# Patient Record
Sex: FEMALE | Race: WHITE | NOT HISPANIC OR LATINO | Employment: OTHER | URBAN - METROPOLITAN AREA
[De-identification: names, ages, dates, MRNs, and addresses within clinical notes are randomized per-mention and may not be internally consistent; named-entity substitution may affect disease eponyms.]

---

## 2017-01-04 RX ORDER — NITROGLYCERIN 0.4 MG/1
0.4 TABLET SUBLINGUAL PRN
Qty: 25 TAB | Refills: 2 | Status: SHIPPED | OUTPATIENT
Start: 2017-01-04 | End: 2017-09-12 | Stop reason: SDUPTHER

## 2017-01-04 NOTE — TELEPHONE ENCOUNTER
Last seen: 7/19/16 by Dr. Bahena  Next appt: 1/9/17 with Dr. Bahena    Was the patient seen in the last year in this department? Yes   Does patient have an active prescription for medications requested? No   Received Request Via: Pharmacy

## 2017-01-09 ENCOUNTER — OFFICE VISIT (OUTPATIENT)
Dept: INTERNAL MEDICINE | Facility: MEDICAL CENTER | Age: 67
End: 2017-01-09
Payer: MEDICARE

## 2017-01-09 VITALS
WEIGHT: 117.6 LBS | HEIGHT: 60 IN | HEART RATE: 78 BPM | BODY MASS INDEX: 23.09 KG/M2 | RESPIRATION RATE: 18 BRPM | DIASTOLIC BLOOD PRESSURE: 78 MMHG | TEMPERATURE: 98.4 F | SYSTOLIC BLOOD PRESSURE: 125 MMHG | OXYGEN SATURATION: 95 %

## 2017-01-09 DIAGNOSIS — Z00.00 PREVENTATIVE HEALTH CARE: ICD-10-CM

## 2017-01-09 DIAGNOSIS — M13.0 POLYARTHRITIS: ICD-10-CM

## 2017-01-09 DIAGNOSIS — Z79.891 OPIATE ANALGESIC USE AGREEMENT EXISTS: ICD-10-CM

## 2017-01-09 DIAGNOSIS — M54.41 CHRONIC MIDLINE LOW BACK PAIN WITH RIGHT-SIDED SCIATICA: ICD-10-CM

## 2017-01-09 DIAGNOSIS — G89.29 CHRONIC MIDLINE LOW BACK PAIN WITH RIGHT-SIDED SCIATICA: ICD-10-CM

## 2017-01-09 DIAGNOSIS — J44.9 CHRONIC OBSTRUCTIVE PULMONARY DISEASE, UNSPECIFIED COPD TYPE (HCC): ICD-10-CM

## 2017-01-09 DIAGNOSIS — Z79.4 TYPE 2 DIABETES MELLITUS WITHOUT COMPLICATION, WITH LONG-TERM CURRENT USE OF INSULIN (HCC): ICD-10-CM

## 2017-01-09 DIAGNOSIS — I63.9 CEREBROVASCULAR ACCIDENT (CVA), UNSPECIFIED MECHANISM (HCC): ICD-10-CM

## 2017-01-09 DIAGNOSIS — I50.9 CHRONIC CONGESTIVE HEART FAILURE, UNSPECIFIED CONGESTIVE HEART FAILURE TYPE: ICD-10-CM

## 2017-01-09 DIAGNOSIS — E11.9 TYPE 2 DIABETES MELLITUS WITHOUT COMPLICATION, WITH LONG-TERM CURRENT USE OF INSULIN (HCC): ICD-10-CM

## 2017-01-09 PROCEDURE — 90715 TDAP VACCINE 7 YRS/> IM: CPT | Performed by: INTERNAL MEDICINE

## 2017-01-09 PROCEDURE — 90472 IMMUNIZATION ADMIN EACH ADD: CPT | Performed by: INTERNAL MEDICINE

## 2017-01-09 PROCEDURE — G0009 ADMIN PNEUMOCOCCAL VACCINE: HCPCS | Performed by: INTERNAL MEDICINE

## 2017-01-09 PROCEDURE — 99214 OFFICE O/P EST MOD 30 MIN: CPT | Mod: 25,GC | Performed by: INTERNAL MEDICINE

## 2017-01-09 PROCEDURE — 90670 PCV13 VACCINE IM: CPT | Performed by: INTERNAL MEDICINE

## 2017-01-09 RX ORDER — HYDROCODONE BITARTRATE AND ACETAMINOPHEN 7.5; 325 MG/1; MG/1
1 TABLET ORAL EVERY 8 HOURS PRN
Qty: 90 TAB | Refills: 3 | Status: SHIPPED | OUTPATIENT
Start: 2017-01-30 | End: 2017-03-03 | Stop reason: SDUPTHER

## 2017-01-09 RX ORDER — BUTALBITAL, ACETAMINOPHEN AND CAFFEINE 50; 325; 40 MG/1; MG/1; MG/1
1 TABLET ORAL
Qty: 30 TAB | Refills: 3 | Status: SHIPPED | OUTPATIENT
Start: 2017-01-09 | End: 2017-04-07 | Stop reason: SDUPTHER

## 2017-01-09 RX ORDER — BUTALBITAL, ACETAMINOPHEN AND CAFFEINE 50; 325; 40 MG/1; MG/1; MG/1
1 TABLET ORAL
Refills: 0 | COMMUNITY
Start: 2016-12-30 | End: 2017-01-09 | Stop reason: SDUPTHER

## 2017-01-09 RX ORDER — BUTALBITAL, ACETAMINOPHEN AND CAFFEINE 50; 325; 40 MG/1; MG/1; MG/1
1 TABLET ORAL
Qty: 30 TAB | Refills: 3 | Status: SHIPPED | OUTPATIENT
Start: 2017-01-09 | End: 2017-01-09 | Stop reason: SDUPTHER

## 2017-01-09 RX ORDER — HYDROCODONE BITARTRATE AND ACETAMINOPHEN 7.5; 325 MG/1; MG/1
1 TABLET ORAL EVERY 8 HOURS PRN
Qty: 90 TAB | Refills: 3 | Status: SHIPPED | OUTPATIENT
Start: 2017-01-30 | End: 2017-01-09 | Stop reason: SDUPTHER

## 2017-01-09 NOTE — MR AVS SNAPSHOT
"        Leena Bella   2017 1:15 PM   Office Visit   MRN: [id number]    Department:  [facility]   Dept Phone:  [phone]    Description:  Female : 1950   Provider:  Adriano Bahena M.D.           Reason for Visit     Follow-Up           Allergies as of 2017     Allergen Noted Reactions    Cozaar [Losartan] 2016       Darvocet [Propoxyphene N-Apap] 2016       Lexapro 2016       Lisinopril 2016       Cephalexin 2014       Rash - \"looks like I have AIDS\"    Morphine 10/12/2008       Heart stopped?    Other Environmental 10/20/2011   Rash    tape    Penicillin G Potassium 10/12/2008   Rash    Has tolerated Unasyn in May 2014 and received amoxicillin on 12/29/15 without reaction    Sulfa Drugs 10/18/2011   Rash      You were diagnosed with     Chronic obstructive pulmonary disease, unspecified COPD type ([facility])   [4781489]       Cerebrovascular accident (CVA), unspecified mechanism ([facility])   [[id number]]       Type 2 diabetes mellitus without complication, with long-term current use of insulin ([facility])   [[id number]]       Chronic congestive heart failure, unspecified congestive heart failure type ([facility])   [[id number]]       Opiate analgesic use agreement exists   [8720971]       Polyarthritis   [115620]       Chronic midline low back pain with right-sided sciatica   [[id number]]       Preventative health care   [[id number]]         Vital Signs     Blood Pressure Pulse Temperature Respirations Height Weight    125/78 mmHg 78 36.9 °C (98.4 °F) 18 1.524 m (5') 53.343 kg (117 lb 9.6 oz)    Body Mass Index Oxygen Saturation Last Menstrual Period Breastfeeding? Smoking Status       22.97 kg/m2 95% 06/15/1996 No Former Smoker       Basic Information     Date Of Birth Sex Race Ethnicity Preferred Language    1950 Female White Non- English      Your appointments     2017  1:00 PM   Follow Up Visit with Maria A Bajwa M.D.   [facility] (--)    [address] , [address]  [facility] [id number] [phone]           You will be receiving a confirmation call a few days before your appointment from our automated call confirmation system.            Feb 14, 2017  2:40 PM   BOTOX with Melissa P Bloch, M.D.   [facility] (--)    [address], [address]  [facility] [id number] [phone]              Problem List              ICD-10-CM Priority Class Noted - Resolved    CAD (coronary artery disease) I25.10   10/18/2011 - Present    Ischemic cardiomyopathy I25.5   10/18/2011 - Present    Type 2 diabetes mellitus ([facility]) E11.9   10/18/2011 - Present    CHF (congestive heart failure) ([facility]) I50.9   10/18/2011 - Present    Migraines G43.909   12/23/2013 - Present    Polyarthralgia M25.50   11/3/2015 - Present    Chronic fatigue R53.82   11/3/2015 - Present    Dyspnea R06.00   11/3/2015 - Present    Skin rash R21   11/3/2015 - Present    Osteoporosis M81.0   11/3/2015 - Present    Vitamin D deficiency E55.9   11/11/2015 - Present    CVA (cerebral vascular accident) ([facility]) I63.9   6/4/2016 - Present    Allergic rhinitis J30.9   6/4/2016 - Present    Automatic implantable cardioverter-defibrillator in situ Z95.810   6/4/2016 - Present    Osteoarthritis M19.90   6/4/2016 - Present    Labial abscess N76.4   6/4/2016 - Present    Nicotine dependence F17.200   6/4/2016 - Present    Diabetic neuropathy ([facility]) E11.40   6/4/2016 - Present    GERD (gastroesophageal reflux disease) K21.9   6/4/2016 - Present    RLS (restless legs syndrome) G25.81   6/4/2016 - Present    PEYTON (obstructive sleep apnea) G47.33   6/4/2016 - Present    COPD (chronic obstructive pulmonary disease) ([facility]) J44.9   6/4/2016 - Present    Anxiety disorder F41.9   6/4/2016 - Present    Dyslipidemia E78.5   6/4/2016 - Present    Abscess L02.91   6/4/2016 - Present    Hx of coronary artery bypass graft Z95.1   6/4/2016 - Present    HTN (hypertension) I10   6/4/2016 - Present    Opiate analgesic use agreement  exists Z02.89   1/9/2017 - Present      Health Maintenance        Date Due Completion Dates    DIABETES MONOFILAMTENT / LE EXAM 1950 ---    RETINAL SCREENING 4/6/1967 ---    PAP SMEAR 4/6/1970 ---    COLONOSCOPY 4/6/1999 ---    URINE ACR / MICROALBUMIN 12/14/2013 12/14/2012, 10/17/2011, 7/11/2011, 4/12/2010, 9/22/2008    A1C SCREENING 3/10/2016 9/10/2015, 8/19/2014, 5/28/2014, 5/28/2014, 12/14/2012, 3/9/2012, 3/7/2012, 10/17/2011, 7/11/2011, 7/26/2010, 4/12/2010, 8/7/2009, 9/22/2008, 2/13/2007, 5/29/2006    MAMMOGRAM 2/3/2017 2/3/2016, 11/5/2012    FASTING LIPID PROFILE 2/11/2017 2/11/2016, 9/10/2015, 3/10/2015, 8/19/2014, 5/28/2014, 5/28/2014, 12/14/2012, 12/14/2012, 7/16/2012, 3/7/2012, 7/11/2011, 7/26/2010, 4/12/2010, 8/7/2009, 9/22/2008, 2/13/2007, 5/29/2006    IMM PNEUMOCOCCAL 65+ (ADULT) LOW/MEDIUM RISK SERIES (2 of 2 - PCV13) 12/5/2017 12/5/2016    SERUM CREATININE 12/15/2017 12/15/2016, 11/9/2016, 4/1/2016, 11/6/2015, 9/10/2015, 3/10/2015, 12/17/2014, 8/19/2014, 8/1/2014, 6/2/2014, 6/1/2014, 5/31/2014, 5/30/2014, 5/29/2014, 5/28/2014, 5/27/2014, 10/10/2013, 10/9/2013, 10/8/2013, 12/14/2012, 10/30/2012, 7/16/2012, 3/9/2012, 3/8/2012, 3/7/2012, 3/6/2012, 10/20/2011, 10/17/2011, 7/11/2011, 7/26/2010, 4/12/2010, 8/7/2009, 9/22/2008, 2/13/2007, 2/12/2007, 7/17/2006, 5/30/2006, 5/29/2006, 5/28/2006    BONE DENSITY 11/30/2020 11/30/2015    IMM DTaP/Tdap/Td Vaccine (2 - Td) 3/3/2025 3/3/2015            Current Immunizations     13-VALENT PCV PREVNAR  Incomplete    Influenza TIV (IM) 10/9/2013  6:30 PM, 11/11/2011    Influenza Vaccine Quad Inj (Preserved) 12/5/2016    Pneumococcal Vaccine (UF)Historical Data 11/11/2011    Pneumococcal polysaccharide vaccine (PPSV-23) 12/5/2016    SHINGLES VACCINE 10/11/2015    Tdap Vaccine  Incomplete, 3/3/2015      Below and/or attached are the medications your provider expects you to take. Review all of your home medications and newly ordered medications with your provider  and/or pharmacist. Follow medication instructions as directed by your provider and/or pharmacist. Please keep your medication list with you and share with your provider. Update the information when medications are discontinued, doses are changed, or new medications (including over-the-counter products) are added; and carry medication information at all times in the event of emergency situations     Allergies:  COZAAR - (reactions not documented)     DARVOCET - (reactions not documented)     LEXAPRO - (reactions not documented)     LISINOPRIL - (reactions not documented)     CEPHALEXIN - (reactions not documented)     MORPHINE - (reactions not documented)     OTHER ENVIRONMENTAL - Rash     PENICILLIN G POTASSIUM - Rash     SULFA DRUGS - Rash               Medications  Valid as of: January 09, 2017 -  2:41 PM    Generic Name Brand Name Tablet Size Instructions for use    Amiodarone HCl (Tab) CORDARONE 200 MG Take 200 mg by mouth every day.        Aspirin (Tab) aspirin 81 MG Take 325 mg by mouth every day.        BuPROPion HCl (TABLET SR 12 HR) WELLBUTRIN- MG Take 1 Tab by mouth 2 times a day.        Butalbital-APAP-Caffeine (Tab) FIORICET -40 MG Take 1 Tab by mouth every day.        Calcium Carbonate-Vitamin D (Tab) OSCAL 500 +D 500-200 MG-UNIT Take 1 Tab by mouth 2 times a day, with meals.        Calcium Carbonate-Vitamin D (Tab) OSCAL 500 +D 500-200 MG-UNIT Take 1 Tab by mouth 2 times a day, with meals.        Carvedilol (Tab) COREG 12.5 MG TAKE 1 TAB BY MOUTH 2 TIMES A DAY.        Cetirizine HCl (Tab) ZYRTEC 10 MG Take 1 Tab by mouth 1 time daily as needed for Allergies.        Clopidogrel Bisulfate (Tab) PLAVIX 75 MG TAKE 1 TAB BY MOUTH EVERY DAY.        Ferrous Sulfate (Tab) Iron 325 (65 FE) MG every day.         Fluticasone Propionate (Suspension) FLONASE 50 MCG/ACT         Furosemide (Tab) LASIX 20 MG TAKE 1 TAB BY MOUTH EVERY DAY.        Gabapentin (Tab) NEURONTIN 600 MG TAKE 1 TABLET BY MOUTH  TWICE A DAY        Hydrocodone-Acetaminophen (Tab) NORCO 7.5-325 MG Take 1 Tab by mouth every 8 hours as needed for Severe Pain.        Hydroxychloroquine Sulfate (Tab) PLAQUENIL 200 MG Take 1 Tab by mouth every day.        Insulin Glargine (Solution Pen-injector) LANTUS 100 UNIT/ML INJECT 20 UNITS SUBCUTANEOUSLY DAILY AT BEDTIME AS DIRECTED        Insulin Pen Needle (Misc) Insulin Pen Needle 31G X 8 MM Use to inject insulin daily.        Ipratropium-Albuterol (Aerosol) COMBIVENT  MCG/ACT Inhale 2 Puffs by mouth 4 times a day.        MetFORMIN HCl (Tab) GLUCOPHAGE 1000 MG Take 1 Tab by mouth 2 times a day.        Nitroglycerin (SL Tab) NITROSTAT 0.4 MG Place 1 Tab under tongue as needed for Chest Pain. Max dose of 3 in 24 hrs until need for ED.        Pantoprazole Sodium (Tablet Delayed Response) PROTONIX 40 MG TAKE 1 TAB BY MOUTH EVERY DAY.        Potassium Chloride (Cap CR) MICRO-K 10 MEQ TAKE ONE CAPSULE BY MOUTH TWICE DAILY        ROPINIRole HCl (Tab) REQUIP 0.5 MG Take 1 Tab by mouth every bedtime.        Rosuvastatin Calcium (Tab) CRESTOR 40 MG Take 1 Tab by mouth every day.        Topiramate (Tab) TOPAMAX 100 MG TAKE 1 TAB BY MOUTH EVERY 12 HOURS.        .                 Medicines prescribed today were sent to:     [id number] - [place] - [address]    [address] [address]    Phone: [phone] Fax: [phone]    Open 24 Hours?: No      Medication refill instructions:       If your prescription bottle indicates you have medication refills left, it is not necessary to call your provider’s office. Please contact your pharmacy and they will refill your medication.    If your prescription bottle indicates you do not have any refills left, you may request refills at any time through one of the following ways: The online [employer] system (except Urgent Care), by calling your provider’s office, or by asking your pharmacy to contact your provider’s office with a refill request.  Medication refills are processed only during regular business hours and may not be available until the next business day. Your provider may request additional information or to have a follow-up visit with you prior to refilling your medication.   *Please Note: Medication refills are assigned a new Rx number when refilled electronically. Your pharmacy may indicate that no refills were authorized even though a new prescription for the same medication is available at the pharmacy. Please request the medicine by name with the pharmacy before contacting your provider for a refill.        Your To Do List     Future Labs/Procedures Complete By Expires    CBC WITH DIFFERENTIAL  As directed 1/9/2018    COMP METABOLIC PANEL  As directed 1/9/2018    HEMOGLOBIN A1C  As directed 1/9/2018      Instructions    Insulin Resistance  Insulin is a hormone made by the pancreas that controls blood sugar (glucose) levels. It is needed for your body to function normally. Insulin resistance occurs when your body does not respond well to the insulin made by your pancreas. Insulin resistance results in high blood glucose levels and can lead to problems, including:  · Prediabetes.  · Type 2 diabetes.  · Heart disease.  · High blood pressure (hypertension).  · Stroke.  · Polycystic ovary syndrome.  · Fatty liver.  CAUSES   The exact causes is unknown.  RISK FACTORS  · Being overweight or obese, especially if your weight is centered in the waist area.  · Physical inactivity.  SIGNS AND SYMPTOMS   There are usually no symptoms.  DIAGNOSIS   There is no test to diagnose insulin resistance. However, your health care provider may suspect that you have insulin resistance if you have:  · High blood glucose levels.  · Abnormal cholesterol levels.  · A waist circumference of more than 35 inches for women and more than 40 inches for men.  · The risk factors for insulin resistance.  To make a diagnosis, your health care provider will perform a physical  exam. During the exam, he or she will ask you questions about your health and medical history.  TREATMENT   The most important treatment is lifestyle changes. Your health care provider can help you make the changes that are appropriate. These may include:  · Eating less.  · Being physically active.  · Stopping the use of any tobacco products.  · Taking medicine to help improve your insulin sensitivity.  HOME CARE INSTRUCTIONS   · Eat a healthy diet.  ¨ Choose healthy portion sizes.  ¨ Eat more vegetables.  ¨ Drink more water.  ¨ Cut back on high-fat toppings.  ¨ Eat foods that are low in fat, such as fruits, vegetables, and lean meats.  · Be physically active to reach and maintain a healthy weight.  ¨ Exercise 4 days a week for 20-40 minutes at a time, or as directed by your health care provider.  ¨ Take the stairs instead of the elevator.  ¨ Walk around when you talk on the phone.  ¨ Walk your dog if you have one.  · Take medicines only as directed by your health care provider.  · Do not use any tobacco products, including cigarettes, chewing tobacco, or electronic cigarettes. If you need help quitting, ask your health care provider.  · Keep all follow-up visits as directed by your health care provider. This is important.  SEEK MEDICAL CARE IF:   · You have trouble losing weight or maintaining your goal weight.  · You gain weight.  · You have trouble following your prescribed meal plan.  · You cannot exercise or do your normal exercises.     This information is not intended to replace advice given to you by your health care provider. Make sure you discuss any questions you have with your health care provider.     Document Released: 02/06/2007 Document Revised: 01/08/2016 Document Reviewed: 05/29/2014  [employer] Interactive Patient Education ©2016 [employer]            MyChart Status: Patient Declined

## 2017-01-09 NOTE — PROGRESS NOTES
Established Patient    Leena presents today with the following:    CC: Follow-up for insulin-dependent diabetes mellitus, hypertension, migraines, or back pain    HPI: Patient is a very pleasant 66-year-old female with a past medical history of ischemic cardiomyopathy resulting in congestive heart failure well compensated, hypertension, vitamin D deficiency, osteoporosis, COPD, heard, and recent CVA in 12/12/16. She came here to follow-up for management for the diseases listed above. Currently she denies any new onset motor weakness, sensory paresthesias, bowel or bladder incontinence, headaches, vertigo. Patient was discharged on aspirin 325 and Plavix dual antiplatelet therapy. Currently she denies any active bleeding, melena, or hematochezia.    Patient Active Problem List    Diagnosis Date Noted   • Opiate analgesic use agreement exists 01/09/2017   • CVA (cerebral vascular accident) ([facility]) 06/04/2016   • Allergic rhinitis 06/04/2016   • Automatic implantable cardioverter-defibrillator in situ 06/04/2016   • Osteoarthritis 06/04/2016   • Labial abscess 06/04/2016   • Nicotine dependence 06/04/2016   • Diabetic neuropathy ([facility]) 06/04/2016   • GERD (gastroesophageal reflux disease) 06/04/2016   • RLS (restless legs syndrome) 06/04/2016   • PEYTON (obstructive sleep apnea) 06/04/2016   • COPD (chronic obstructive pulmonary disease) ([facility]) 06/04/2016   • Anxiety disorder 06/04/2016   • Dyslipidemia 06/04/2016   • Abscess 06/04/2016   • Hx of coronary artery bypass graft 06/04/2016   • HTN (hypertension) 06/04/2016   • Vitamin D deficiency 11/11/2015   • Polyarthralgia 11/03/2015   • Chronic fatigue 11/03/2015   • Dyspnea 11/03/2015   • Skin rash 11/03/2015   • Osteoporosis 11/03/2015   • Migraines 12/23/2013   • CAD (coronary artery disease) 10/18/2011   • Ischemic cardiomyopathy 10/18/2011   • Type 2 diabetes mellitus ([facility]) 10/18/2011   • CHF (congestive heart failure) ([facility]) 10/18/2011       Current Outpatient  Prescriptions   Medication Sig Dispense Refill   • calcium-vitamin D (OSCAL 500 +D) 500-200 MG-UNIT Tab Take 1 Tab by mouth 2 times a day, with meals. 100 Tab 3   • calcium-vitamin D (OSCAL 500 +D) 500-200 MG-UNIT Tab Take 1 Tab by mouth 2 times a day, with meals. 100 Tab 3   • nitroglycerin (NITROSTAT) 0.4 MG SL Tab Place 1 Tab under tongue as needed for Chest Pain. Max dose of 3 in 24 hrs until need for ED. 25 Tab 2   • hydrocodone-acetaminophen (NORCO) 7.5-325 MG per tablet Take 1 Tab by mouth every 8 hours as needed for Severe Pain. 90 Tab 0   • Insulin Pen Needle 31G X 8 MM Misc Use to inject insulin daily. 100 Each 0   • rosuvastatin (CRESTOR) 40 MG tablet Take 1 Tab by mouth every day. 30 Tab 3   • buPROPion SR (WELLBUTRIN-SR) 150 MG TABLET SR 12 HR sustained-release tablet Take 1 Tab by mouth 2 times a day. 60 Tab 11   • metformin (GLUCOPHAGE) 1000 MG tablet Take 1 Tab by mouth 2 times a day. 60 Tab 11   • ropinirole (REQUIP) 0.5 MG Tab Take 1 Tab by mouth every bedtime. 30 Tab 3   • furosemide (LASIX) 20 MG Tab TAKE 1 TAB BY MOUTH EVERY DAY. 60 Tab 0   • cetirizine (ZYRTEC) 10 MG Tab Take 1 Tab by mouth 1 time daily as needed for Allergies. 90 Tab 0   • hydroxychloroquine (PLAQUENIL) 200 MG Tab Take 1 Tab by mouth every day. 30 Tab 3   • potassium chloride (MICRO-K) 10 MEQ capsule TAKE ONE CAPSULE BY MOUTH TWICE DAILY 180 Cap 3   • pantoprazole (PROTONIX) 40 MG Tablet Delayed Response TAKE 1 TAB BY MOUTH EVERY DAY. 30 Tab 5   • clopidogrel (PLAVIX) 75 MG Tab TAKE 1 TAB BY MOUTH EVERY DAY. 30 Tab 5   • carvedilol (COREG) 12.5 MG Tab TAKE 1 TAB BY MOUTH 2 TIMES A DAY. 60 Tab 5   • topiramate (TOPAMAX) 100 MG Tab TAKE 1 TAB BY MOUTH EVERY 12 HOURS. 60 Tab 5   • gabapentin (NEURONTIN) 600 MG tablet TAKE 1 TABLET BY MOUTH TWICE A DAY 60 Tab 5   • insulin glargine (LANTUS SOLOSTAR) 100 UNIT/ML Solution Pen-injector injection INJECT 20 UNITS SUBCUTANEOUSLY DAILY AT BEDTIME AS DIRECTED 15 PEN 0   • fluticasone  (FLONASE) 50 MCG/ACT nasal spray      • albuterol-ipratropium (COMBIVENT)  MCG/ACT AERO Inhale 2 Puffs by mouth 4 times a day. 1 Inhaler 0   • amiodarone (CORDARONE) 200 MG TABS Take 200 mg by mouth every day.     • IRON 325 (65 FE) MG PO TABS every day.      • ASPIRIN 81 MG PO TABS Take 325 mg by mouth every day.     • acetaminophen/caffeine/butalbital 325-40-50 mg (FIORICET) -40 MG Tab Take 1 Tab by mouth every day.  0     No current facility-administered medications for this visit.       ROS: As per HPI. Additional pertinent symptoms as noted below.    Constitutional: denies unintentional weight loss, fatigue, however endorses occasional lightheadedness when getting up out of bed quickly  Cardiovascular: reports exertional angina, denies palpitations and orthopnea  Respiratory: denies cough or phlegm production. Reports shortness of breath with prolonged activity  Neurological: patient has residual left-sided mild motor deficit. Denies any numbness or tingling  All others negative    /78 mmHg  Pulse 78  Temp(Src) 36.9 °C (98.4 °F)  Resp 18  Ht 1.524 m (5')  Wt 53.343 kg (117 lb 9.6 oz)  BMI 22.97 kg/m2  SpO2 95%  LMP 06/15/1996  Breastfeeding? No    Physical Exam   Constitutional:  oriented to person, place, and time. No distress.   Eyes: Pupils are equal, round, and reactive to light. No scleral icterus.  Neck: Neck supple. No thyromegaly present.   Cardiovascular: Normal rate, regular rhythm and normal heart sounds.  Exam reveals no gallop and no friction rub.  No murmur heard.  Pulmonary/Chest: Breath sounds normal. Chest wall is not dull to percussion.   Musculoskeletal:   no edema.   Lymphadenopathy: no cervical adenopathy  Neurological: Hand  strength is decreased on the left. No motor deficit present in the lower extremities. Reflexes are 2/2. Babinski sign shows downflexion  Skin: No cyanosis. Nails show no clubbing.        Assessment and Plan    1. Chronic obstructive  pulmonary disease, unspecified COPD type ([facility])  -Reports exertional dyspnea without cough or production  -No PFTs on file  -States that she uses 2 L of oxygen at night and sometimes throughout the day  -Currently on Combivent  -Symptoms are well-controlled  -CTM      2. Cerebrovascular accident (CVA), unspecified mechanism ([facility])  -Ischemic CVA in December 2016 resulting in left swati-paralysis, dysarthria, and mild cognitive deficit.  -Patient underwent stroke rehabilitation  -Patient underwent physical therapy  -Currently uses a walker. Denies any falls  -Significant improvement in her symptoms as per the patient  -She was discharged with aspirin 325 and Plavix 75  -Currently smokes 1-3 cigarettes in a month  -Currently on Crestor 20      3. Type 2 diabetes mellitus without complication, with long-term current use of insulin ([facility])  -Patient was advised to get routine labs however failed to do so  -Last glycohemoglobin was 8.2 in 2015  -She is currently on Lantus 15 units daily at bedtime and metformin 2 g a day  -Reports fasting glucose of 95-10 5 in the morning, and postprandial glucose ranging from 125-165  -Compliant with daily glucose   Measurements    4. Chronic congestive heart failure, unspecified congestive heart failure type ([facility])  -Patient endorses angina with exertion, denies rest angina. -No pedal edema present upon examination, no bibasal crackles present, no JVD noted.  -Continue Coreg, aspirin, Plavix, furosemide 10, and KCl      5. Opiate analgesic use agreement exists  -For her lower back pain  Overall impression that this patient is benefiting from opioid therapy and that the benefits outweigh the risks of continued use: yes     - Controlled Substance Use Agreement current or updated today   - Urine drug screen current or ordered today   - Additional lab: CMP to assess liver and renal function scheduled for the futur   - Rx refill prescriptions are done for 3 months, No early refills. See  orders   - Referral to pain management: N\A    6. Preventative health care  Patient is up-to-date on her flu shot, is scheduled to get a mammogram, has gotten a colonoscopy within the last 10 years, has gotten Pneumovax.  -Prevnar and Tdap given in the clinic today      Followup: Return in about 6 months (around 7/9/2017).      Signed by: Adriano Bahena M.D.

## 2017-01-09 NOTE — PATIENT INSTRUCTIONS
Insulin Resistance  Insulin is a hormone made by the pancreas that controls blood sugar (glucose) levels. It is needed for your body to function normally. Insulin resistance occurs when your body does not respond well to the insulin made by your pancreas. Insulin resistance results in high blood glucose levels and can lead to problems, including:  · Prediabetes.  · Type 2 diabetes.  · Heart disease.  · High blood pressure (hypertension).  · Stroke.  · Polycystic ovary syndrome.  · Fatty liver.  CAUSES   The exact causes is unknown.  RISK FACTORS  · Being overweight or obese, especially if your weight is centered in the waist area.  · Physical inactivity.  SIGNS AND SYMPTOMS   There are usually no symptoms.  DIAGNOSIS   There is no test to diagnose insulin resistance. However, your health care provider may suspect that you have insulin resistance if you have:  · High blood glucose levels.  · Abnormal cholesterol levels.  · A waist circumference of more than 35 inches for women and more than 40 inches for men.  · The risk factors for insulin resistance.  To make a diagnosis, your health care provider will perform a physical exam. During the exam, he or she will ask you questions about your health and medical history.  TREATMENT   The most important treatment is lifestyle changes. Your health care provider can help you make the changes that are appropriate. These may include:  · Eating less.  · Being physically active.  · Stopping the use of any tobacco products.  · Taking medicine to help improve your insulin sensitivity.  HOME CARE INSTRUCTIONS   · Eat a healthy diet.  ¨ Choose healthy portion sizes.  ¨ Eat more vegetables.  ¨ Drink more water.  ¨ Cut back on high-fat toppings.  ¨ Eat foods that are low in fat, such as fruits, vegetables, and lean meats.  · Be physically active to reach and maintain a healthy weight.  ¨ Exercise 4 days a week for 20-40 minutes at a time, or as directed by your health care  provider.  ¨ Take the stairs instead of the elevator.  ¨ Walk around when you talk on the phone.  ¨ Walk your dog if you have one.  · Take medicines only as directed by your health care provider.  · Do not use any tobacco products, including cigarettes, chewing tobacco, or electronic cigarettes. If you need help quitting, ask your health care provider.  · Keep all follow-up visits as directed by your health care provider. This is important.  SEEK MEDICAL CARE IF:   · You have trouble losing weight or maintaining your goal weight.  · You gain weight.  · You have trouble following your prescribed meal plan.  · You cannot exercise or do your normal exercises.     This information is not intended to replace advice given to you by your health care provider. Make sure you discuss any questions you have with your health care provider.     Document Released: 02/06/2007 Document Revised: 01/08/2016 Document Reviewed: 05/29/2014  [employer] Interactive Patient Education ©2016 [employer]

## 2017-01-24 NOTE — TELEPHONE ENCOUNTER
Last seen: 01/09/17 by Dr. Bahena  Next appt: None     Was the patient seen in the last year in this department? Yes   Does patient have an active prescription for medications requested? No   Received Request Via: Pharmacy

## 2017-02-01 RX ORDER — FUROSEMIDE 20 MG/1
20 TABLET ORAL
Qty: 30 TAB | Refills: 3 | Status: SHIPPED | OUTPATIENT
Start: 2017-02-01 | End: 2017-03-28 | Stop reason: SDUPTHER

## 2017-02-09 ENCOUNTER — APPOINTMENT (OUTPATIENT)
Dept: RHEUMATOLOGY | Facility: PHYSICIAN GROUP | Age: 67
End: 2017-02-09
Payer: MEDICARE

## 2017-02-14 ENCOUNTER — OFFICE VISIT (OUTPATIENT)
Dept: NEUROLOGY | Facility: MEDICAL CENTER | Age: 67
End: 2017-02-14
Payer: MEDICARE

## 2017-02-14 VITALS
HEART RATE: 82 BPM | BODY MASS INDEX: 24.58 KG/M2 | RESPIRATION RATE: 16 BRPM | OXYGEN SATURATION: 93 % | HEIGHT: 60 IN | TEMPERATURE: 97.4 F | SYSTOLIC BLOOD PRESSURE: 144 MMHG | DIASTOLIC BLOOD PRESSURE: 72 MMHG | WEIGHT: 125.2 LBS

## 2017-02-14 DIAGNOSIS — G43.119 INTRACTABLE MIGRAINE WITH AURA WITHOUT STATUS MIGRAINOSUS: ICD-10-CM

## 2017-02-14 DIAGNOSIS — I63.032 CEREBROVASCULAR ACCIDENT (CVA) DUE TO THROMBOSIS OF LEFT CAROTID ARTERY (HCC): ICD-10-CM

## 2017-02-14 PROCEDURE — G8420 CALC BMI NORM PARAMETERS: HCPCS | Performed by: PSYCHIATRY & NEUROLOGY

## 2017-02-14 PROCEDURE — G8482 FLU IMMUNIZE ORDER/ADMIN: HCPCS | Performed by: PSYCHIATRY & NEUROLOGY

## 2017-02-14 PROCEDURE — G8432 DEP SCR NOT DOC, RNG: HCPCS | Performed by: PSYCHIATRY & NEUROLOGY

## 2017-02-14 PROCEDURE — 4040F PNEUMOC VAC/ADMIN/RCVD: CPT | Performed by: PSYCHIATRY & NEUROLOGY

## 2017-02-14 PROCEDURE — 0518F FALL PLAN OF CARE DOCD: CPT | Mod: 8P | Performed by: PSYCHIATRY & NEUROLOGY

## 2017-02-14 PROCEDURE — 1100F PTFALLS ASSESS-DOCD GE2>/YR: CPT | Performed by: PSYCHIATRY & NEUROLOGY

## 2017-02-14 PROCEDURE — 3017F COLORECTAL CA SCREEN DOC REV: CPT | Mod: 8P | Performed by: PSYCHIATRY & NEUROLOGY

## 2017-02-14 PROCEDURE — 1036F TOBACCO NON-USER: CPT | Performed by: PSYCHIATRY & NEUROLOGY

## 2017-02-14 PROCEDURE — G8598 ASA/ANTIPLAT THER USED: HCPCS | Performed by: PSYCHIATRY & NEUROLOGY

## 2017-02-14 PROCEDURE — 99214 OFFICE O/P EST MOD 30 MIN: CPT | Performed by: PSYCHIATRY & NEUROLOGY

## 2017-02-14 PROCEDURE — 3288F FALL RISK ASSESSMENT DOCD: CPT | Mod: 8P | Performed by: PSYCHIATRY & NEUROLOGY

## 2017-02-14 PROCEDURE — 3014F SCREEN MAMMO DOC REV: CPT | Performed by: PSYCHIATRY & NEUROLOGY

## 2017-02-14 NOTE — MR AVS SNAPSHOT
"        Leena Bella   2017 2:40 PM   Office Visit   MRN: [id number]    Department:  [facility]   Dept Phone:  [phone]    Description:  Female : 1950   Provider:  Melissa P Bloch, M.D.           Reason for Visit     Follow-Up Botox      Allergies as of 2017     Allergen Noted Reactions    Cozaar [Losartan] 2016       Darvocet [Propoxyphene N-Apap] 2016       Lexapro 2016       Lisinopril 2016       Cephalexin 2014       Rash - \"looks like I have AIDS\"    Morphine 10/12/2008       Heart stopped?    Other Environmental 10/20/2011   Rash    tape    Penicillin G Potassium 10/12/2008   Rash    Has tolerated Unasyn in May 2014 and received amoxicillin on 12/29/15 without reaction    Sulfa Drugs 10/18/2011   Rash      You were diagnosed with     Intractable migraine with aura without status migrainosus   [987077]       Cerebrovascular accident (CVA) due to thrombosis of left carotid artery (CMS-[facility])   [5997171]         Vital Signs     Blood Pressure Pulse Temperature Respirations Height Weight    144/72 mmHg 82 36.3 °C (97.4 °F) 16 1.524 m (5') 56.79 kg (125 lb 3.2 oz)    Body Mass Index Oxygen Saturation Last Menstrual Period Smoking Status          24.45 kg/m2 93% 06/15/1996 Former Smoker        Basic Information     Date Of Birth Sex Race Ethnicity Preferred Language    1950 Female White Non- English      Your appointments     Aug 14, 2017  2:40 PM   Follow Up Visit with Melissa P Bloch, M.D.   [facility] (--)    [address] [place] [address], [address]  [facility] [address] [phone]           You will be receiving a confirmation call a few days before your appointment from our automated call confirmation system.              Problem List              ICD-10-CM Priority Class Noted - Resolved    CAD (coronary artery disease) I25.10   10/18/2011 - Present    Ischemic cardiomyopathy I25.5   10/18/2011 - Present    Type 2 diabetes " mellitus (CMS-HCC) E11.9   10/18/2011 - Present    CHF (congestive heart failure) (CMS-HCC) I50.9   10/18/2011 - Present    Migraines G43.909   12/23/2013 - Present    Polyarthralgia M25.50   11/3/2015 - Present    Chronic fatigue R53.82   11/3/2015 - Present    Dyspnea R06.00   11/3/2015 - Present    Skin rash R21   11/3/2015 - Present    Osteoporosis M81.0   11/3/2015 - Present    Vitamin D deficiency E55.9   11/11/2015 - Present    CVA (cerebral vascular accident) (CMS-HCC) I63.9   6/4/2016 - Present    Allergic rhinitis J30.9   6/4/2016 - Present    Automatic implantable cardioverter-defibrillator in situ Z95.810   6/4/2016 - Present    Osteoarthritis M19.90   6/4/2016 - Present    Labial abscess N76.4   6/4/2016 - Present    Nicotine dependence F17.200   6/4/2016 - Present    Diabetic neuropathy (CMS-HCC) E11.40   6/4/2016 - Present    GERD (gastroesophageal reflux disease) K21.9   6/4/2016 - Present    RLS (restless legs syndrome) G25.81   6/4/2016 - Present    PEYTON (obstructive sleep apnea) G47.33   6/4/2016 - Present    COPD (chronic obstructive pulmonary disease) (CMS-HCC) J44.9   6/4/2016 - Present    Anxiety disorder F41.9   6/4/2016 - Present    Dyslipidemia E78.5   6/4/2016 - Present    Abscess L02.91   6/4/2016 - Present    Hx of coronary artery bypass graft Z95.1   6/4/2016 - Present    HTN (hypertension) I10   6/4/2016 - Present    Opiate analgesic use agreement exists Z02.89   1/9/2017 - Present      Health Maintenance        Date Due Completion Dates    DIABETES MONOFILAMENT / LE EXAM 1950 ---    RETINAL SCREENING 4/6/1967 ---    PAP SMEAR 4/6/1970 ---    COLONOSCOPY 4/6/1999 ---    URINE ACR / MICROALBUMIN 12/14/2013 12/14/2012, 10/17/2011, 7/11/2011, 4/12/2010, 9/22/2008    A1C SCREENING 3/10/2016 9/10/2015, 8/19/2014, 5/28/2014, 5/28/2014, 12/14/2012, 3/9/2012, 3/7/2012, 10/17/2011, 7/11/2011, 7/26/2010, 4/12/2010, 8/7/2009, 9/22/2008, 2/13/2007, 5/29/2006    MAMMOGRAM 2/3/2017 2/3/2016,  11/5/2012    FASTING LIPID PROFILE 2/11/2017 2/11/2016, 9/10/2015, 3/10/2015, 8/19/2014, 5/28/2014, 5/28/2014, 12/14/2012, 12/14/2012, 7/16/2012, 3/7/2012, 7/11/2011, 7/26/2010, 4/12/2010, 8/7/2009, 9/22/2008, 2/13/2007, 5/29/2006    SERUM CREATININE 12/15/2017 12/15/2016, 11/9/2016, 4/1/2016, 11/6/2015, 9/10/2015, 3/10/2015, 12/17/2014, 8/19/2014, 8/1/2014, 6/2/2014, 6/1/2014, 5/31/2014, 5/30/2014, 5/29/2014, 5/28/2014, 5/27/2014, 10/10/2013, 10/9/2013, 10/8/2013, 12/14/2012, 10/30/2012, 7/16/2012, 3/9/2012, 3/8/2012, 3/7/2012, 3/6/2012, 10/20/2011, 10/17/2011, 7/11/2011, 7/26/2010, 4/12/2010, 8/7/2009, 9/22/2008, 2/13/2007, 2/12/2007, 7/17/2006, 5/30/2006, 5/29/2006, 5/28/2006    BONE DENSITY 11/30/2020 11/30/2015    IMM DTaP/Tdap/Td Vaccine (2 - Td) 1/9/2027 1/9/2017, 3/3/2015            Current Immunizations     13-VALENT PCV PREVNAR 1/9/2017    Influenza TIV (IM) 10/9/2013  6:30 PM, 11/11/2011    Influenza Vaccine Quad Inj (Preserved) 12/5/2016    Pneumococcal Vaccine (UF)Historical Data 11/11/2011    Pneumococcal polysaccharide vaccine (PPSV-23) 12/5/2016    SHINGLES VACCINE 10/11/2015    Tdap Vaccine 1/9/2017, 3/3/2015      Below and/or attached are the medications your provider expects you to take. Review all of your home medications and newly ordered medications with your provider and/or pharmacist. Follow medication instructions as directed by your provider and/or pharmacist. Please keep your medication list with you and share with your provider. Update the information when medications are discontinued, doses are changed, or new medications (including over-the-counter products) are added; and carry medication information at all times in the event of emergency situations     Allergies:  COZAAR - (reactions not documented)     DARVOCET - (reactions not documented)     LEXAPRO - (reactions not documented)     LISINOPRIL - (reactions not documented)     CEPHALEXIN - (reactions not documented)     MORPHINE -  (reactions not documented)     OTHER ENVIRONMENTAL - Rash     PENICILLIN G POTASSIUM - Rash     SULFA DRUGS - Rash               Medications  Valid as of: February 14, 2017 -  3:20 PM    Generic Name Brand Name Tablet Size Instructions for use    Amiodarone HCl (Tab) CORDARONE 200 MG Take 200 mg by mouth every day.        Aspirin (Tab) aspirin 81 MG Take 325 mg by mouth every day.        BuPROPion HCl (TABLET SR 12 HR) WELLBUTRIN- MG Take 1 Tab by mouth 2 times a day.        Butalbital-APAP-Caffeine (Tab) FIORICET -40 MG Take 1 Tab by mouth every day.        Calcium Carbonate-Vitamin D (Tab) OSCAL 500 +D 500-200 MG-UNIT Take 1 Tab by mouth 2 times a day, with meals.        Calcium Carbonate-Vitamin D (Tab) OSCAL 500 +D 500-200 MG-UNIT Take 1 Tab by mouth 2 times a day, with meals.        Carvedilol (Tab) COREG 12.5 MG TAKE 1 TAB BY MOUTH 2 TIMES A DAY.        Cetirizine HCl (Tab) ZYRTEC 10 MG Take 1 Tab by mouth 1 time daily as needed for Allergies.        Clopidogrel Bisulfate (Tab) PLAVIX 75 MG TAKE 1 TAB BY MOUTH EVERY DAY.        Ferrous Sulfate (Tab) Iron 325 (65 FE) MG every day.         Fluticasone Propionate (Suspension) FLONASE 50 MCG/ACT         Furosemide (Tab) LASIX 20 MG Take 1 Tab by mouth every day.        Gabapentin (Tab) NEURONTIN 600 MG TAKE 1 TABLET BY MOUTH TWICE A DAY        Hydrocodone-Acetaminophen (Tab) NORCO 7.5-325 MG Take 1 Tab by mouth every 8 hours as needed for Severe Pain.        Hydroxychloroquine Sulfate (Tab) PLAQUENIL 200 MG Take 1 Tab by mouth every day.        Insulin Glargine (Solution Pen-injector) LANTUS 100 UNIT/ML INJECT 20 UNITS SUBCUTANEOUSLY DAILY AT BEDTIME AS DIRECTED        Insulin Pen Needle (Misc) Insulin Pen Needle 31G X 8 MM Use to inject insulin daily.        Ipratropium-Albuterol (Aerosol) COMBIVENT  MCG/ACT Inhale 2 Puffs by mouth 4 times a day.        MetFORMIN HCl (Tab) GLUCOPHAGE 1000 MG Take 1 Tab by mouth 2 times a day.         Nitroglycerin (SL Tab) NITROSTAT 0.4 MG Place 1 Tab under tongue as needed for Chest Pain. Max dose of 3 in 24 hrs until need for ED.        Pantoprazole Sodium (Tablet Delayed Response) PROTONIX 40 MG TAKE 1 TAB BY MOUTH EVERY DAY.        Potassium Chloride (Cap CR) MICRO-K 10 MEQ TAKE ONE CAPSULE BY MOUTH TWICE DAILY        ROPINIRole HCl (Tab) REQUIP 0.5 MG Take 1 Tab by mouth every bedtime.        Rosuvastatin Calcium (Tab) CRESTOR 40 MG Take 1 Tab by mouth every day.        Topiramate (Tab) TOPAMAX 100 MG TAKE 1 TAB BY MOUTH EVERY 12 HOURS.        .                 Medicines prescribed today were sent to:     [facility] #[id number] - [place], [place] - [address] [facility]    [address] [place] [address]    Phone: [phone] Fax: [phone]    Open 24 Hours?: No      Medication refill instructions:       If your prescription bottle indicates you have medication refills left, it is not necessary to call your provider’s office. Please contact your pharmacy and they will refill your medication.    If your prescription bottle indicates you do not have any refills left, you may request refills at any time through one of the following ways: The online [employer] system (except Urgent Care), by calling your provider’s office, or by asking your pharmacy to contact your provider’s office with a refill request. Medication refills are processed only during regular business hours and may not be available until the next business day. Your provider may request additional information or to have a follow-up visit with you prior to refilling your medication.   *Please Note: Medication refills are assigned a new Rx number when refilled electronically. Your pharmacy may indicate that no refills were authorized even though a new prescription for the same medication is available at the pharmacy. Please request the medicine by name with the pharmacy before contacting your provider for a refill.           [facility] [employer] Status: Patient Declined

## 2017-02-16 RX ORDER — CETIRIZINE HYDROCHLORIDE 10 MG/1
TABLET ORAL
Qty: 90 TAB | Refills: 0 | Status: SHIPPED | OUTPATIENT
Start: 2017-02-16 | End: 2017-03-28 | Stop reason: SDUPTHER

## 2017-02-27 NOTE — TELEPHONE ENCOUNTER
Last seen: 1/9/17 by Dr. Bahena  Next appt: 3/3/17 with Dr. Suggs    Was the patient seen in the last year in this department? Yes   Does patient have an active prescription for medications requested? No   Received Request Via: Pharmacy

## 2017-02-28 ENCOUNTER — HOSPITAL ENCOUNTER (OUTPATIENT)
Dept: LAB | Facility: MEDICAL CENTER | Age: 67
End: 2017-02-28
Attending: INTERNAL MEDICINE
Payer: MEDICARE

## 2017-02-28 DIAGNOSIS — Z79.4 TYPE 2 DIABETES MELLITUS WITHOUT COMPLICATION, WITH LONG-TERM CURRENT USE OF INSULIN (HCC): ICD-10-CM

## 2017-02-28 DIAGNOSIS — E11.9 TYPE 2 DIABETES MELLITUS WITHOUT COMPLICATION, WITH LONG-TERM CURRENT USE OF INSULIN (HCC): ICD-10-CM

## 2017-02-28 LAB
ALBUMIN SERPL BCP-MCNC: 4.1 G/DL (ref 3.2–4.9)
ALBUMIN/GLOB SERPL: 1.2 G/DL
ALP SERPL-CCNC: 81 U/L (ref 30–99)
ALT SERPL-CCNC: 8 U/L (ref 2–50)
ANION GAP SERPL CALC-SCNC: 7 MMOL/L (ref 0–11.9)
AST SERPL-CCNC: 10 U/L (ref 12–45)
BASOPHILS # BLD AUTO: 0.06 K/UL (ref 0–0.12)
BASOPHILS NFR BLD AUTO: 0.6 % (ref 0–1.8)
BILIRUB SERPL-MCNC: 0.2 MG/DL (ref 0.1–1.5)
BUN SERPL-MCNC: 21 MG/DL (ref 8–22)
CALCIUM SERPL-MCNC: 10.3 MG/DL (ref 8.5–10.5)
CHLORIDE SERPL-SCNC: 109 MMOL/L (ref 96–112)
CO2 SERPL-SCNC: 23 MMOL/L (ref 20–33)
CREAT SERPL-MCNC: 0.96 MG/DL (ref 0.5–1.4)
EOSINOPHIL # BLD: 0.27 K/UL (ref 0–0.51)
EOSINOPHIL NFR BLD AUTO: 2.5 % (ref 0–6.9)
ERYTHROCYTE [DISTWIDTH] IN BLOOD BY AUTOMATED COUNT: 46 FL (ref 35.9–50)
EST. AVERAGE GLUCOSE BLD GHB EST-MCNC: 203 MG/DL
GLOBULIN SER CALC-MCNC: 3.3 G/DL (ref 1.9–3.5)
GLUCOSE SERPL-MCNC: 346 MG/DL (ref 65–99)
HBA1C MFR BLD: 8.7 % (ref 0–5.6)
HCT VFR BLD AUTO: 43.6 % (ref 37–47)
HGB BLD-MCNC: 13.6 G/DL (ref 12–16)
IMM GRANULOCYTES # BLD AUTO: 0.03 K/UL (ref 0–0.11)
IMM GRANULOCYTES NFR BLD AUTO: 0.3 % (ref 0–0.9)
LYMPHOCYTES # BLD: 1.94 K/UL (ref 1–4.8)
LYMPHOCYTES NFR BLD AUTO: 18.1 % (ref 22–41)
MCH RBC QN AUTO: 29.4 PG (ref 27–33)
MCHC RBC AUTO-ENTMCNC: 31.2 G/DL (ref 33.6–35)
MCV RBC AUTO: 94.2 FL (ref 81.4–97.8)
MONOCYTES # BLD: 0.5 K/UL (ref 0–0.85)
MONOCYTES NFR BLD AUTO: 4.7 % (ref 0–13.4)
NEUTROPHILS # BLD: 7.9 K/UL (ref 2–7.15)
NEUTROPHILS NFR BLD AUTO: 73.8 % (ref 44–72)
NRBC # BLD AUTO: 0 K/UL
NRBC BLD-RTO: 0 /100 WBC
PLATELET # BLD AUTO: 178 K/UL (ref 164–446)
PMV BLD AUTO: 10.9 FL (ref 9–12.9)
POTASSIUM SERPL-SCNC: 4.4 MMOL/L (ref 3.6–5.5)
PROT SERPL-MCNC: 7.4 G/DL (ref 6–8.2)
RBC # BLD AUTO: 4.63 M/UL (ref 4.2–5.4)
SODIUM SERPL-SCNC: 139 MMOL/L (ref 135–145)
WBC # BLD AUTO: 10.7 K/UL (ref 4.8–10.8)

## 2017-02-28 PROCEDURE — 36415 COLL VENOUS BLD VENIPUNCTURE: CPT

## 2017-02-28 PROCEDURE — 83036 HEMOGLOBIN GLYCOSYLATED A1C: CPT | Mod: GA

## 2017-02-28 PROCEDURE — 85025 COMPLETE CBC W/AUTO DIFF WBC: CPT

## 2017-02-28 PROCEDURE — 80053 COMPREHEN METABOLIC PANEL: CPT

## 2017-02-28 RX ORDER — POTASSIUM CHLORIDE 750 MG/1
CAPSULE, EXTENDED RELEASE ORAL
Qty: 60 CAP | Refills: 1 | Status: SHIPPED | OUTPATIENT
Start: 2017-02-28 | End: 2017-03-28 | Stop reason: SDUPTHER

## 2017-03-03 ENCOUNTER — APPOINTMENT (OUTPATIENT)
Dept: INTERNAL MEDICINE | Facility: MEDICAL CENTER | Age: 67
End: 2017-03-03
Payer: MEDICARE

## 2017-03-03 ENCOUNTER — OFFICE VISIT (OUTPATIENT)
Dept: INTERNAL MEDICINE | Facility: MEDICAL CENTER | Age: 67
End: 2017-03-03
Payer: MEDICARE

## 2017-03-03 VITALS
DIASTOLIC BLOOD PRESSURE: 89 MMHG | BODY MASS INDEX: 23.95 KG/M2 | TEMPERATURE: 98.4 F | HEART RATE: 60 BPM | WEIGHT: 122 LBS | HEIGHT: 60 IN | OXYGEN SATURATION: 97 % | SYSTOLIC BLOOD PRESSURE: 112 MMHG

## 2017-03-03 DIAGNOSIS — Z79.891 CHRONIC PRESCRIPTION OPIATE USE: ICD-10-CM

## 2017-03-03 DIAGNOSIS — M54.41 CHRONIC MIDLINE LOW BACK PAIN WITH RIGHT-SIDED SCIATICA: ICD-10-CM

## 2017-03-03 DIAGNOSIS — G25.81 RLS (RESTLESS LEGS SYNDROME): ICD-10-CM

## 2017-03-03 DIAGNOSIS — Z79.891 OPIATE ANALGESIC USE AGREEMENT EXISTS: ICD-10-CM

## 2017-03-03 DIAGNOSIS — G89.29 CHRONIC MIDLINE LOW BACK PAIN WITH RIGHT-SIDED SCIATICA: ICD-10-CM

## 2017-03-03 PROBLEM — M54.9 CHRONIC BACK PAIN: Status: ACTIVE | Noted: 2017-03-03

## 2017-03-03 PROCEDURE — 99214 OFFICE O/P EST MOD 30 MIN: CPT | Performed by: INTERNAL MEDICINE

## 2017-03-03 RX ORDER — HYDROCODONE BITARTRATE AND ACETAMINOPHEN 7.5; 325 MG/1; MG/1
1 TABLET ORAL EVERY 8 HOURS PRN
Qty: 90 TAB | Refills: 0 | Status: SHIPPED | OUTPATIENT
Start: 2017-03-03 | End: 2017-03-28 | Stop reason: SDUPTHER

## 2017-03-03 RX ORDER — ROPINIROLE 0.5 MG/1
1 TABLET, FILM COATED ORAL
Qty: 30 TAB | Refills: 3 | COMMUNITY
Start: 2017-03-03 | End: 2017-03-28 | Stop reason: SDUPTHER

## 2017-03-03 RX ORDER — DOCUSATE SODIUM 100 MG/1
100 CAPSULE, LIQUID FILLED ORAL 2 TIMES DAILY
Qty: 60 CAP | Status: SHIPPED | OUTPATIENT
Start: 2017-03-03 | End: 2017-04-26 | Stop reason: SDUPTHER

## 2017-03-03 NOTE — PROGRESS NOTES
Established Patient    Leena Bella is a 66 y.o. female who presents today with the following:    CC: Follow-up visit for refill of pain medications.    HPI:  1. Chronic midline low back pain with right-sided sciatica/Chronic prescription opiate use/Opiate analgesic use agreement exists  Leena presents today for short-term follow-up for refill of her hydrocodone. She is a primary care patient of Dr. Adriano Bahena, and was last seen by Dr. Bahena on January 10, 2017. At that time a prescription was given for quantity of 90 of her hydrocodone 7.5/325. She reports that she is getting significant pain relief with use of this medication. She does however endorse that she is having some side effects of this in particular constipation. She reports that she only has a bowel movement about every 3 or 4 days. She reports that in the past she has tried over-the-counter medications such as stool softeners but has never been very consistent about using them.    2. RLS (restless legs syndrome)  Incidentally she mentioned today that she has found that lately the current dose of ropinirole 0.5 mg that she is taking is not as effective as it used to be for control of restless leg syndrome. She finds that the symptoms are quite intolerable especially at night. She is interested in possibly increasing this medication.      ROS:   Denies any new chest pain or shortness of breath.  No changes to urinary function. Positive constipation as per history of present illness    Past Medical History   Diagnosis Date   • Coronary bypass    • Automatic implantable cardiac defibrillator in situ    • Diabetes    • Arthritis    • Hypertension    • Myocardial infarct (CMS-[facility])    • Congestive heart failure (CMS-[facility])    • Arrhythmia    • Pacemaker    • Angina    • Indigestion    • Hiatus hernia syndrome    • ASTHMA    • Bronchitis    • Breath shortness    • CATARACT    • Infectious disease      6 weeks ago   • Cold    • Heart burn    • Other  acute pain      feet   • On home oxygen therapy    • Diabetes (CMS-HCC)    • CVA (cerebral vascular accident) (CMS-HCC) 6/4/2016   • Allergic rhinitis 6/4/2016   • Automatic implantable cardioverter-defibrillator in situ 6/4/2016   • Ventricular tachycardia (CMS-HCC) 6/4/2016   • Cardiomyopathy, ischemic 6/4/2016   • Osteoarthritis 6/4/2016   • Labial abscess 6/4/2016   • Nicotine dependence 6/4/2016   • Diabetic neuropathy (CMS-HCC) 6/4/2016   • GERD (gastroesophageal reflux disease) 6/4/2016   • RLS (restless legs syndrome) 6/4/2016   • PEYTON (obstructive sleep apnea) 6/4/2016   • COPD (chronic obstructive pulmonary disease) (CMS-HCC) 6/4/2016   • Anxiety disorder 6/4/2016   • Dyslipidemia 6/4/2016   • Abscess 6/4/2016   • Hx of coronary artery bypass graft 6/4/2016   • HTN (hypertension) 6/4/2016       Social History   Substance Use Topics   • Smoking status: Former Smoker -- 0.25 packs/day for 25 years     Types: Cigarettes     Quit date: 01/01/2016   • Smokeless tobacco: Never Used   • Alcohol Use: No       Current Outpatient Prescriptions   Medication Sig Dispense Refill   • ropinirole (REQUIP) 0.5 MG Tab Take 2 Tabs by mouth every bedtime. 30 Tab 3   • hydrocodone-acetaminophen (NORCO) 7.5-325 MG per tablet Take 1 Tab by mouth every 8 hours as needed for Severe Pain. 90 Tab 0   • docusate sodium (COLACE) 100 MG Cap Take 1 Cap by mouth 2 times a day. 60 Cap prn   • KLOR-CON SPRINKLE 10 MEQ capsule TAKE 1 CAP BY MOUTH 2 TIMES A DAY. 60 Cap 1   • topiramate (TOPAMAX) 100 MG Tab TAKE 1 TAB BY MOUTH EVERY 12 HOURS. 30 Tab 1   • carvedilol (COREG) 12.5 MG Tab TAKE 1 TAB BY MOUTH 2 TIMES A DAY. 60 Tab 5   • clopidogrel (PLAVIX) 75 MG Tab TAKE 1 TAB BY MOUTH EVERY DAY. 30 Tab 5   • pantoprazole (PROTONIX) 40 MG Tablet Delayed Response TAKE 1 TAB BY MOUTH EVERY DAY. 30 Tab 0   • gabapentin (NEURONTIN) 600 MG tablet TAKE 1 TABLET BY MOUTH TWICE A DAY 60 Tab 3   • cetirizine (ZYRTEC) 10 MG Tab TAKE 1 TAB BY MOUTH 1 TIME  DAILY AS NEEDED FOR ALLERGIES. 90 Tab 0   • furosemide (LASIX) 20 MG Tab Take 1 Tab by mouth every day. 30 Tab 3   • calcium-vitamin D (OSCAL 500 +D) 500-200 MG-UNIT Tab Take 1 Tab by mouth 2 times a day, with meals. 100 Tab 3   • calcium-vitamin D (OSCAL 500 +D) 500-200 MG-UNIT Tab Take 1 Tab by mouth 2 times a day, with meals. 100 Tab 3   • acetaminophen/caffeine/butalbital 325-40-50 mg (FIORICET) -40 MG Tab Take 1 Tab by mouth every day. 30 Tab 3   • nitroglycerin (NITROSTAT) 0.4 MG SL Tab Place 1 Tab under tongue as needed for Chest Pain. Max dose of 3 in 24 hrs until need for ED. 25 Tab 2   • Insulin Pen Needle 31G X 8 MM Misc Use to inject insulin daily. 100 Each 0   • rosuvastatin (CRESTOR) 40 MG tablet Take 1 Tab by mouth every day. 30 Tab 3   • buPROPion SR (WELLBUTRIN-SR) 150 MG TABLET SR 12 HR sustained-release tablet Take 1 Tab by mouth 2 times a day. 60 Tab 11   • metformin (GLUCOPHAGE) 1000 MG tablet Take 1 Tab by mouth 2 times a day. 60 Tab 11   • hydroxychloroquine (PLAQUENIL) 200 MG Tab Take 1 Tab by mouth every day. 30 Tab 3   • potassium chloride (MICRO-K) 10 MEQ capsule TAKE ONE CAPSULE BY MOUTH TWICE DAILY 180 Cap 3   • insulin glargine (LANTUS SOLOSTAR) 100 UNIT/ML Solution Pen-injector injection INJECT 20 UNITS SUBCUTANEOUSLY DAILY AT BEDTIME AS DIRECTED 15 PEN 0   • fluticasone (FLONASE) 50 MCG/ACT nasal spray      • albuterol-ipratropium (COMBIVENT)  MCG/ACT AERO Inhale 2 Puffs by mouth 4 times a day. 1 Inhaler 0   • amiodarone (CORDARONE) 200 MG TABS Take 200 mg by mouth every day.     • IRON 325 (65 FE) MG PO TABS every day.      • ASPIRIN 81 MG PO TABS Take 325 mg by mouth every day.       No current facility-administered medications for this visit.       Pulse 60  Temp(Src) 36.9 °C (98.4 °F)  Ht 1.524 m (5')  Wt 55.339 kg (122 lb)  BMI 23.83 kg/m2  SpO2 97%  LMP 06/15/1996    Physical Exam  General: Well developed, well nourished female, in no distress.  Eyes:  Conjuntiva without any obvious injection or erythema.   Neuro: Speech is somewhat delayed, and she is having some word finding difficulty, however this is consistent with her history of stroke in the past and she had similar issues when I saw her in December.    Assessment and Plan    1. Chronic midline low back pain with right-sided sciatica/Chronic prescription opiate use/Opiate analgesic use agreement exists  I refilled her medication for hydrocodone quantity of 90. I have reviewed her prescription monitoring report, which shows that her last refill of hydrocodone was January 26, 2017.  I called her pharmacist to verify this, and was informed that she does not have any additional prescriptions on hold prior to be filled. Per the note by Dr. Adriano Bahena dated January 10, 2017, 3 refills were provided. It appears however these refills were noted on the original prescription rather than written as separate prescriptions.    Moving forward, Leena should be seen once per month here in the office for her medications. Given her history of stroke, and gait instability she has an increased risk of falls, and therefore should be assessed regularly for continued appropriateness of use of narcotic pain relief.    She seems to be having significant constipation as a side effect of chronic opiate use. My recommendation today is for her to be consistent about using a stool softener as a first step. I recommended docusate 100 mg twice daily in addition to significantly increasing her fiber intake as well as significantly increasing her hydration status. When she follows up with her primary care physician, this status can be addressed.    2. RLS (restless legs syndrome)  I think it would be reasonable given the increased symptoms of restless leg syndrome, for her to increase her ropinirole dose to 1 mg at night. If she finds this is helpful, she should request a refill for this new dose. Additionally there is some concern for  worsening constipation associated with ropinirole however I'm hopeful that with a more aggressive bowel regimen with twice daily use of docusate that this could be mitigated.     I spent approximately 35 minutes with this patient of which the majority was spend in the counseling and/or coordinating care of the above listed conditions.      Return in about 4 weeks (around 3/31/2017) with PCP Dr. Bahena.      Signed by: Dhaval Suggs M.D.    This note was created using voice recognition software.  While every attempt is made to ensure accuracy of transcription, occasionally errors occur.

## 2017-03-03 NOTE — PATIENT INSTRUCTIONS
Try OTC stool softener twice per day and increase your fluid and fiber intake to help with constipation.    Try increasing your ropinirole to two pills at night for the restless leg syndrome.

## 2017-03-24 ENCOUNTER — APPOINTMENT (OUTPATIENT)
Dept: RADIOLOGY | Facility: MEDICAL CENTER | Age: 67
End: 2017-03-24
Attending: EMERGENCY MEDICINE
Payer: MEDICARE

## 2017-03-24 ENCOUNTER — HOSPITAL ENCOUNTER (EMERGENCY)
Facility: MEDICAL CENTER | Age: 67
End: 2017-03-24
Attending: EMERGENCY MEDICINE
Payer: MEDICARE

## 2017-03-24 VITALS
BODY MASS INDEX: 22.08 KG/M2 | WEIGHT: 120 LBS | HEART RATE: 69 BPM | OXYGEN SATURATION: 96 % | HEIGHT: 62 IN | RESPIRATION RATE: 16 BRPM | TEMPERATURE: 97.7 F

## 2017-03-24 DIAGNOSIS — S09.90XA CHI (CLOSED HEAD INJURY), INITIAL ENCOUNTER: ICD-10-CM
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
## 2017-03-24 DIAGNOSIS — S16.1XXA NECK STRAIN, INITIAL ENCOUNTER: ICD-10-CM

## 2017-03-24 DIAGNOSIS — T07.XXXA MULTIPLE CONTUSIONS: ICD-10-CM

## 2017-03-24 DIAGNOSIS — J01.00 ACUTE NON-RECURRENT MAXILLARY SINUSITIS: ICD-10-CM

## 2017-03-24 PROCEDURE — 70450 CT HEAD/BRAIN W/O DYE: CPT

## 2017-03-24 PROCEDURE — 304561 HCHG STAT O2

## 2017-03-24 PROCEDURE — 72125 CT NECK SPINE W/O DYE: CPT

## 2017-03-24 PROCEDURE — A9270 NON-COVERED ITEM OR SERVICE: HCPCS | Performed by: EMERGENCY MEDICINE

## 2017-03-24 PROCEDURE — 71010 DX-CHEST-LIMITED (1 VIEW): CPT

## 2017-03-24 PROCEDURE — 73501 X-RAY EXAM HIP UNI 1 VIEW: CPT | Mod: RT

## 2017-03-24 PROCEDURE — 99284 EMERGENCY DEPT VISIT MOD MDM: CPT

## 2017-03-24 PROCEDURE — 700102 HCHG RX REV CODE 250 W/ 637 OVERRIDE(OP): Performed by: EMERGENCY MEDICINE

## 2017-03-24 RX ORDER — DOXYCYCLINE 100 MG/1
100 CAPSULE ORAL 2 TIMES DAILY
Qty: 20 CAP | Refills: 0 | Status: SHIPPED | OUTPATIENT
Start: 2017-03-24 | End: 2017-04-03

## 2017-03-24 RX ORDER — OXYCODONE HYDROCHLORIDE AND ACETAMINOPHEN 5; 325 MG/1; MG/1
1 TABLET ORAL ONCE
Status: COMPLETED | OUTPATIENT
Start: 2017-03-24 | End: 2017-03-24

## 2017-03-24 RX ADMIN — OXYCODONE HYDROCHLORIDE AND ACETAMINOPHEN 1 TABLET: 5; 325 TABLET ORAL at 18:29

## 2017-03-24 ASSESSMENT — LIFESTYLE VARIABLES: DO YOU DRINK ALCOHOL: NO

## 2017-03-24 ASSESSMENT — PAIN SCALES - GENERAL: PAINLEVEL_OUTOF10: 8

## 2017-03-24 NOTE — ED AVS SNAPSHOT
Home Care Instructions                                                                                                                Leena Bella   MRN: [id number]    Department:  [facility], Emergency Dept   Date of Visit:  3/24/2017            [facility], Emergency Dept    [address] [facility] [address]    Phone:  [phone]      You were seen by     Gaston Vivar M.D.      Your Diagnosis Was     CHI (closed head injury), initial encounter     S09.90XA       These are the medications you received during your hospitalization from 03/24/2017 1747 to 03/24/2017 2022     Date/Time Order Dose Route Action    03/24/2017 1829 oxycodone-acetaminophen (PERCOCET) 5-325 MG per tablet 1 Tab 1 Tab Oral Given      Follow-up Information     1. Follow up with Adriano Bahena M.D.. Schedule an appointment as soon as possible for a visit in 1 week.    Specialty:  Internal Medicine    Why:  As needed    Contact information    [address]  [address]  [facility] [address] [phone]        Medication Information     Review all of your home medications and newly ordered medications with your primary doctor and/or pharmacist as soon as possible. Follow medication instructions as directed by your doctor and/or pharmacist.     Please keep your complete medication list with you and share with your physician. Update the information when medications are discontinued, doses are changed, or new medications (including over-the-counter products) are added; and carry medication information at all times in the event of emergency situations.               Medication List      START taking these medications        Instructions    Morning Afternoon Evening Bedtime    doxycycline 100 MG capsule   Commonly known as:  MONODOX        Take 1 Cap by mouth 2 times a day for 10 days.   Dose:  100 mg                          ASK your doctor about these medications        Instructions    Morning Afternoon  Evening Bedtime    acetaminophen/caffeine/butalbital 325-40-50 mg -40 MG Tabs   Commonly known as:  FIORICET        Take 1 Tab by mouth every day.   Dose:  1 Tab                        albuterol-ipratropium  MCG/ACT Aero   Commonly known as:  COMBIVENT        Inhale 2 Puffs by mouth 4 times a day.   Dose:  2 Puff                        amiodarone 200 MG Tabs   Commonly known as:  CORDARONE        Take 200 mg by mouth every day.   Dose:  200 mg                        aspirin 81 MG tablet        Take 325 mg by mouth every day.   Dose:  325 mg                        buPROPion  MG Tb12 sustained-release tablet   Commonly known as:  WELLBUTRIN-SR        Take 1 Tab by mouth 2 times a day.   Dose:  150 mg                        * calcium-vitamin D 500-200 MG-UNIT Tabs   Commonly known as:  OSCAL 500 +D        Take 1 Tab by mouth 2 times a day, with meals.   Dose:  1 Tab                        * calcium-vitamin D 500-200 MG-UNIT Tabs   Commonly known as:  OSCAL 500 +D        Take 1 Tab by mouth 2 times a day, with meals.   Dose:  1 Tab                        carvedilol 12.5 MG Tabs   Commonly known as:  COREG        TAKE 1 TAB BY MOUTH 2 TIMES A DAY.                        cetirizine 10 MG Tabs   Commonly known as:  ZYRTEC        TAKE 1 TAB BY MOUTH 1 TIME DAILY AS NEEDED FOR ALLERGIES.                        clopidogrel 75 MG Tabs   Commonly known as:  PLAVIX        TAKE 1 TAB BY MOUTH EVERY DAY.                        docusate sodium 100 MG Caps   Commonly known as:  COLACE        Take 1 Cap by mouth 2 times a day.   Dose:  100 mg                        fluticasone 50 MCG/ACT nasal spray   Commonly known as:  FLONASE                             furosemide 20 MG Tabs   Commonly known as:  LASIX        Take 1 Tab by mouth every day.   Dose:  20 mg                        gabapentin 600 MG tablet   Commonly known as:  NEURONTIN        TAKE 1 TABLET BY MOUTH TWICE A DAY                         hydrocodone-acetaminophen 7.5-325 MG per tablet   Commonly known as:  NORCO        Take 1 Tab by mouth every 8 hours as needed for Severe Pain.   Dose:  1 Tab                        hydroxychloroquine 200 MG Tabs   Commonly known as:  PLAQUENIL        TAKE 1 TABLET BY MOUTH EVERY DAY                        insulin glargine 100 UNIT/ML Sopn injection   Commonly known as:  LANTUS SOLOSTAR        INJECT 20 UNITS SUBCUTANEOUSLY DAILY AT BEDTIME AS DIRECTED                        Insulin Pen Needle 31G X 8 MM Misc        Use to inject insulin daily.                        Iron 325 (65 FE) MG Tabs        every day.                        metformin 1000 MG tablet   Commonly known as:  GLUCOPHAGE        Take 1 Tab by mouth 2 times a day.   Dose:  1000 mg                        nitroglycerin 0.4 MG Subl   Commonly known as:  NITROSTAT        Place 1 Tab under tongue as needed for Chest Pain. Max dose of 3 in 24 hrs until need for ED.   Dose:  0.4 mg                        pantoprazole 40 MG Tbec   Commonly known as:  PROTONIX        TAKE 1 TAB BY MOUTH EVERY DAY.                        * potassium chloride 10 MEQ capsule   Commonly known as:  MICRO-K        TAKE ONE CAPSULE BY MOUTH TWICE DAILY                        * KLOR-CON SPRINKLE 10 MEQ capsule   Generic drug:  potassium chloride        TAKE 1 CAP BY MOUTH 2 TIMES A DAY.                        ropinirole 0.5 MG Tabs   Commonly known as:  REQUIP        Take 2 Tabs by mouth every bedtime.   Dose:  1 mg                        rosuvastatin 40 MG tablet   Commonly known as:  CRESTOR        Take 1 Tab by mouth every day.   Dose:  40 mg                        topiramate 100 MG Tabs   Commonly known as:  TOPAMAX        TAKE 1 TAB BY MOUTH EVERY 12 HOURS.                        * Notice:  This list has 4 medication(s) that are the same as other medications prescribed for you. Read the directions carefully, and ask your doctor or other care provider to review them with you.           Where to Get Your Medications      You can get these medications from any pharmacy     Bring a paper prescription for each of these medications    - doxycycline 100 MG capsule            Procedures and tests performed during your visit     CT-CSPINE WITHOUT PLUS RECONS    CT-HEAD W/O    DX-CHEST-LIMITED (1 VIEW)    DX-HIP-UNILATERAL-WITH PELVIS-1 VIEW RIGHT        Discharge Instructions       Head Injuries, Adult    Return for worsening headache, repeated vomiting, confusion, seizure, unequal pupils or difficulty arousing from sleep.  Avoid activities that may cause a repeat concussion for 1-2 weeks. Take antibiotics for left maxillary sinusitis.  Take your pain medicines as needed for headache, neck strain or bruises.    You have had a head injury which does not appear serious at this time. A concussion is a state of changed mental ability, usually from a blow to the head. You should take clear liquids for the rest of the day and then resume your regular diet. You should not take sedatives or alcoholic beverages for 48 hours after discharge. After injuries such as yours, most problems occur within the first 24 hours.    THESE MINOR SYMPTOMS MAY BE seen AFTER DISCHARGE:  Ø Memory difficulties  Ø Dizziness  Ø Headaches Ø Double vision  Ø Hearing difficulties   Ø Depression Ø Tiredness  Ø Weakness  Ø Difficulty with concentration 11.1      If you experience any of these problems, you should not be alarmed. A bruise on the brain (concussion) requires a few days for recovery. This is the same as a bruise elsewhere on the body. Many patients with head injuries frequently experience such symptoms. Usually, these problems disappear without medical care. If symptoms last for more than one day, notify your caregiver. See your caregiver sooner if symptoms are becoming worse rather than better.    22.2 HOME CARE INSTRUCTIONS  Ø During the next 24 hours you must stay with someone who can watch you for the above warning  signs.  Ø This person should wake you up every 30 minutes for 3 hours or as directed to check on your condition, noting any of the above signs or symptoms. Problems which are getting worse mean you should call or return immediately to the facility where you were just seen, or to the nearest emergency department. In case of emergency or unconsciousness, dial 911.    Although it is unlikely that serious side effects will occur, you should be aware of signs and symptoms which may necessitate your return to this location. Side effects may occur up to 7 - 10 days following the injury.  It is important for you to carefully monitor your condition and contact your caregiver or seek immediate medical attention if there is a change in your condition.    33.3 seek immediate medical attention if:  Ø There is confusion or drowsiness.   Ø You can not awaken the injured person.  Ø There is nausea (feeling sick to your stomach) or continued, forceful vomiting.  Ø You notice dizziness or unsteadiness which is getting worse, or inability to walk.  Ø You have convulsions or unconsciousness.  Ø You experience severe, persistent headaches not relieved by Tylenol®. (Do not take aspirin as this impairs clotting abilities). Take other pain medications only as directed.  Ø You can not use arms or legs normally.  Ø There are changes in pupil sizes. (This is the black center in the colored part of the eye)  Ø There is clear or bloody discharge from the nose or ears.    AGREEMENT BETWEEN PATIENT AND HEALTHCARE TEAM:  Your signature on this document represents an understanding between you and the healthcare team that took care of you today.  That means that you:  Ø Understand these discharge instructions.   Ø Will monitor your condition.  Ø Will seek immediate medical attention as instructed.    Document Released: 12/18/2006  Document Re-Released: 06/30/2008  ExitCare® Patient Information ©2009 [employer]              Patient Information      Patient Information    Following emergency treatment: all patient requiring follow-up care must return either to a private physician or a clinic if your condition worsens before you are able to obtain further medical attention, please return to the emergency room.     Billing Information    At [facility], we work to make the billing process streamlined for our patients.  Our Representatives are here to answer any questions you may have regarding your hospital bill.  If you have insurance coverage and have supplied your insurance information to us, we will submit a claim to your insurer on your behalf.  Should you have any questions regarding your bill, we can be reached online or by phone as follows:  Online: You are able pay your bills online or live chat with our representatives about any billing questions you may have. We are here to help Monday - Friday from 8:00am to 7:30pm and 9:00am - 12:00pm on Saturdays.  Please visit [email or web address]  for more information.   Phone:  [phone] or [phone]    Please note that your emergency physician, surgeon, pathologist, radiologist, anesthesiologist, and other specialists are not employed by [facility] and will therefore bill separately for their services.  Please contact them directly for any questions concerning their bills at the numbers below:     Emergency Physician Services:  [phone]  [facility]:  [phone]  [facility]:  [phone]  [facility]:  [phone]    1. Your final bill may vary from the amount quoted upon discharge if all procedures are not complete at that time, or if your doctor has additional procedures of which we are not aware. You will receive an additional bill if you return to the Emergency Department at [facility] for suture removal regardless of the facility of which the sutures were placed.     2. Please arrange for settlement of  this account at the emergency registration.    3. All self-pay accounts are due in full at the time of treatment.  If you are unable to meet this obligation then payment is expected within 4-5 days.     4. If you have had radiology studies (CT, X-ray, Ultrasound, MRI), you have received a preliminary result during your emergency department visit. Please contact the radiology department [phone] to receive a copy of your final result. Please discuss the Final result with your primary physician or with the follow up physician provided.     Crisis Hotline:  [place] Crisis Hotline:  9-217-YEBBSJZ or 1-484.424.6426  [place] Crisis Hotline:    1-279.777.7642 or 455-168-5872         ED Discharge Follow Up Questions    1. In order to provide you with very good care, we would like to follow up with a phone call in the next few days.  May we have your permission to contact you?     YES /  NO    2. What is the best phone number to call you? (       )_____-__________    3. What is the best time to call you?      Morning  /  Afternoon  /  Evening                   Patient Signature:  ____________________________________________________________    Date:  ____________________________________________________________      Your appointments     Mar 28, 2017  9:00 AM   Established Patient with Emily Parr M.D.   [facility] - Internal Medicine (--)    [address] [place] [address]  [address]  [facility] [address] [phone]           You will be receiving a confirmation call a few days before your appointment from our automated call confirmation system.            Aug 14, 2017  2:40 PM   Follow Up Visit with Melissa P Bloch, M.D.   [facility] (--)    [address], [address]  [facility] [id number] [phone]           You will be receiving a confirmation call a few days before your appointment from our automated call confirmation system.

## 2017-03-24 NOTE — ED AVS SNAPSHOT
3/24/2017          Leena Bella  [address] [place] [address]    Dear Leena:    [facility] wants to ensure your discharge home is safe and you or your loved ones have had all your questions answered regarding your care after you leave the hospital.    You may receive a telephone call within two days of your discharge.  This call is to make certain you understand your discharge instructions as well as ensure we provided you with the best care possible during your stay with us.     The call will only last approximately 3-5 minutes and will be done by a nurse.    Once again, we want to ensure your discharge home is safe and that you have a clear understanding of any next steps in your care.  If you have any questions or concerns, please do not hesitate to contact us, we are here for you.  Thank you for choosing [facility] for your healthcare needs.    Sincerely,    Steven Carbajal    [facility]

## 2017-03-24 NOTE — ED AVS SNAPSHOT
Seeq Access Code: Activation code not generated  Current Seeq Status: Patient Declined    Your email address is not on file at [facility] [facility].  Email Addresses are required for you to sign up for Seeq, please contact [phone] to verify your personal information and to provide your email address prior to attempting to register for Seeq.    Leena Bella  [address] [place] [address]    Seeq  A secure, online tool to manage your health information     [employer]’s Seeq® is a secure, online tool that connects you to your personalized health information from the privacy of your home -- day or night - making it very easy for you to manage your healthcare. Once the activation process is completed, you can even access your medical information using the Seeq jaki, which is available for free in the Apple Jaki store or Google Play store.     To learn more about Seeq, visit [email or web address]    There are two levels of access available (as shown below):   My Chart Features  [facility] Primary Care Doctor [facility]  Specialists [facility]  Urgent  Care Non-[facility] Primary Care Doctor   Email your healthcare team securely and privately 24/7 X X X    Manage appointments: schedule your next appointment; view details of past/upcoming appointments X      Request prescription refills. X      View recent personal medical records, including lab and immunizations X X X X   View health record, including health history, allergies, medications X X X X   Read reports about your outpatient visits, procedures, consult and ER notes X X X X   See your discharge summary, which is a recap of your hospital and/or ER visit that includes your diagnosis, lab results, and care plan X X  X     How to register for [employer]:  Once your e-mail address has been verified, follow the following steps to sign up for [employer].     1. Go to  [email or web address]  2. Click on the Sign Up Now box, which takes you to the New  Member Sign Up page. You will need to provide the following information:  a. Enter your [employer] Access Code exactly as it appears at the top of this page. (You will not need to use this code after you’ve completed the sign-up process. If you do not sign up before the expiration date, you must request a new code.)   b. Enter your date of birth.   c. Enter your home email address.   d. Click Submit, and follow the next screen’s instructions.  3. Create a [employer] ID. This will be your [employer] login ID and cannot be changed, so think of one that is secure and easy to remember.  4. Create a [employer] password. You can change your password at any time.  5. Enter your Password Reset Question and Answer. This can be used at a later time if you forget your password.   6. Enter your e-mail address. This allows you to receive e-mail notifications when new information is available in [employer].  7. Click Sign Up. You can now view your health information.    For assistance activating your [employer] account, call [phone]
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
## 2017-03-25 NOTE — ED NOTES
Patient arrives to ED via. EMS reports GLF just PTA. Patient states that she hit her right side including her head, but had no LOC. Patient states that she was at the pharmacy to  her medications, patient states that her cart got away from her and she fell.

## 2017-03-25 NOTE — DISCHARGE INSTRUCTIONS
Head Injuries, Adult    Return for worsening headache, repeated vomiting, confusion, seizure, unequal pupils or difficulty arousing from sleep.  Avoid activities that may cause a repeat concussion for 1-2 weeks. Take antibiotics for left maxillary sinusitis.  Take your pain medicines as needed for headache, neck strain or bruises.    You have had a head injury which does not appear serious at this time. A concussion is a state of changed mental ability, usually from a blow to the head. You should take clear liquids for the rest of the day and then resume your regular diet. You should not take sedatives or alcoholic beverages for 48 hours after discharge. After injuries such as yours, most problems occur within the first 24 hours.    THESE MINOR SYMPTOMS MAY BE seen AFTER DISCHARGE:  Ø Memory difficulties  Ø Dizziness  Ø Headaches Ø Double vision  Ø Hearing difficulties   Ø Depression Ø Tiredness  Ø Weakness  Ø Difficulty with concentration 11.1      If you experience any of these problems, you should not be alarmed. A bruise on the brain (concussion) requires a few days for recovery. This is the same as a bruise elsewhere on the body. Many patients with head injuries frequently experience such symptoms. Usually, these problems disappear without medical care. If symptoms last for more than one day, notify your caregiver. See your caregiver sooner if symptoms are becoming worse rather than better.    22.2 HOME CARE INSTRUCTIONS  Ø During the next 24 hours you must stay with someone who can watch you for the above warning signs.  Ø This person should wake you up every 30 minutes for 3 hours or as directed to check on your condition, noting any of the above signs or symptoms. Problems which are getting worse mean you should call or return immediately to the facility where you were just seen, or to the nearest emergency department. In case of emergency or unconsciousness, dial 911.    Although it is unlikely that serious  side effects will occur, you should be aware of signs and symptoms which may necessitate your return to this location. Side effects may occur up to 7 - 10 days following the injury.  It is important for you to carefully monitor your condition and contact your caregiver or seek immediate medical attention if there is a change in your condition.    33.3 seek immediate medical attention if:  Ø There is confusion or drowsiness.   Ø You can not awaken the injured person.  Ø There is nausea (feeling sick to your stomach) or continued, forceful vomiting.  Ø You notice dizziness or unsteadiness which is getting worse, or inability to walk.  Ø You have convulsions or unconsciousness.  Ø You experience severe, persistent headaches not relieved by Tylenol®. (Do not take aspirin as this impairs clotting abilities). Take other pain medications only as directed.  Ø You can not use arms or legs normally.  Ø There are changes in pupil sizes. (This is the black center in the colored part of the eye)  Ø There is clear or bloody discharge from the nose or ears.    AGREEMENT BETWEEN PATIENT AND HEALTHCARE TEAM:  Your signature on this document represents an understanding between you and the healthcare team that took care of you today.  That means that you:  Ø Understand these discharge instructions.   Ø Will monitor your condition.  Ø Will seek immediate medical attention as instructed.    Document Released: 12/18/2006  Document Re-Released: 06/30/2008  [employer]® Patient Information ©2009 [employer].

## 2017-03-25 NOTE — ED NOTES
All lines and monitors d/c'd. Discharge paperwork and medications reviewed. Pt states she understands and had no questions. Pt encouraged to follow-up with PCP and come back to ER with worsening symptoms. Instructed not to drive after taking pain medication. Pt verbalizes understanding. Pt ambulated out of ED with steady gait.

## 2017-03-27 NOTE — TELEPHONE ENCOUNTER
Last seen: 03/03/17 by Dr. Suggs  Next appt: 03/28/17 with Dr. Parr    Was the patient seen in the last year in this department? Yes   Does patient have an active prescription for medications requested? No   Received Request Via: Pharmacy

## 2017-03-28 ENCOUNTER — OFFICE VISIT (OUTPATIENT)
Dept: INTERNAL MEDICINE | Facility: MEDICAL CENTER | Age: 67
End: 2017-03-28
Payer: MEDICARE

## 2017-03-28 VITALS
BODY MASS INDEX: 24.11 KG/M2 | SYSTOLIC BLOOD PRESSURE: 132 MMHG | RESPIRATION RATE: 18 BRPM | TEMPERATURE: 97.6 F | HEIGHT: 60 IN | HEART RATE: 84 BPM | WEIGHT: 122.8 LBS | DIASTOLIC BLOOD PRESSURE: 82 MMHG | OXYGEN SATURATION: 93 %

## 2017-03-28 DIAGNOSIS — Z79.891 OPIATE ANALGESIC USE AGREEMENT EXISTS: ICD-10-CM

## 2017-03-28 DIAGNOSIS — I63.032 CEREBROVASCULAR ACCIDENT (CVA) DUE TO THROMBOSIS OF LEFT CAROTID ARTERY (HCC): ICD-10-CM

## 2017-03-28 DIAGNOSIS — G25.81 RLS (RESTLESS LEGS SYNDROME): ICD-10-CM

## 2017-03-28 DIAGNOSIS — G89.29 CHRONIC MIDLINE LOW BACK PAIN WITH RIGHT-SIDED SCIATICA: ICD-10-CM

## 2017-03-28 DIAGNOSIS — I10 ESSENTIAL HYPERTENSION: ICD-10-CM

## 2017-03-28 DIAGNOSIS — M54.41 CHRONIC MIDLINE LOW BACK PAIN WITH RIGHT-SIDED SCIATICA: ICD-10-CM

## 2017-03-28 PROCEDURE — 99213 OFFICE O/P EST LOW 20 MIN: CPT | Mod: GE | Performed by: INTERNAL MEDICINE

## 2017-03-28 RX ORDER — ROPINIROLE 0.5 MG/1
1 TABLET, FILM COATED ORAL
Qty: 30 TAB | Refills: 3 | Status: SHIPPED | OUTPATIENT
Start: 2017-03-28 | End: 2017-04-07

## 2017-03-28 RX ORDER — FUROSEMIDE 20 MG/1
20 TABLET ORAL
Qty: 30 TAB | Refills: 3 | Status: SHIPPED | OUTPATIENT
Start: 2017-03-28 | End: 2017-04-07

## 2017-03-28 RX ORDER — TOPIRAMATE 100 MG/1
100 TABLET, FILM COATED ORAL EVERY 12 HOURS
Qty: 30 TAB | Refills: 1 | Status: SHIPPED | OUTPATIENT
Start: 2017-03-28 | End: 2017-04-26 | Stop reason: SDUPTHER

## 2017-03-28 RX ORDER — HYDROCODONE BITARTRATE AND ACETAMINOPHEN 7.5; 325 MG/1; MG/1
1 TABLET ORAL EVERY 8 HOURS PRN
Qty: 90 TAB | Refills: 0 | Status: SHIPPED | OUTPATIENT
Start: 2017-03-28 | End: 2017-04-28 | Stop reason: SDUPTHER

## 2017-03-28 RX ORDER — AMIODARONE HYDROCHLORIDE 200 MG/1
200 TABLET ORAL DAILY
Qty: 30 TAB | Refills: 0 | Status: SHIPPED | OUTPATIENT
Start: 2017-03-28 | End: 2017-04-07

## 2017-03-28 RX ORDER — TOPIRAMATE 100 MG/1
100 TABLET, FILM COATED ORAL EVERY 12 HOURS
Qty: 30 TAB | Refills: 1 | Status: SHIPPED | OUTPATIENT
Start: 2017-03-28 | End: 2017-04-07

## 2017-03-28 RX ORDER — POTASSIUM CHLORIDE 750 MG/1
10 CAPSULE, EXTENDED RELEASE ORAL 2 TIMES DAILY
Qty: 60 CAP | Refills: 1 | Status: SHIPPED | OUTPATIENT
Start: 2017-03-28 | End: 2017-04-07

## 2017-03-28 RX ORDER — CETIRIZINE HYDROCHLORIDE 10 MG/1
TABLET ORAL
Qty: 90 TAB | Refills: 0 | Status: SHIPPED | OUTPATIENT
Start: 2017-03-28 | End: 2017-05-26 | Stop reason: SDUPTHER

## 2017-03-28 RX ORDER — POTASSIUM CHLORIDE 750 MG/1
10 CAPSULE, EXTENDED RELEASE ORAL 2 TIMES DAILY
Qty: 60 CAP | Refills: 1 | Status: SHIPPED | OUTPATIENT
Start: 2017-03-28 | End: 2017-04-07 | Stop reason: SDUPTHER

## 2017-03-28 RX ORDER — AMIODARONE HYDROCHLORIDE 200 MG/1
200 TABLET ORAL DAILY
Qty: 30 TAB | Refills: 0 | Status: SHIPPED | OUTPATIENT
Start: 2017-03-28 | End: 2017-05-26 | Stop reason: SDUPTHER

## 2017-03-28 RX ORDER — PANTOPRAZOLE SODIUM 40 MG/1
40 TABLET, DELAYED RELEASE ORAL
Qty: 30 TAB | Refills: 0 | Status: SHIPPED | OUTPATIENT
Start: 2017-03-28 | End: 2017-04-28 | Stop reason: SDUPTHER

## 2017-03-28 RX ORDER — ROPINIROLE 0.5 MG/1
1 TABLET, FILM COATED ORAL
Qty: 30 TAB | Refills: 3 | Status: SHIPPED | OUTPATIENT
Start: 2017-03-28 | End: 2017-04-07 | Stop reason: SDUPTHER

## 2017-03-28 RX ORDER — FUROSEMIDE 20 MG/1
20 TABLET ORAL
Qty: 30 TAB | Refills: 3 | Status: SHIPPED | OUTPATIENT
Start: 2017-03-28 | End: 2017-04-26 | Stop reason: SDUPTHER

## 2017-03-28 RX ORDER — PANTOPRAZOLE SODIUM 40 MG/1
40 TABLET, DELAYED RELEASE ORAL
Qty: 30 TAB | Refills: 0 | Status: SHIPPED | OUTPATIENT
Start: 2017-03-28 | End: 2017-04-07

## 2017-03-28 ASSESSMENT — ENCOUNTER SYMPTOMS
CONSTIPATION: 0
NAUSEA: 0
TINGLING: 0
SEIZURES: 0
SPUTUM PRODUCTION: 0
COUGH: 0
DEPRESSION: 0
SHORTNESS OF BREATH: 0
ABDOMINAL PAIN: 0
BACK PAIN: 1
VOMITING: 0
HEADACHES: 0
NECK PAIN: 1
DIARRHEA: 0
BLURRED VISION: 0

## 2017-03-28 ASSESSMENT — PAIN SCALES - GENERAL: PAINLEVEL: 10=SEVERE PAIN
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
## 2017-03-28 NOTE — MR AVS SNAPSHOT
"        Leena Conrade   3/28/2017 9:00 AM   Office Visit   MRN: [id number]    Department:  [facility] - Internal Med   Dept Phone:  [phone]    Description:  Female : 1950   Provider:  Emily Parr M.D.           Reason for Visit     Chronic Opiate Therapy refills     Depression htn      Allergies as of 3/28/2017     Allergen Noted Reactions    Cozaar [Losartan] 2016       Darvocet [Propoxyphene N-Apap] 2016       Lexapro 2016       Lisinopril 2016       Cephalexin 2014       Rash - \"looks like I have AIDS\"    Morphine 10/12/2008       Heart stopped?    Other Environmental 10/20/2011   Rash    tape    Penicillin G Potassium 10/12/2008   Rash    Has tolerated Unasyn in May 2014 and received amoxicillin on 12/29/15 without reaction    Sulfa Drugs 10/18/2011   Rash      You were diagnosed with     RLS (restless legs syndrome)   [577518]       Opiate analgesic use agreement exists   [2991646]       Essential hypertension   [1690271]       Cerebrovascular accident (CVA) due to thrombosis of left carotid artery (CMS-HCC)   [4374293]       Chronic midline low back pain with right-sided sciatica   [2800909]         Vital Signs     Blood Pressure Pulse Temperature Respirations Height Weight    132/82 mmHg 84 36.4 °C (97.6 °F) 18 1.524 m (5') 55.702 kg (122 lb 12.8 oz)    Body Mass Index Oxygen Saturation Last Menstrual Period Smoking Status          23.98 kg/m2 93% 06/15/1996 Former Smoker        Basic Information     Date Of Birth Sex Race Ethnicity Preferred Language    1950 Female White Non- English      Your appointments     2017 10:30 AM   Established Patient with Adriano Bahena M.D.   [facility] / [facility] - Internal Medicine (--)    [address]  [address]  [facility] [address] [phone]           You will be receiving a confirmation call a few days before your appointment from our automated call confirmation system.            " 2017  2:40 PM   Follow Up Visit with Melissa P Bloch, M.D.   [facility] (--)    [address], [address]  [facility] [address] [phone]           You will be receiving a confirmation call a few days before your appointment from our automated call confirmation system.              Problem List              ICD-10-CM Priority Class Noted - Resolved    CAD (coronary artery disease) I25.10   10/18/2011 - Present    Ischemic cardiomyopathy I25.5   10/18/2011 - Present    Type 2 diabetes mellitus (CMS-HCC) E11.9   10/18/2011 - Present    CHF (congestive heart failure) (CMS-HCC) I50.9   10/18/2011 - Present    Migraines G43.909   12/23/2013 - Present    Polyarthralgia M25.50   11/3/2015 - Present    Chronic fatigue R53.82   11/3/2015 - Present    Dyspnea R06.00   11/3/2015 - Present    Skin rash R21   11/3/2015 - Present    Osteoporosis M81.0   11/3/2015 - Present    Vitamin D deficiency E55.9   11/11/2015 - Present    CVA (cerebral vascular accident) (CMS-HCC) I63.9   6/4/2016 - Present    Allergic rhinitis J30.9   6/4/2016 - Present    Automatic implantable cardioverter-defibrillator in situ Z95.810   6/4/2016 - Present    Osteoarthritis M19.90   6/4/2016 - Present    Labial abscess N76.4   6/4/2016 - Present    Nicotine dependence F17.200   6/4/2016 - Present    Diabetic neuropathy (CMS-HCC) E11.40   6/4/2016 - Present    GERD (gastroesophageal reflux disease) K21.9   6/4/2016 - Present    RLS (restless legs syndrome) G25.81   6/4/2016 - Present    PEYTON (obstructive sleep apnea) G47.33   6/4/2016 - Present    COPD (chronic obstructive pulmonary disease) (CMS-HCC) J44.9   6/4/2016 - Present    Anxiety disorder F41.9   6/4/2016 - Present    Dyslipidemia E78.5   6/4/2016 - Present    Abscess L02.91   6/4/2016 - Present    Hx of coronary artery bypass graft Z95.1   6/4/2016 - Present    HTN (hypertension) I10   6/4/2016 - Present    Opiate analgesic use agreement exists Z02.89   1/9/2017 - Present     Chronic back pain M54.9, G89.29   3/3/2017 - Present      Health Maintenance        Date Due Completion Dates    DIABETES MONOFILAMENT / LE EXAM 1950 ---    RETINAL SCREENING 4/6/1967 ---    PAP SMEAR 4/6/1970 ---    URINE ACR / MICROALBUMIN 12/14/2013 12/14/2012, 10/17/2011, 7/11/2011, 4/12/2010, 9/22/2008    COLONOSCOPY 8/11/2014 8/11/2004    MAMMOGRAM 2/3/2017 2/3/2016, 11/5/2012    FASTING LIPID PROFILE 2/11/2017 2/11/2016, 9/10/2015, 3/10/2015, 8/19/2014, 5/28/2014, 5/28/2014, 12/14/2012, 12/14/2012, 7/16/2012, 3/7/2012, 7/11/2011, 7/26/2010, 4/12/2010, 8/7/2009, 9/22/2008, 2/13/2007, 5/29/2006    A1C SCREENING 8/28/2017 2/28/2017, 9/10/2015, 8/19/2014, 5/28/2014, 5/28/2014, 12/14/2012, 3/9/2012, 3/7/2012, 10/17/2011, 7/11/2011, 7/26/2010, 4/12/2010, 8/7/2009, 9/22/2008, 2/13/2007, 5/29/2006    SERUM CREATININE 2/28/2018 2/28/2017, 12/15/2016, 11/9/2016, 4/1/2016, 11/6/2015, 9/10/2015, 3/10/2015, 12/17/2014, 8/19/2014, 8/1/2014, 6/2/2014, 6/1/2014, 5/31/2014, 5/30/2014, 5/29/2014, 5/28/2014, 5/27/2014, 10/10/2013, 10/9/2013, 10/8/2013, 12/14/2012, 10/30/2012, 7/16/2012, 3/9/2012, 3/8/2012, 3/7/2012, 3/6/2012, 10/20/2011, 10/17/2011, 7/11/2011, 7/26/2010, 4/12/2010, 8/7/2009, 9/22/2008, 2/13/2007, 2/12/2007, 7/17/2006, 5/30/2006, 5/29/2006, 5/28/2006    BONE DENSITY 11/30/2020 11/30/2015    IMM DTaP/Tdap/Td Vaccine (2 - Td) 1/9/2027 1/9/2017, 3/3/2015            Current Immunizations     13-VALENT PCV PREVNAR 1/9/2017    Influenza TIV (IM) 10/9/2013  6:30 PM, 11/11/2011    Influenza Vaccine Quad Inj (Preserved) 12/5/2016    Pneumococcal Vaccine (UF)Historical Data 11/11/2011    Pneumococcal polysaccharide vaccine (PPSV-23) 12/5/2016    SHINGLES VACCINE 10/11/2015    Tdap Vaccine 1/9/2017, 3/3/2015      Below and/or attached are the medications your provider expects you to take. Review all of your home medications and newly ordered medications with your provider and/or pharmacist. Follow medication  instructions as directed by your provider and/or pharmacist. Please keep your medication list with you and share with your provider. Update the information when medications are discontinued, doses are changed, or new medications (including over-the-counter products) are added; and carry medication information at all times in the event of emergency situations     Allergies:  COZAAR - (reactions not documented)     DARVOCET - (reactions not documented)     LEXAPRO - (reactions not documented)     LISINOPRIL - (reactions not documented)     CEPHALEXIN - (reactions not documented)     MORPHINE - (reactions not documented)     OTHER ENVIRONMENTAL - Rash     PENICILLIN G POTASSIUM - Rash     SULFA DRUGS - Rash               Medications  Valid as of: March 28, 2017 - 10:14 AM    Generic Name Brand Name Tablet Size Instructions for use    Amiodarone HCl (Tab) CORDARONE 200 MG Take 1 Tab by mouth every day.        Amiodarone HCl (Tab) CORDARONE 200 MG Take 1 Tab by mouth every day.        Aspirin (Tab) aspirin 81 MG Take 325 mg by mouth every day.        BuPROPion HCl (TABLET SR 12 HR) WELLBUTRIN- MG Take 1 Tab by mouth 2 times a day.        Butalbital-APAP-Caffeine (Tab) FIORICET -40 MG Take 1 Tab by mouth every day.        Calcium Carb-Cholecalciferol (Tab) OS-ADAIR CALCIUM + D3 500-200 MG-UNIT Take 1 Tab by mouth 2 times a day, with meals.        Calcium Carbonate-Vitamin D (Tab) OSCAL 500 +D 500-200 MG-UNIT Take 1 Tab by mouth 2 times a day, with meals.        Calcium Carbonate-Vitamin D (Tab) OSCAL 500 +D 500-200 MG-UNIT Take 1 Tab by mouth 2 times a day, with meals.        Carvedilol (Tab) COREG 12.5 MG TAKE 1 TAB BY MOUTH 2 TIMES A DAY.        Cetirizine HCl (Tab) ZYRTEC 10 MG TAKE 1 TAB BY MOUTH 1 TIME DAILY AS NEEDED FOR ALLERGIES.        Clopidogrel Bisulfate (Tab) PLAVIX 75 MG TAKE 1 TAB BY MOUTH EVERY DAY.        Docusate Sodium (Cap) COLACE 100 MG Take 1 Cap by mouth 2 times a day.        Doxycycline  Monohydrate (Cap) MONODOX 100 MG Take 1 Cap by mouth 2 times a day for 10 days.        Ferrous Sulfate (Tab) Iron 325 (65 FE) MG every day.         Fluticasone Propionate (Suspension) FLONASE 50 MCG/ACT         Furosemide (Tab) LASIX 20 MG Take 1 Tab by mouth every day.        Furosemide (Tab) LASIX 20 MG Take 1 Tab by mouth every day.        Gabapentin (Tab) NEURONTIN 600 MG TAKE 1 TABLET BY MOUTH TWICE A DAY        Hydrocodone-Acetaminophen (Tab) NORCO 7.5-325 MG Take 1 Tab by mouth every 8 hours as needed for Severe Pain.        Hydroxychloroquine Sulfate (Tab) PLAQUENIL 200 MG TAKE 1 TABLET BY MOUTH EVERY DAY        Insulin Glargine (Solution Pen-injector) LANTUS 100 UNIT/ML INJECT 20 UNITS SUBCUTANEOUSLY DAILY AT BEDTIME AS DIRECTED        Insulin Pen Needle (Misc) Insulin Pen Needle 31G X 8 MM Use to inject insulin daily.        Ipratropium-Albuterol (Aerosol) COMBIVENT  MCG/ACT Inhale 2 Puffs by mouth 4 times a day.        MetFORMIN HCl (Tab) GLUCOPHAGE 1000 MG Take 1 Tab by mouth 2 times a day.        Nitroglycerin (SL Tab) NITROSTAT 0.4 MG Place 1 Tab under tongue as needed for Chest Pain. Max dose of 3 in 24 hrs until need for ED.        Pantoprazole Sodium (Tablet Delayed Response) PROTONIX 40 MG Take 1 Tab by mouth every day. TAKE 1 TAB BY MOUTH EVERY DAY.        Pantoprazole Sodium (Tablet Delayed Response) PROTONIX 40 MG Take 1 Tab by mouth every day. TAKE 1 TAB BY MOUTH EVERY DAY.        Potassium Chloride (Cap CR) MICRO-K 10 MEQ TAKE ONE CAPSULE BY MOUTH TWICE DAILY        Potassium Chloride (Cap CR) MICRO-K 10 MEQ Take 1 Cap by mouth 2 times a day.        Potassium Chloride (Cap CR) MICRO-K 10 MEQ Take 1 Cap by mouth 2 times a day.        ROPINIRole HCl (Tab) REQUIP 0.5 MG Take 2 Tabs by mouth every bedtime.        Rosuvastatin Calcium (Tab) CRESTOR 40 MG Take 1 Tab by mouth every day.        Topiramate (Tab) TOPAMAX 100 MG Take 1 Tab by mouth every 12 hours.        Topiramate (Tab) TOPAMAX  100 MG Take 1 Tab by mouth every 12 hours.        .                 Medicines prescribed today were sent to:     [facility] - [place], [place] - [address]    [address] [address]    Phone: [phone] Fax: [phone]    Open 24 Hours?: No      Medication refill instructions:       If your prescription bottle indicates you have medication refills left, it is not necessary to call your provider’s office. Please contact your pharmacy and they will refill your medication.    If your prescription bottle indicates you do not have any refills left, you may request refills at any time through one of the following ways: The online [employer] system (except Urgent Care), by calling your provider’s office, or by asking your pharmacy to contact your provider’s office with a refill request. Medication refills are processed only during regular business hours and may not be available until the next business day. Your provider may request additional information or to have a follow-up visit with you prior to refilling your medication.   *Please Note: Medication refills are assigned a new Rx number when refilled electronically. Your pharmacy may indicate that no refills were authorized even though a new prescription for the same medication is available at the pharmacy. Please request the medicine by name with the pharmacy before contacting your provider for a refill.        Your To Do List     Future Labs/Procedures Complete By Expires    MAGNESIUM  As directed 3/28/2018         [employer] Status: Patient Declined

## 2017-03-28 NOTE — PROGRESS NOTES
Established Patient    Leena presents today with the following:    CC: For pain med refill. Dr. Frias is PCP    HPI:   Patient is a very pleasant 66-year-old female with a past medical history of ischemic cardiomyopathy resulting in congestive heart failure well compensated, hypertension, vitamin D deficiency, osteoporosis, COPD, heard, and recent CVA came to the clinic for pain med refill.   Pt had mechanical fall few day ago and she went to the ED. CT head without contrast came back normal. CT spine did not show any fracture from C1-T2. Patient report of neck pain on this visit, deny any alarm symptoms..  Patient is on pain medication for many years. Patient report of chronic back pain. Deny any alarm symptoms.  Patient also report of bilateral leg pain going on for many months and especially happened during  night. Patient was diagnosed with restless leg syndrome. Recent chem panel came back normal.     Patient Active Problem List    Diagnosis Date Noted   • Chronic back pain 03/03/2017   • Opiate analgesic use agreement exists 01/09/2017   • CVA (cerebral vascular accident) (CMS-HCC) 06/04/2016   • Allergic rhinitis 06/04/2016   • Automatic implantable cardioverter-defibrillator in situ 06/04/2016   • Osteoarthritis 06/04/2016   • Labial abscess 06/04/2016   • Nicotine dependence 06/04/2016   • Diabetic neuropathy (CMS-HCC) 06/04/2016   • GERD (gastroesophageal reflux disease) 06/04/2016   • RLS (restless legs syndrome) 06/04/2016   • PEYTON (obstructive sleep apnea) 06/04/2016   • COPD (chronic obstructive pulmonary disease) (CMS-HCC) 06/04/2016   • Anxiety disorder 06/04/2016   • Dyslipidemia 06/04/2016   • Abscess 06/04/2016   • Hx of coronary artery bypass graft 06/04/2016   • HTN (hypertension) 06/04/2016   • Vitamin D deficiency 11/11/2015   • Polyarthralgia 11/03/2015   • Chronic fatigue 11/03/2015   • Dyspnea 11/03/2015   • Skin rash 11/03/2015   • Osteoporosis 11/03/2015   • Migraines 12/23/2013   •  CAD (coronary artery disease) 10/18/2011   • Ischemic cardiomyopathy 10/18/2011   • Type 2 diabetes mellitus (CMS-HCC) 10/18/2011   • CHF (congestive heart failure) (CMS-HCC) 10/18/2011       Current Outpatient Prescriptions   Medication Sig Dispense Refill   • ropinirole (REQUIP) 0.5 MG Tab Take 2 Tabs by mouth every bedtime. 30 Tab 3   • cetirizine (ZYRTEC) 10 MG Tab TAKE 1 TAB BY MOUTH 1 TIME DAILY AS NEEDED FOR ALLERGIES. 90 Tab 0   • furosemide (LASIX) 20 MG Tab Take 1 Tab by mouth every day. 30 Tab 3   • pantoprazole (PROTONIX) 40 MG Tablet Delayed Response Take 1 Tab by mouth every day. TAKE 1 TAB BY MOUTH EVERY DAY. 30 Tab 0   • amiodarone (CORDARONE) 200 MG Tab Take 1 Tab by mouth every day. 30 Tab 0   • potassium chloride (KLOR-CON SPRINKLE) 10 MEQ capsule Take 1 Cap by mouth 2 times a day. 60 Cap 1   • topiramate (TOPAMAX) 100 MG Tab Take 1 Tab by mouth every 12 hours. 30 Tab 1   • hydrocodone-acetaminophen (NORCO) 7.5-325 MG per tablet Take 1 Tab by mouth every 8 hours as needed for Severe Pain. 90 Tab 0   • amiodarone (CORDARONE) 200 MG Tab Take 1 Tab by mouth every day. 30 Tab 0   • furosemide (LASIX) 20 MG Tab Take 1 Tab by mouth every day. 30 Tab 3   • pantoprazole (PROTONIX) 40 MG Tablet Delayed Response Take 1 Tab by mouth every day. TAKE 1 TAB BY MOUTH EVERY DAY. 30 Tab 0   • topiramate (TOPAMAX) 100 MG Tab Take 1 Tab by mouth every 12 hours. 30 Tab 1   • potassium chloride (KLOR-CON SPRINKLE) 10 MEQ capsule Take 1 Cap by mouth 2 times a day. 60 Cap 1   • OS-ADAIR CALCIUM + D3 500-200 MG-UNIT Tab Take 1 Tab by mouth 2 times a day, with meals.  3   • doxycycline (MONODOX) 100 MG capsule Take 1 Cap by mouth 2 times a day for 10 days. 20 Cap 0   • hydroxychloroquine (PLAQUENIL) 200 MG Tab TAKE 1 TABLET BY MOUTH EVERY DAY 30 Tab 5   • docusate sodium (COLACE) 100 MG Cap Take 1 Cap by mouth 2 times a day. 60 Cap prn   • carvedilol (COREG) 12.5 MG Tab TAKE 1 TAB BY MOUTH 2 TIMES A DAY. 60 Tab 5   •  clopidogrel (PLAVIX) 75 MG Tab TAKE 1 TAB BY MOUTH EVERY DAY. 30 Tab 5   • gabapentin (NEURONTIN) 600 MG tablet TAKE 1 TABLET BY MOUTH TWICE A DAY 60 Tab 3   • calcium-vitamin D (OSCAL 500 +D) 500-200 MG-UNIT Tab Take 1 Tab by mouth 2 times a day, with meals. 100 Tab 3   • calcium-vitamin D (OSCAL 500 +D) 500-200 MG-UNIT Tab Take 1 Tab by mouth 2 times a day, with meals. 100 Tab 3   • acetaminophen/caffeine/butalbital 325-40-50 mg (FIORICET) -40 MG Tab Take 1 Tab by mouth every day. 30 Tab 3   • nitroglycerin (NITROSTAT) 0.4 MG SL Tab Place 1 Tab under tongue as needed for Chest Pain. Max dose of 3 in 24 hrs until need for ED. 25 Tab 2   • Insulin Pen Needle 31G X 8 MM Misc Use to inject insulin daily. 100 Each 0   • rosuvastatin (CRESTOR) 40 MG tablet Take 1 Tab by mouth every day. 30 Tab 3   • buPROPion SR (WELLBUTRIN-SR) 150 MG TABLET SR 12 HR sustained-release tablet Take 1 Tab by mouth 2 times a day. 60 Tab 11   • metformin (GLUCOPHAGE) 1000 MG tablet Take 1 Tab by mouth 2 times a day. 60 Tab 11   • potassium chloride (MICRO-K) 10 MEQ capsule TAKE ONE CAPSULE BY MOUTH TWICE DAILY 180 Cap 3   • insulin glargine (LANTUS SOLOSTAR) 100 UNIT/ML Solution Pen-injector injection INJECT 20 UNITS SUBCUTANEOUSLY DAILY AT BEDTIME AS DIRECTED 15 PEN 0   • fluticasone (FLONASE) 50 MCG/ACT nasal spray      • albuterol-ipratropium (COMBIVENT)  MCG/ACT AERO Inhale 2 Puffs by mouth 4 times a day. 1 Inhaler 0   • IRON 325 (65 FE) MG PO TABS every day.      • ASPIRIN 81 MG PO TABS Take 325 mg by mouth every day.       No current facility-administered medications for this visit.       ROS: As per HPI. Additional pertinent symptoms as noted below.    Review of Systems   Eyes: Negative for blurred vision.   Respiratory: Negative for cough, sputum production and shortness of breath.    Cardiovascular: Positive for leg swelling. Negative for chest pain.   Gastrointestinal: Negative for nausea, vomiting, abdominal pain,  diarrhea and constipation.   Genitourinary: Negative for dysuria.   Musculoskeletal: Positive for back pain and neck pain.   Neurological: Negative for tingling, seizures and headaches.   Psychiatric/Behavioral: Negative for depression.       /82 mmHg  Pulse 84  Temp(Src) 36.4 °C (97.6 °F)  Resp 18  Ht 1.524 m (5')  Wt 55.702 kg (122 lb 12.8 oz)  BMI 23.98 kg/m2  SpO2 93%  LMP 06/15/1996    Physical Exam   Constitutional:  oriented to person, place, and time.   Eyes: . No scleral icterus.  Neck: Neck supple.  Cardiovascular: Normal rate, regular rhythm and normal heart sounds.   No murmur heard.  Pulmonary/Chest: Breath sounds normal. Chest wall is not dull to percussion.    Musculoskeletal:   no edema.    Neurological: Hand  strength is decreased on the left. No motor deficit present in the lower extremities. Reflexes are 2/2.   Skin: No cyanosis.    Assessment and Plan    1. RLS (restless legs syndrome)  -Likely leg pain is due to the restless leg syndrome  -We will check magnesium level, it was normal then consider to increase ropinirole dose in next appointment   -Follow up in 2 weeks      2. Essential hypertension  -Stable ,denied any alarm symptoms  -Continue current medication    3. Cerebrovascular accident (CVA) due to thrombosis of left carotid artery (CMS-HCC)  -Having right-sided neck weakness due to CVA. Patient using cane at home   -Cont aspirin , pt is not on statin , will talk in next appointment     4. Chronic midline low back pain with right-sided sciatica   -on Norco 7.5/325 tid      Chronic pain recheck:   Last dose of controlled substance: 3/27/2017  Chronic pain treated with oxy/aceta  Current alcohol or substance use:No    Consequences of Chronic Opiate therapy:  (5 A's)  Analgesia:  IMPROVED/  Activity:  IMPROVED  Adverse Events:  None  Aberrant Behaviors: No  Affect/Mood: Normal    Nonnarcotic treatments that are being used: None    Urine drug screen done: 1/17, which was  reviewed today and consistent with prescribed medications.    I have reviewed the medical records, the Prescription Monitoring Program and I have determined that controlled substance treatment is medically indicated.          Signed by: Emily Parr M.D.

## 2017-03-29 DIAGNOSIS — M19.90 INFLAMMATORY ARTHROPATHY: ICD-10-CM

## 2017-03-29 RX ORDER — HYDROXYCHLOROQUINE SULFATE 200 MG/1
200 TABLET, FILM COATED ORAL
Qty: 30 TAB | Refills: 2 | Status: SHIPPED | OUTPATIENT
Start: 2017-03-29 | End: 2017-04-27 | Stop reason: SDUPTHER

## 2017-03-29 RX ORDER — TOPIRAMATE 100 MG/1
TABLET, FILM COATED ORAL
Qty: 30 TAB | Refills: 0 | Status: SHIPPED | OUTPATIENT
Start: 2017-03-29 | End: 2017-04-07

## 2017-03-29 RX ORDER — PANTOPRAZOLE SODIUM 40 MG/1
TABLET, DELAYED RELEASE ORAL
Qty: 30 TAB | Refills: 0 | Status: SHIPPED | OUTPATIENT
Start: 2017-03-29 | End: 2017-04-07

## 2017-03-30 NOTE — TELEPHONE ENCOUNTER
I wrote an Rx 3/2017 for this medication with 5 refills.  Why is the pharmacy asking for a refill?

## 2017-04-03 ENCOUNTER — TELEPHONE (OUTPATIENT)
Dept: INTERNAL MEDICINE | Facility: MEDICAL CENTER | Age: 67
End: 2017-04-03

## 2017-04-04 NOTE — TELEPHONE ENCOUNTER
"----- Message from Finesse Flaquito, Med Ass't sent at 3/30/2017  4:05 PM PDT -----  Regarding: Son is conernced about Rx abuse  Contact: [phone]  Son, Narendra Portillo, is concerned that his mother is taking too much of her medication (I think it's the Norco, but not certain as he said something that started with a \"B\").  States that when she gets them, she takes a whole bunch at once and it makes her \"drunk\".  He's had to help her out of the tub, she falls down a lot leading to injuries and his fiance has had to help her with her undergarments, etc.     States \"it has to stop now!\"    Would like to discuss with you or he \"will take further action against [employer]\" as this is the second time he has brought this up.  Would also like for us to not tell his mother about this discussion because it causes fights and problems at home.  I let him know that while he is authorized on her account to discuss her treatment and billing, he is not a court ordered appointee and she is still her own guardian.    Please call on Monday when you are back.  I am happy to sit in on discussion.  "

## 2017-04-05 NOTE — TELEPHONE ENCOUNTER
Spoke to Mr. Portillo and asked if he was available to speak to Dr. Chavarria, he said he will call back on another line.

## 2017-04-05 NOTE — TELEPHONE ENCOUNTER
Let Dr. Chavarria know that Mr. Portillo would be coming in and Dr. Chavarria would really like for Leena to come into office.

## 2017-04-05 NOTE — TELEPHONE ENCOUNTER
Called PabloBandar explained to him that  would like for the pt to schedule an appt to be seen and would like for him to come in with his mother to the appt  refused says he is walking in at 5pm today to speak to .

## 2017-04-06 NOTE — TELEPHONE ENCOUNTER
I was here until 6pm last night and mr. Portillo did not show. Regardless, I have listened to 3 separate complaints in 3 days (2 from healthcare workers and her son) that patient may be overmedicated.  1 worker interacted with patient this week in grocery store. Patient did not recognize worker although they've known each other for years, patients pupils dilated and very lethargic appearing.  Another worker roomed patient and noted lethargy. She had to help patient remove her sweater.     Patient needs appt with resident ASAP to eval overmedication and SE from meds. May need UDS as well.

## 2017-04-06 NOTE — TELEPHONE ENCOUNTER
I spoke to patient, inform her that the dr would like her to be seen today but she doesn't feel well. Appt made for tomorrow at 8:30am with Dr. Bucio.   Inform patient that its important for to be seen and if she's not feeling well then the doctor can address that also.

## 2017-04-07 ENCOUNTER — OFFICE VISIT (OUTPATIENT)
Dept: INTERNAL MEDICINE | Facility: MEDICAL CENTER | Age: 67
End: 2017-04-07
Payer: MEDICARE

## 2017-04-07 VITALS
BODY MASS INDEX: 24.94 KG/M2 | SYSTOLIC BLOOD PRESSURE: 128 MMHG | OXYGEN SATURATION: 95 % | WEIGHT: 127 LBS | DIASTOLIC BLOOD PRESSURE: 82 MMHG | RESPIRATION RATE: 19 BRPM | TEMPERATURE: 97.5 F | HEART RATE: 66 BPM | HEIGHT: 60 IN

## 2017-04-07 DIAGNOSIS — Z81.8 FAMILY HISTORY OF STRESS: ICD-10-CM

## 2017-04-07 DIAGNOSIS — I25.810 CORONARY ARTERY DISEASE INVOLVING CORONARY BYPASS GRAFT OF NATIVE HEART WITHOUT ANGINA PECTORIS: ICD-10-CM

## 2017-04-07 DIAGNOSIS — E11.49 OTHER DIABETIC NEUROLOGICAL COMPLICATION ASSOCIATED WITH TYPE 2 DIABETES MELLITUS (HCC): ICD-10-CM

## 2017-04-07 DIAGNOSIS — G89.29 CHRONIC BILATERAL LOW BACK PAIN WITH BILATERAL SCIATICA: ICD-10-CM

## 2017-04-07 DIAGNOSIS — Z79.891 CHRONIC USE OF OPIATE DRUGS THERAPEUTIC PURPOSES: ICD-10-CM

## 2017-04-07 DIAGNOSIS — Z79.4 TYPE 2 DIABETES MELLITUS WITHOUT COMPLICATION, WITH LONG-TERM CURRENT USE OF INSULIN (HCC): ICD-10-CM

## 2017-04-07 DIAGNOSIS — Z95.1 HX OF CORONARY ARTERY BYPASS GRAFT: ICD-10-CM

## 2017-04-07 DIAGNOSIS — E11.9 TYPE 2 DIABETES MELLITUS WITHOUT COMPLICATION, WITH LONG-TERM CURRENT USE OF INSULIN (HCC): ICD-10-CM

## 2017-04-07 DIAGNOSIS — M54.42 CHRONIC BILATERAL LOW BACK PAIN WITH BILATERAL SCIATICA: ICD-10-CM

## 2017-04-07 DIAGNOSIS — M25.50 POLYARTHRALGIA: ICD-10-CM

## 2017-04-07 DIAGNOSIS — G43.119 INTRACTABLE MIGRAINE WITH AURA WITHOUT STATUS MIGRAINOSUS: ICD-10-CM

## 2017-04-07 DIAGNOSIS — I25.5 ISCHEMIC CARDIOMYOPATHY: ICD-10-CM

## 2017-04-07 DIAGNOSIS — M54.41 CHRONIC BILATERAL LOW BACK PAIN WITH BILATERAL SCIATICA: ICD-10-CM

## 2017-04-07 DIAGNOSIS — G25.81 RLS (RESTLESS LEGS SYNDROME): ICD-10-CM

## 2017-04-07 DIAGNOSIS — J44.9 CHRONIC OBSTRUCTIVE PULMONARY DISEASE, UNSPECIFIED COPD TYPE (HCC): ICD-10-CM

## 2017-04-07 PROCEDURE — 3017F COLORECTAL CA SCREEN DOC REV: CPT | Mod: 8P,GC | Performed by: INTERNAL MEDICINE

## 2017-04-07 PROCEDURE — 3288F FALL RISK ASSESSMENT DOCD: CPT | Mod: 8P,GC | Performed by: INTERNAL MEDICINE

## 2017-04-07 PROCEDURE — 1100F PTFALLS ASSESS-DOCD GE2>/YR: CPT | Mod: GC | Performed by: INTERNAL MEDICINE

## 2017-04-07 PROCEDURE — 1036F TOBACCO NON-USER: CPT | Mod: GC | Performed by: INTERNAL MEDICINE

## 2017-04-07 PROCEDURE — 3014F SCREEN MAMMO DOC REV: CPT | Mod: GC | Performed by: INTERNAL MEDICINE

## 2017-04-07 PROCEDURE — 0518F FALL PLAN OF CARE DOCD: CPT | Mod: 8P,GC | Performed by: INTERNAL MEDICINE

## 2017-04-07 PROCEDURE — 99000 SPECIMEN HANDLING OFFICE-LAB: CPT | Performed by: INTERNAL MEDICINE

## 2017-04-07 PROCEDURE — G8420 CALC BMI NORM PARAMETERS: HCPCS | Mod: GC | Performed by: INTERNAL MEDICINE

## 2017-04-07 PROCEDURE — 4040F PNEUMOC VAC/ADMIN/RCVD: CPT | Mod: GC | Performed by: INTERNAL MEDICINE

## 2017-04-07 PROCEDURE — 3045F PR MOST RECENT HEMOGLOBIN A1C LEVEL 7.0-9.0%: CPT | Mod: GC | Performed by: INTERNAL MEDICINE

## 2017-04-07 PROCEDURE — 99214 OFFICE O/P EST MOD 30 MIN: CPT | Mod: 25,GC | Performed by: INTERNAL MEDICINE

## 2017-04-07 PROCEDURE — G8432 DEP SCR NOT DOC, RNG: HCPCS | Mod: GC | Performed by: INTERNAL MEDICINE

## 2017-04-07 PROCEDURE — G8598 ASA/ANTIPLAT THER USED: HCPCS | Mod: GC | Performed by: INTERNAL MEDICINE

## 2017-04-07 RX ORDER — POTASSIUM CHLORIDE 750 MG/1
10 CAPSULE, EXTENDED RELEASE ORAL DAILY
Qty: 30 CAP | Refills: 0 | Status: ON HOLD
Start: 2017-04-07 | End: 2017-04-12

## 2017-04-07 RX ORDER — GABAPENTIN 600 MG/1
600 TABLET ORAL 2 TIMES DAILY
Qty: 60 TAB | Refills: 3
Start: 2017-04-07 | End: 2017-04-28 | Stop reason: SDUPTHER

## 2017-04-07 RX ORDER — ROPINIROLE 0.5 MG/1
1 TABLET, FILM COATED ORAL
Qty: 60 TAB | Refills: 3 | Status: SHIPPED | OUTPATIENT
Start: 2017-04-07 | End: 2017-04-26 | Stop reason: SDUPTHER

## 2017-04-07 RX ORDER — BUTALBITAL, ACETAMINOPHEN AND CAFFEINE 50; 325; 40 MG/1; MG/1; MG/1
1 TABLET ORAL
Qty: 1 TAB | Refills: 0
Start: 2017-04-07 | End: 2017-04-28

## 2017-04-07 ASSESSMENT — PAIN SCALES - GENERAL: PAINLEVEL: 7=MODERATE-SEVERE PAIN

## 2017-04-07 NOTE — MR AVS SNAPSHOT
"        Leena Bella   2017 8:30 AM   Office Visit   MRN: [id number]    Department:  [facility]   Dept Phone:  [phone]    Description:  Female : 1950   Provider:  Rafael Bucio M.D.           Reason for Visit     Medication Problem drug screen    Diarrhea uri      Allergies as of 2017     Allergen Noted Reactions    Cozaar [Losartan] 2016       Darvocet [Propoxyphene N-Apap] 2016       Lexapro 2016       Lisinopril 2016       Cephalexin 2014       Rash - \"looks like I have AIDS\"    Morphine 10/12/2008       Heart stopped?    Other Environmental 10/20/2011   Rash    tape    Penicillin G Potassium 10/12/2008   Rash    Has tolerated Unasyn in May 2014 and received amoxicillin on 12/29/15 without reaction    Sulfa Drugs 10/18/2011   Rash      You were diagnosed with     Hx of coronary artery bypass graft   [186473]       Coronary artery disease involving coronary bypass graft of native heart without angina pectoris   [0318940]       Ischemic cardiomyopathy   [954685]       Intractable migraine with aura without status migrainosus   [897449]       Type 2 diabetes mellitus without complication, with long-term current use of insulin (CMS-HCC)   [5545024]       RLS (restless legs syndrome)   [878614]       Chronic obstructive pulmonary disease, unspecified COPD type (CMS-HCC)   [9263458]       Other diabetic neurological complication associated with type 2 diabetes mellitus (CMS-HCC)   [1943008]       Polyarthralgia   [041050]       Chronic use of opiate drugs therapeutic purposes   [3479443]       Family history of stress   [469581]   Son concern about mom overmedicated but will not come in to discuss case.  Patient report's son is drug abuser and non-reliable.      Vital Signs     Blood Pressure Pulse Temperature Respirations Height Weight    128/82 mmHg 66 36.4 °C (97.5 °F) 19 1.524 m (5') 57.607 kg (127 lb)    Body Mass Index Oxygen Saturation Last " Menstrual Period Breastfeeding? Smoking Status       24.80 kg/m2 95% 06/15/1996 No Former Smoker       Basic Information     Date Of Birth Sex Race Ethnicity Preferred Language    1950 Female White Non- English      Your appointments     Apr 28, 2017 10:30 AM   Established Patient with Adriano Bahena M.D.   [facility] Med - Internal Medicine (--)    [address]  [address]  [facility] [id number] [phone]           You will be receiving a confirmation call a few days before your appointment from our automated call confirmation system.            Aug 14, 2017  2:40 PM   Follow Up Visit with Melissa P Bloch, M.D.   [facility] Neurology (--)    [address] [facility], [address]  [facility] [id number] [phone]           You will be receiving a confirmation call a few days before your appointment from our automated call confirmation system.              Problem List              ICD-10-CM Priority Class Noted - Resolved    CAD (coronary artery disease) I25.10   10/18/2011 - Present    Ischemic cardiomyopathy I25.5   10/18/2011 - Present    Type 2 diabetes mellitus (CMS-HCC) E11.9   10/18/2011 - Present    CHF (congestive heart failure) (CMS-HCC) I50.9   10/18/2011 - Present    Migraines G43.909   12/23/2013 - Present    Polyarthralgia M25.50   11/3/2015 - Present    Chronic fatigue R53.82   11/3/2015 - Present    Dyspnea R06.00   11/3/2015 - Present    Skin rash R21   11/3/2015 - Present    Osteoporosis M81.0   11/3/2015 - Present    Vitamin D deficiency E55.9   11/11/2015 - Present    CVA (cerebral vascular accident) (CMS-HCC) I63.9   6/4/2016 - Present    Allergic rhinitis J30.9   6/4/2016 - Present    Automatic implantable cardioverter-defibrillator in situ Z95.810   6/4/2016 - Present    Osteoarthritis M19.90   6/4/2016 - Present    Labial abscess N76.4   6/4/2016 - Present    Nicotine dependence F17.200   6/4/2016 - Present    Diabetic neuropathy (CMS-HCC) E11.40   6/4/2016 -  Present    GERD (gastroesophageal reflux disease) K21.9   6/4/2016 - Present    RLS (restless legs syndrome) G25.81   6/4/2016 - Present    PEYTON (obstructive sleep apnea) G47.33   6/4/2016 - Present    COPD (chronic obstructive pulmonary disease) (CMS-[facility]) J44.9   6/4/2016 - Present    Anxiety disorder F41.9   6/4/2016 - Present    Dyslipidemia E78.5   6/4/2016 - Present    Abscess L02.91   6/4/2016 - Present    Hx of coronary artery bypass graft Z95.1   6/4/2016 - Present    HTN (hypertension) I10   6/4/2016 - Present    Opiate analgesic use agreement exists Z02.89   1/9/2017 - Present    Chronic back pain M54.9, G89.29   3/3/2017 - Present    Family history of stress Z81.8   4/7/2017 - Present      Health Maintenance        Date Due Completion Dates    DIABETES MONOFILAMENT / LE EXAM 1950 ---    RETINAL SCREENING 4/6/1967 ---    PAP SMEAR 4/6/1970 ---    URINE ACR / MICROALBUMIN 12/14/2013 12/14/2012, 10/17/2011, 7/11/2011, 4/12/2010, 9/22/2008    COLONOSCOPY 8/11/2014 8/11/2004    MAMMOGRAM 2/3/2017 2/3/2016, 11/5/2012    FASTING LIPID PROFILE 2/11/2017 2/11/2016, 9/10/2015, 3/10/2015, 8/19/2014, 5/28/2014, 5/28/2014, 12/14/2012, 12/14/2012, 7/16/2012, 3/7/2012, 7/11/2011, 7/26/2010, 4/12/2010, 8/7/2009, 9/22/2008, 2/13/2007, 5/29/2006    A1C SCREENING 8/28/2017 2/28/2017, 9/10/2015, 8/19/2014, 5/28/2014, 5/28/2014, 12/14/2012, 3/9/2012, 3/7/2012, 10/17/2011, 7/11/2011, 7/26/2010, 4/12/2010, 8/7/2009, 9/22/2008, 2/13/2007, 5/29/2006    SERUM CREATININE 2/28/2018 2/28/2017, 12/15/2016, 11/9/2016, 4/1/2016, 11/6/2015, 9/10/2015, 3/10/2015, 12/17/2014, 8/19/2014, 8/1/2014, 6/2/2014, 6/1/2014, 5/31/2014, 5/30/2014, 5/29/2014, 5/28/2014, 5/27/2014, 10/10/2013, 10/9/2013, 10/8/2013, 12/14/2012, 10/30/2012, 7/16/2012, 3/9/2012, 3/8/2012, 3/7/2012, 3/6/2012, 10/20/2011, 10/17/2011, 7/11/2011, 7/26/2010, 4/12/2010, 8/7/2009, 9/22/2008, 2/13/2007, 2/12/2007, 7/17/2006, 5/30/2006, 5/29/2006, 5/28/2006    BONE DENSITY  11/30/2020 11/30/2015    IMM DTaP/Tdap/Td Vaccine (2 - Td) 1/9/2027 1/9/2017, 3/3/2015            Current Immunizations     13-VALENT PCV PREVNAR 1/9/2017    Influenza TIV (IM) 10/9/2013  6:30 PM, 11/11/2011    Influenza Vaccine Quad Inj (Preserved) 12/5/2016    Pneumococcal Vaccine (UF)Historical Data 11/11/2011    Pneumococcal polysaccharide vaccine (PPSV-23) 12/5/2016    SHINGLES VACCINE 10/11/2015    Tdap Vaccine 1/9/2017, 3/3/2015      Below and/or attached are the medications your provider expects you to take. Review all of your home medications and newly ordered medications with your provider and/or pharmacist. Follow medication instructions as directed by your provider and/or pharmacist. Please keep your medication list with you and share with your provider. Update the information when medications are discontinued, doses are changed, or new medications (including over-the-counter products) are added; and carry medication information at all times in the event of emergency situations     Allergies:  COZAAR - (reactions not documented)     DARVOCET - (reactions not documented)     LEXAPRO - (reactions not documented)     LISINOPRIL - (reactions not documented)     CEPHALEXIN - (reactions not documented)     MORPHINE - (reactions not documented)     OTHER ENVIRONMENTAL - Rash     PENICILLIN G POTASSIUM - Rash     SULFA DRUGS - Rash               Medications  Valid as of: April 07, 2017 -  9:42 AM    Generic Name Brand Name Tablet Size Instructions for use    Amiodarone HCl (Tab) CORDARONE 200 MG Take 1 Tab by mouth every day.        Aspirin (Tab) aspirin 81 MG Take 325 mg by mouth every day.        BuPROPion HCl (TABLET SR 12 HR) WELLBUTRIN- MG Take 1 Tab by mouth 2 times a day.        Butalbital-APAP-Caffeine (Tab) FIORICET -40 MG Take 1 Tab by mouth 1 time daily as needed for Headache.        Calcium Carb-Cholecalciferol (Tab) OS-ADAIR CALCIUM + D3 500-200 MG-UNIT Take 1 Tab by mouth 2 times a day,  with meals.        Calcium Carbonate-Vitamin D (Tab) OSCAL 500 +D 500-200 MG-UNIT Take 1 Tab by mouth 2 times a day, with meals.        Carvedilol (Tab) COREG 12.5 MG TAKE 1 TAB BY MOUTH 2 TIMES A DAY.        Cetirizine HCl (Tab) ZYRTEC 10 MG TAKE 1 TAB BY MOUTH 1 TIME DAILY AS NEEDED FOR ALLERGIES.        Clopidogrel Bisulfate (Tab) PLAVIX 75 MG TAKE 1 TAB BY MOUTH EVERY DAY.        Docusate Sodium (Cap) COLACE 100 MG Take 1 Cap by mouth 2 times a day.        Ferrous Sulfate (Tab) Iron 325 (65 FE) MG every day.         Fluticasone Propionate (Suspension) FLONASE 50 MCG/ACT         Furosemide (Tab) LASIX 20 MG Take 1 Tab by mouth every day.        Gabapentin (Tab) NEURONTIN 600 MG Take 1 Tab by mouth 2 times a day. TAKE 1 TABLET BY MOUTH TWICE A DAY        Hydrocodone-Acetaminophen (Tab) NORCO 7.5-325 MG Take 1 Tab by mouth every 8 hours as needed for Severe Pain.        Hydroxychloroquine Sulfate (Tab) PLAQUENIL 200 MG Take 1 Tab by mouth every day.        Insulin Glargine (Solution Pen-injector) LANTUS 100 UNIT/ML INJECT 20 UNITS SUBCUTANEOUSLY DAILY AT BEDTIME AS DIRECTED        Insulin Pen Needle (Misc) Insulin Pen Needle 31G X 8 MM Use to inject insulin daily.        Ipratropium-Albuterol (Aerosol) COMBIVENT  MCG/ACT Inhale 2 Puffs by mouth 4 times a day.        MetFORMIN HCl (Tab) GLUCOPHAGE 1000 MG Take 1 Tab by mouth 2 times a day.        Nitroglycerin (SL Tab) NITROSTAT 0.4 MG Place 1 Tab under tongue as needed for Chest Pain. Max dose of 3 in 24 hrs until need for ED.        Pantoprazole Sodium (Tablet Delayed Response) PROTONIX 40 MG Take 1 Tab by mouth every day. TAKE 1 TAB BY MOUTH EVERY DAY.        Potassium Chloride (Cap CR) MICRO-K 10 MEQ TAKE ONE CAPSULE BY MOUTH TWICE DAILY        Potassium Chloride (Cap CR) MICRO-K 10 MEQ Take 1 Cap by mouth every day.        ROPINIRole HCl (Tab) REQUIP 0.5 MG Take 2 Tabs by mouth every bedtime.        Rosuvastatin Calcium (Tab) CRESTOR 40 MG Take 1 Tab by  mouth every day.        Topiramate (Tab) TOPAMAX 100 MG Take 1 Tab by mouth every 12 hours.        .                 Medicines prescribed today were sent to:     [facility] - [place], [place] - [address] [facility]    [address] [address]    Phone: [phone] Fax: [phone]    Open 24 Hours?: No      Medication refill instructions:       If your prescription bottle indicates you have medication refills left, it is not necessary to call your provider’s office. Please contact your pharmacy and they will refill your medication.    If your prescription bottle indicates you do not have any refills left, you may request refills at any time through one of the following ways: The online [employer] system (except Urgent Care), by calling your provider’s office, or by asking your pharmacy to contact your provider’s office with a refill request. Medication refills are processed only during regular business hours and may not be available until the next business day. Your provider may request additional information or to have a follow-up visit with you prior to refilling your medication.   *Please Note: Medication refills are assigned a new Rx number when refilled electronically. Your pharmacy may indicate that no refills were authorized even though a new prescription for the same medication is available at the pharmacy. Please request the medicine by name with the pharmacy before contacting your provider for a refill.        Your To Do List     Future Labs/Procedures Complete By Expires    [facility]  As directed 4/7/2018    Comments:    Current Meds (name, sig, last dose):   Current outpatient prescriptions:   •  acetaminophen/caffeine/butalbital 325-40-50 mg, 1 Tab, Oral, QDAY PRN  •  gabapentin, 600 mg, Oral, BID  •  potassium chloride, 10 mEq, Oral, DAILY  •  ropinirole, 1 mg, Oral, QHS  •  hydroxychloroquine, 200 mg, Oral, QDAY  •  OS-ADAIR CALCIUM + D3, 1 Tab, Oral, BID  WITH MEALS  •  cetirizine, TAKE 1 TAB BY MOUTH 1 TIME DAILY AS NEEDED FOR ALLERGIES.  •  furosemide, 20 mg, Oral, QDAY  •  amiodarone, 200 mg, Oral, DAILY  •  hydrocodone-acetaminophen, 1 Tab, Oral, Q8HRS PRN  •  pantoprazole, 40 mg, Oral, QDAY  •  topiramate, 100 mg, Oral, Q12HRS  •  docusate sodium, 100 mg, Oral, BID, 3/24/2017 at Unknown time  •  carvedilol, TAKE 1 TAB BY MOUTH 2 TIMES A DAY., 3/24/2017 at Unknown time  •  clopidogrel, TAKE 1 TAB BY MOUTH EVERY DAY., 3/24/2017 at Unknown time  •  calcium-vitamin D, 1 Tab, Oral, BID WITH MEALS, 3/24/2017 at Unknown time  •  nitroglycerin, 0.4 mg, Sublingual, PRN, 3/24/2017 at Unknown time  •  Insulin Pen Needle, Use to inject insulin daily., 3/24/2017 at Unknown time  •  rosuvastatin, 40 mg, Oral, QDAY, 3/24/2017 at Unknown time  •  buPROPion SR, 150 mg, Oral, BID, 3/24/2017 at Unknown time  •  metformin, 1,000 mg, Oral, BID, 3/24/2017 at Unknown time  •  potassium chloride, TAKE ONE CAPSULE BY MOUTH TWICE DAILY, 3/24/2017 at Unknown time  •  insulin glargine, INJECT 20 UNITS SUBCUTANEOUSLY DAILY AT BEDTIME AS DIRECTED, 3/24/2017 at Unknown time  •  fluticasone, , 3/24/2017 at Unknown time  •  albuterol-ipratropium, 2 Puff, Inhalation, 4XDAY, 3/24/2017 at Unknown time  •  Iron, every day. , 3/24/2017 at Unknown time  •  aspirin, 325 mg, Oral, DAILY, 3/24/2017 at Unknown time            Instructions    F/u with Dr. Josef Ivy Status: Patient Declined

## 2017-04-07 NOTE — PROGRESS NOTES
"Established Patient    Leena presents today with the following:    CC: medication review    HPI:   Patient is a 67 y.o woman here for medication review.  Son was concern patient is being over medicated. He reports she has fallen a few times.  Patient has a history of CVA and uses a walker.  I have reviewed all her medications.  She is very appropriate. Very understanding.  She is recovering from a cold it appears.  She is taking lots of medications but appears to need it.   She is on gabapentin, topamax, norco which can cause drowsiness.  She is A&Ox3  She has osteoporosis. Moderate osteoarthritis of the bilateral hip joint.  Moderate degenerative change of the cervical spine, most at C5-6 and C6-C7.  She feels safe at home, she lives with her son.  She reports her son is a drug abuser.    After patient left the building, 30 minutes later the son wants to speak to Dr. Rivas and I. We had a 10 minute conversation, he is concern about the use of fiorcet is making her \"loopy\". We reported to him that his concern is valid but we need him and his mom to be present at the same time to make medical decisions. We can not address issues that the patient is not concern with. He is not DPOA. She is competent to make her own decisions.       Patient Active Problem List    Diagnosis Date Noted   • Chronic back pain 03/03/2017   • Opiate analgesic use agreement exists 01/09/2017   • CVA (cerebral vascular accident) (CMS-HCC) 06/04/2016   • Allergic rhinitis 06/04/2016   • Automatic implantable cardioverter-defibrillator in situ 06/04/2016   • Osteoarthritis 06/04/2016   • Labial abscess 06/04/2016   • Nicotine dependence 06/04/2016   • Diabetic neuropathy (CMS-HCC) 06/04/2016   • GERD (gastroesophageal reflux disease) 06/04/2016   • RLS (restless legs syndrome) 06/04/2016   • PEYTON (obstructive sleep apnea) 06/04/2016   • COPD (chronic obstructive pulmonary disease) (CMS-HCC) 06/04/2016   • Anxiety disorder 06/04/2016   • " Dyslipidemia 06/04/2016   • Abscess 06/04/2016   • Hx of coronary artery bypass graft 06/04/2016   • HTN (hypertension) 06/04/2016   • Vitamin D deficiency 11/11/2015   • Polyarthralgia 11/03/2015   • Chronic fatigue 11/03/2015   • Dyspnea 11/03/2015   • Skin rash 11/03/2015   • Osteoporosis 11/03/2015   • Migraines 12/23/2013   • CAD (coronary artery disease) 10/18/2011   • Ischemic cardiomyopathy 10/18/2011   • Type 2 diabetes mellitus (CMS-HCC) 10/18/2011   • CHF (congestive heart failure) (CMS-HCC) 10/18/2011       Current Outpatient Prescriptions   Medication Sig Dispense Refill   • acetaminophen/caffeine/butalbital 325-40-50 mg (FIORICET) -40 MG Tab Take 1 Tab by mouth 1 time daily as needed for Headache. 1 Tab 0   • gabapentin (NEURONTIN) 600 MG tablet Take 1 Tab by mouth 2 times a day. TAKE 1 TABLET BY MOUTH TWICE A DAY 60 Tab 3   • potassium chloride (KLOR-CON SPRINKLE) 10 MEQ capsule Take 1 Cap by mouth every day. 30 Cap 00   • ropinirole (REQUIP) 0.5 MG Tab Take 2 Tabs by mouth every bedtime. 60 Tab 3   • hydroxychloroquine (PLAQUENIL) 200 MG Tab Take 1 Tab by mouth every day. 30 Tab 2   • OS-ADAIR CALCIUM + D3 500-200 MG-UNIT Tab Take 1 Tab by mouth 2 times a day, with meals.  3   • cetirizine (ZYRTEC) 10 MG Tab TAKE 1 TAB BY MOUTH 1 TIME DAILY AS NEEDED FOR ALLERGIES. 90 Tab 0   • furosemide (LASIX) 20 MG Tab Take 1 Tab by mouth every day. 30 Tab 3   • amiodarone (CORDARONE) 200 MG Tab Take 1 Tab by mouth every day. 30 Tab 0   • hydrocodone-acetaminophen (NORCO) 7.5-325 MG per tablet Take 1 Tab by mouth every 8 hours as needed for Severe Pain. 90 Tab 0   • pantoprazole (PROTONIX) 40 MG Tablet Delayed Response Take 1 Tab by mouth every day. TAKE 1 TAB BY MOUTH EVERY DAY. 30 Tab 0   • topiramate (TOPAMAX) 100 MG Tab Take 1 Tab by mouth every 12 hours. 30 Tab 1   • docusate sodium (COLACE) 100 MG Cap Take 1 Cap by mouth 2 times a day. 60 Cap prn   • carvedilol (COREG) 12.5 MG Tab TAKE 1 TAB BY MOUTH 2  TIMES A DAY. 60 Tab 5   • clopidogrel (PLAVIX) 75 MG Tab TAKE 1 TAB BY MOUTH EVERY DAY. 30 Tab 5   • calcium-vitamin D (OSCAL 500 +D) 500-200 MG-UNIT Tab Take 1 Tab by mouth 2 times a day, with meals. 100 Tab 3   • nitroglycerin (NITROSTAT) 0.4 MG SL Tab Place 1 Tab under tongue as needed for Chest Pain. Max dose of 3 in 24 hrs until need for ED. 25 Tab 2   • Insulin Pen Needle 31G X 8 MM Misc Use to inject insulin daily. 100 Each 0   • rosuvastatin (CRESTOR) 40 MG tablet Take 1 Tab by mouth every day. 30 Tab 3   • buPROPion SR (WELLBUTRIN-SR) 150 MG TABLET SR 12 HR sustained-release tablet Take 1 Tab by mouth 2 times a day. 60 Tab 11   • metformin (GLUCOPHAGE) 1000 MG tablet Take 1 Tab by mouth 2 times a day. 60 Tab 11   • potassium chloride (MICRO-K) 10 MEQ capsule TAKE ONE CAPSULE BY MOUTH TWICE DAILY 180 Cap 3   • insulin glargine (LANTUS SOLOSTAR) 100 UNIT/ML Solution Pen-injector injection INJECT 20 UNITS SUBCUTANEOUSLY DAILY AT BEDTIME AS DIRECTED 15 PEN 0   • fluticasone (FLONASE) 50 MCG/ACT nasal spray      • albuterol-ipratropium (COMBIVENT)  MCG/ACT AERO Inhale 2 Puffs by mouth 4 times a day. 1 Inhaler 0   • IRON 325 (65 FE) MG PO TABS every day.      • ASPIRIN 81 MG PO TABS Take 325 mg by mouth every day.       No current facility-administered medications for this visit.       Social History     Social History   • Marital Status: Single     Spouse Name: N/A   • Number of Children: N/A   • Years of Education: N/A     Occupational History   • Not on file.     Social History Main Topics   • Smoking status: Former Smoker -- 0.25 packs/day for 25 years     Types: Cigarettes     Quit date: 01/01/2016   • Smokeless tobacco: Never Used   • Alcohol Use: No   • Drug Use: No   • Sexual Activity: Not on file     Other Topics Concern   • Not on file     Social History Narrative       Family History   Problem Relation Age of Onset   • Arthritis Mother    • Heart Disease Father    • Arthritis Sister         ROS  Constitutional: fevers Denies weight changes  Eyes: Denies changes in vision, no eye pain  Ears/Nose/Throat/Mouth: Denies nasal congestion or sore throat   Cardiovascular: Denies chest pain or palpitations   Respiratory: cough Denies shortness of breath , Denies   Gastrointestinal/Hepatic: Denies abdominal pain, nausea, vomiting, diarrhea, constipation or GI bleeding   Genitourinary: Denies bladder dysfunction, dysuria or frequency  Musculoskeletal/Rheum: joint pain   Neurological: Denies headache, confusion, memory loss or focal weakness/parasthesias  Psychiatric: denies mood disorder   Endocrine: hx of diabetes dysfunction  Heme/Oncology/Lymph Nodes: Denies enlarged lymph nodes, denies brusing or known bleeding disorder  Allergic/Immunologic: hx of allergies        /82 mmHg  Pulse 66  Temp(Src) 36.4 °C (97.5 °F)  Resp 19  Ht 1.524 m (5')  Wt 57.607 kg (127 lb)  BMI 24.80 kg/m2  SpO2 95%  LMP 06/15/1996  Breastfeeding? No        Physical Exam  General: no acute distress, sitting on table  Eyes:EOMI, Scleral clear, PERRLA   Mouth:moist mucus membranes  Neck: NC/AT  CV: regular rate and rhythm, no m/r/g, no peripheral edema   Pulm: ctab, good inspiratory effort,no wheezing/crackles  Abd: non-distended, non-tender to palpation, +BS, soft, No hepatosplenomegaly, No hernias present   Extremity: no cyanosis, pulses +2  Neuro:  no focal deficits  MSK: 4-5/5 muscular strength bilaterally    Skin: no rashes, lesions or ulcers   Pysch: A&Ox3    Assessment and Plan    1. Hx of coronary artery bypass graft-coreg, plavix, lasix, asa, crestor   2. Coronary artery disease involving coronary bypass graft of native heart without angina pectoris-coreg, plavix, lasix, asa, crestor   3. Ischemic cardiomyopathy-coreg, plavix, lasix, asa, crestor   4. Un-intractable migraine with aura without status migrainosus-topamax, firocet   5. Type 2 diabetes mellitus without complication, with long-term current use of  insulin ([facility])-metformin, lantus   6. RLS (restless legs syndrome)- refill her requip   7. Chronic obstructive pulmonary disease, unspecified COPD type (CMS-HCC)  8. Other diabetic neurological complication associated with type 2 diabetes mellitus (CMS-HCC)  9. Polyarthralgia- norco 7.5/325mg  10. Afib- amiodarone    Main purpose of this visit is medication review. Possible polypharmacy.   I went over all her medications. She has zero concerns with her medications. She is very competent. A&Ox3. Good grooming, full facial expressions, normal speech pattern and content, normal thought patterns, normal perception, good insight, normal reasoning. She has no concern for her safety at home with her son.   Son on the other hand is very concern about her medications interactions especially fiorcet. We have discussed with him that both him and his mom need to be present to make his concerns valid.   We are hearing two different stories and need both of them together to get one real story.  -bring in all medications next visit  -request son to be at the next visit to discuss any issues  -drug screen today              Signed by: Rafael Bucio M.D.  Follow up: Return if symptoms worsen or fail to improve.

## 2017-04-11 ENCOUNTER — APPOINTMENT (OUTPATIENT)
Dept: RADIOLOGY | Facility: MEDICAL CENTER | Age: 67
End: 2017-04-11
Attending: EMERGENCY MEDICINE
Payer: MEDICARE

## 2017-04-11 ENCOUNTER — HOSPITAL ENCOUNTER (OUTPATIENT)
Facility: MEDICAL CENTER | Age: 67
End: 2017-04-12
Attending: EMERGENCY MEDICINE
Payer: MEDICARE

## 2017-04-11 DIAGNOSIS — R55 SYNCOPE, UNSPECIFIED SYNCOPE TYPE: ICD-10-CM

## 2017-04-11 LAB
ALBUMIN SERPL BCP-MCNC: 3.6 G/DL (ref 3.2–4.9)
ALBUMIN/GLOB SERPL: 1.3 G/DL
ALP SERPL-CCNC: 89 U/L (ref 30–99)
ALT SERPL-CCNC: 6 U/L (ref 2–50)
ANION GAP SERPL CALC-SCNC: 4 MMOL/L (ref 0–11.9)
AST SERPL-CCNC: 7 U/L (ref 12–45)
BASOPHILS # BLD AUTO: 0.9 % (ref 0–1.8)
BASOPHILS # BLD: 0.07 K/UL (ref 0–0.12)
BILIRUB SERPL-MCNC: 0.2 MG/DL (ref 0.1–1.5)
BNP SERPL-MCNC: 196 PG/ML (ref 0–100)
BUN SERPL-MCNC: 26 MG/DL (ref 8–22)
CALCIUM SERPL-MCNC: 9.9 MG/DL (ref 8.5–10.5)
CHLORIDE SERPL-SCNC: 107 MMOL/L (ref 96–112)
CO2 SERPL-SCNC: 26 MMOL/L (ref 20–33)
CREAT SERPL-MCNC: 1.19 MG/DL (ref 0.5–1.4)
EOSINOPHIL # BLD AUTO: 0.43 K/UL (ref 0–0.51)
EOSINOPHIL NFR BLD: 5.3 % (ref 0–6.9)
ERYTHROCYTE [DISTWIDTH] IN BLOOD BY AUTOMATED COUNT: 44.5 FL (ref 35.9–50)
GFR SERPL CREATININE-BSD FRML MDRD: 45 ML/MIN/1.73 M 2
GLOBULIN SER CALC-MCNC: 2.7 G/DL (ref 1.9–3.5)
GLUCOSE SERPL-MCNC: 218 MG/DL (ref 65–99)
HCT VFR BLD AUTO: 37.8 % (ref 37–47)
HGB BLD-MCNC: 12.4 G/DL (ref 12–16)
IMM GRANULOCYTES # BLD AUTO: 0.01 K/UL (ref 0–0.11)
IMM GRANULOCYTES NFR BLD AUTO: 0.1 % (ref 0–0.9)
INR PPP: 1.02 (ref 0.87–1.13)
LACTATE BLD-SCNC: 1.1 MMOL/L (ref 0.5–2)
LYMPHOCYTES # BLD AUTO: 2.53 K/UL (ref 1–4.8)
LYMPHOCYTES NFR BLD: 31 % (ref 22–41)
MCH RBC QN AUTO: 30 PG (ref 27–33)
MCHC RBC AUTO-ENTMCNC: 32.8 G/DL (ref 33.6–35)
MCV RBC AUTO: 91.5 FL (ref 81.4–97.8)
MONOCYTES # BLD AUTO: 0.6 K/UL (ref 0–0.85)
MONOCYTES NFR BLD AUTO: 7.4 % (ref 0–13.4)
NEUTROPHILS # BLD AUTO: 4.51 K/UL (ref 2–7.15)
NEUTROPHILS NFR BLD: 55.3 % (ref 44–72)
NRBC # BLD AUTO: 0.08 K/UL
NRBC BLD AUTO-RTO: 1 /100 WBC
PLATELET # BLD AUTO: 180 K/UL (ref 164–446)
PMV BLD AUTO: 10.6 FL (ref 9–12.9)
POTASSIUM SERPL-SCNC: 3.8 MMOL/L (ref 3.6–5.5)
PROT SERPL-MCNC: 6.3 G/DL (ref 6–8.2)
PROTHROMBIN TIME: 13.7 SEC (ref 12–14.6)
RBC # BLD AUTO: 4.13 M/UL (ref 4.2–5.4)
SODIUM SERPL-SCNC: 137 MMOL/L (ref 135–145)
TROPONIN I SERPL-MCNC: <0.01 NG/ML (ref 0–0.04)
WBC # BLD AUTO: 8.2 K/UL (ref 4.8–10.8)

## 2017-04-11 PROCEDURE — 71010 DX-CHEST-PORTABLE (1 VIEW): CPT

## 2017-04-11 PROCEDURE — 70450 CT HEAD/BRAIN W/O DYE: CPT

## 2017-04-11 PROCEDURE — 700105 HCHG RX REV CODE 258: Performed by: EMERGENCY MEDICINE

## 2017-04-11 PROCEDURE — 96361 HYDRATE IV INFUSION ADD-ON: CPT

## 2017-04-11 PROCEDURE — 94760 N-INVAS EAR/PLS OXIMETRY 1: CPT

## 2017-04-11 PROCEDURE — 83605 ASSAY OF LACTIC ACID: CPT

## 2017-04-11 PROCEDURE — 81002 URINALYSIS NONAUTO W/O SCOPE: CPT

## 2017-04-11 PROCEDURE — 80053 COMPREHEN METABOLIC PANEL: CPT

## 2017-04-11 PROCEDURE — 36415 COLL VENOUS BLD VENIPUNCTURE: CPT

## 2017-04-11 PROCEDURE — 85610 PROTHROMBIN TIME: CPT

## 2017-04-11 PROCEDURE — 84484 ASSAY OF TROPONIN QUANT: CPT

## 2017-04-11 PROCEDURE — 96360 HYDRATION IV INFUSION INIT: CPT

## 2017-04-11 PROCEDURE — 93005 ELECTROCARDIOGRAM TRACING: CPT

## 2017-04-11 PROCEDURE — 99285 EMERGENCY DEPT VISIT HI MDM: CPT

## 2017-04-11 PROCEDURE — 83880 ASSAY OF NATRIURETIC PEPTIDE: CPT

## 2017-04-11 PROCEDURE — 85025 COMPLETE CBC W/AUTO DIFF WBC: CPT

## 2017-04-11 RX ORDER — SODIUM CHLORIDE 9 MG/ML
1000 INJECTION, SOLUTION INTRAVENOUS CONTINUOUS
Status: ACTIVE | OUTPATIENT
Start: 2017-04-11 | End: 2017-04-11

## 2017-04-11 RX ADMIN — SODIUM CHLORIDE 1000 ML: 9 INJECTION, SOLUTION INTRAVENOUS at 21:48

## 2017-04-11 ASSESSMENT — LIFESTYLE VARIABLES: DO YOU DRINK ALCOHOL: NO

## 2017-04-11 ASSESSMENT — PAIN SCALES - GENERAL: PAINLEVEL_OUTOF10: 0

## 2017-04-11 NOTE — IP AVS SNAPSHOT
" Home Care Instructions                                                                                                                  Name:Leena Bella  Medical Record Number:[id number]  CSN: [id number]    YOB: 1950   Age: 67 y.o.  Sex: female  HT:1.575 m (5' 2\") WT: 55.7 kg (122 lb 12.7 oz)          Admit Date: 4/11/2017     Discharge Date:   Today's Date: 4/12/2017  Attending Doctor:  No att. providers found                  Allergies:  Cozaar; Darvocet; Lexapro; Lisinopril; Cephalexin; Morphine; Other environmental; Penicillin g potassium; and Sulfa drugs            Discharge Instructions       Discharge Instructions    Discharged to home by car with friend. Discharged via wheelchair, hospital escort: Yes.  Special equipment needed: Not Applicable    Be sure to schedule a follow-up appointment with your primary care doctor or any specialists as instructed.     Discharge Plan:   Diet Plan: Discussed (Regular )  Activity Level: Discussed (As tolerated)  Smoking Cessation Offered: Patient Refused  Confirmed Follow up Appointment: Patient to Call and Schedule Appointment  Confirmed Symptoms Management: Discussed  Medication Reconciliation Updated: Yes  Influenza Vaccine Indication: Not indicated: Previously immunized this influenza season and > 8 years of age    I understand that a diet low in cholesterol, fat, and sodium is recommended for good health. Unless I have been given specific instructions below for another diet, I accept this instruction as my diet prescription.   Other diet: Regular    Special Instructions: None    · Is patient discharged on Warfarin / Coumadin?   No     · Is patient Post Blood Transfusion?  No    Depression / Suicide Risk    As you are discharged from this [facility] facility, it is important to learn how to keep safe from harming yourself.    Recognize the warning signs:  · Abrupt changes in personality, positive or negative- including increase in energy   · Giving " away possessions  · Change in eating patterns- significant weight changes-  positive or negative  · Change in sleeping patterns- unable to sleep or sleeping all the time   · Unwillingness or inability to communicate  · Depression  · Unusual sadness, discouragement and loneliness  · Talk of wanting to die  · Neglect of personal appearance   · Rebelliousness- reckless behavior  · Withdrawal from people/activities they love  · Confusion- inability to concentrate     If you or a loved one observes any of these behaviors or has concerns about self-harm, here's what you can do:  · Talk about it- your feelings and reasons for harming yourself  · Remove any means that you might use to hurt yourself (examples: pills, rope, extension cords, firearm)  · Get professional help from the community (Mental Health, Substance Abuse, psychological counseling)  · Do not be alone:Call your Safe Contact- someone whom you trust who will be there for you.  · Call your local CRISIS HOTLINE 976-5905 or 674-992-6089  · Call your local Children's Mobile Crisis Response Team [place] (254) 772-8759 or www.[employer]  · Call the toll free National Suicide Prevention Hotlines   · National Suicide Prevention Lifeline 512-042-IVBZ (1598)  · National Hope Line Network 800-SUICIDE (226-0881)    Okay to be discharged home.     Please inform patient to follow up Dr. Mercado at [facility] ([address], [place] [address]). [phone]   Patient also has an upcoming appointment with PCP, please inform her to keep appointment.    Your appointments     Apr 28, 2017 10:30 AM   Established Patient with Adriano Bahena M.D.   [facility] (--)    [address]  [address]  [facility] [address] [phone]           You will be receiving a confirmation call a few days before your appointment from our automated call confirmation system.            Aug 14, 2017  2:40 PM   Follow Up Visit  with Melissa P Bloch, M.D.   [facility] (--)    [address], [address]  [facility] [address] [phone]           You will be receiving a confirmation call a few days before your appointment from our automated call confirmation system.              Follow-up Information     1. Follow up with Dhaval Mercado M.D..    Specialty:  Neurology    Contact information    [address]  [address]  [facility] [address] [phone]           Discharge Medication Instructions:    Below are the medications your physician expects you to take upon discharge:    Review all your home medications and newly ordered medications with your doctor and/or pharmacist. Follow medication instructions as directed by your doctor and/or pharmacist.    Please keep your medication list with you and share with your physician.               Medication List      CHANGE how you take these medications        Instructions    Morning Afternoon Evening Bedtime    potassium chloride 10 MEQ capsule   What changed:  Another medication with the same name was removed. Continue taking this medication, and follow the directions you see here.   Commonly known as:  MICRO-K        TAKE ONE CAPSULE BY MOUTH TWICE DAILY                          CONTINUE taking these medications        Instructions    Morning Afternoon Evening Bedtime    acetaminophen/caffeine/butalbital 325-40-50 mg -40 MG Tabs   Commonly known as:  FIORICET        Take 1 Tab by mouth 1 time daily as needed for Headache.   Dose:  1 Tab                        albuterol-ipratropium  MCG/ACT Aero   Commonly known as:  COMBIVENT        Inhale 2 Puffs by mouth 4 times a day.   Dose:  2 Puff                        amiodarone 200 MG Tabs   Last time this was given:  200 mg on 4/12/2017 10:16 AM   Commonly known as:  CORDARONE        Take 1 Tab by mouth every day.   Dose:  200 mg                        aspirin 81 MG tablet   Last time this was given:  325 mg on  4/12/2017 10:16 AM        Take 325 mg by mouth every day.   Dose:  325 mg                        buPROPion  MG Tb12 sustained-release tablet   Last time this was given:  150 mg on 4/12/2017 10:16 AM   Commonly known as:  WELLBUTRIN-SR        Take 1 Tab by mouth 2 times a day.   Dose:  150 mg                        calcium-vitamin D 500-200 MG-UNIT Tabs   Commonly known as:  OSCAL 500 +D        Take 1 Tab by mouth 2 times a day, with meals.   Dose:  1 Tab                        carvedilol 12.5 MG Tabs   Last time this was given:  12.5 mg on 4/12/2017 10:16 AM   Commonly known as:  COREG        TAKE 1 TAB BY MOUTH 2 TIMES A DAY.                        cetirizine 10 MG Tabs   Commonly known as:  ZYRTEC        TAKE 1 TAB BY MOUTH 1 TIME DAILY AS NEEDED FOR ALLERGIES.                        clopidogrel 75 MG Tabs   Last time this was given:  75 mg on 4/12/2017 10:16 AM   Commonly known as:  PLAVIX        TAKE 1 TAB BY MOUTH EVERY DAY.                        docusate sodium 100 MG Caps   Commonly known as:  COLACE        Take 1 Cap by mouth 2 times a day.   Dose:  100 mg                        fluticasone 50 MCG/ACT nasal spray   Last time this was given:  50 mcg on 4/12/2017 10:17 AM   Commonly known as:  FLONASE                             furosemide 20 MG Tabs   Commonly known as:  LASIX        Take 1 Tab by mouth every day.   Dose:  20 mg                        gabapentin 600 MG tablet   Commonly known as:  NEURONTIN        Take 1 Tab by mouth 2 times a day. TAKE 1 TABLET BY MOUTH TWICE A DAY   Dose:  600 mg                        hydrocodone-acetaminophen 7.5-325 MG per tablet   Commonly known as:  NORCO        Take 1 Tab by mouth every 8 hours as needed for Severe Pain.   Dose:  1 Tab                        hydroxychloroquine 200 MG Tabs   Commonly known as:  PLAQUENIL        Take 1 Tab by mouth every day.   Dose:  200 mg                        insulin glargine 100 UNIT/ML Sopn injection   Last time this was  given:  15 Units on 4/12/2017  5:02 AM   Commonly known as:  LANTUS SOLOSTAR        INJECT 20 UNITS SUBCUTANEOUSLY DAILY AT BEDTIME AS DIRECTED                        Insulin Pen Needle 31G X 8 MM Misc        Use to inject insulin daily.                        Iron 325 (65 FE) MG Tabs   Last time this was given:  325 mg on 4/12/2017 10:17 AM        every day.                        metformin 1000 MG tablet   Commonly known as:  GLUCOPHAGE        Take 1 Tab by mouth 2 times a day.   Dose:  1000 mg                        nitroglycerin 0.4 MG Subl   Commonly known as:  NITROSTAT        Place 1 Tab under tongue as needed for Chest Pain. Max dose of 3 in 24 hrs until need for ED.   Dose:  0.4 mg                        OS-ADAIR CALCIUM + D3 500-200 MG-UNIT Tabs   Generic drug:  Calcium Carb-Cholecalciferol        Take 1 Tab by mouth 2 times a day, with meals.   Dose:  1 Tab                        pantoprazole 40 MG Tbec   Commonly known as:  PROTONIX        Take 1 Tab by mouth every day. TAKE 1 TAB BY MOUTH EVERY DAY.   Dose:  40 mg                        ropinirole 0.5 MG Tabs   Last time this was given:  1 mg on 4/12/2017  5:02 AM   Commonly known as:  REQUIP        Take 2 Tabs by mouth every bedtime.   Dose:  1 mg                        rosuvastatin 40 MG tablet   Commonly known as:  CRESTOR        Take 1 Tab by mouth every day.   Dose:  40 mg                        topiramate 100 MG Tabs   Last time this was given:  100 mg on 4/12/2017 10:17 AM   Commonly known as:  TOPAMAX        Take 1 Tab by mouth every 12 hours.   Dose:  100 mg                                Instructions           Diet / Nutrition:    Follow any diet instructions given to you by your doctor or the dietician, including how much salt (sodium) you are allowed each day.    If you are overweight, talk to your doctor about a weight reduction plan.    Activity:    Remain physically active following your doctor's instructions about exercise and  activity.    Rest often.     Any time you become even a little tired or short of breath, SIT DOWN and rest.    Worsening Symptoms:    Report any of the following signs and symptoms to the doctor's office immediately:    *Pain of jaw, arm, or neck  *Chest pain not relieved by medication                               *Dizziness or loss of consciousness  *Difficulty breathing even when at rest   *More tired than usual                                       *Bleeding drainage or swelling of surgical site  *Swelling of feet, ankles, legs or stomach                 *Fever (>100ºF)  *Pink or blood tinged sputum  *Weight gain (3lbs/day or 5lbs /week)           *Shock from internal defibrillator (if applicable)  *Palpitations or irregular heartbeats                *Cool and/or numb extremities    Stroke Awareness    Common Risk Factors for Stroke include:    Age  Atrial Fibrillation  Carotid Artery Stenosis  Diabetes Mellitus  Excessive alcohol consumption  High blood pressure  Overweight   Physical inactivity  Smoking    Warning signs and symptoms of a stroke include:    *Sudden numbness or weakness of the face, arm or leg (especially on one side of the body).  *Sudden confusion, trouble speaking or understanding.  *Sudden trouble seeing in one or both eyes.  *Sudden trouble walking, dizziness, loss of balance or coordination.Sudden severe headache with no known cause.    It is very important to get treatment quickly when a stroke occurs. If you experience any of the above warning signs, call 911 immediately.                   Disclaimer         Quit Smoking / Tobacco Use:    I understand the use of any tobacco products increases my chance of suffering from future heart disease or stroke and could cause other illnesses which may shorten my life. Quitting the use of tobacco products is the single most important thing I can do to improve my health. For further information on smoking / tobacco cessation call a Toll Free Quit  Line at 1-436.187.8010 (*[employer]) or 1-610.534.5245 (American Lung Association) or you can access the web based program at www.lungusa.org.    [place] Tobacco Users Help Line:  (656) 217-6108       Toll Free: 1-583.255.8398  Quit Tobacco Program [facility] (063)460-0403    Crisis Hotline:    [place] Crisis Hotline:  4-623-BTHLHCS or 1-860.561.8457    [place] Crisis Hotline:    1-913.684.8316 or 235-018-4218    Discharge Survey:   Thank you for choosing [facility]. We hope we did everything we could to make your hospital stay a pleasant one. You may be receiving a phone survey and we would appreciate your time and participation in answering the questions. Your input is very valuable to us in our efforts to improve our service to our patients and their families.        My signature on this form indicates that:    1. I have reviewed and understand the above information.  2. My questions regarding this information have been answered to my satisfaction.  3. I have formulated a plan with my discharge nurse to obtain my prescribed medications for home.                  Disclaimer         __________________________________                     __________       ________                       Patient Signature                                                 Date                    Time

## 2017-04-12 ENCOUNTER — RESOLUTE PROFESSIONAL BILLING HOSPITAL PROF FEE (OUTPATIENT)
Dept: HOSPITALIST | Facility: MEDICAL CENTER | Age: 67
End: 2017-04-12
Payer: MEDICARE

## 2017-04-12 ENCOUNTER — TELEPHONE (OUTPATIENT)
Dept: CARDIOLOGY | Facility: MEDICAL CENTER | Age: 67
End: 2017-04-12

## 2017-04-12 VITALS
RESPIRATION RATE: 16 BRPM | WEIGHT: 122.8 LBS | DIASTOLIC BLOOD PRESSURE: 89 MMHG | HEART RATE: 61 BPM | TEMPERATURE: 97.7 F | SYSTOLIC BLOOD PRESSURE: 117 MMHG | HEIGHT: 62 IN | BODY MASS INDEX: 22.6 KG/M2 | OXYGEN SATURATION: 98 %

## 2017-04-12 PROBLEM — N17.9 AKI (ACUTE KIDNEY INJURY) (HCC): Status: ACTIVE | Noted: 2017-04-12

## 2017-04-12 PROBLEM — R55 SYNCOPE, NEAR: Status: ACTIVE | Noted: 2017-04-12

## 2017-04-12 PROBLEM — F17.200 CURRENT SMOKER: Status: ACTIVE | Noted: 2017-04-12

## 2017-04-12 PROBLEM — I50.9 ACUTE EXACERBATION OF CONGESTIVE HEART FAILURE (HCC): Status: ACTIVE | Noted: 2017-04-12

## 2017-04-12 LAB
ALBUMIN SERPL BCP-MCNC: 3.4 G/DL (ref 3.2–4.9)
ALBUMIN/GLOB SERPL: 1.3 G/DL
ALP SERPL-CCNC: 89 U/L (ref 30–99)
ALT SERPL-CCNC: 6 U/L (ref 2–50)
ANION GAP SERPL CALC-SCNC: 8 MMOL/L (ref 0–11.9)
APPEARANCE UR: ABNORMAL
APPEARANCE UR: ABNORMAL
AST SERPL-CCNC: 8 U/L (ref 12–45)
BACTERIA #/AREA URNS HPF: ABNORMAL /HPF
BILIRUB SERPL-MCNC: 0.2 MG/DL (ref 0.1–1.5)
BILIRUB UR QL STRIP.AUTO: NEGATIVE
BUN SERPL-MCNC: 22 MG/DL (ref 8–22)
CALCIUM SERPL-MCNC: 9.5 MG/DL (ref 8.5–10.5)
CHLORIDE SERPL-SCNC: 111 MMOL/L (ref 96–112)
CO2 SERPL-SCNC: 23 MMOL/L (ref 20–33)
COLOR UR AUTO: YELLOW
COLOR UR: COLORLESS
CREAT SERPL-MCNC: 1.11 MG/DL (ref 0.5–1.4)
EKG IMPRESSION: NORMAL
EPI CELLS #/AREA URNS HPF: ABNORMAL /HPF
EST. AVERAGE GLUCOSE BLD GHB EST-MCNC: 275 MG/DL
GFR SERPL CREATININE-BSD FRML MDRD: 49 ML/MIN/1.73 M 2
GLOBULIN SER CALC-MCNC: 2.6 G/DL (ref 1.9–3.5)
GLUCOSE BLD-MCNC: 148 MG/DL (ref 65–99)
GLUCOSE BLD-MCNC: 162 MG/DL (ref 65–99)
GLUCOSE BLD-MCNC: 298 MG/DL (ref 65–99)
GLUCOSE SERPL-MCNC: 162 MG/DL (ref 65–99)
GLUCOSE UR QL STRIP.AUTO: 100 MG/DL
GLUCOSE UR STRIP.AUTO-MCNC: NEGATIVE MG/DL
HBA1C MFR BLD: 11.2 % (ref 0–5.6)
HYALINE CASTS #/AREA URNS LPF: ABNORMAL /LPF
KETONES UR QL STRIP.AUTO: NEGATIVE MG/DL
KETONES UR STRIP.AUTO-MCNC: NEGATIVE MG/DL
LEUKOCYTE ESTERASE UR QL STRIP.AUTO: ABNORMAL
LEUKOCYTE ESTERASE UR QL STRIP.AUTO: ABNORMAL
LV EJECT FRACT  99904: 60
LV EJECT FRACT MOD 2C 99903: 60.83
LV EJECT FRACT MOD 4C 99902: 60.05
LV EJECT FRACT MOD BP 99901: 61.4
MAGNESIUM SERPL-MCNC: 1.9 MG/DL (ref 1.5–2.5)
MICRO URNS: ABNORMAL
NITRITE UR QL STRIP.AUTO: NEGATIVE
NITRITE UR QL STRIP.AUTO: NEGATIVE
PH UR STRIP.AUTO: 7 [PH]
PH UR STRIP.AUTO: 7 [PH]
POTASSIUM SERPL-SCNC: 3.6 MMOL/L (ref 3.6–5.5)
PROT SERPL-MCNC: 6 G/DL (ref 6–8.2)
PROT UR QL STRIP: NEGATIVE MG/DL
PROT UR QL STRIP: NEGATIVE MG/DL
RBC # URNS HPF: ABNORMAL /HPF
RBC UR QL AUTO: NEGATIVE
RBC UR QL AUTO: NEGATIVE
SODIUM SERPL-SCNC: 142 MMOL/L (ref 135–145)
SP GR UR STRIP.AUTO: 1.01
SP GR UR: 1.01
TSH SERPL DL<=0.005 MIU/L-ACNC: 1.36 UIU/ML (ref 0.3–3.7)
WBC #/AREA URNS HPF: ABNORMAL /HPF

## 2017-04-12 PROCEDURE — G8980 MOBILITY D/C STATUS: HCPCS | Mod: CI

## 2017-04-12 PROCEDURE — 82962 GLUCOSE BLOOD TEST: CPT

## 2017-04-12 PROCEDURE — 81001 URINALYSIS AUTO W/SCOPE: CPT

## 2017-04-12 PROCEDURE — 80053 COMPREHEN METABOLIC PANEL: CPT

## 2017-04-12 PROCEDURE — 700102 HCHG RX REV CODE 250 W/ 637 OVERRIDE(OP): Performed by: STUDENT IN AN ORGANIZED HEALTH CARE EDUCATION/TRAINING PROGRAM

## 2017-04-12 PROCEDURE — 700101 HCHG RX REV CODE 250: Performed by: STUDENT IN AN ORGANIZED HEALTH CARE EDUCATION/TRAINING PROGRAM

## 2017-04-12 PROCEDURE — G0378 HOSPITAL OBSERVATION PER HR: HCPCS

## 2017-04-12 PROCEDURE — A9270 NON-COVERED ITEM OR SERVICE: HCPCS | Performed by: INTERNAL MEDICINE

## 2017-04-12 PROCEDURE — 93306 TTE W/DOPPLER COMPLETE: CPT

## 2017-04-12 PROCEDURE — 97161 PT EVAL LOW COMPLEX 20 MIN: CPT

## 2017-04-12 PROCEDURE — 99236 HOSP IP/OBS SAME DATE HI 85: CPT | Mod: GC | Performed by: INTERNAL MEDICINE

## 2017-04-12 PROCEDURE — G8979 MOBILITY GOAL STATUS: HCPCS | Mod: CI

## 2017-04-12 PROCEDURE — 93306 TTE W/DOPPLER COMPLETE: CPT | Mod: 26 | Performed by: INTERNAL MEDICINE

## 2017-04-12 PROCEDURE — 84443 ASSAY THYROID STIM HORMONE: CPT

## 2017-04-12 PROCEDURE — G8978 MOBILITY CURRENT STATUS: HCPCS | Mod: CI

## 2017-04-12 PROCEDURE — 96376 TX/PRO/DX INJ SAME DRUG ADON: CPT | Mod: XU

## 2017-04-12 PROCEDURE — 96372 THER/PROPH/DIAG INJ SC/IM: CPT

## 2017-04-12 PROCEDURE — 700102 HCHG RX REV CODE 250 W/ 637 OVERRIDE(OP): Performed by: INTERNAL MEDICINE

## 2017-04-12 PROCEDURE — 302152 K-PAD 12X17: Performed by: INTERNAL MEDICINE

## 2017-04-12 PROCEDURE — 36415 COLL VENOUS BLD VENIPUNCTURE: CPT

## 2017-04-12 PROCEDURE — 96374 THER/PROPH/DIAG INJ IV PUSH: CPT

## 2017-04-12 PROCEDURE — A9270 NON-COVERED ITEM OR SERVICE: HCPCS | Performed by: STUDENT IN AN ORGANIZED HEALTH CARE EDUCATION/TRAINING PROGRAM

## 2017-04-12 PROCEDURE — 700111 HCHG RX REV CODE 636 W/ 250 OVERRIDE (IP): Performed by: STUDENT IN AN ORGANIZED HEALTH CARE EDUCATION/TRAINING PROGRAM

## 2017-04-12 PROCEDURE — 83735 ASSAY OF MAGNESIUM: CPT

## 2017-04-12 PROCEDURE — 83036 HEMOGLOBIN GLYCOSYLATED A1C: CPT

## 2017-04-12 RX ORDER — NICOTINE 21 MG/24HR
14 PATCH, TRANSDERMAL 24 HOURS TRANSDERMAL
Status: DISCONTINUED | OUTPATIENT
Start: 2017-04-12 | End: 2017-04-12 | Stop reason: HOSPADM

## 2017-04-12 RX ORDER — BISACODYL 10 MG
10 SUPPOSITORY, RECTAL RECTAL
Status: DISCONTINUED | OUTPATIENT
Start: 2017-04-12 | End: 2017-04-12 | Stop reason: HOSPADM

## 2017-04-12 RX ORDER — INSULIN GLARGINE 100 [IU]/ML
15 INJECTION, SOLUTION SUBCUTANEOUS EVERY EVENING
Status: DISCONTINUED | OUTPATIENT
Start: 2017-04-12 | End: 2017-04-12 | Stop reason: HOSPADM

## 2017-04-12 RX ORDER — HYDROCODONE BITARTRATE AND ACETAMINOPHEN 5; 325 MG/1; MG/1
1 TABLET ORAL EVERY 6 HOURS PRN
Status: DISCONTINUED | OUTPATIENT
Start: 2017-04-12 | End: 2017-04-12 | Stop reason: HOSPADM

## 2017-04-12 RX ORDER — IPRATROPIUM BROMIDE AND ALBUTEROL SULFATE 2.5; .5 MG/3ML; MG/3ML
3 SOLUTION RESPIRATORY (INHALATION)
Status: DISCONTINUED | OUTPATIENT
Start: 2017-04-12 | End: 2017-04-12 | Stop reason: HOSPADM

## 2017-04-12 RX ORDER — ROSUVASTATIN CALCIUM 20 MG/1
40 TABLET, COATED ORAL
Status: DISCONTINUED | OUTPATIENT
Start: 2017-04-12 | End: 2017-04-12 | Stop reason: HOSPADM

## 2017-04-12 RX ORDER — POTASSIUM CHLORIDE 1.5 G/1.58G
40 POWDER, FOR SOLUTION ORAL ONCE
Status: DISCONTINUED | OUTPATIENT
Start: 2017-04-12 | End: 2017-04-12

## 2017-04-12 RX ORDER — DEXTROSE MONOHYDRATE 25 G/50ML
25 INJECTION, SOLUTION INTRAVENOUS
Status: DISCONTINUED | OUTPATIENT
Start: 2017-04-12 | End: 2017-04-12 | Stop reason: HOSPADM

## 2017-04-12 RX ORDER — AMIODARONE HYDROCHLORIDE 200 MG/1
200 TABLET ORAL DAILY
Status: DISCONTINUED | OUTPATIENT
Start: 2017-04-12 | End: 2017-04-12 | Stop reason: HOSPADM

## 2017-04-12 RX ORDER — OMEPRAZOLE 20 MG/1
20 CAPSULE, DELAYED RELEASE ORAL DAILY
Status: DISCONTINUED | OUTPATIENT
Start: 2017-04-12 | End: 2017-04-12 | Stop reason: HOSPADM

## 2017-04-12 RX ORDER — FUROSEMIDE 10 MG/ML
20 INJECTION INTRAMUSCULAR; INTRAVENOUS 2 TIMES DAILY
Status: DISCONTINUED | OUTPATIENT
Start: 2017-04-12 | End: 2017-04-12 | Stop reason: HOSPADM

## 2017-04-12 RX ORDER — FERROUS SULFATE 325(65) MG
325 TABLET ORAL
Status: DISCONTINUED | OUTPATIENT
Start: 2017-04-12 | End: 2017-04-12 | Stop reason: HOSPADM

## 2017-04-12 RX ORDER — PANTOPRAZOLE SODIUM 40 MG/1
40 TABLET, DELAYED RELEASE ORAL
Status: DISCONTINUED | OUTPATIENT
Start: 2017-04-12 | End: 2017-04-12

## 2017-04-12 RX ORDER — POTASSIUM CHLORIDE 20 MEQ/1
40 TABLET, EXTENDED RELEASE ORAL ONCE
Status: COMPLETED | OUTPATIENT
Start: 2017-04-12 | End: 2017-04-12

## 2017-04-12 RX ORDER — FLUTICASONE PROPIONATE 50 MCG
1 SPRAY, SUSPENSION (ML) NASAL DAILY
Status: DISCONTINUED | OUTPATIENT
Start: 2017-04-12 | End: 2017-04-12 | Stop reason: HOSPADM

## 2017-04-12 RX ORDER — HYDROXYCHLOROQUINE SULFATE 200 MG/1
200 TABLET, FILM COATED ORAL
Status: DISCONTINUED | OUTPATIENT
Start: 2017-04-12 | End: 2017-04-12 | Stop reason: HOSPADM

## 2017-04-12 RX ORDER — NITROGLYCERIN 0.4 MG/1
0.4 TABLET SUBLINGUAL PRN
Status: DISCONTINUED | OUTPATIENT
Start: 2017-04-12 | End: 2017-04-12 | Stop reason: HOSPADM

## 2017-04-12 RX ORDER — BUPROPION HYDROCHLORIDE 150 MG/1
150 TABLET, EXTENDED RELEASE ORAL 2 TIMES DAILY
Status: DISCONTINUED | OUTPATIENT
Start: 2017-04-12 | End: 2017-04-12 | Stop reason: HOSPADM

## 2017-04-12 RX ORDER — ONDANSETRON 2 MG/ML
4 INJECTION INTRAMUSCULAR; INTRAVENOUS EVERY 4 HOURS PRN
Status: DISCONTINUED | OUTPATIENT
Start: 2017-04-12 | End: 2017-04-12 | Stop reason: HOSPADM

## 2017-04-12 RX ORDER — ROPINIROLE 1 MG/1
1 TABLET, FILM COATED ORAL
Status: DISCONTINUED | OUTPATIENT
Start: 2017-04-12 | End: 2017-04-12 | Stop reason: HOSPADM

## 2017-04-12 RX ORDER — POLYETHYLENE GLYCOL 3350 17 G/17G
1 POWDER, FOR SOLUTION ORAL
Status: DISCONTINUED | OUTPATIENT
Start: 2017-04-12 | End: 2017-04-12 | Stop reason: HOSPADM

## 2017-04-12 RX ORDER — GABAPENTIN 300 MG/1
600 CAPSULE ORAL 2 TIMES DAILY
Status: DISCONTINUED | OUTPATIENT
Start: 2017-04-12 | End: 2017-04-12 | Stop reason: HOSPADM

## 2017-04-12 RX ORDER — CARVEDILOL 6.25 MG/1
12.5 TABLET ORAL 2 TIMES DAILY
Status: DISCONTINUED | OUTPATIENT
Start: 2017-04-12 | End: 2017-04-12 | Stop reason: HOSPADM

## 2017-04-12 RX ORDER — AMOXICILLIN 250 MG
2 CAPSULE ORAL 2 TIMES DAILY
Status: DISCONTINUED | OUTPATIENT
Start: 2017-04-12 | End: 2017-04-12 | Stop reason: HOSPADM

## 2017-04-12 RX ORDER — CLOPIDOGREL BISULFATE 75 MG/1
75 TABLET ORAL
Status: DISCONTINUED | OUTPATIENT
Start: 2017-04-12 | End: 2017-04-12 | Stop reason: HOSPADM

## 2017-04-12 RX ORDER — TOPIRAMATE 100 MG/1
100 TABLET, FILM COATED ORAL EVERY 12 HOURS
Status: DISCONTINUED | OUTPATIENT
Start: 2017-04-12 | End: 2017-04-12 | Stop reason: HOSPADM

## 2017-04-12 RX ORDER — ALBUTEROL SULFATE 90 UG/1
2 AEROSOL, METERED RESPIRATORY (INHALATION) EVERY 4 HOURS PRN
Status: DISCONTINUED | OUTPATIENT
Start: 2017-04-12 | End: 2017-04-12 | Stop reason: HOSPADM

## 2017-04-12 RX ORDER — ASPIRIN 325 MG
325 TABLET ORAL DAILY
Status: DISCONTINUED | OUTPATIENT
Start: 2017-04-12 | End: 2017-04-12 | Stop reason: HOSPADM

## 2017-04-12 RX ADMIN — INSULIN LISPRO 1 UNITS: 100 INJECTION, SOLUTION INTRAVENOUS; SUBCUTANEOUS at 06:12

## 2017-04-12 RX ADMIN — FUROSEMIDE 20 MG: 10 INJECTION, SOLUTION INTRAVENOUS at 04:36

## 2017-04-12 RX ADMIN — TOPIRAMATE 100 MG: 100 TABLET ORAL at 10:17

## 2017-04-12 RX ADMIN — CLOPIDOGREL 75 MG: 75 TABLET, FILM COATED ORAL at 10:16

## 2017-04-12 RX ADMIN — POTASSIUM CHLORIDE 40 MEQ: 1500 TABLET, EXTENDED RELEASE ORAL at 10:16

## 2017-04-12 RX ADMIN — FLUTICASONE PROPIONATE 50 MCG: 50 SPRAY, METERED NASAL at 10:17

## 2017-04-12 RX ADMIN — OMEPRAZOLE 20 MG: 20 CAPSULE, DELAYED RELEASE ORAL at 10:16

## 2017-04-12 RX ADMIN — HYDROCODONE BITARTRATE AND ACETAMINOPHEN 1 TABLET: 5; 325 TABLET ORAL at 14:53

## 2017-04-12 RX ADMIN — INSULIN LISPRO 3 UNITS: 100 INJECTION, SOLUTION INTRAVENOUS; SUBCUTANEOUS at 14:06

## 2017-04-12 RX ADMIN — GABAPENTIN 600 MG: 300 CAPSULE ORAL at 10:16

## 2017-04-12 RX ADMIN — CARVEDILOL 12.5 MG: 6.25 TABLET, FILM COATED ORAL at 10:16

## 2017-04-12 RX ADMIN — NICOTINE 14 MG: 14 PATCH TRANSDERMAL at 05:05

## 2017-04-12 RX ADMIN — FERROUS SULFATE TAB 325 MG (65 MG ELEMENTAL FE) 325 MG: 325 (65 FE) TAB at 10:17

## 2017-04-12 RX ADMIN — AMIODARONE HYDROCHLORIDE 200 MG: 200 TABLET ORAL at 10:16

## 2017-04-12 RX ADMIN — FUROSEMIDE 20 MG: 10 INJECTION, SOLUTION INTRAVENOUS at 11:06

## 2017-04-12 RX ADMIN — ROPINIROLE HYDROCHLORIDE 1 MG: 1 TABLET, FILM COATED ORAL at 05:02

## 2017-04-12 RX ADMIN — MAGNESIUM GLUCONATE 500 MG ORAL TABLET 800 MG: 500 TABLET ORAL at 10:15

## 2017-04-12 RX ADMIN — INSULIN GLARGINE 15 UNITS: 100 INJECTION, SOLUTION SUBCUTANEOUS at 05:02

## 2017-04-12 RX ADMIN — BUPROPION HYDROCHLORIDE 150 MG: 150 TABLET, FILM COATED, EXTENDED RELEASE ORAL at 10:16

## 2017-04-12 RX ADMIN — ASPIRIN 325 MG: 325 TABLET, COATED ORAL at 10:16

## 2017-04-12 RX ADMIN — ENOXAPARIN SODIUM 40 MG: 100 INJECTION SUBCUTANEOUS at 10:15

## 2017-04-12 ASSESSMENT — LIFESTYLE VARIABLES
SUBSTANCE_ABUSE: 0
ALCOHOL_USE: NO
EVER_SMOKED: YES
EVER_SMOKED: YES

## 2017-04-12 ASSESSMENT — ENCOUNTER SYMPTOMS
SENSORY CHANGE: 1
WEIGHT LOSS: 0
ORTHOPNEA: 0
ABDOMINAL PAIN: 0
BLURRED VISION: 1
WEAKNESS: 1
DIARRHEA: 0
TINGLING: 1
DOUBLE VISION: 0
FOCAL WEAKNESS: 0
BLOOD IN STOOL: 0
WHEEZING: 0
SPUTUM PRODUCTION: 0
PALPITATIONS: 0
DIAPHORESIS: 0
HEARTBURN: 0
NERVOUS/ANXIOUS: 0
COUGH: 0
SHORTNESS OF BREATH: 0
CHILLS: 0
VOMITING: 0
SEIZURES: 0
VOMITING: 1
PHOTOPHOBIA: 0
SPEECH CHANGE: 1
BACK PAIN: 0
DIZZINESS: 0
HEADACHES: 1
NAUSEA: 0
CONSTIPATION: 0
LOSS OF CONSCIOUSNESS: 0
NAUSEA: 1
HEADACHES: 0
EYE PAIN: 0
DEPRESSION: 0
BRUISES/BLEEDS EASILY: 0
BLURRED VISION: 0
MEMORY LOSS: 0
FALLS: 0
FEVER: 0

## 2017-04-12 ASSESSMENT — PAIN SCALES - GENERAL
PAINLEVEL_OUTOF10: 0
PAINLEVEL_OUTOF10: 0

## 2017-04-12 ASSESSMENT — COPD QUESTIONNAIRES
COPD SCREENING SCORE: 4
DO YOU EVER COUGH UP ANY MUCUS OR PHLEGM?: NO/ONLY WITH OCCASIONAL COLDS OR INFECTIONS
HAVE YOU SMOKED AT LEAST 100 CIGARETTES IN YOUR ENTIRE LIFE: YES
DURING THE PAST 4 WEEKS HOW MUCH DID YOU FEEL SHORT OF BREATH: NONE/LITTLE OF THE TIME

## 2017-04-12 ASSESSMENT — GAIT ASSESSMENTS
ASSISTIVE DEVICE: SINGLE POINT CANE
GAIT LEVEL OF ASSIST: SUPERVISED

## 2017-04-12 NOTE — PROGRESS NOTES
Provided education regarding to Stroke including s/s, FAST, tx, risk factors, medications, diet, & exercise.   Pt verbalized understanding. All questions are answered.   Provided stroke education book and extra information papers.

## 2017-04-12 NOTE — ED NOTES
Floor called and notified of transport.  Leenakelsey Bella transported to [facility] via gurney with transport. All personal belongings in possession.  NAD noted.

## 2017-04-12 NOTE — ED NOTES
"Pt bib EMS from home:  Chief Complaint   Patient presents with   • Syncope     x2 this evening, patient reports \"I felt numb around my mouth then went out\"   • Weakness   • Altered Mental Status     Alert to person, place, and time, disoriented to event     Pt reports hx of TIA 3 mos ago, no deficits.    Pt denies pain, no facial drop noted, bilateral equal strength in upper and lower extremities, reports tingling in bilateral hands.     Pt changed into gown, chart up for ERP.  "
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
## 2017-04-12 NOTE — THERAPY
"Physical Therapy Evaluation completed.   Bed Mobility:  Supine to Sit: Supervised  Transfers: Sit to Stand: Supervised  Gait: Level Of Assist: Supervised with Single Point Cane       Plan of Care: Patient with no further skilled PT needs in the acute care setting at this time  Discharge Recommendations: Equipment: No Equipment Needed. Post-acute therapy Discharge to home with outpatient or home health for additional skilled therapy services.    See \"Rehab Therapy-Acute\" Patient Summary Report for complete documentation.       Pt is a 68 yo F with history of L sided weakness presents with syncope.  Patient lives with sister and adult son in single story house, small step to enter.  Patient able to perform all bed mobility, transfers, and amb with supervision and single point cane.  Patient reports that she is at her baseline currently.  Pt does not require additional PT care while she remains in hospital.  May benefit from PT after d/c home.    "
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
## 2017-04-12 NOTE — PROGRESS NOTES
Done head to toe assessment, pt A&OX4, DOOLEY, pt denies pain or N/T, no SOB, BS+, ambulated to bathroom w/ cane x SBA. Tolerated good, safety measures in placed, hourly rounding in use.

## 2017-04-12 NOTE — DISCHARGE INSTRUCTIONS
Discharge Instructions    Discharged to home by car with friend. Discharged via wheelchair, hospital escort: Yes.  Special equipment needed: Not Applicable    Be sure to schedule a follow-up appointment with your primary care doctor or any specialists as instructed.     Discharge Plan:   Diet Plan: Discussed (Regular )  Activity Level: Discussed (As tolerated)  Smoking Cessation Offered: Patient Refused  Confirmed Follow up Appointment: Patient to Call and Schedule Appointment  Confirmed Symptoms Management: Discussed  Medication Reconciliation Updated: Yes  Influenza Vaccine Indication: Not indicated: Previously immunized this influenza season and > 8 years of age    I understand that a diet low in cholesterol, fat, and sodium is recommended for good health. Unless I have been given specific instructions below for another diet, I accept this instruction as my diet prescription.   Other diet: Regular    Special Instructions: None    · Is patient discharged on Warfarin / Coumadin?   No     · Is patient Post Blood Transfusion?  No    Depression / Suicide Risk    As you are discharged from this [facility] facility, it is important to learn how to keep safe from harming yourself.    Recognize the warning signs:  · Abrupt changes in personality, positive or negative- including increase in energy   · Giving away possessions  · Change in eating patterns- significant weight changes-  positive or negative  · Change in sleeping patterns- unable to sleep or sleeping all the time   · Unwillingness or inability to communicate  · Depression  · Unusual sadness, discouragement and loneliness  · Talk of wanting to die  · Neglect of personal appearance   · Rebelliousness- reckless behavior  · Withdrawal from people/activities they love  · Confusion- inability to concentrate     If you or a loved one observes any of these behaviors or has concerns about self-harm, here's what you can do:  · Talk about it- your feelings and reasons  for harming yourself  · Remove any means that you might use to hurt yourself (examples: pills, rope, extension cords, firearm)  · Get professional help from the community (Mental Health, Substance Abuse, psychological counseling)  · Do not be alone:Call your Safe Contact- someone whom you trust who will be there for you.  · Call your local CRISIS HOTLINE 642-3287 or 426-803-4161  · Call your local Children's Mobile Crisis Response Team [place] (205) 489-9894 or www.[employer]  · Call the toll free National Suicide Prevention Hotlines   · National Suicide Prevention Lifeline 133-051-JCES (6398)  · National Hope Line Network 800-SUICIDE (813-3072)    Okay to be discharged home.     Please inform patient to follow up Dr. Mercado at [facility] ([address], [place] [address]). [phone]   Patient also has an upcoming appointment with PCP, please inform her to keep appointment.
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
## 2017-04-12 NOTE — TELEPHONE ENCOUNTER
----- Message from Jayla Jansen sent at 4/12/2017 12:01 PM PDT -----  Regarding: pacemaker interrogation requested  Contact: [phone]  ANEESH KAUR [[id number]]    Dr Brown ([phone]) from [facility] needing pacemaker interrogation

## 2017-04-12 NOTE — PROGRESS NOTES
"                               [facility] Internal Medicine Interval Note    Name Leena Bella     1950   Age/Sex 67 y.o. female   MRN [id number]   Code Status Full     After 5PM or if no immediate response to page, please call for cross-coverage  Attending/Team: Susan Call [phone] to page   1st Call - Day Intern (R1):   Stephanie 2nd Call - Day Sr. Resident (R2/R3):   Poppy     Chief complaint/ reason for interval visit (Primary Diagnosis)   Near syncopal episodes    Interval Problem Daily Status Update    Active Hospital Problems    Diagnosis   • Syncope, near [R55]   • MARGARITA (acute kidney injury) (CMS-HCC) [N17.9]   • Acute exacerbation of congestive heart failure (CMS-[facility]) [I50.9]   • Current smoker [F17.200]   • Type 2 diabetes mellitus (CMS-[facility]) [E11.9]       Review of Systems   Constitutional: Negative for fever and chills.   HENT: Negative for hearing loss and tinnitus.    Eyes: Positive for blurred vision. Negative for pain.   Respiratory: Negative for shortness of breath.    Cardiovascular: Negative for chest pain and palpitations.   Gastrointestinal: Positive for nausea and vomiting.   Musculoskeletal: Negative for falls.   Neurological: Positive for speech change and headaches. Negative for dizziness, seizures and loss of consciousness.   Psychiatric/Behavioral: Negative for depression and memory loss. The patient is not nervous/anxious.        Consultants/Specialty  None    Disposition  Discharge to home    Quality Measures  Labs reviewed, Medications reviewed, Radiology images reviewed and EKG reviewed  Waterman catheter: No Waterman                    Physical Exam       Filed Vitals:    17 0305 17 0310 17 0315 17 0333   BP: 134/61 132/66 126/73    Pulse: 80 76 68 65   Temp: 36.1 °C (97 °F)  36.7 °C (98 °F)    Resp: 14   16   Height: 1.575 m (5' 2\")      Weight: 55.7 kg (122 lb 12.7 oz)      SpO2: 93%  95% 95%     Body mass index is 22.45 kg/(m^2). Weight: 55.7 kg " (122 lb 12.7 oz)  Oxygen Therapy:  Pulse Oximetry: 95 %, O2 (LPM): 2, O2 Delivery: None (Room Air)    Physical Exam   Constitutional: She is oriented to person, place, and time. No distress.   HENT:   Head: Normocephalic.   Eyes: EOM are normal.   Neck: Normal range of motion.   Cardiovascular: Normal rate and regular rhythm.    No murmur heard.  Pulmonary/Chest: Effort normal and breath sounds normal.   AICD noted in upper left chest wall   Abdominal: Soft.   Musculoskeletal: She exhibits no edema.   Neurological: She is alert and oriented to person, place, and time.   Left sided motor weakness, sensation intact.   Skin: She is not diaphoretic.         Lab Data Review:      4/12/2017  6:30 AM    Recent Labs      04/11/17 2135 04/12/17 0340   SODIUM  137  142   POTASSIUM  3.8  3.6   CHLORIDE  107  111   CO2  26  23   BUN  26*  22   CREATININE  1.19  1.11   MAGNESIUM   --   1.9   CALCIUM  9.9  9.5       Recent Labs      04/11/17 2135 04/12/17   0340   ALTSGPT  6  6   ASTSGOT  7*  8*   ALKPHOSPHAT  89  89   TBILIRUBIN  0.2  0.2   GLUCOSE  218*  162*       Recent Labs      04/11/17 2135   RBC  4.13*   HEMOGLOBIN  12.4   HEMATOCRIT  37.8   PLATELETCT  180   PROTHROMBTM  13.7   INR  1.02       Recent Labs      04/11/17 2135 04/12/17   0340   WBC  8.2   --    NEUTSPOLYS  55.30   --    LYMPHOCYTES  31.00   --    MONOCYTES  7.40   --    EOSINOPHILS  5.30   --    BASOPHILS  0.90   --    ASTSGOT  7*  8*   ALTSGPT  6  6   ALKPHOSPHAT  89  89   TBILIRUBIN  0.2  0.2       Assessment/Plan   Interval Update  - No symptoms present this morning  - Head CT, minor atrophy, chronic left maxillary sinusitis  - CXR negative  - EKG  - Trop neg  - Orthostat Neg (minor BP changes)  - UA: Nitrite neg, Leukocyte Esterase neg, few bacteria, many WBC  - Glucose: elevated 140-212  - TSH 1.36  -   - Pt reports that she missed her last AICD check in [facility]     Problem List  1. CABG (1998)  2. COPD (2L home)  3. CVA (12/2016  at [facility])  4. Arthritis  5. Diabetes + neuropathy  6. Asthma  7. Afib  8. AICD  9. Restless Leg Syndrome  10. Current smoker  11. Migraine  12. MARGARITA    # Near syncope  - New LBBB on EKG  - Prior heart pathology (CABG, MI, Arrythmia)  - CVA at [place] 1 year ago resulting in left-sided weakness  - Overdue for AICD check    Plan  -ECHO  -Pacemaker/AICD Check    # Acute exacerbation systolic heart failure  # Hx of CAD and ischemic CM, s/p CABG 4v (1998), AICD  - AICD device checks q6months (due for another check)  - crackles on lung and pitting edema BLE indicating mild heart failure exacerbation    - lasix 20mg BID IV and replete K, if needed  - continue coreg, plavix, asa, plavix,     # Hx of afib  - Cont amiodarone  - EKG didn't show afib  - Check AICD for rhythm abnormalities    # MARGARITA  - BUN/Cr 22/1.11  - GFR 49  - Likely from mild fluid overload state  - Diuresis as above and monitor kidney function    # Hx of migraine  - on fioricet and topamax  - held fioricet while inpatient and cont topamax    # Hx of DMT2 with neuropathy  - cont gabapentin and lantus 15u  - hold metformin and ISS/hypoglycemic protocol while inpatient     # Hx of RLS  - cont ropinirole, stable     # COPD (2L O2 at home)  # Current smoker   - 1-3 cigars/monthly  - on inhalers at home with 2L Oxygen, currently stable    - RT PRN with oxygen and home inhaler resumed  - advised for smoking cessation    # Hx of polyarthralgia  - At home: norco, plaquinil  - Cont plaquinil and hold norco unless it is necessary for severe pain while inpatient

## 2017-04-12 NOTE — PROGRESS NOTES
Report received, assumed patient care.  Pt A&OX4.  Family at bedside.  Assessment completed.  Call light within reach, personal belongings available, bed in lowest position, pt calling for assistance.  Pt reports no pain.  Communication board updated, POC discussed.  Monitors reapplied, VSS.  No additional needs at this time.

## 2017-04-12 NOTE — PROGRESS NOTES
Senior Admit Note     Ms. Bella is a 67 year old F with PMHx significant for TIA, CAD status post CABG, diabetes presented to the emergency room with complaints of numbness around the mouth with difficulty articulating speech. She was in her normal state of health until this evening when she acutely experienced the above-stated symptoms. The episode lasted a few minutes with complete resolution only to have 2 similar episodes within the next hour. Subsequently, she describes having sudden onset of generalized weakness followed by loss of consciousness for several minutes. Her daughter witnessed the incident was not available for questioning in the emergency room. She denies any precipitating chest pain, presyncope, palpitations or shortness of breath. She denies facial droop, focal weakness or numbness, headache, confusion, bowel or bladder incontinence, or trauma to the tongue or mouth. She reports having similar episodes earlier this year at which time she was told she had a TIA. Upon arrival to the emergency room she was asymptomatic and back to her baseline. She has an AICD and it did not fire today. She is unaware if she had any cyanosis with these episodes.    Physical Exam:  Vitals: Heart rate 60-70. Blood pressure 120-130/70. Satting well on room air.  General: non-toxic apeparnce. Resting comfortably in bed. NAD.   HEENT: EOMI. Scleral clear.  CVS: normal S1, S2. NSR. No m/r/g. No JVD.   PULM: Mild basilar crackles.   ABD: soft, NT, ND. +BS.   : No suprapubic tenderness. No CVA tenderness.   EXT: 2+ LE edema b/l. No cyanosis.  Neuro: No focal deficits. No facial droop. Tongue and uvula midline.   L sided mild residual weakness since stroke in 12/2016  Speech clear, understandable.   Pysch: AAOx4  Skin: no rashes. No signs of peripheral central cyanosis.    Recent Labs      04/11/17   2135   WBC  8.2   RBC  4.13*   HEMOGLOBIN  12.4   HEMATOCRIT  37.8   MCV  91.5   MCH  30.0   RDW  44.5   PLATELETCT  180    MPV  10.6   NEUTSPOLYS  55.30   LYMPHOCYTES  31.00   MONOCYTES  7.40   EOSINOPHILS  5.30   BASOPHILS  0.90     Recent Labs      04/11/17   2135   SODIUM  137   POTASSIUM  3.8   CHLORIDE  107   CO2  26   GLUCOSE  218*   BUN  26*     Recent Labs      04/11/17 2135   ASTSGOT  7*   ALTSGPT  6   TBILIRUBIN  0.2   ALKPHOSPHAT  89   GLOBULIN  2.7   INR  1.02     CT-HEAD W/O   Final Result      1.  No acute intracranial process.      2.  Minor atrophy.      3.  Chronic left maxillary sinusitis.      DX-CHEST-PORTABLE (1 VIEW)   Final Result      1.  No acute cardiac or pulmonary abnormality is noted.      2.  CABG changes.      3.  Left unipolar pacing device in place.        A/P:  1. Syncope query cardiac v. Orthostatic v. TIA (unlikely seizure or atypical migraine)   2. Acute kidney injury   3. Acute exacerbation of heart failure   4  Hx of CAD s/p CABG, ischemic cardiomyopathy, a.fib - AICD, coreg, lasix, asa, crestor, amiodarone   5. Hx of CVA (Dec 2016- Right internal capsule infarct with left swati-paresis/dsyarthria) - minimal residual deficits currently, on plavix, ASA, statin  6.  Hx of migraine - topamax, treated with botox in the past   7. Hx of DM w/ neuropathy - Last A1c: 8.7, on insulin, gabapentin    8. Hx of RLS - on ropinerole   9. COPD - uses 2L oxygen at nights   10. Hx of arthritis - on norco, plaquinil, intermittent use of steroids in the past    Patient admitted after presenting with multiple symptoms including initial focal neurological deficit described as numbness around the mouth with dysarthria with complete resolution--worrisome for possible TIA (in a similar vascular distribution as her prior CVA). Shortly after this, she had an episode of diffuse weakness in the upper and lower extremities with loss of consciousness--worrisome for syncope. She was seen in the outpatient clinic last week for concerns over mentation changes, increasing lethargy and was found to be at her baseline with clear  mentation. No medication changes were made at that time.     She was admitted to [facility] in December 2016 with acute onset of left-sided weakness, dysarthria and memory deficits (which has since resolved after PT/OT). At that time, she underwent stroke workup including CTA (12/1/16, media tab) which showed some irregular atheromatous plaque in each carotid bifurcations, possibly a source of embolus. Approximately 30% stenosis on each side. Vertebral arteries were patent. CT of the head was unremarkable. She was unable to undergo MRI secondary to her AICD.    Her dysarthria today is concerning for repeat embolic event possibly from the atheromatous plaque at the carotid bifurcations. However, this does not explain the patient's syncopal episode and I suspect there may be an underlying cardiac cause. She will be admitted for further workup of syncope including echocardiogram and close monitoring on telemetry. Consider repeat imaging of the carotid vessels.     PLAN:   - admit to tele   - ABCD score: 4, moderate stroke risk  - orthostats WNL, cerebellar exam: wnl  - ERP discussed with neurology, who believe its unlikely CVA/TIA  - CT head: unremarkable ([facility] (12/1/2016):  irregular atheromatous plaque in each carotid bifurcations, possibly a source of embolus. Approximately 30% stenosis on each side. Vertebral arteries were patent)   - cleared to resume diet, resume ASA, plavix, statin   - echocardiogram in AM, consider repeating CTA in AM   - DVT prophylaxis with heparin   - crackles noted on exam along with increased LE swelling: Increase lasix to IV BID, daily weights, I/Os       Please refer to Dr. Cline's H&P for complete details and management of chronic stable issues.

## 2017-04-13 ENCOUNTER — PATIENT OUTREACH (OUTPATIENT)
Dept: HEALTH INFORMATION MANAGEMENT | Facility: OTHER | Age: 67
End: 2017-04-13

## 2017-04-13 NOTE — DISCHARGE SUMMARY
[facility] [place] [facility] Discharge Summary      Admit Date:  4/11/2017       Discharge Date:   4/12/2017    Service:   R Internal Medicine Gray Team  Attending Physician(s):   Dr. Bravo       Senior Resident(s):   Dr. Mcwilliams  Abhi Resident(s):   Dr. Brown      Primary Diagnosis:   Transient ischemic attack    Secondary Diagnoses:                Active Hospital Problems    Diagnosis   • Syncope, near [R55]   • MARGARITA (acute kidney injury) (CMS-HCC) [N17.9]   • Acute exacerbation of congestive heart failure (CMS-HCC) [I50.9]   • Current smoker [F17.200]   • Type 2 diabetes mellitus (CMS-HCC) [E11.9]       Hospital Summary (Brief Narrative):       Ms Bella is a 66 yo  female with hx of TIA (2/2016), stroke with left side residual weakness in December 2016 (CTA showed plaques bilaterally at bifurcation of carotid from [place]), status post rehab/PT with improvement, CAD s/p CABG 4v in 1998 and AICD (following q6mon for device check, no recent issues with it), DMT2 (on lantus 15u qhs and metformin), HTN, Migraine, RLS, COPD (2L O2 at home), afib on amiodarone, GERD, depression and polyarthralgia. She presented with symptoms of numbness and tingling around the mouth, dysarthria, and near syncopal episodes.    When patient presented to the ED, her symptoms have resolved. Orthostatic vitals were negative, telemetry did not reveal any arrhythmias, and echo did not show any concerning structural heart disease. AICD was interrogated which did not reveal recent firing and patient was already on maximal medical therapy. She was discharged and advised to follow up with PCP and neurology as soon as possible.    Patient also had crackles on presentation which didn't improve on administration of lasix. It appears to be related to amiodarone use, probably pulmonary fibrosis. She needs to follow up with PCP for possible outpatient PFTs    Patient /Hospital Summary (Details -- Problem Oriented) :          # Hx of  afib  - Cont amiodarone  - EKG didn't show afib  - Check AICD for rhythm abnormalities    # MARGARITA  - BUN/Cr 22/1.11  - GFR 49  - Likely from mild dehydration.  - Advised patient to stay well hydrated.    # Hx of migraine  - on fioricet and topamax  - held fioricet while inpatient and cont topamax    # Hx of DMT2 with neuropathy  - cont gabapentin and lantus 15u  - hold metformin and ISS/hypoglycemic protocol while inpatient     # Hx of RLS  - cont ropinirole, stable     # COPD (2L O2 at home)  # Current smoker   - 1-3 cigars/monthly  - on inhalers at home with 2L Oxygen, currently stable    - RT PRN with oxygen and home inhaler resumed  - advised for smoking cessation    # Hx of polyarthralgia  - At home: norco, plaquinil  - Cont plaquinil and hold norco unless it is necessary for severe pain while inpatient                    Consultants:     None    Procedures:        Echocardiogram    Imaging/ Testing:      ECHOCARDIOGRAM COMP W/O CONT   Final Result      CT-HEAD W/O   Final Result      1.  No acute intracranial process.      2.  Minor atrophy.      3.  Chronic left maxillary sinusitis.      DX-CHEST-PORTABLE (1 VIEW)   Final Result      1.  No acute cardiac or pulmonary abnormality is noted.      2.  CABG changes.      3.  Left unipolar pacing device in place.            Discharge Medications:         Medication Reconciliation: Completed     Medication List      CHANGE how you take these medications       Instructions    potassium chloride 10 MEQ capsule   What changed:  Another medication with the same name was removed. Continue taking this medication, and follow the directions you see here.   Commonly known as:  MICRO-K    TAKE ONE CAPSULE BY MOUTH TWICE DAILY         CONTINUE taking these medications       Instructions    acetaminophen/caffeine/butalbital 325-40-50 mg -40 MG Tabs   Commonly known as:  FIORICET    Take 1 Tab by mouth 1 time daily as needed for Headache.   Dose:  1 Tab        albuterol-ipratropium  MCG/ACT Aero   Commonly known as:  COMBIVENT    Inhale 2 Puffs by mouth 4 times a day.   Dose:  2 Puff       amiodarone 200 MG Tabs   Last time this was given:  200 mg on 4/12/2017 10:16 AM   Commonly known as:  CORDARONE    Take 1 Tab by mouth every day.   Dose:  200 mg       aspirin 81 MG tablet   Last time this was given:  325 mg on 4/12/2017 10:16 AM    Take 325 mg by mouth every day.   Dose:  325 mg       buPROPion  MG Tb12 sustained-release tablet   Last time this was given:  150 mg on 4/12/2017 10:16 AM   Commonly known as:  WELLBUTRIN-SR    Take 1 Tab by mouth 2 times a day.   Dose:  150 mg       calcium-vitamin D 500-200 MG-UNIT Tabs   Commonly known as:  OSCAL 500 +D    Take 1 Tab by mouth 2 times a day, with meals.   Dose:  1 Tab       carvedilol 12.5 MG Tabs   Last time this was given:  12.5 mg on 4/12/2017 10:16 AM   Commonly known as:  COREG    TAKE 1 TAB BY MOUTH 2 TIMES A DAY.       cetirizine 10 MG Tabs   Commonly known as:  ZYRTEC    TAKE 1 TAB BY MOUTH 1 TIME DAILY AS NEEDED FOR ALLERGIES.       clopidogrel 75 MG Tabs   Last time this was given:  75 mg on 4/12/2017 10:16 AM   Commonly known as:  PLAVIX    TAKE 1 TAB BY MOUTH EVERY DAY.       docusate sodium 100 MG Caps   Commonly known as:  COLACE    Take 1 Cap by mouth 2 times a day.   Dose:  100 mg       fluticasone 50 MCG/ACT nasal spray   Last time this was given:  50 mcg on 4/12/2017 10:17 AM   Commonly known as:  FLONASE        furosemide 20 MG Tabs   Commonly known as:  LASIX    Take 1 Tab by mouth every day.   Dose:  20 mg       gabapentin 600 MG tablet   Commonly known as:  NEURONTIN    Take 1 Tab by mouth 2 times a day. TAKE 1 TABLET BY MOUTH TWICE A DAY   Dose:  600 mg       hydrocodone-acetaminophen 7.5-325 MG per tablet   Commonly known as:  NORCO    Take 1 Tab by mouth every 8 hours as needed for Severe Pain.   Dose:  1 Tab       hydroxychloroquine 200 MG Tabs   Commonly known as:  PLAQUENIL    Take  1 Tab by mouth every day.   Dose:  200 mg       insulin glargine 100 UNIT/ML Sopn injection   Last time this was given:  15 Units on 4/12/2017  5:02 AM   Commonly known as:  LANTUS SOLOSTAR    INJECT 20 UNITS SUBCUTANEOUSLY DAILY AT BEDTIME AS DIRECTED       Insulin Pen Needle 31G X 8 MM Misc    Use to inject insulin daily.       Iron 325 (65 FE) MG Tabs   Last time this was given:  325 mg on 4/12/2017 10:17 AM    every day.       metformin 1000 MG tablet   Commonly known as:  GLUCOPHAGE    Take 1 Tab by mouth 2 times a day.   Dose:  1000 mg       nitroglycerin 0.4 MG Subl   Commonly known as:  NITROSTAT    Place 1 Tab under tongue as needed for Chest Pain. Max dose of 3 in 24 hrs until need for ED.   Dose:  0.4 mg       OS-ADAIR CALCIUM + D3 500-200 MG-UNIT Tabs   Generic drug:  Calcium Carb-Cholecalciferol    Take 1 Tab by mouth 2 times a day, with meals.   Dose:  1 Tab       pantoprazole 40 MG Tbec   Commonly known as:  PROTONIX    Take 1 Tab by mouth every day. TAKE 1 TAB BY MOUTH EVERY DAY.   Dose:  40 mg       ropinirole 0.5 MG Tabs   Last time this was given:  1 mg on 4/12/2017  5:02 AM   Commonly known as:  REQUIP    Take 2 Tabs by mouth every bedtime.   Dose:  1 mg       rosuvastatin 40 MG tablet   Commonly known as:  CRESTOR    Take 1 Tab by mouth every day.   Dose:  40 mg       topiramate 100 MG Tabs   Last time this was given:  100 mg on 4/12/2017 10:17 AM   Commonly known as:  TOPAMAX    Take 1 Tab by mouth every 12 hours.   Dose:  100 mg               Disposition:   Discharged home    Diet:   Diabetic diet    Activity:   As tolerated    Instructions:      Follow up with PCP and Neurology as soon as possible.   The patient was instructed to return to the ER in the event of worsening symptoms. I have counseled the patient on the importance of compliance and the patient has agreed to proceed with all medical recommendations and follow up plan indicated above.   The patient understands that all medications  come with benefits and risks. Risks may include permanent injury or death and these risks can be minimized with close reassessment and monitoring.        Primary Care Provider:    Adriano Bahena M.D.    Discharge summary faxed to primary care provider:  Completed  Copy of discharge summary given to the patient: Deferred    Follow up appointment details :      1)PCP: April 28th    Pending Studies:        None    Time spent on discharge day patient visit, preparing discharge paperwork and arranging for patient follow up.    Summary of follow up issues:   1) Patient has crackles - PCP to evaluate for pulmonary fibrosis from amiodaraone toxicity.  2) May need outpatient PFTs.  3) Need to follow up with neurology.    Discharge Time (Minutes) :    40 minutes      Condition on Discharge    ______________________________________________________________________    Interval history/exam for day of discharge:     Patient denies symptoms, completely asymptomatic now. Patient reports upcoming follow up with PCP. Encouraged her to follow with her neurologist as soon as possible.    Filed Vitals:    04/12/17 0333 04/12/17 0805 04/12/17 1134 04/12/17 1607   BP:  127/59 119/59 117/89   Pulse: 65 58 62 61   Temp:  36.1 °C (96.9 °F) 36.8 °C (98.2 °F) 36.5 °C (97.7 °F)   Resp: 16 16 18 16   Height:       Weight:       SpO2: 95% 98% 97% 98%     Weight/BMI: Body mass index is 22.45 kg/(m^2).  Pulse Oximetry: 98 %, O2 (LPM): 2, O2 Delivery: Nasal Cannula    General: Appears comfortable  CVS: Regular rate and rhythm  PULM: Bilateral basal crackles  Neuro: Left sided weakness    Most Recent Labs:    Lab Results   Component Value Date/Time    WBC 8.2 04/11/2017 09:35 PM    RBC 4.13* 04/11/2017 09:35 PM    HEMOGLOBIN 12.4 04/11/2017 09:35 PM    HEMATOCRIT 37.8 04/11/2017 09:35 PM    MCV 91.5 04/11/2017 09:35 PM    MCH 30.0 04/11/2017 09:35 PM    MCHC 32.8* 04/11/2017 09:35 PM    MPV 10.6 04/11/2017 09:35 PM    NEUTROPHILS-POLYS 55.30 04/11/2017  09:35 PM    LYMPHOCYTES 31.00 04/11/2017 09:35 PM    MONOCYTES 7.40 04/11/2017 09:35 PM    EOSINOPHILS 5.30 04/11/2017 09:35 PM    BASOPHILS 0.90 04/11/2017 09:35 PM    HYPOCHROMIA 1+ 10/10/2013 04:41 AM      Lab Results   Component Value Date/Time    SODIUM 142 04/12/2017 03:40 AM    POTASSIUM 3.6 04/12/2017 03:40 AM    CHLORIDE 111 04/12/2017 03:40 AM    CO2 23 04/12/2017 03:40 AM    GLUCOSE 162* 04/12/2017 03:40 AM    BUN 22 04/12/2017 03:40 AM    CREATININE 1.11 04/12/2017 03:40 AM      Lab Results   Component Value Date/Time    ALT(SGPT) 6 04/12/2017 03:40 AM    AST(SGOT) 8* 04/12/2017 03:40 AM    ALKALINE PHOSPHATASE 89 04/12/2017 03:40 AM    TOTAL BILIRUBIN 0.2 04/12/2017 03:40 AM    DIRECT BILIRUBIN <0.1 11/09/2016 12:46 PM    LIPASE 45 03/06/2012 04:40 PM    ALBUMIN 3.4 04/12/2017 03:40 AM    GLOBULIN 2.6 04/12/2017 03:40 AM    INR 1.02 04/11/2017 09:35 PM     Lab Results   Component Value Date/Time    PT 13.7 04/11/2017 09:35 PM    INR 1.02 04/11/2017 09:35 PM

## 2017-04-13 NOTE — PROGRESS NOTES
Pt discharged to home. IV d/c'd, pt armband removed. Pt and family understand written and verbal d/c instructions.  No new rx's.  Regular diet, activity as tolerated.  Pt to follow up with PCP.  Pt instructed not drive after taking any narcotics.  Pt verbalized understanding.

## 2017-04-19 ENCOUNTER — HOSPITAL ENCOUNTER (OUTPATIENT)
Dept: LAB | Facility: MEDICAL CENTER | Age: 67
End: 2017-04-19
Attending: HOSPITALIST
Payer: MEDICARE

## 2017-04-19 DIAGNOSIS — G25.81 RLS (RESTLESS LEGS SYNDROME): ICD-10-CM

## 2017-04-26 DIAGNOSIS — G25.81 RLS (RESTLESS LEGS SYNDROME): ICD-10-CM

## 2017-04-26 DIAGNOSIS — G89.29 CHRONIC MIDLINE LOW BACK PAIN WITH RIGHT-SIDED SCIATICA: ICD-10-CM

## 2017-04-26 DIAGNOSIS — M54.41 CHRONIC MIDLINE LOW BACK PAIN WITH RIGHT-SIDED SCIATICA: ICD-10-CM

## 2017-04-26 RX ORDER — TOPIRAMATE 100 MG/1
100 TABLET, FILM COATED ORAL EVERY 12 HOURS
Qty: 180 TAB | Refills: 0 | Status: SHIPPED | OUTPATIENT
Start: 2017-04-26 | End: 2017-04-28 | Stop reason: SDUPTHER

## 2017-04-26 RX ORDER — DOCUSATE SODIUM 100 MG/1
100 CAPSULE, LIQUID FILLED ORAL 2 TIMES DAILY
Qty: 180 CAP | Refills: 0 | Status: SHIPPED | OUTPATIENT
Start: 2017-04-26 | End: 2017-04-28 | Stop reason: SDUPTHER

## 2017-04-26 RX ORDER — FUROSEMIDE 20 MG/1
20 TABLET ORAL
Qty: 90 TAB | Refills: 0 | Status: SHIPPED | OUTPATIENT
Start: 2017-04-26 | End: 2017-05-26 | Stop reason: SDUPTHER

## 2017-04-26 RX ORDER — ROPINIROLE 0.5 MG/1
1 TABLET, FILM COATED ORAL
Qty: 180 TAB | Refills: 0 | Status: SHIPPED | OUTPATIENT
Start: 2017-04-26 | End: 2017-04-28

## 2017-04-26 RX ORDER — HYDROCODONE BITARTRATE AND ACETAMINOPHEN 7.5; 325 MG/1; MG/1
1 TABLET ORAL EVERY 8 HOURS PRN
Qty: 90 TAB | Refills: 0 | OUTPATIENT
Start: 2017-04-26

## 2017-04-26 RX ORDER — POTASSIUM CHLORIDE 750 MG/1
CAPSULE, EXTENDED RELEASE ORAL
Qty: 180 CAP | Refills: 0 | Status: SHIPPED | OUTPATIENT
Start: 2017-04-26 | End: 2017-04-28

## 2017-04-26 NOTE — TELEPHONE ENCOUNTER
Last seen: 04/07/17 by Dr. Bucio  Next appt: 04/28/17 with Dr. Bahena    Was the patient seen in the last year in this department? Yes   Does patient have an active prescription for medications requested? No   Received Request Via: Pharmacy

## 2017-04-27 DIAGNOSIS — M19.90 INFLAMMATORY ARTHROPATHY: ICD-10-CM

## 2017-04-27 RX ORDER — HYDROXYCHLOROQUINE SULFATE 200 MG/1
200 TABLET, FILM COATED ORAL
Qty: 30 TAB | Refills: 2 | Status: SHIPPED | OUTPATIENT
Start: 2017-04-27 | End: 2017-04-28

## 2017-04-27 NOTE — TELEPHONE ENCOUNTER
Was the patient seen in the last year in this department? Yes  last office visit 11/8/17  Last labs 4/11/17    Does patient have an active prescription for medications requested? No     Received Request Via: Pharmacy

## 2017-04-28 ENCOUNTER — OFFICE VISIT (OUTPATIENT)
Dept: INTERNAL MEDICINE | Facility: MEDICAL CENTER | Age: 67
End: 2017-04-28
Payer: MEDICARE

## 2017-04-28 VITALS
HEART RATE: 66 BPM | WEIGHT: 121.6 LBS | DIASTOLIC BLOOD PRESSURE: 70 MMHG | HEIGHT: 60 IN | TEMPERATURE: 98.2 F | SYSTOLIC BLOOD PRESSURE: 135 MMHG | BODY MASS INDEX: 23.87 KG/M2 | OXYGEN SATURATION: 96 %

## 2017-04-28 DIAGNOSIS — E11.9 TYPE 2 DIABETES MELLITUS WITHOUT COMPLICATION, WITH LONG-TERM CURRENT USE OF INSULIN (HCC): ICD-10-CM

## 2017-04-28 DIAGNOSIS — E55.9 VITAMIN D DEFICIENCY: ICD-10-CM

## 2017-04-28 DIAGNOSIS — Z79.891 OPIATE ANALGESIC USE AGREEMENT EXISTS: ICD-10-CM

## 2017-04-28 DIAGNOSIS — I25.5 CARDIOMYOPATHY, ISCHEMIC: ICD-10-CM

## 2017-04-28 DIAGNOSIS — I10 ESSENTIAL HYPERTENSION: ICD-10-CM

## 2017-04-28 DIAGNOSIS — M81.0 OSTEOPOROSIS: ICD-10-CM

## 2017-04-28 DIAGNOSIS — E11.49 OTHER DIABETIC NEUROLOGICAL COMPLICATION ASSOCIATED WITH TYPE 2 DIABETES MELLITUS (HCC): ICD-10-CM

## 2017-04-28 DIAGNOSIS — I63.032 CEREBROVASCULAR ACCIDENT (CVA) DUE TO THROMBOSIS OF LEFT CAROTID ARTERY (HCC): ICD-10-CM

## 2017-04-28 DIAGNOSIS — Z95.1 HX OF CORONARY ARTERY BYPASS GRAFT: ICD-10-CM

## 2017-04-28 DIAGNOSIS — M79.672 FOOT PAIN, LEFT: ICD-10-CM

## 2017-04-28 DIAGNOSIS — G25.81 RLS (RESTLESS LEGS SYNDROME): ICD-10-CM

## 2017-04-28 DIAGNOSIS — G89.29 CHRONIC MIDLINE LOW BACK PAIN WITH RIGHT-SIDED SCIATICA: ICD-10-CM

## 2017-04-28 DIAGNOSIS — F39 MOOD DISORDER (HCC): ICD-10-CM

## 2017-04-28 DIAGNOSIS — G43.119 INTRACTABLE MIGRAINE WITH AURA WITHOUT STATUS MIGRAINOSUS: ICD-10-CM

## 2017-04-28 DIAGNOSIS — Z79.4 TYPE 2 DIABETES MELLITUS WITHOUT COMPLICATION, WITH LONG-TERM CURRENT USE OF INSULIN (HCC): ICD-10-CM

## 2017-04-28 DIAGNOSIS — I25.5 ISCHEMIC CARDIOMYOPATHY: ICD-10-CM

## 2017-04-28 DIAGNOSIS — E13.8 OTHER SPECIFIED DIABETES MELLITUS WITH UNSPECIFIED COMPLICATIONS (HCC): ICD-10-CM

## 2017-04-28 DIAGNOSIS — Z79.899 ON AMIODARONE THERAPY: ICD-10-CM

## 2017-04-28 DIAGNOSIS — M54.41 CHRONIC MIDLINE LOW BACK PAIN WITH RIGHT-SIDED SCIATICA: ICD-10-CM

## 2017-04-28 PROCEDURE — 99214 OFFICE O/P EST MOD 30 MIN: CPT | Mod: GC | Performed by: INTERNAL MEDICINE

## 2017-04-28 RX ORDER — ASPIRIN 325 MG
325 TABLET ORAL DAILY
COMMUNITY
End: 2017-04-28 | Stop reason: SDUPTHER

## 2017-04-28 RX ORDER — PANTOPRAZOLE SODIUM 40 MG/1
40 TABLET, DELAYED RELEASE ORAL
Qty: 30 TAB | Refills: 3 | Status: SHIPPED | OUTPATIENT
Start: 2017-04-28 | End: 2017-05-04 | Stop reason: SDUPTHER

## 2017-04-28 RX ORDER — CARVEDILOL 12.5 MG/1
12.5 TABLET ORAL 2 TIMES DAILY
Qty: 60 TAB | Refills: 5 | Status: SHIPPED | OUTPATIENT
Start: 2017-04-28 | End: 2017-05-26 | Stop reason: SDUPTHER

## 2017-04-28 RX ORDER — BUPROPION HYDROCHLORIDE 150 MG/1
150 TABLET, EXTENDED RELEASE ORAL 2 TIMES DAILY
Qty: 60 TAB | Refills: 11 | Status: SHIPPED | OUTPATIENT
Start: 2017-04-28 | End: 2017-05-26 | Stop reason: SDUPTHER

## 2017-04-28 RX ORDER — HYDROCODONE BITARTRATE AND ACETAMINOPHEN 7.5; 325 MG/1; MG/1
1 TABLET ORAL EVERY 8 HOURS PRN
Qty: 90 TAB | Refills: 1 | Status: SHIPPED | OUTPATIENT
Start: 2017-04-28 | End: 2017-05-26 | Stop reason: SDUPTHER

## 2017-04-28 RX ORDER — TOPIRAMATE 100 MG/1
100 TABLET, FILM COATED ORAL EVERY 12 HOURS
Qty: 60 TAB | Refills: 3 | Status: SHIPPED | OUTPATIENT
Start: 2017-04-28 | End: 2017-05-26 | Stop reason: SDUPTHER

## 2017-04-28 RX ORDER — ROPINIROLE 1 MG/1
1 TABLET, FILM COATED ORAL
Qty: 30 TAB | Refills: 3 | Status: SHIPPED | OUTPATIENT
Start: 2017-04-28 | End: 2017-05-26 | Stop reason: SDUPTHER

## 2017-04-28 RX ORDER — FLUTICASONE PROPIONATE 50 MCG
1 SPRAY, SUSPENSION (ML) NASAL DAILY
Qty: 16 G | Refills: 3 | Status: SHIPPED | OUTPATIENT
Start: 2017-04-28 | End: 2017-05-30 | Stop reason: SDUPTHER

## 2017-04-28 RX ORDER — ASPIRIN 325 MG
325 TABLET ORAL DAILY
Qty: 100 TAB | Refills: 3 | Status: SHIPPED | OUTPATIENT
Start: 2017-04-28 | End: 2017-05-30 | Stop reason: SDUPTHER

## 2017-04-28 RX ORDER — DOCUSATE SODIUM 100 MG/1
100 CAPSULE, LIQUID FILLED ORAL 2 TIMES DAILY
Qty: 180 CAP | Refills: 0 | Status: SHIPPED | OUTPATIENT
Start: 2017-04-28 | End: 2017-05-30 | Stop reason: SDUPTHER

## 2017-04-28 RX ORDER — CLOPIDOGREL BISULFATE 75 MG/1
75 TABLET ORAL
Qty: 30 TAB | Refills: 5 | Status: SHIPPED | OUTPATIENT
Start: 2017-04-28 | End: 2017-05-26 | Stop reason: SDUPTHER

## 2017-04-28 RX ORDER — ROSUVASTATIN CALCIUM 40 MG/1
40 TABLET, COATED ORAL
Qty: 30 TAB | Refills: 3 | Status: SHIPPED | OUTPATIENT
Start: 2017-04-28 | End: 2017-05-26 | Stop reason: SDUPTHER

## 2017-04-28 RX ORDER — GABAPENTIN 600 MG/1
600 TABLET ORAL 2 TIMES DAILY
Qty: 60 TAB | Refills: 3 | Status: SHIPPED | OUTPATIENT
Start: 2017-04-28 | End: 2017-05-26 | Stop reason: SDUPTHER

## 2017-04-28 ASSESSMENT — PAIN SCALES - GENERAL: PAINLEVEL: 8=MODERATE-SEVERE PAIN

## 2017-04-28 NOTE — MR AVS SNAPSHOT
"        Leena Bella   2017 10:30 AM   Office Visit   MRN: [id number]    Department:  [facility]   Dept Phone:  [phone]    Description:  Female : 1950   Provider:  Adriano Bahena M.D.           Reason for Visit     Follow-Up medication reconciliation/psorasis       Allergies as of 2017     Allergen Noted Reactions    Cozaar [Losartan] 2016       Darvocet [Propoxyphene N-Apap] 2016       Lexapro 2016       Lisinopril 2016       Cephalexin 2014       Rash - \"looks like I have AIDS\"    Morphine 10/12/2008       Heart stopped?    Other Environmental 10/20/2011   Rash    tape    Penicillin G Potassium 10/12/2008   Rash    Has tolerated Unasyn in May 2014 and received amoxicillin on 12/29/15 without reaction    Potassium 2017       Headache     Sulfa Drugs 10/18/2011   Rash      You were diagnosed with     Type 2 diabetes mellitus without complication, with long-term current use of insulin (CMS-HCC)   [7505586]       RLS (restless legs syndrome)   [164892]       Foot pain, left   [355159]       Vitamin D deficiency   [8656477]       Osteoporosis   [2135783]       Opiate analgesic use agreement exists   [6572239]       Intractable migraine with aura without status migrainosus   [203067]       Ischemic cardiomyopathy   [670727]       Hx of coronary artery bypass graft   [239721]       Essential hypertension   [1084708]       Other diabetic neurological complication associated with type 2 diabetes mellitus (CMS-HCC)   [5498126]       Cerebrovascular accident (CVA) due to thrombosis of left carotid artery (CMS-HCC)   [3532862]       Other specified diabetes mellitus with unspecified complications (CMS-HCC)   [1646962]       Chronic midline low back pain with right-sided sciatica   [4066512]       Cardiomyopathy, ischemic   [788107]       Mood disorder (CMS-HCC)   [473941]       On amiodarone therapy   [3032960]         Vital Signs     Blood Pressure " Pulse Temperature Height Weight Body Mass Index    135/70 mmHg 66 36.8 °C (98.2 °F) 1.524 m (5') 55.157 kg (121 lb 9.6 oz) 23.75 kg/m2    Oxygen Saturation Last Menstrual Period Breastfeeding? Smoking Status          96% 06/15/1996 No Former Smoker        Basic Information     Date Of Birth Sex Race Ethnicity Preferred Language    1950 Female White Non- English      Your appointments     Jun 01, 2017  2:45 PM   Established Patient with Adriano Bahena M.D.   [facility] - Internal Medicine (--)    [address] [place] [address]  [address]  [facility] [id number] [phone]           You will be receiving a confirmation call a few days before your appointment from our automated call confirmation system.            Aug 14, 2017  2:40 PM   Follow Up Visit with Melissa P Bloch, M.D.   [facility] Neurology (--)    [address], [address]  [facility] [id number] [phone]           You will be receiving a confirmation call a few days before your appointment from our automated call confirmation system.              Problem List              ICD-10-CM Priority Class Noted - Resolved    CAD (coronary artery disease) I25.10   10/18/2011 - Present    Ischemic cardiomyopathy I25.5   10/18/2011 - Present    Type 2 diabetes mellitus (CMS-HCC) E11.9   10/18/2011 - Present    CHF (congestive heart failure) (CMS-HCC) I50.9   10/18/2011 - Present    Migraines G43.909   12/23/2013 - Present    Polyarthralgia M25.50   11/3/2015 - Present    Chronic fatigue R53.82   11/3/2015 - Present    Dyspnea R06.00   11/3/2015 - Present    Skin rash R21   11/3/2015 - Present    Osteoporosis M81.0   11/3/2015 - Present    Vitamin D deficiency E55.9   11/11/2015 - Present    CVA (cerebral vascular accident) (CMS-HCC) I63.9   6/4/2016 - Present    Allergic rhinitis J30.9   6/4/2016 - Present    Automatic implantable cardioverter-defibrillator in situ Z95.810   6/4/2016 - Present    Osteoarthritis M19.90   6/4/2016 - Present     Labial abscess N76.4   6/4/2016 - Present    Nicotine dependence F17.200   6/4/2016 - Present    Diabetic neuropathy (CMS-HCC) E11.40   6/4/2016 - Present    GERD (gastroesophageal reflux disease) K21.9   6/4/2016 - Present    RLS (restless legs syndrome) G25.81   6/4/2016 - Present    PEYTON (obstructive sleep apnea) G47.33   6/4/2016 - Present    COPD (chronic obstructive pulmonary disease) (CMS-HCC) J44.9   6/4/2016 - Present    Anxiety disorder F41.9   6/4/2016 - Present    Dyslipidemia E78.5   6/4/2016 - Present    Abscess L02.91   6/4/2016 - Present    Hx of coronary artery bypass graft Z95.1   6/4/2016 - Present    HTN (hypertension) I10   6/4/2016 - Present    Opiate analgesic use agreement exists Z02.89   1/9/2017 - Present    Chronic back pain M54.9, G89.29   3/3/2017 - Present    Family history of stress Z81.8   4/7/2017 - Present    Syncope, near R55   4/12/2017 - Present    MARGARITA (acute kidney injury) (CMS-HCC) N17.9   4/12/2017 - Present    Acute exacerbation of congestive heart failure (CMS-HCC) I50.9   4/12/2017 - Present    Current smoker F17.200   4/12/2017 - Present    Foot pain, left M79.672   4/28/2017 - Present      Health Maintenance        Date Due Completion Dates    DIABETES MONOFILAMENT / LE EXAM 1950 ---    RETINAL SCREENING 4/6/1967 ---    PAP SMEAR 4/6/1970 ---    URINE ACR / MICROALBUMIN 12/14/2013 12/14/2012, 10/17/2011, 7/11/2011, 4/12/2010, 9/22/2008    COLONOSCOPY 8/11/2014 8/11/2004    MAMMOGRAM 2/3/2017 2/3/2016, 11/5/2012    FASTING LIPID PROFILE 2/11/2017 2/11/2016, 9/10/2015, 3/10/2015, 8/19/2014, 5/28/2014, 5/28/2014, 12/14/2012, 12/14/2012, 7/16/2012, 3/7/2012, 7/11/2011, 7/26/2010, 4/12/2010, 8/7/2009, 9/22/2008, 2/13/2007, 5/29/2006    A1C SCREENING 10/12/2017 4/12/2017, 2/28/2017, 9/10/2015, 8/19/2014, 5/28/2014, 5/28/2014, 12/14/2012, 3/9/2012, 3/7/2012, 10/17/2011, 7/11/2011, 7/26/2010, 4/12/2010, 8/7/2009, 9/22/2008, 2/13/2007, 5/29/2006    SERUM CREATININE  4/12/2018 4/12/2017, 4/11/2017, 2/28/2017, 12/15/2016, 11/9/2016, 4/1/2016, 11/6/2015, 9/10/2015, 3/10/2015, 12/17/2014, 8/19/2014, 8/1/2014, 6/2/2014, 6/1/2014, 5/31/2014, 5/30/2014, 5/29/2014, 5/28/2014, 5/27/2014, 10/10/2013, 10/9/2013, 10/8/2013, 12/14/2012, 10/30/2012, 7/16/2012, 3/9/2012, 3/8/2012, 3/7/2012, 3/6/2012, 10/20/2011, 10/17/2011, 7/11/2011, 7/26/2010, 4/12/2010, 8/7/2009, 9/22/2008, 2/13/2007, 2/12/2007, 7/17/2006, 5/30/2006, 5/29/2006, 5/28/2006    BONE DENSITY 11/30/2020 11/30/2015    IMM DTaP/Tdap/Td Vaccine (2 - Td) 1/9/2027 1/9/2017, 3/3/2015            Current Immunizations     13-VALENT PCV PREVNAR 1/9/2017    Influenza TIV (IM) 10/9/2013  6:30 PM, 11/11/2011    Influenza Vaccine Quad Inj (Preserved) 12/5/2016    Pneumococcal Vaccine (UF)Historical Data 11/11/2011    Pneumococcal polysaccharide vaccine (PPSV-23) 12/5/2016    SHINGLES VACCINE 10/11/2015    Tdap Vaccine 1/9/2017, 3/3/2015      Below and/or attached are the medications your provider expects you to take. Review all of your home medications and newly ordered medications with your provider and/or pharmacist. Follow medication instructions as directed by your provider and/or pharmacist. Please keep your medication list with you and share with your provider. Update the information when medications are discontinued, doses are changed, or new medications (including over-the-counter products) are added; and carry medication information at all times in the event of emergency situations     Allergies:  COZAAR - (reactions not documented)     DARVOCET - (reactions not documented)     LEXAPRO - (reactions not documented)     LISINOPRIL - (reactions not documented)     CEPHALEXIN - (reactions not documented)     MORPHINE - (reactions not documented)     OTHER ENVIRONMENTAL - Rash     PENICILLIN G POTASSIUM - Rash     POTASSIUM - (reactions not documented)     SULFA DRUGS - Rash               Medications  Valid as of: April 28, 2017 - 12:22  PM    Generic Name Brand Name Tablet Size Instructions for use    Amiodarone HCl (Tab) CORDARONE 200 MG Take 1 Tab by mouth every day.        Aspirin (Tab) aspirin 81 MG Take 325 mg by mouth every day.        Aspirin (Tab)  MG Take 1 Tab by mouth every day.        BuPROPion HCl (TABLET SR 12 HR) WELLBUTRIN- MG Take 1 Tab by mouth 2 times a day.        Calcium Carb-Cholecalciferol (Tab) OS-ADAIR CALCIUM + D3 500-200 MG-UNIT Take 1 Tab by mouth 2 times a day, with meals.        Calcium Carbonate-Vitamin D (Tab) OSCAL 500 +D 500-200 MG-UNIT Take 1 Tab by mouth 2 times a day, with meals.        Carvedilol (Tab) COREG 12.5 MG Take 1 Tab by mouth 2 times a day. TAKE 1 TAB BY MOUTH 2 TIMES A DAY.        Cetirizine HCl (Tab) ZYRTEC 10 MG TAKE 1 TAB BY MOUTH 1 TIME DAILY AS NEEDED FOR ALLERGIES.        Clopidogrel Bisulfate (Tab) PLAVIX 75 MG Take 1 Tab by mouth every day. TAKE 1 TAB BY MOUTH EVERY DAY.        Docusate Sodium (Cap) COLACE 100 MG Take 1 Cap by mouth 2 times a day.        Ferrous Sulfate (Tab) Iron 325 (65 FE) MG every day.         Fluticasone Propionate (Suspension) FLONASE 50 MCG/ACT Spray 1 Spray in nose every day.        Furosemide (Tab) LASIX 20 MG Take 1 Tab by mouth every day.        Gabapentin (Tab) NEURONTIN 600 MG Take 1 Tab by mouth 2 times a day. TAKE 1 TABLET BY MOUTH TWICE A DAY        Hydrocodone-Acetaminophen (Tab) NORCO 7.5-325 MG Take 1 Tab by mouth every 8 hours as needed for Severe Pain.        Insulin Glargine (Solution Pen-injector) LANTUS 100 UNIT/ML INJECT 20 UNITS SUBCUTANEOUSLY DAILY AT BEDTIME AS DIRECTED        Insulin Glargine (Solution Pen-injector) LANTUS 100 UNIT/ML INJECT 20 UNITS SUBCUTANEOUSLY DAILY AT BEDTIME AS DIRECTED        Insulin Pen Needle (Misc) Insulin Pen Needle 31G X 8 MM Use to inject insulin daily.        Ipratropium-Albuterol (Aerosol) COMBIVENT  MCG/ACT Inhale 2 Puffs by mouth 4 times a day.        MetFORMIN HCl (Tab) GLUCOPHAGE 1000 MG Take 1  Tab by mouth 2 times a day.        Nitroglycerin (SL Tab) NITROSTAT 0.4 MG Place 1 Tab under tongue as needed for Chest Pain. Max dose of 3 in 24 hrs until need for ED.        Pantoprazole Sodium (Tablet Delayed Response) PROTONIX 40 MG Take 1 Tab by mouth every day. TAKE 1 TAB BY MOUTH EVERY DAY.        ROPINIRole HCl (Tab) REQUIP 1 MG Take 1 Tab by mouth every bedtime.        Rosuvastatin Calcium (Tab) CRESTOR 40 MG Take 1 Tab by mouth every day.        Topiramate (Tab) TOPAMAX 100 MG Take 1 Tab by mouth every 12 hours.        .                 Medicines prescribed today were sent to:     [facility] [place], [place] - [address] [facility]    [address] [facility] [address]    Phone: [phone] Fax: [phone]    Open 24 Hours?: No      Medication refill instructions:       If your prescription bottle indicates you have medication refills left, it is not necessary to call your provider’s office. Please contact your pharmacy and they will refill your medication.    If your prescription bottle indicates you do not have any refills left, you may request refills at any time through one of the following ways: The online [employer] system (except Urgent Care), by calling your provider’s office, or by asking your pharmacy to contact your provider’s office with a refill request. Medication refills are processed only during regular business hours and may not be available until the next business day. Your provider may request additional information or to have a follow-up visit with you prior to refilling your medication.   *Please Note: Medication refills are assigned a new Rx number when refilled electronically. Your pharmacy may indicate that no refills were authorized even though a new prescription for the same medication is available at the pharmacy. Please request the medicine by name with the pharmacy before contacting your provider for a refill.        Your To Do List     Future Labs/Procedures  Complete By Expires    DX-FOOT-COMPLETE 3+ LEFT  As directed 4/28/2018         MyChart Status: Patient Declined

## 2017-04-28 NOTE — PROGRESS NOTES
Established Patient    Leena presents today with the following:    CC: Follow up for medication management, recent hospitalization for TIA, uncontrolled IDDM, polypharmacy    HPI:    Recent TIA  Uncontrolled IDDM  Recently discharged with a complete workup for TIA with self resolving symptoms of nam-oral numbness and dysarthria. Her TTE, CT head, EKG, and overnight telemetry findings are normal. Her glycohemoglobin was elevated from 8.7 (February) to 11.2. She does report compliance with metformin 1000 mg twice a day. She does not take Lantus 20 units as prescribed daily at bedtime. Reports that her blood sugars, random are 250-350. She continues to smoke. Blood pressure is normotensive currently. Denies any recurrence or persistence of her symptoms.    Atrial fibrillation on dual antiplatelet therapy  She follows up with cardiology and is currently on rate control amiodarone and dual antiplatelet therapy instead of anticoagulation. Her chadsvasc score is 4. When asked about anticoagulation, the patient states that she was previously on Coumadin, however was taken off due to noncompliance and a cumbersome routine. Her EGFR is 49. I discussed the risks and benefits of anticoagulation including the risks of bleeding and the benefits of stroke prevention in the future. Continue current therapy. The patient is scheduled to meet with her cardiology appointment and will bring up the topic of anticoagulation.    Medication management  Her son is present during the entire encounter who is concerned and states that some medications might be interacting with each other causing excessive drowsiness and altered level of consciousness at various times in the day. The patient and her son have brought a list of medications with the timings of administration written out in a concise pattern. I discussed with them the side effects of each medication and the interactions of various sedative and hypnotic medications. Please see  assessment and plan below.      Chronic headaches  During the recent hospitalization, the patient states she did not receive any potassium while inpatient and her chronic headaches have resolved. Potassium has been placed as an allergy. However, the patient still endorses occasional migraines with ~10 episodes per month, which are largely self resolving and mild. Denies any photophobia, weakness, or sensory paresthesias during the time. Her son expresses concern that the patient was abusing Fioricet when she would have these chronic headaches. Now, the patient takes Fioricet sparingly. I advised the patient to discontinue it if possible or to hold some of the remaining medications for breakthrough migraine pain.       Recent mechanical fall  She had a mechanical fall in a grocery store resulting left lateral foot swelling, tenderness, erythema, and pain. She states her symptoms have mostly resided except for tenderness and pain which are still present 3 weeks after the incident. Otherwise denies any imaging obtained in the previous visits. Denies any deformity. She states that the fall was mechanical and the incident was without any chest pain, palpitations, shortness of breath, or dizziness.      Patient Active Problem List    Diagnosis Date Noted   • Foot pain, left 04/28/2017   • Syncope, near 04/12/2017   • MARGARITA (acute kidney injury) (CMS-HCC) 04/12/2017   • Acute exacerbation of congestive heart failure (CMS-HCC) 04/12/2017   • Current smoker 04/12/2017   • Family history of stress 04/07/2017   • Chronic back pain 03/03/2017   • Opiate analgesic use agreement exists 01/09/2017   • CVA (cerebral vascular accident) (CMS-HCC) 06/04/2016   • Allergic rhinitis 06/04/2016   • Automatic implantable cardioverter-defibrillator in situ 06/04/2016   • Osteoarthritis 06/04/2016   • Labial abscess 06/04/2016   • Nicotine dependence 06/04/2016   • Diabetic neuropathy (CMS-HCC) 06/04/2016   • GERD (gastroesophageal reflux  disease) 06/04/2016   • RLS (restless legs syndrome) 06/04/2016   • PEYTON (obstructive sleep apnea) 06/04/2016   • COPD (chronic obstructive pulmonary disease) (CMS-HCC) 06/04/2016   • Anxiety disorder 06/04/2016   • Dyslipidemia 06/04/2016   • Abscess 06/04/2016   • Hx of coronary artery bypass graft 06/04/2016   • HTN (hypertension) 06/04/2016   • Vitamin D deficiency 11/11/2015   • Polyarthralgia 11/03/2015   • Chronic fatigue 11/03/2015   • Dyspnea 11/03/2015   • Skin rash 11/03/2015   • Osteoporosis 11/03/2015   • Migraines 12/23/2013   • CAD (coronary artery disease) 10/18/2011   • Ischemic cardiomyopathy 10/18/2011   • Type 2 diabetes mellitus (CMS-HCC) 10/18/2011   • CHF (congestive heart failure) (CMS-HCC) 10/18/2011       Current Outpatient Prescriptions   Medication Sig Dispense Refill   • aspirin (ASA) 325 MG Tab Take 325 mg by mouth every day.     • insulin glargine (LANTUS SOLOSTAR) 100 UNIT/ML Solution Pen-injector injection INJECT 20 UNITS SUBCUTANEOUSLY DAILY AT BEDTIME AS DIRECTED 15 PEN 0   • ropinirole (REQUIP) 1 MG Tab Take 1 Tab by mouth every bedtime. 30 Tab 3   • furosemide (LASIX) 20 MG Tab Take 1 Tab by mouth every day. 90 Tab 0   • topiramate (TOPAMAX) 100 MG Tab Take 1 Tab by mouth every 12 hours. 180 Tab 0   • docusate sodium (COLACE) 100 MG Cap Take 1 Cap by mouth 2 times a day. 180 Cap 0   • acetaminophen/caffeine/butalbital 325-40-50 mg (FIORICET) -40 MG Tab Take 1 Tab by mouth 1 time daily as needed for Headache. 1 Tab 0   • gabapentin (NEURONTIN) 600 MG tablet Take 1 Tab by mouth 2 times a day. TAKE 1 TABLET BY MOUTH TWICE A DAY 60 Tab 3   • OS-ADAIR CALCIUM + D3 500-200 MG-UNIT Tab Take 1 Tab by mouth 2 times a day, with meals.  3   • cetirizine (ZYRTEC) 10 MG Tab TAKE 1 TAB BY MOUTH 1 TIME DAILY AS NEEDED FOR ALLERGIES. 90 Tab 0   • amiodarone (CORDARONE) 200 MG Tab Take 1 Tab by mouth every day. 30 Tab 0   • hydrocodone-acetaminophen (NORCO) 7.5-325 MG per tablet Take 1 Tab by  mouth every 8 hours as needed for Severe Pain. 90 Tab 0   • pantoprazole (PROTONIX) 40 MG Tablet Delayed Response Take 1 Tab by mouth every day. TAKE 1 TAB BY MOUTH EVERY DAY. 30 Tab 0   • carvedilol (COREG) 12.5 MG Tab TAKE 1 TAB BY MOUTH 2 TIMES A DAY. 60 Tab 5   • clopidogrel (PLAVIX) 75 MG Tab TAKE 1 TAB BY MOUTH EVERY DAY. 30 Tab 5   • nitroglycerin (NITROSTAT) 0.4 MG SL Tab Place 1 Tab under tongue as needed for Chest Pain. Max dose of 3 in 24 hrs until need for ED. 25 Tab 2   • Insulin Pen Needle 31G X 8 MM Misc Use to inject insulin daily. 100 Each 0   • rosuvastatin (CRESTOR) 40 MG tablet Take 1 Tab by mouth every day. 30 Tab 3   • buPROPion SR (WELLBUTRIN-SR) 150 MG TABLET SR 12 HR sustained-release tablet Take 1 Tab by mouth 2 times a day. 60 Tab 11   • metformin (GLUCOPHAGE) 1000 MG tablet Take 1 Tab by mouth 2 times a day. 60 Tab 11   • fluticasone (FLONASE) 50 MCG/ACT nasal spray      • IRON 325 (65 FE) MG PO TABS every day.      • hydroxychloroquine (PLAQUENIL) 200 MG Tab Take 1 Tab by mouth every day. 30 Tab 2   • potassium chloride (MICRO-K) 10 MEQ capsule TAKE ONE CAPSULE BY MOUTH TWICE DAILY 180 Cap 0   • calcium-vitamin D (OSCAL 500 +D) 500-200 MG-UNIT Tab Take 1 Tab by mouth 2 times a day, with meals. 100 Tab 3   • albuterol-ipratropium (COMBIVENT)  MCG/ACT AERO Inhale 2 Puffs by mouth 4 times a day. 1 Inhaler 0   • ASPIRIN 81 MG PO TABS Take 325 mg by mouth every day.       No current facility-administered medications for this visit.       ROS: As per HPI. Additional pertinent symptoms as noted below.    REVIEW OF SYSTEMS:   CONSTITUTIONAL: Denies malaise    HEENT: Denies sore throat  CARDIOVASCULAR: Denies chest pain or palpitations   RESPIRATORY: No cough, shortness of breath  GASTROINTESTINAL: No nausea or vomiting   NEUROLOGIC: No focal weakness   PSYCHIATRIC: Denies mood disturbances or insomnia    HEMATOLOGICAL: No easy bruising or bleeding.         /70 mmHg  Pulse 66   Temp(Src) 36.8 °C (98.2 °F)  Ht 1.524 m (5')  Wt 55.157 kg (121 lb 9.6 oz)  BMI 23.75 kg/m2  SpO2 96%  LMP 06/15/1996  Breastfeeding? No    Physical Exam   GEN: Well developed, AO x 3, in no apparent distress   HEENT: Atraumatic, normocephalic, oral mucosa is moist  CVS: S1 S2 present, R/R/R, no M/R/G  RS: Air entry equal bilaterally. Coarse crackles noted throughout.   ABD: Soft, nontender, BS present in all quadrants  EXT: No pedal edema noted. Left lateral foot pain present.   NEUR: Patient is able to move all of her extremities. CN 2-12 are grossly intact           Assessment and Plan    Recent TIA  Uncontrolled IDDM  -Patient has been noncompliant with her medication regimen including Lantus 20 units at night. She does report compliance with 2 g of metformin.  -Her significant increasing glycohemoglobin and her already impaired kidney function poses her at a high rate of complications.  -Patient was reeducated on diabetic complications and the importance of adherence to her strict regimen.  -I advised the patient to bring a log of her home glucose readings upon her next visit in 5 weeks.  -She continues to smoke  -Continue Crestor and dual antiplatelet therapy    Atrial fibrillation  -Her chadsvasc score is 4 and the patient needs to be on chronic anticoagulation.  -I discussed with her the risks and benefits of blood thinners and the patient stated that she would like to bring this up with her next cardiology appointment  -Continue rhythm control with amiodarone. Continue dual antiplatelet therapy for now.   - She has an upcoming appointment with cardiology and the patient is encouraged to request anticoagulation.    Medication management  - Qam antihistamines and fioricet discontinued.  - Patient and her family educated on the indications of each medication and the interactions of various sedative and hypnotic medications that the patient is currently on.  - Proper medication interactions explained in  detail  - The entire family confirms understanding.     Chronic headaches  - DC fioricet  - Continue topamax    Left foot pain  - Xray series ordered      Concern for RLD in chronic Amiodarone use patient  - PFTs ordered.    Followup: Return in about 5 weeks (around 6/2/2017).      Signed by: Adriano Bahena M.D.

## 2017-04-30 ENCOUNTER — HOSPITAL ENCOUNTER (EMERGENCY)
Facility: MEDICAL CENTER | Age: 67
End: 2017-04-30
Attending: EMERGENCY MEDICINE
Payer: MEDICARE

## 2017-04-30 VITALS
TEMPERATURE: 98.2 F | WEIGHT: 120 LBS | RESPIRATION RATE: 18 BRPM | HEART RATE: 70 BPM | DIASTOLIC BLOOD PRESSURE: 52 MMHG | SYSTOLIC BLOOD PRESSURE: 126 MMHG | OXYGEN SATURATION: 95 % | HEIGHT: 62 IN | BODY MASS INDEX: 22.08 KG/M2

## 2017-04-30 DIAGNOSIS — N39.0 URINARY TRACT INFECTION, SITE UNSPECIFIED: ICD-10-CM

## 2017-04-30 DIAGNOSIS — R11.0 NAUSEA: ICD-10-CM

## 2017-04-30 DIAGNOSIS — R53.1 GENERALIZED WEAKNESS: ICD-10-CM

## 2017-04-30 LAB
ALBUMIN SERPL BCP-MCNC: 3.6 G/DL (ref 3.2–4.9)
ALBUMIN/GLOB SERPL: 1.3 G/DL
ALP SERPL-CCNC: 82 U/L (ref 30–99)
ALT SERPL-CCNC: 5 U/L (ref 2–50)
ANION GAP SERPL CALC-SCNC: 9 MMOL/L (ref 0–11.9)
APPEARANCE UR: ABNORMAL
AST SERPL-CCNC: 8 U/L (ref 12–45)
BACTERIA #/AREA URNS HPF: ABNORMAL /HPF
BASOPHILS # BLD AUTO: 0.6 % (ref 0–1.8)
BASOPHILS # BLD: 0.04 K/UL (ref 0–0.12)
BILIRUB SERPL-MCNC: 0.3 MG/DL (ref 0.1–1.5)
BILIRUB UR QL STRIP.AUTO: NEGATIVE
BUN SERPL-MCNC: 27 MG/DL (ref 8–22)
CALCIUM SERPL-MCNC: 9.6 MG/DL (ref 8.5–10.5)
CHLORIDE SERPL-SCNC: 107 MMOL/L (ref 96–112)
CO2 SERPL-SCNC: 21 MMOL/L (ref 20–33)
COLOR UR: COLORLESS
CREAT SERPL-MCNC: 1.3 MG/DL (ref 0.5–1.4)
CULTURE IF INDICATED INDCX: YES UA CULTURE
EOSINOPHIL # BLD AUTO: 0.3 K/UL (ref 0–0.51)
EOSINOPHIL NFR BLD: 4.3 % (ref 0–6.9)
ERYTHROCYTE [DISTWIDTH] IN BLOOD BY AUTOMATED COUNT: 44.2 FL (ref 35.9–50)
GFR SERPL CREATININE-BSD FRML MDRD: 41 ML/MIN/1.73 M 2
GLOBULIN SER CALC-MCNC: 2.7 G/DL (ref 1.9–3.5)
GLUCOSE SERPL-MCNC: 285 MG/DL (ref 65–99)
GLUCOSE UR STRIP.AUTO-MCNC: ABNORMAL MG/DL
HCT VFR BLD AUTO: 41.3 % (ref 37–47)
HGB BLD-MCNC: 13.1 G/DL (ref 12–16)
IMM GRANULOCYTES # BLD AUTO: 0.01 K/UL (ref 0–0.11)
IMM GRANULOCYTES NFR BLD AUTO: 0.1 % (ref 0–0.9)
KETONES UR STRIP.AUTO-MCNC: NEGATIVE MG/DL
LEUKOCYTE ESTERASE UR QL STRIP.AUTO: ABNORMAL
LYMPHOCYTES # BLD AUTO: 2.15 K/UL (ref 1–4.8)
LYMPHOCYTES NFR BLD: 30.5 % (ref 22–41)
MCH RBC QN AUTO: 29.4 PG (ref 27–33)
MCHC RBC AUTO-ENTMCNC: 31.7 G/DL (ref 33.6–35)
MCV RBC AUTO: 92.8 FL (ref 81.4–97.8)
MICRO URNS: ABNORMAL
MONOCYTES # BLD AUTO: 0.36 K/UL (ref 0–0.85)
MONOCYTES NFR BLD AUTO: 5.1 % (ref 0–13.4)
NEUTROPHILS # BLD AUTO: 4.18 K/UL (ref 2–7.15)
NEUTROPHILS NFR BLD: 59.4 % (ref 44–72)
NITRITE UR QL STRIP.AUTO: NEGATIVE
NRBC # BLD AUTO: 0 K/UL
NRBC BLD AUTO-RTO: 0 /100 WBC
PH UR STRIP.AUTO: 5.5 [PH]
PLATELET # BLD AUTO: 168 K/UL (ref 164–446)
PMV BLD AUTO: 10.5 FL (ref 9–12.9)
POTASSIUM SERPL-SCNC: 3.7 MMOL/L (ref 3.6–5.5)
PROT SERPL-MCNC: 6.3 G/DL (ref 6–8.2)
PROT UR QL STRIP: NEGATIVE MG/DL
RBC # BLD AUTO: 4.45 M/UL (ref 4.2–5.4)
RBC UR QL AUTO: NEGATIVE
SODIUM SERPL-SCNC: 137 MMOL/L (ref 135–145)
SP GR UR STRIP.AUTO: 1
TROPONIN I SERPL-MCNC: <0.01 NG/ML (ref 0–0.04)
WBC # BLD AUTO: 7 K/UL (ref 4.8–10.8)
WBC #/AREA URNS HPF: ABNORMAL /HPF

## 2017-04-30 PROCEDURE — 81001 URINALYSIS AUTO W/SCOPE: CPT

## 2017-04-30 PROCEDURE — 87086 URINE CULTURE/COLONY COUNT: CPT

## 2017-04-30 PROCEDURE — 80053 COMPREHEN METABOLIC PANEL: CPT

## 2017-04-30 PROCEDURE — 99284 EMERGENCY DEPT VISIT MOD MDM: CPT

## 2017-04-30 PROCEDURE — 87186 SC STD MICRODIL/AGAR DIL: CPT

## 2017-04-30 PROCEDURE — 93005 ELECTROCARDIOGRAM TRACING: CPT | Performed by: EMERGENCY MEDICINE

## 2017-04-30 PROCEDURE — 87077 CULTURE AEROBIC IDENTIFY: CPT

## 2017-04-30 PROCEDURE — 84484 ASSAY OF TROPONIN QUANT: CPT

## 2017-04-30 PROCEDURE — A9270 NON-COVERED ITEM OR SERVICE: HCPCS | Performed by: EMERGENCY MEDICINE

## 2017-04-30 PROCEDURE — 85025 COMPLETE CBC W/AUTO DIFF WBC: CPT

## 2017-04-30 PROCEDURE — 700102 HCHG RX REV CODE 250 W/ 637 OVERRIDE(OP): Performed by: EMERGENCY MEDICINE

## 2017-04-30 RX ORDER — ONDANSETRON 4 MG/1
4 TABLET, ORALLY DISINTEGRATING ORAL EVERY 8 HOURS PRN
Qty: 10 TAB | Refills: 1 | Status: SHIPPED | OUTPATIENT
Start: 2017-04-30 | End: 2017-05-30 | Stop reason: SDUPTHER

## 2017-04-30 RX ORDER — AMOXICILLIN AND CLAVULANATE POTASSIUM 875; 125 MG/1; MG/1
1 TABLET, FILM COATED ORAL 2 TIMES DAILY
Qty: 13 TAB | Refills: 0 | Status: SHIPPED | OUTPATIENT
Start: 2017-04-30 | End: 2017-05-04

## 2017-04-30 RX ORDER — AMOXICILLIN AND CLAVULANATE POTASSIUM 875; 125 MG/1; MG/1
1 TABLET, FILM COATED ORAL ONCE
Status: COMPLETED | OUTPATIENT
Start: 2017-04-30 | End: 2017-04-30

## 2017-04-30 RX ADMIN — AMOXICILLIN AND CLAVULANATE POTASSIUM 1 TABLET: 875; 125 TABLET, FILM COATED ORAL at 18:59

## 2017-04-30 ASSESSMENT — PAIN SCALES - GENERAL: PAINLEVEL_OUTOF10: 0

## 2017-04-30 ASSESSMENT — LIFESTYLE VARIABLES: DO YOU DRINK ALCOHOL: NO

## 2017-04-30 NOTE — ED NOTES
Chief Complaint   Patient presents with   • Weakness     and fatigue x45 min.     BIB [facility] for above. FSBS 251 per medics. VSS. Chart up for ERP.
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
## 2017-04-30 NOTE — ED AVS SNAPSHOT
Home Care Instructions                                                                                                                Leena Bella   MRN: [id number]    Department:  [facility], Emergency Dept   Date of Visit:  4/30/2017            [facility], Emergency Dept    [address] [facility] [address]    Phone:  [phone]      You were seen by     Lionel Huang M.D.      Your Diagnosis Was     Generalized weakness     R53.1       These are the medications you received during your hospitalization from 04/30/2017 1615 to 04/30/2017 1902     Date/Time Order Dose Route Action    04/30/2017 1859 amoxicillin-clavulanate (AUGMENTIN) 875-125 MG per tablet 1 Tab 1 Tab Oral Given      Follow-up Information     1. Follow up with Adriano Bahena M.D. In 3 days.    Specialty:  Internal Medicine    Why:  see her doctor for recheck in 3 days if no improvement. Sooner if worse or return    Contact information    [address]  [address]  [facility] [address] [phone]        Medication Information     Review all of your home medications and newly ordered medications with your primary doctor and/or pharmacist as soon as possible. Follow medication instructions as directed by your doctor and/or pharmacist.     Please keep your complete medication list with you and share with your physician. Update the information when medications are discontinued, doses are changed, or new medications (including over-the-counter products) are added; and carry medication information at all times in the event of emergency situations.               Medication List      START taking these medications        Instructions    Morning Afternoon Evening Bedtime    amoxicillin-clavulanate 875-125 MG Tabs   Last time this was given:  1 Tab on 4/30/2017  6:59 PM   Commonly known as:  AUGMENTIN        Take 1 Tab by mouth 2 times a day for 13 days.   Dose:  1 Tab                        ondansetron 4  MG Tbdp   Commonly known as:  ZOFRAN ODT        Take 1 Tab by mouth every 8 hours as needed for Nausea/Vomiting.   Dose:  4 mg                          ASK your doctor about these medications        Instructions    Morning Afternoon Evening Bedtime    albuterol-ipratropium  MCG/ACT Aero   Commonly known as:  COMBIVENT        Inhale 2 Puffs by mouth 4 times a day.   Dose:  2 Puff                        amiodarone 200 MG Tabs   Commonly known as:  CORDARONE        Take 1 Tab by mouth every day.   Dose:  200 mg                        * aspirin 81 MG tablet        Take 325 mg by mouth every day.   Dose:  325 mg                        * aspirin 325 MG Tabs   Commonly known as:  ASA        Take 1 Tab by mouth every day.   Dose:  325 mg                        buPROPion  MG Tb12 sustained-release tablet   Commonly known as:  WELLBUTRIN-SR        Take 1 Tab by mouth 2 times a day.   Dose:  150 mg                        calcium-vitamin D 500-200 MG-UNIT Tabs   Commonly known as:  OSCAL 500 +D        Take 1 Tab by mouth 2 times a day, with meals.   Dose:  1 Tab                        carvedilol 12.5 MG Tabs   Commonly known as:  COREG        Take 1 Tab by mouth 2 times a day. TAKE 1 TAB BY MOUTH 2 TIMES A DAY.   Dose:  12.5 mg                        cetirizine 10 MG Tabs   Commonly known as:  ZYRTEC        TAKE 1 TAB BY MOUTH 1 TIME DAILY AS NEEDED FOR ALLERGIES.                        clopidogrel 75 MG Tabs   Commonly known as:  PLAVIX        Take 1 Tab by mouth every day. TAKE 1 TAB BY MOUTH EVERY DAY.   Dose:  75 mg                        docusate sodium 100 MG Caps   Commonly known as:  COLACE        Take 1 Cap by mouth 2 times a day.   Dose:  100 mg                        fluticasone 50 MCG/ACT nasal spray   Commonly known as:  FLONASE        Spray 1 Spray in nose every day.   Dose:  1 Spray                        furosemide 20 MG Tabs   Commonly known as:  LASIX        Take 1 Tab by mouth every day.   Dose:   20 mg                        gabapentin 600 MG tablet   Commonly known as:  NEURONTIN        Take 1 Tab by mouth 2 times a day. TAKE 1 TABLET BY MOUTH TWICE A DAY   Dose:  600 mg                        hydrocodone-acetaminophen 7.5-325 MG per tablet   Commonly known as:  NORCO        Take 1 Tab by mouth every 8 hours as needed for Severe Pain.   Dose:  1 Tab                        * insulin glargine 100 UNIT/ML Sopn injection   Commonly known as:  LANTUS SOLOSTAR        INJECT 20 UNITS SUBCUTANEOUSLY DAILY AT BEDTIME AS DIRECTED                        * insulin glargine 100 UNIT/ML Sopn injection   Commonly known as:  LANTUS SOLOSTAR        INJECT 20 UNITS SUBCUTANEOUSLY DAILY AT BEDTIME AS DIRECTED                        Insulin Pen Needle 31G X 8 MM Misc        Use to inject insulin daily.                        Iron 325 (65 FE) MG Tabs        every day.                        metformin 1000 MG tablet   Commonly known as:  GLUCOPHAGE        Take 1 Tab by mouth 2 times a day.   Dose:  1000 mg                        nitroglycerin 0.4 MG Subl   Commonly known as:  NITROSTAT        Place 1 Tab under tongue as needed for Chest Pain. Max dose of 3 in 24 hrs until need for ED.   Dose:  0.4 mg                        OS-ADAIR CALCIUM + D3 500-200 MG-UNIT Tabs   Generic drug:  Calcium Carb-Cholecalciferol        Take 1 Tab by mouth 2 times a day, with meals.   Dose:  1 Tab                        pantoprazole 40 MG Tbec   Commonly known as:  PROTONIX        Take 1 Tab by mouth every day. TAKE 1 TAB BY MOUTH EVERY DAY.   Dose:  40 mg                        ropinirole 1 MG Tabs   Commonly known as:  REQUIP        Take 1 Tab by mouth every bedtime.   Dose:  1 mg                        rosuvastatin 40 MG tablet   Commonly known as:  CRESTOR        Take 1 Tab by mouth every day.   Dose:  40 mg                        topiramate 100 MG Tabs   Commonly known as:  TOPAMAX        Take 1 Tab by mouth every 12 hours.   Dose:  100 mg                          * Notice:  This list has 4 medication(s) that are the same as other medications prescribed for you. Read the directions carefully, and ask your doctor or other care provider to review them with you.         Where to Get Your Medications      You can get these medications from any pharmacy     Bring a paper prescription for each of these medications    - amoxicillin-clavulanate 875-125 MG Tabs  - ondansetron 4 MG Tbdp            Procedures and tests performed during your visit     CARDIAC MONITORING    CBC WITH DIFFERENTIAL    COMP METABOLIC PANEL    EKG (ER)    ESTIMATED GFR    PULSE OX    SALINE LOCK    TROPONIN    URINALYSIS,CULTURE IF INDICATED    URINE MICROSCOPIC (W/UA)        Discharge Instructions       Urinary Tract Infection  Urinary tract infections (UTIs) can develop anywhere along your urinary tract. Your urinary tract is your body's drainage system for removing wastes and extra water. Your urinary tract includes two kidneys, two ureters, a bladder, and a urethra. Your kidneys are a pair of bean-shaped organs. Each kidney is about the size of your fist. They are located below your ribs, one on each side of your spine.  CAUSES  Infections are caused by microbes, which are microscopic organisms, including fungi, viruses, and bacteria. These organisms are so small that they can only be seen through a microscope. Bacteria are the microbes that most commonly cause UTIs.  SYMPTOMS   Symptoms of UTIs may vary by age and gender of the patient and by the location of the infection. Symptoms in young women typically include a frequent and intense urge to urinate and a painful, burning feeling in the bladder or urethra during urination. Older women and men are more likely to be tired, shaky, and weak and have muscle aches and abdominal pain. A fever may mean the infection is in your kidneys. Other symptoms of a kidney infection include pain in your back or sides below the ribs, nausea, and  vomiting.  DIAGNOSIS  To diagnose a UTI, your caregiver will ask you about your symptoms. Your caregiver also will ask to provide a urine sample. The urine sample will be tested for bacteria and white blood cells. White blood cells are made by your body to help fight infection.  TREATMENT   Typically, UTIs can be treated with medication. Because most UTIs are caused by a bacterial infection, they usually can be treated with the use of antibiotics. The choice of antibiotic and length of treatment depend on your symptoms and the type of bacteria causing your infection.  HOME CARE INSTRUCTIONS  · If you were prescribed antibiotics, take them exactly as your caregiver instructs you. Finish the medication even if you feel better after you have only taken some of the medication.  · Drink enough water and fluids to keep your urine clear or pale yellow.  · Avoid caffeine, tea, and carbonated beverages. They tend to irritate your bladder.  · Empty your bladder often. Avoid holding urine for long periods of time.  · Empty your bladder before and after sexual intercourse.  · After a bowel movement, women should cleanse from front to back. Use each tissue only once.  SEEK MEDICAL CARE IF:   · You have back pain.  · You develop a fever.  · Your symptoms do not begin to resolve within 3 days.  SEEK IMMEDIATE MEDICAL CARE IF:   · You have severe back pain or lower abdominal pain.  · You develop chills.  · You have nausea or vomiting.  · You have continued burning or discomfort with urination.  MAKE SURE YOU:   · Understand these instructions.  · Will watch your condition.  · Will get help right away if you are not doing well or get worse.     This information is not intended to replace advice given to you by your health care provider. Make sure you discuss any questions you have with your health care provider.     Document Released: 09/27/2006 Document Revised: 01/08/2016 Document Reviewed: 01/25/2013  [employer] Patient  Education ©2016 Elsevier Inc.            Patient Information     Patient Information    Following emergency treatment: all patient requiring follow-up care must return either to a private physician or a clinic if your condition worsens before you are able to obtain further medical attention, please return to the emergency room.     Billing Information    At [facility], we work to make the billing process streamlined for our patients.  Our Representatives are here to answer any questions you may have regarding your hospital bill.  If you have insurance coverage and have supplied your insurance information to us, we will submit a claim to your insurer on your behalf.  Should you have any questions regarding your bill, we can be reached online or by phone as follows:  Online: You are able pay your bills online or live chat with our representatives about any billing questions you may have. We are here to help Monday - Friday from 8:00am to 7:30pm and 9:00am - 12:00pm on Saturdays.  Please visit [email or web address]  for more information.   Phone:  [phone] or [phone]    Please note that your emergency physician, surgeon, pathologist, radiologist, anesthesiologist, and other specialists are not employed by [facility] and will therefore bill separately for their services.  Please contact them directly for any questions concerning their bills at the numbers below:     Emergency Physician Services:  [phone]  [facility]:  [phone]  [facility]:  [phone]  [facility]:  [phone]    1. Your final bill may vary from the amount quoted upon discharge if all procedures are not complete at that time, or if your doctor has additional procedures of which we are not aware. You will receive an additional bill if you return to the Emergency Department at [facility] for suture removal regardless of the facility of which the  sutures were placed.     2. Please arrange for settlement of this account at the emergency registration.    3. All self-pay accounts are due in full at the time of treatment.  If you are unable to meet this obligation then payment is expected within 4-5 days.     4. If you have had radiology studies (CT, X-ray, Ultrasound, MRI), you have received a preliminary result during your emergency department visit. Please contact the radiology department [phone] to receive a copy of your final result. Please discuss the Final result with your primary physician or with the follow up physician provided.     Crisis Hotline:  [place] Crisis Hotline:  7-133-RZZSWVO or 1-900.330.3361  [place] Crisis Hotline:    1-137.381.2993 or 713-220-9144         ED Discharge Follow Up Questions    1. In order to provide you with very good care, we would like to follow up with a phone call in the next few days.  May we have your permission to contact you?     YES /  NO    2. What is the best phone number to call you? (       )_____-__________    3. What is the best time to call you?      Morning  /  Afternoon  /  Evening                   Patient Signature:  ____________________________________________________________    Date:  ____________________________________________________________      Your appointments     May 09, 2017  9:00 AM   Pulmonary Function Test with PULMONARY FUNCTION LAB   PULMONARY LAB OP [facility] (--)    [address] [facility] [id number] [phone]            Jun 01, 2017  2:45 PM   Established Patient with Adriano Bahena M.D.   [facility] - Internal Medicine (--)    [address]  [address]  [facility] [id number] [phone]           You will be receiving a confirmation call a few days before your appointment from our automated call confirmation system.            Aug 14, 2017  2:40 PM   Follow Up Visit with Melissa P Bloch, M.D.   [facility] Neu[facility] (--)    [address]  [place] [address]   [phone]           You will be receiving a confirmation call a few days before your appointment from our automated call confirmation system.

## 2017-04-30 NOTE — ED AVS SNAPSHOT
4/30/2017    Leena Bella  [address] [place] [address]    Dear Leena:    formerly [facility] wants to ensure your discharge home is safe and you or your loved ones have had all of your questions answered regarding your care after you leave the hospital.    Below is a list of resources and contact information should you have any questions regarding your hospital stay, follow-up instructions, or active medical symptoms.    Questions or Concerns Regarding… Contact   Medical Questions Related to Your Discharge  (7 days a week, 8am-5pm) Contact a Nurse Care Coordinator   [phone]   Medical Questions Not Related to Your Discharge  (24 hours a day / 7 days a week)  Contact the Nurse Health Line   [phone]    Medications or Discharge Instructions Refer to your discharge packet   or contact your [facility] Primary Care Provider   [phone]   Follow-up Appointment(s) Schedule your appointment via Worldscape   or contact Scheduling [phone]   Billing Review your statement via Worldscape  or contact Billing [phone]   Medical Records Review your records via Worldscape   or contact Medical Records [phone]     You may receive a telephone call within two days of discharge. This call is to make certain you understand your discharge instructions and have the opportunity to have any questions answered. You can also easily access your medical information, test results and upcoming appointments via the Worldscape free online health management tool. You can learn more and sign up at [employer]/Worldscape. For assistance setting up your Worldscape account, please call [phone].    Once again, we want to ensure your discharge home is safe and that you have a clear understanding of any next steps in your care. If you have any questions or concerns, please do not hesitate to contact us, we are here for you. Thank you for choosing [facility] for your healthcare needs.    Sincerely,    Your [facility] Healthcare Team

## 2017-04-30 NOTE — ED AVS SNAPSHOT
[employer] Access Code: Activation code not generated  Current [employer] Status: Patient Declined    Your email address is not on file at [facility].  Email Addresses are required for you to sign up for [employer], please contact [phone] to verify your personal information and to provide your email address prior to attempting to register for [employer].    Leena Bella  [address] [place] [address]    [employer]  A secure, online tool to manage your health information     [facility]’s [employer]® is a secure, online tool that connects you to your personalized health information from the privacy of your home -- day or night - making it very easy for you to manage your healthcare. Once the activation process is completed, you can even access your medical information using the [employer] jaki, which is available for free in the Apple Jaki store or Google Play store.     To learn more about [employer], visit [email or web address]    There are two levels of access available (as shown below):   My Chart Features  [facility] Primary Care Doctor [facility]  Specialists [facility]  Urgent  Care Non-[facility] Primary Care Doctor   Email your healthcare team securely and privately 24/7 X X X    Manage appointments: schedule your next appointment; view details of past/upcoming appointments X      Request prescription refills. X      View recent personal medical records, including lab and immunizations X X X X   View health record, including health history, allergies, medications X X X X   Read reports about your outpatient visits, procedures, consult and ER notes X X X X   See your discharge summary, which is a recap of your hospital and/or ER visit that includes your diagnosis, lab results, and care plan X X  X     How to register for [employer]:  Once your e-mail address has been verified, follow the following steps to sign up for [employer].     1. Go to  [email or web address]  2. Click on the Sign Up Now box, which takes you to the New  Member Sign Up page. You will need to provide the following information:  a. Enter your Activehours Access Code exactly as it appears at the top of this page. (You will not need to use this code after you’ve completed the sign-up process. If you do not sign up before the expiration date, you must request a new code.)   b. Enter your date of birth.   c. Enter your home email address.   d. Click Submit, and follow the next screen’s instructions.  3. Create a WorldRemitt ID. This will be your Activehours login ID and cannot be changed, so think of one that is secure and easy to remember.  4. Create a Activehours password. You can change your password at any time.  5. Enter your Password Reset Question and Answer. This can be used at a later time if you forget your password.   6. Enter your e-mail address. This allows you to receive e-mail notifications when new information is available in Activehours.  7. Click Sign Up. You can now view your health information.    For assistance activating your Activehours account, call [phone]

## 2017-05-01 LAB — EKG IMPRESSION: NORMAL

## 2017-05-01 NOTE — ED NOTES
Verbalizes understanding of POC. EKG completed at bedside. Ambulates to restroom with one person assist, clean catch urine sample obtained and sent to lab.

## 2017-05-01 NOTE — DISCHARGE INSTRUCTIONS
Urinary Tract Infection  Urinary tract infections (UTIs) can develop anywhere along your urinary tract. Your urinary tract is your body's drainage system for removing wastes and extra water. Your urinary tract includes two kidneys, two ureters, a bladder, and a urethra. Your kidneys are a pair of bean-shaped organs. Each kidney is about the size of your fist. They are located below your ribs, one on each side of your spine.  CAUSES  Infections are caused by microbes, which are microscopic organisms, including fungi, viruses, and bacteria. These organisms are so small that they can only be seen through a microscope. Bacteria are the microbes that most commonly cause UTIs.  SYMPTOMS   Symptoms of UTIs may vary by age and gender of the patient and by the location of the infection. Symptoms in young women typically include a frequent and intense urge to urinate and a painful, burning feeling in the bladder or urethra during urination. Older women and men are more likely to be tired, shaky, and weak and have muscle aches and abdominal pain. A fever may mean the infection is in your kidneys. Other symptoms of a kidney infection include pain in your back or sides below the ribs, nausea, and vomiting.  DIAGNOSIS  To diagnose a UTI, your caregiver will ask you about your symptoms. Your caregiver also will ask to provide a urine sample. The urine sample will be tested for bacteria and white blood cells. White blood cells are made by your body to help fight infection.  TREATMENT   Typically, UTIs can be treated with medication. Because most UTIs are caused by a bacterial infection, they usually can be treated with the use of antibiotics. The choice of antibiotic and length of treatment depend on your symptoms and the type of bacteria causing your infection.  HOME CARE INSTRUCTIONS  · If you were prescribed antibiotics, take them exactly as your caregiver instructs you. Finish the medication even if you feel better after you  have only taken some of the medication.  · Drink enough water and fluids to keep your urine clear or pale yellow.  · Avoid caffeine, tea, and carbonated beverages. They tend to irritate your bladder.  · Empty your bladder often. Avoid holding urine for long periods of time.  · Empty your bladder before and after sexual intercourse.  · After a bowel movement, women should cleanse from front to back. Use each tissue only once.  SEEK MEDICAL CARE IF:   · You have back pain.  · You develop a fever.  · Your symptoms do not begin to resolve within 3 days.  SEEK IMMEDIATE MEDICAL CARE IF:   · You have severe back pain or lower abdominal pain.  · You develop chills.  · You have nausea or vomiting.  · You have continued burning or discomfort with urination.  MAKE SURE YOU:   · Understand these instructions.  · Will watch your condition.  · Will get help right away if you are not doing well or get worse.     This information is not intended to replace advice given to you by your health care provider. Make sure you discuss any questions you have with your health care provider.     Document Released: 09/27/2006 Document Revised: 01/08/2016 Document Reviewed: 01/25/2013  [employer] Interactive Patient Education ©2016 [employer].

## 2017-05-01 NOTE — ED NOTES
Verbalizes understanding of discharge and followup instructions. PIV removed. VSS. Given Rx's x2. Ambulates with steady gait to discharge.

## 2017-05-02 LAB
BACTERIA UR CULT: ABNORMAL
BACTERIA UR CULT: ABNORMAL
SIGNIFICANT IND 70042: ABNORMAL
SITE SITE: ABNORMAL
SOURCE SOURCE: ABNORMAL

## 2017-05-04 ENCOUNTER — TELEPHONE (OUTPATIENT)
Dept: PHARMACY | Facility: MEDICAL CENTER | Age: 67
End: 2017-05-04

## 2017-05-04 RX ORDER — CIPROFLOXACIN 250 MG/1
250 TABLET, FILM COATED ORAL 2 TIMES DAILY
Qty: 14 TAB | Refills: 0 | Status: SHIPPED | OUTPATIENT
Start: 2017-05-04 | End: 2017-05-11

## 2017-05-04 NOTE — TELEPHONE ENCOUNTER
Was the patient seen in the last year in this department? Yes     Does patient have an active prescription for medications requested? No     Received Request Via: Patient's son

## 2017-05-04 NOTE — ED NOTES
ED Positive Culture Follow-up/Notification Note:    Date: 5/4/17     Patient seen in the ED on 4/30/2017 for generalized.   1. Generalized weakness    2. Urinary tract infection, site unspecified    3. Nausea       Discharge Medication List as of 4/30/2017  7:02 PM      START taking these medications    Details   amoxicillin-clavulanate (AUGMENTIN) 875-125 MG Tab Take 1 Tab by mouth 2 times a day for 13 days., Disp-13 Tab, R-0, Print Rx Paper      ondansetron (ZOFRAN ODT) 4 MG TABLET DISPERSIBLE Take 1 Tab by mouth every 8 hours as needed for Nausea/Vomiting., Disp-10 Tab, R-1, Print Rx Paper             Allergies: Cozaar; Darvocet; Lexapro; Lisinopril; Cephalexin; Morphine; Other environmental; Penicillin g potassium; Potassium; and Sulfa drugs     Final cultures:   Results     Procedure Component Value Units Date/Time    URINE CULTURE(NEW) [[id number]]  (Abnormal)  (Susceptibility) Collected:  04/30/17 1658    Order Status:  Completed Specimen Information:  Urine Updated:  05/02/17 0864     Significant Indicator POS (POS)      Source UR      Site --      Urine Culture -- (A)      Urine Culture -- (A)      Result:        Escherichia coli  >100,000 cfu/mL      Culture & Susceptibility     ESCHERICHIA COLI     Antibiotic Sensitivity Microscan Unit Status    Ampicillin Resistant >16 mcg/mL Final    Cefepime Sensitive <=8 mcg/mL Final    Cefotaxime Sensitive <=2 mcg/mL Final    Cefotetan Sensitive <=16 mcg/mL Final    Ceftazidime Sensitive <=1 mcg/mL Final    Ceftriaxone Sensitive <=8 mcg/mL Final    Cefuroxime Sensitive <=4 mcg/mL Final    Cephalothin Intermediate 16 mcg/mL Final    Ciprofloxacin Sensitive <=1 mcg/mL Final    Gentamicin Sensitive <=4 mcg/mL Final    Levofloxacin Sensitive <=2 mcg/mL Final    Nitrofurantoin Sensitive <=32 mcg/mL Final    Pip/Tazobactam Sensitive <=16 mcg/mL Final    Piperacillin Resistant >64 mcg/mL Final    Tigecycline Sensitive <=2 mcg/mL Final    Tobramycin Sensitive <=4 mcg/mL Final     Trimeth/Sulfa Sensitive <=2/38 mcg/mL Final                       URINALYSIS,CULTURE IF INDICATED [[id number]]  (Abnormal) Collected:  04/30/17 1658    Order Status:  Completed Specimen Information:  Other Updated:  04/30/17 1724     Micro Urine Req Microscopic      Color Colorless      Character Sl Cloudy (A)      Specific Gravity 1.005      Ph 5.5      Glucose Trace (A) mg/dL      Ketones Negative mg/dL      Protein Negative mg/dL      Bilirubin Negative      Nitrite Negative      Leukocyte Esterase Large (A)      Occult Blood Negative      Culture Indicated Yes UA Culture           Plan:   Discussed result with the patient who states that she is still having symptoms despite taking currently prescribed antibiotic, Augmentin.    She describes a burning and hurting sensation.   Will change antibiotic to ciprofloxacin and she has been instructed to stop the Augmentin.     New Rx to be sent to [facility] on [address] :   Cipro 250 mg po BID x 7 days, #14, no refills - MD Santana per protocol    Mariam Aguero

## 2017-05-05 RX ORDER — PANTOPRAZOLE SODIUM 40 MG/1
40 TABLET, DELAYED RELEASE ORAL
Qty: 30 TAB | Refills: 3 | Status: SHIPPED | OUTPATIENT
Start: 2017-05-05 | End: 2017-05-26 | Stop reason: SDUPTHER

## 2017-05-11 ENCOUNTER — TELEPHONE (OUTPATIENT)
Dept: INTERNAL MEDICINE | Facility: MEDICAL CENTER | Age: 67
End: 2017-05-11

## 2017-05-11 NOTE — TELEPHONE ENCOUNTER
1. Caller Name: CarlosSon                      Call Back Number: [phone]    2. Message: Mom need refill of Fioricet.  Per son, it was not discussed at last visit that the medication was to be discontinued. Patient need for her migraines.       3. Patient approves office to leave a detailed voicemail/MyChart message: N\A

## 2017-05-12 NOTE — TELEPHONE ENCOUNTER
Called pt asked to get son on the phone was told he could not take the call due to pt's son being on another call spoke to Leena explained the medication was not going to get refilled at this time due to pt's son concerns that pt needed to get re-evaluated for headaches transferred pt to scheduling.

## 2017-05-26 DIAGNOSIS — G89.29 CHRONIC MIDLINE LOW BACK PAIN WITH RIGHT-SIDED SCIATICA: ICD-10-CM

## 2017-05-26 DIAGNOSIS — F39 MOOD DISORDER (HCC): ICD-10-CM

## 2017-05-26 DIAGNOSIS — Z79.4 TYPE 2 DIABETES MELLITUS WITH DIABETIC NEUROPATHY, WITH LONG-TERM CURRENT USE OF INSULIN (HCC): ICD-10-CM

## 2017-05-26 DIAGNOSIS — I25.5 CARDIOMYOPATHY, ISCHEMIC: ICD-10-CM

## 2017-05-26 DIAGNOSIS — M54.41 CHRONIC MIDLINE LOW BACK PAIN WITH RIGHT-SIDED SCIATICA: ICD-10-CM

## 2017-05-26 DIAGNOSIS — E11.40 TYPE 2 DIABETES MELLITUS WITH DIABETIC NEUROPATHY, WITH LONG-TERM CURRENT USE OF INSULIN (HCC): ICD-10-CM

## 2017-05-26 RX ORDER — CETIRIZINE HYDROCHLORIDE 10 MG/1
TABLET ORAL
Qty: 90 TAB | Refills: 0 | Status: SHIPPED | OUTPATIENT
Start: 2017-05-26 | End: 2017-05-30 | Stop reason: SDUPTHER

## 2017-05-26 RX ORDER — HYDROCODONE BITARTRATE AND ACETAMINOPHEN 7.5; 325 MG/1; MG/1
1 TABLET ORAL EVERY 8 HOURS PRN
Qty: 90 TAB | Refills: 0 | Status: SHIPPED | OUTPATIENT
Start: 2017-05-26 | End: 2017-06-26 | Stop reason: SDUPTHER

## 2017-05-26 RX ORDER — PANTOPRAZOLE SODIUM 40 MG/1
40 TABLET, DELAYED RELEASE ORAL
Qty: 30 TAB | Refills: 3 | Status: SHIPPED | OUTPATIENT
Start: 2017-05-26 | End: 2017-05-30 | Stop reason: SDUPTHER

## 2017-05-26 RX ORDER — BUPROPION HYDROCHLORIDE 150 MG/1
150 TABLET, EXTENDED RELEASE ORAL 2 TIMES DAILY
Qty: 60 TAB | Refills: 5 | Status: SHIPPED | OUTPATIENT
Start: 2017-05-26 | End: 2017-05-30 | Stop reason: SDUPTHER

## 2017-05-26 RX ORDER — GABAPENTIN 600 MG/1
600 TABLET ORAL 2 TIMES DAILY
Qty: 60 TAB | Refills: 3 | Status: SHIPPED | OUTPATIENT
Start: 2017-05-26 | End: 2017-05-30 | Stop reason: SDUPTHER

## 2017-05-26 RX ORDER — AMIODARONE HYDROCHLORIDE 200 MG/1
200 TABLET ORAL DAILY
Qty: 30 TAB | Refills: 3 | Status: SHIPPED | OUTPATIENT
Start: 2017-05-26 | End: 2017-05-30 | Stop reason: SDUPTHER

## 2017-05-26 RX ORDER — FUROSEMIDE 20 MG/1
20 TABLET ORAL
Qty: 30 TAB | Refills: 3 | Status: SHIPPED | OUTPATIENT
Start: 2017-05-26 | End: 2017-05-30 | Stop reason: SDUPTHER

## 2017-05-26 RX ORDER — TOPIRAMATE 100 MG/1
100 TABLET, FILM COATED ORAL EVERY 12 HOURS
Qty: 60 TAB | Refills: 3 | Status: SHIPPED | OUTPATIENT
Start: 2017-05-26 | End: 2017-05-30 | Stop reason: SDUPTHER

## 2017-05-26 RX ORDER — ROSUVASTATIN CALCIUM 40 MG/1
40 TABLET, COATED ORAL
Qty: 30 TAB | Refills: 3 | Status: SHIPPED | OUTPATIENT
Start: 2017-05-26 | End: 2017-05-30 | Stop reason: SDUPTHER

## 2017-05-26 RX ORDER — CARVEDILOL 12.5 MG/1
12.5 TABLET ORAL 2 TIMES DAILY
Qty: 60 TAB | Refills: 5 | Status: SHIPPED | OUTPATIENT
Start: 2017-05-26 | End: 2017-05-30 | Stop reason: SDUPTHER

## 2017-05-26 RX ORDER — CLOPIDOGREL BISULFATE 75 MG/1
75 TABLET ORAL
Qty: 30 TAB | Refills: 5 | Status: SHIPPED | OUTPATIENT
Start: 2017-05-26 | End: 2017-05-30 | Stop reason: SDUPTHER

## 2017-05-26 RX ORDER — ROPINIROLE 1 MG/1
1 TABLET, FILM COATED ORAL
Qty: 30 TAB | Refills: 3 | Status: SHIPPED | OUTPATIENT
Start: 2017-05-26 | End: 2017-05-30 | Stop reason: SDUPTHER

## 2017-05-26 NOTE — TELEPHONE ENCOUNTER
Patient's son notified prescription ready for Mom to .   Inform him that they need to give us more time for refills in the future in case her doctor is not available to refill her medication.

## 2017-05-30 DIAGNOSIS — E13.8 OTHER SPECIFIED DIABETES MELLITUS WITH UNSPECIFIED COMPLICATIONS (HCC): ICD-10-CM

## 2017-05-30 DIAGNOSIS — F39 MOOD DISORDER (HCC): ICD-10-CM

## 2017-05-30 DIAGNOSIS — I25.5 CARDIOMYOPATHY, ISCHEMIC: ICD-10-CM

## 2017-05-30 DIAGNOSIS — Z79.4 TYPE 2 DIABETES MELLITUS WITH DIABETIC NEUROPATHY, WITH LONG-TERM CURRENT USE OF INSULIN (HCC): ICD-10-CM

## 2017-05-30 DIAGNOSIS — E11.40 TYPE 2 DIABETES MELLITUS WITH DIABETIC NEUROPATHY, WITH LONG-TERM CURRENT USE OF INSULIN (HCC): ICD-10-CM

## 2017-05-30 RX ORDER — DOCUSATE SODIUM 100 MG/1
100 CAPSULE, LIQUID FILLED ORAL 2 TIMES DAILY
Qty: 180 CAP | Refills: 0 | Status: SHIPPED | OUTPATIENT
Start: 2017-05-30 | End: 2017-08-17

## 2017-05-30 RX ORDER — CETIRIZINE HYDROCHLORIDE 10 MG/1
TABLET ORAL
Qty: 90 TAB | Refills: 0 | Status: SHIPPED | OUTPATIENT
Start: 2017-05-30 | End: 2017-09-12 | Stop reason: SDUPTHER

## 2017-05-30 RX ORDER — PANTOPRAZOLE SODIUM 40 MG/1
40 TABLET, DELAYED RELEASE ORAL
Qty: 30 TAB | Refills: 3 | Status: SHIPPED | OUTPATIENT
Start: 2017-05-30 | End: 2017-09-12 | Stop reason: SDUPTHER

## 2017-05-30 RX ORDER — ROSUVASTATIN CALCIUM 40 MG/1
40 TABLET, COATED ORAL
Qty: 30 TAB | Refills: 3 | Status: SHIPPED | OUTPATIENT
Start: 2017-05-30 | End: 2017-09-12 | Stop reason: SDUPTHER

## 2017-05-30 RX ORDER — CARVEDILOL 12.5 MG/1
12.5 TABLET ORAL 2 TIMES DAILY
Qty: 60 TAB | Refills: 5 | Status: ON HOLD | OUTPATIENT
Start: 2017-05-30 | End: 2017-08-19

## 2017-05-30 RX ORDER — AMIODARONE HYDROCHLORIDE 200 MG/1
200 TABLET ORAL DAILY
Qty: 30 TAB | Refills: 3 | Status: SHIPPED | OUTPATIENT
Start: 2017-05-30 | End: 2017-09-12 | Stop reason: SDUPTHER

## 2017-05-30 RX ORDER — FUROSEMIDE 20 MG/1
20 TABLET ORAL
Qty: 30 TAB | Refills: 3 | Status: SHIPPED | OUTPATIENT
Start: 2017-05-30 | End: 2017-09-12 | Stop reason: SDUPTHER

## 2017-05-30 RX ORDER — GABAPENTIN 600 MG/1
600 TABLET ORAL 2 TIMES DAILY
Qty: 60 TAB | Refills: 3 | Status: SHIPPED | OUTPATIENT
Start: 2017-05-30 | End: 2017-07-24 | Stop reason: SDUPTHER

## 2017-05-30 RX ORDER — TOPIRAMATE 100 MG/1
100 TABLET, FILM COATED ORAL EVERY 12 HOURS
Qty: 60 TAB | Refills: 3 | Status: SHIPPED | OUTPATIENT
Start: 2017-05-30 | End: 2017-09-12 | Stop reason: SDUPTHER

## 2017-05-30 RX ORDER — ASPIRIN 325 MG
325 TABLET ORAL DAILY
Qty: 100 TAB | Refills: 3 | Status: SHIPPED | OUTPATIENT
Start: 2017-05-30 | End: 2017-09-12

## 2017-05-30 RX ORDER — ONDANSETRON 4 MG/1
4 TABLET, ORALLY DISINTEGRATING ORAL EVERY 8 HOURS PRN
Qty: 10 TAB | Refills: 1 | Status: SHIPPED | OUTPATIENT
Start: 2017-05-30 | End: 2018-01-30 | Stop reason: SDUPTHER

## 2017-05-30 RX ORDER — ROPINIROLE 1 MG/1
1 TABLET, FILM COATED ORAL
Qty: 30 TAB | Refills: 3 | Status: SHIPPED | OUTPATIENT
Start: 2017-05-30 | End: 2017-06-12 | Stop reason: SDUPTHER

## 2017-05-30 RX ORDER — CLOPIDOGREL BISULFATE 75 MG/1
75 TABLET ORAL
Qty: 30 TAB | Refills: 5 | Status: SHIPPED | OUTPATIENT
Start: 2017-05-30 | End: 2017-09-12 | Stop reason: SDUPTHER

## 2017-05-30 RX ORDER — BUPROPION HYDROCHLORIDE 150 MG/1
150 TABLET, EXTENDED RELEASE ORAL 2 TIMES DAILY
Qty: 60 TAB | Refills: 5 | Status: SHIPPED | OUTPATIENT
Start: 2017-05-30 | End: 2017-09-12 | Stop reason: SDUPTHER

## 2017-05-30 RX ORDER — FLUTICASONE PROPIONATE 50 MCG
1 SPRAY, SUSPENSION (ML) NASAL DAILY
Qty: 16 G | Refills: 3 | Status: SHIPPED | OUTPATIENT
Start: 2017-05-30 | End: 2018-01-30

## 2017-06-01 ENCOUNTER — OFFICE VISIT (OUTPATIENT)
Dept: INTERNAL MEDICINE | Facility: MEDICAL CENTER | Age: 67
End: 2017-06-01
Payer: MEDICARE

## 2017-06-01 VITALS
TEMPERATURE: 98.7 F | WEIGHT: 121.2 LBS | HEIGHT: 60 IN | DIASTOLIC BLOOD PRESSURE: 68 MMHG | RESPIRATION RATE: 17 BRPM | BODY MASS INDEX: 23.8 KG/M2 | SYSTOLIC BLOOD PRESSURE: 128 MMHG | OXYGEN SATURATION: 93 % | HEART RATE: 80 BPM

## 2017-06-01 DIAGNOSIS — R29.898 LEFT ARM WEAKNESS: ICD-10-CM

## 2017-06-01 DIAGNOSIS — Z79.4 TYPE 2 DIABETES MELLITUS WITHOUT COMPLICATION, WITH LONG-TERM CURRENT USE OF INSULIN (HCC): ICD-10-CM

## 2017-06-01 DIAGNOSIS — R41.3 MEMORY LOSS: ICD-10-CM

## 2017-06-01 DIAGNOSIS — I10 ESSENTIAL HYPERTENSION: ICD-10-CM

## 2017-06-01 DIAGNOSIS — Z79.891 OPIATE ANALGESIC USE AGREEMENT EXISTS: ICD-10-CM

## 2017-06-01 DIAGNOSIS — E11.9 TYPE 2 DIABETES MELLITUS WITHOUT COMPLICATION, WITH LONG-TERM CURRENT USE OF INSULIN (HCC): ICD-10-CM

## 2017-06-01 DIAGNOSIS — I25.5 ISCHEMIC CARDIOMYOPATHY: ICD-10-CM

## 2017-06-01 PROCEDURE — 99213 OFFICE O/P EST LOW 20 MIN: CPT | Mod: GE | Performed by: INTERNAL MEDICINE

## 2017-06-01 RX ORDER — NITROFURANTOIN 25; 75 MG/1; MG/1
100 CAPSULE ORAL 2 TIMES DAILY
Qty: 10 CAP | Refills: 0 | Status: SHIPPED | OUTPATIENT
Start: 2017-06-01 | End: 2017-08-17

## 2017-06-01 RX ORDER — DULOXETIN HYDROCHLORIDE 20 MG/1
20 CAPSULE, DELAYED RELEASE ORAL DAILY
Qty: 30 CAP | Refills: 3 | Status: SHIPPED | OUTPATIENT
Start: 2017-06-01 | End: 2017-09-12 | Stop reason: SDUPTHER

## 2017-06-01 ASSESSMENT — PAIN SCALES - GENERAL: PAINLEVEL: 9=SEVERE PAIN

## 2017-06-01 NOTE — PROGRESS NOTES
Established Patient    Leena presents today with the following:    CC: Memory loss, left arm weakness, low mood    HPI:     Patient complains of irritability, low mood, over eating, and stress. Denies any insomnia, SI, or HI. Her PHQ 9 score is 9 today. She has extensive social support and goes to [employer] regularly. She also complains of severe peripheral neuropathic pain. Patient also complains of occasional mild memory loss.     She follows up with cardiology and is currently on rate control amiodarone and dual antiplatelet therapy instead of anticoagulation for her atrial fibrillation. Her chadsvasc score is 4. When asked about anticoagulation, the patient states that she was previously on Coumadin, however was taken off due to noncompliance and a cumbersome routine. Her EGFR is 49. I discussed the risks and benefits of anticoagulation including the risks of bleeding and the benefits of stroke prevention in the future. Continue current therapy. The patient is scheduled to meet with her cardiology appointment and will bring up the topic of anticoagulation. She has an upcoming appointment soon    Patient has been complaining of left arm weakness after her last ED visits. Stroke workup has been negative. Strength 4/5 present. Patient takes Crestor as well. Denies any other site weakness. She does report occasional generalized body aches. Denies any dark urine or dizziness. She ambulates with a cane. Denies any falls          Patient Active Problem List    Diagnosis Date Noted   • Foot pain, left 04/28/2017   • Syncope, near 04/12/2017   • MARGARITA (acute kidney injury) (CMS-HCC) 04/12/2017   • Acute exacerbation of congestive heart failure (CMS-HCC) 04/12/2017   • Current smoker 04/12/2017   • Family history of stress 04/07/2017   • Chronic back pain 03/03/2017   • Opiate analgesic use agreement exists 01/09/2017   • CVA (cerebral vascular accident) (CMS-HCC) 06/04/2016   • Allergic rhinitis 06/04/2016   • Automatic  implantable cardioverter-defibrillator in situ 06/04/2016   • Osteoarthritis 06/04/2016   • Labial abscess 06/04/2016   • Nicotine dependence 06/04/2016   • Diabetic neuropathy (CMS-HCC) 06/04/2016   • GERD (gastroesophageal reflux disease) 06/04/2016   • RLS (restless legs syndrome) 06/04/2016   • PEYTON (obstructive sleep apnea) 06/04/2016   • COPD (chronic obstructive pulmonary disease) (CMS-HCC) 06/04/2016   • Anxiety disorder 06/04/2016   • Dyslipidemia 06/04/2016   • Abscess 06/04/2016   • Hx of coronary artery bypass graft 06/04/2016   • HTN (hypertension) 06/04/2016   • Vitamin D deficiency 11/11/2015   • Polyarthralgia 11/03/2015   • Chronic fatigue 11/03/2015   • Dyspnea 11/03/2015   • Skin rash 11/03/2015   • Osteoporosis 11/03/2015   • Migraines 12/23/2013   • CAD (coronary artery disease) 10/18/2011   • Ischemic cardiomyopathy 10/18/2011   • Type 2 diabetes mellitus (CMS-HCC) 10/18/2011   • CHF (congestive heart failure) (CMS-HCC) 10/18/2011       Current Outpatient Prescriptions   Medication Sig Dispense Refill   • nitrofurantoin monohydr macro (MACROBID) 100 MG Cap Take 1 Cap by mouth 2 times a day. 10 Cap 0   • duloxetine (CYMBALTA) 20 MG Cap DR Particles Take 1 Cap by mouth every day. 30 Cap 3   • albuterol-ipratropium (COMBIVENT)  MCG/ACT Aerosol Inhale 2 Puffs by mouth 4 times a day. 1 Inhaler 0   • amiodarone (CORDARONE) 200 MG Tab Take 1 Tab by mouth every day. 30 Tab 3   • aspirin (ASA) 325 MG Tab Take 1 Tab by mouth every day. 100 Tab 3   • buPROPion SR (WELLBUTRIN-SR) 150 MG TABLET SR 12 HR sustained-release tablet Take 1 Tab by mouth 2 times a day. 60 Tab 5   • calcium-vitamin D (OSCAL 500 +D) 500-200 MG-UNIT Tab Take 1 Tab by mouth 2 times a day, with meals. 100 Tab 3   • carvedilol (COREG) 12.5 MG Tab Take 1 Tab by mouth 2 times a day. 60 Tab 5   • cetirizine (ZYRTEC) 10 MG Tab TAKE 1 TAB BY MOUTH 1 TIME DAILY AS NEEDED FOR ALLERGIES. 90 Tab 0   • clopidogrel (PLAVIX) 75 MG Tab Take 1  Tab by mouth every day. 30 Tab 5   • docusate sodium (COLACE) 100 MG Cap Take 1 Cap by mouth 2 times a day. 180 Cap 0   • fluticasone (FLONASE) 50 MCG/ACT nasal spray Spray 1 Spray in nose every day. 16 g 3   • furosemide (LASIX) 20 MG Tab Take 1 Tab by mouth every day. 30 Tab 3   • gabapentin (NEURONTIN) 600 MG tablet Take 1 Tab by mouth 2 times a day. 60 Tab 3   • metformin (GLUCOPHAGE) 1000 MG tablet Take 1 Tab by mouth 2 times a day. 60 Tab 5   • ondansetron (ZOFRAN ODT) 4 MG TABLET DISPERSIBLE Take 1 Tab by mouth every 8 hours as needed for Nausea/Vomiting. 10 Tab 1   • OS-ADAIR CALCIUM + D3 500-200 MG-UNIT Tab Take 1 Tab by mouth 2 times a day, with meals. 60 Tab 3   • pantoprazole (PROTONIX) 40 MG Tablet Delayed Response Take 1 Tab by mouth every day. 30 Tab 3   • ropinirole (REQUIP) 1 MG Tab Take 1 Tab by mouth every bedtime. 30 Tab 3   • rosuvastatin (CRESTOR) 40 MG tablet Take 1 Tab by mouth every day. 30 Tab 3   • topiramate (TOPAMAX) 100 MG Tab Take 1 Tab by mouth every 12 hours. 60 Tab 3   • hydrocodone-acetaminophen (NORCO) 7.5-325 MG per tablet Take 1 Tab by mouth every 8 hours as needed for Severe Pain. 90 Tab 0   • insulin glargine (LANTUS SOLOSTAR) 100 UNIT/ML Solution Pen-injector injection INJECT 20 UNITS SUBCUTANEOUSLY DAILY AT BEDTIME AS DIRECTED 15 PEN 0   • nitroglycerin (NITROSTAT) 0.4 MG SL Tab Place 1 Tab under tongue as needed for Chest Pain. Max dose of 3 in 24 hrs until need for ED. 25 Tab 2   • Insulin Pen Needle 31G X 8 MM Misc Use to inject insulin daily. 100 Each 0   • IRON 325 (65 FE) MG PO TABS every day.      • ASPIRIN 81 MG PO TABS Take 325 mg by mouth every day.     • insulin glargine (LANTUS SOLOSTAR) 100 UNIT/ML Solution Pen-injector injection INJECT 20 UNITS SUBCUTANEOUSLY DAILY AT BEDTIME AS DIRECTED 15 PEN 0     No current facility-administered medications for this visit.       ROS: As per HPI. Additional pertinent symptoms as noted below.    CONSTITUTIONAL: Denies malaise      HEENT: Denies sore throat  CARDIOVASCULAR: Denies chest pain or palpitations    RESPIRATORY: No cough, shortness of breath  GASTROINTESTINAL: No nausea or vomiting    NEUROLOGIC: Mild right arm weakness.    PSYCHIATRIC: Denies mood disturbances or insomnia     HEMATOLOGICAL: No easy bruising or bleeding.     /68 mmHg  Pulse 80  Temp(Src) 37.1 °C (98.7 °F)  Resp 17  Ht 1.524 m (5')  Wt 54.976 kg (121 lb 3.2 oz)  BMI 23.67 kg/m2  SpO2 93%  LMP 06/15/1996  Breastfeeding? No    Physical Exam   GEN: Well developed, AO x 3, in no apparent distress    HEENT: Atraumatic, normocephalic, oral mucosa is moist  CVS: S1 S2 present, R/R/R, no M/R/G  RS: Air entry equal bilaterally. Coarse crackles noted throughout.    ABD: Soft, nontender, BS present in all quadrants  EXT: Mild right pedal edema noted  NEUR: Patient is able to move all of her extremities. CN 2-12 are grossly intact. Proximal right arm strength 4/5      Assessment and Plan    1. Type 2 diabetes mellitus without complication, with long-term current use of insulin (CMS-[facility])  -Patient has been noncompliant with her medication regimen including Lantus 20 units at night. She does report compliance with 2 g of metformin.  -Her significant increasing glycohemoglobin and her already impaired kidney function poses her at a high rate of complications.  -Patient was reeducated on diabetic complications and the importance of adherence to her strict regimen.  -I advised the patient to bring a log of her home glucose readings upon her next visit in 5 weeks.  -She continues to smoke  -Continue Crestor and dual antiplatelet therapy  - Added Cymbalta for peripheral neuropathy and concurrent depression symptoms.    2. Essential hypertension  3. Ischemic cardiomyopathy      Atrial fibrillation  - Follow up with cardiology   -Her chadsvasc score is 4 and the patient needs to be on chronic anticoagulation.  -I discussed with her the risks and benefits of blood  thinners and the patient stated that she would like to bring this up with her next cardiology appointment  -Continue rhythm control with amiodarone. Continue dual antiplatelet therapy for now.    - She has an upcoming appointment with cardiology and the patient is encouraged to request anticoagulation.      4. Left arm weakness  - This may be due to her statin use.   - Negative stroke workup in the past ED visits.  - Will do a CPK and follow up      5. Memory loss  - PHQ9 score of 9  - TSH normal  PLAN  - Patient requesting SSRI therapy.  - Cymbalta initiated for concurrent peripheral neuropathy.  - B12 and folate levels.    Followup: Return in about 5 weeks (around 7/6/2017).      Signed by: Adriano Bahena M.D.

## 2017-06-01 NOTE — MR AVS SNAPSHOT
"        Leena Villegas Shayne   2017 2:45 PM   Office Visit   MRN: [id number]    Department:  [facility] Med - Internal Med   Dept Phone:  [phone]    Description:  Female : 1950   Provider:  Adriano Bahena M.D.           Reason for Visit     Chronic Opiate Therapy migraine    Diabetes medications review    Depression           Allergies as of 2017     Allergen Noted Reactions    Cozaar [Losartan] 2016       Darvocet [Propoxyphene N-Apap] 2016       Lexapro 2016       Lisinopril 2016       Cephalexin 2014       Rash - \"looks like I have AIDS\"    Morphine 10/12/2008       Heart stopped?    Other Environmental 10/20/2011   Rash    tape    Penicillin G Potassium 10/12/2008   Rash    Has tolerated Unasyn in May 2014 and received amoxicillin on 12/29/15 without reaction    Potassium 2017       Headache     Sulfa Drugs 10/18/2011   Rash      You were diagnosed with     Type 2 diabetes mellitus without complication, with long-term current use of insulin (CMS-HCC)   [6387525]       Essential hypertension   [3274479]       Ischemic cardiomyopathy   [132733]       Opiate analgesic use agreement exists   [4207239]       Left arm weakness   [336931]         Vital Signs     Blood Pressure Pulse Temperature Respirations Height Weight    128/68 mmHg 80 37.1 °C (98.7 °F) 17 1.524 m (5') 54.976 kg (121 lb 3.2 oz)    Body Mass Index Oxygen Saturation Last Menstrual Period Breastfeeding? Smoking Status       23.67 kg/m2 93% 06/15/1996 No Current Every Day Smoker       Basic Information     Date Of Birth Sex Race Ethnicity Preferred Language    1950 Female White Non- English      Your appointments     2017  2:00 PM   Established Patient with Adriano Bahena M.D.   [facility] Med - Internal Medicine (--)    [address]  [address] [place] [id number] [phone]           You will be receiving a confirmation call a few days before your appointment from " our automated call confirmation system.            Aug 14, 2017  2:40 PM   Follow Up Visit with Melissa P Bloch, M.D.   [facility] Neurology (--)    [address], [address]  [facility] [address] [phone]           You will be receiving a confirmation call a few days before your appointment from our automated call confirmation system.              Problem List              ICD-10-CM Priority Class Noted - Resolved    CAD (coronary artery disease) I25.10   10/18/2011 - Present    Ischemic cardiomyopathy I25.5   10/18/2011 - Present    Type 2 diabetes mellitus (CMS-HCC) E11.9   10/18/2011 - Present    CHF (congestive heart failure) (CMS-HCC) I50.9   10/18/2011 - Present    Migraines G43.909   12/23/2013 - Present    Polyarthralgia M25.50   11/3/2015 - Present    Chronic fatigue R53.82   11/3/2015 - Present    Dyspnea R06.00   11/3/2015 - Present    Skin rash R21   11/3/2015 - Present    Osteoporosis M81.0   11/3/2015 - Present    Vitamin D deficiency E55.9   11/11/2015 - Present    CVA (cerebral vascular accident) (CMS-HCC) I63.9   6/4/2016 - Present    Allergic rhinitis J30.9   6/4/2016 - Present    Automatic implantable cardioverter-defibrillator in situ Z95.810   6/4/2016 - Present    Osteoarthritis M19.90   6/4/2016 - Present    Labial abscess N76.4   6/4/2016 - Present    Nicotine dependence F17.200   6/4/2016 - Present    Diabetic neuropathy (CMS-HCC) E11.40   6/4/2016 - Present    GERD (gastroesophageal reflux disease) K21.9   6/4/2016 - Present    RLS (restless legs syndrome) G25.81   6/4/2016 - Present    PEYTON (obstructive sleep apnea) G47.33   6/4/2016 - Present    COPD (chronic obstructive pulmonary disease) (CMS-HCC) J44.9   6/4/2016 - Present    Anxiety disorder F41.9   6/4/2016 - Present    Dyslipidemia E78.5   6/4/2016 - Present    Abscess L02.91   6/4/2016 - Present    Hx of coronary artery bypass graft Z95.1   6/4/2016 - Present    HTN (hypertension) I10   6/4/2016 - Present    Opiate  analgesic use agreement exists Z02.89   1/9/2017 - Present    Chronic back pain M54.9, G89.29   3/3/2017 - Present    Family history of stress Z81.8   4/7/2017 - Present    Syncope, near R55   4/12/2017 - Present    MARGARITA (acute kidney injury) (CMS-HCC) N17.9   4/12/2017 - Present    Acute exacerbation of congestive heart failure (CMS-HCC) I50.9   4/12/2017 - Present    Current smoker F17.200   4/12/2017 - Present    Foot pain, left M79.672   4/28/2017 - Present      Health Maintenance        Date Due Completion Dates    DIABETES MONOFILAMENT / LE EXAM 1950 ---    RETINAL SCREENING 4/6/1967 ---    PAP SMEAR 4/6/1970 ---    URINE ACR / MICROALBUMIN 12/14/2013 12/14/2012, 10/17/2011, 7/11/2011, 4/12/2010, 9/22/2008    COLONOSCOPY 8/11/2014 8/11/2004    MAMMOGRAM 2/3/2017 2/3/2016, 11/5/2012    FASTING LIPID PROFILE 2/11/2017 2/11/2016, 9/10/2015, 3/10/2015, 8/19/2014, 5/28/2014, 5/28/2014, 12/14/2012, 12/14/2012, 7/16/2012, 3/7/2012, 7/11/2011, 7/26/2010, 4/12/2010, 8/7/2009, 9/22/2008, 2/13/2007, 5/29/2006    A1C SCREENING 10/12/2017 4/12/2017, 2/28/2017, 9/10/2015, 8/19/2014, 5/28/2014, 5/28/2014, 12/14/2012, 3/9/2012, 3/7/2012, 10/17/2011, 7/11/2011, 7/26/2010, 4/12/2010, 8/7/2009, 9/22/2008, 2/13/2007, 5/29/2006    SERUM CREATININE 4/30/2018 4/30/2017, 4/12/2017, 4/11/2017, 2/28/2017, 12/15/2016, 11/9/2016, 4/1/2016, 11/6/2015, 9/10/2015, 3/10/2015, 12/17/2014, 8/19/2014, 8/1/2014, 6/2/2014, 6/1/2014, 5/31/2014, 5/30/2014, 5/29/2014, 5/28/2014, 5/27/2014, 10/10/2013, 10/9/2013, 10/8/2013, 12/14/2012, 10/30/2012, 7/16/2012, 3/9/2012, 3/8/2012, 3/7/2012, 3/6/2012, 10/20/2011, 10/17/2011, 7/11/2011, 7/26/2010, 4/12/2010, 8/7/2009, 9/22/2008, 2/13/2007, 2/12/2007, 7/17/2006, 5/30/2006, 5/29/2006, 5/28/2006    BONE DENSITY 11/30/2020 11/30/2015    IMM DTaP/Tdap/Td Vaccine (2 - Td) 1/9/2027 1/9/2017, 3/3/2015            Current Immunizations     13-VALENT PCV PREVNAR 1/9/2017    Influenza TIV (IM) 10/9/2013  6:30 PM,  11/11/2011    Influenza Vaccine Quad Inj (Preserved) 12/5/2016    Pneumococcal Vaccine (UF)Historical Data 11/11/2011    Pneumococcal polysaccharide vaccine (PPSV-23) 12/5/2016    SHINGLES VACCINE 10/11/2015    Tdap Vaccine 1/9/2017, 3/3/2015      Below and/or attached are the medications your provider expects you to take. Review all of your home medications and newly ordered medications with your provider and/or pharmacist. Follow medication instructions as directed by your provider and/or pharmacist. Please keep your medication list with you and share with your provider. Update the information when medications are discontinued, doses are changed, or new medications (including over-the-counter products) are added; and carry medication information at all times in the event of emergency situations     Allergies:  COZAAR - (reactions not documented)     DARVOCET - (reactions not documented)     LEXAPRO - (reactions not documented)     LISINOPRIL - (reactions not documented)     CEPHALEXIN - (reactions not documented)     MORPHINE - (reactions not documented)     OTHER ENVIRONMENTAL - Rash     PENICILLIN G POTASSIUM - Rash     POTASSIUM - (reactions not documented)     SULFA DRUGS - Rash               Medications  Valid as of: June 01, 2017 -  3:41 PM    Generic Name Brand Name Tablet Size Instructions for use    Amiodarone HCl (Tab) CORDARONE 200 MG Take 1 Tab by mouth every day.        Aspirin (Tab) aspirin 81 MG Take 325 mg by mouth every day.        Aspirin (Tab)  MG Take 1 Tab by mouth every day.        BuPROPion HCl (TABLET SR 12 HR) WELLBUTRIN- MG Take 1 Tab by mouth 2 times a day.        Calcium Carb-Cholecalciferol (Tab) OS-ADAIR CALCIUM + D3 500-200 MG-UNIT Take 1 Tab by mouth 2 times a day, with meals.        Calcium Carbonate-Vitamin D (Tab) OSCAL 500 +D 500-200 MG-UNIT Take 1 Tab by mouth 2 times a day, with meals.        Carvedilol (Tab) COREG 12.5 MG Take 1 Tab by mouth 2 times a day.         Cetirizine HCl (Tab) ZYRTEC 10 MG TAKE 1 TAB BY MOUTH 1 TIME DAILY AS NEEDED FOR ALLERGIES.        Clopidogrel Bisulfate (Tab) PLAVIX 75 MG Take 1 Tab by mouth every day.        Docusate Sodium (Cap) COLACE 100 MG Take 1 Cap by mouth 2 times a day.        DULoxetine HCl (Cap DR Particles) CYMBALTA 20 MG Take 1 Cap by mouth every day.        Ferrous Sulfate (Tab) Iron 325 (65 FE) MG every day.         Fluticasone Propionate (Suspension) FLONASE 50 MCG/ACT Spray 1 Spray in nose every day.        Furosemide (Tab) LASIX 20 MG Take 1 Tab by mouth every day.        Gabapentin (Tab) NEURONTIN 600 MG Take 1 Tab by mouth 2 times a day.        Hydrocodone-Acetaminophen (Tab) NORCO 7.5-325 MG Take 1 Tab by mouth every 8 hours as needed for Severe Pain.        Insulin Glargine (Solution Pen-injector) LANTUS 100 UNIT/ML INJECT 20 UNITS SUBCUTANEOUSLY DAILY AT BEDTIME AS DIRECTED        Insulin Glargine (Solution Pen-injector) LANTUS 100 UNIT/ML INJECT 20 UNITS SUBCUTANEOUSLY DAILY AT BEDTIME AS DIRECTED        Insulin Pen Needle (Misc) Insulin Pen Needle 31G X 8 MM Use to inject insulin daily.        Ipratropium-Albuterol (Aerosol) COMBIVENT  MCG/ACT Inhale 2 Puffs by mouth 4 times a day.        MetFORMIN HCl (Tab) GLUCOPHAGE 1000 MG Take 1 Tab by mouth 2 times a day.        Nitrofurantoin Monohyd Macro (Cap) MACROBID 100 MG Take 1 Cap by mouth 2 times a day.        Nitroglycerin (SL Tab) NITROSTAT 0.4 MG Place 1 Tab under tongue as needed for Chest Pain. Max dose of 3 in 24 hrs until need for ED.        Ondansetron (TABLET DISPERSIBLE) ZOFRAN ODT 4 MG Take 1 Tab by mouth every 8 hours as needed for Nausea/Vomiting.        Pantoprazole Sodium (Tablet Delayed Response) PROTONIX 40 MG Take 1 Tab by mouth every day.        ROPINIRole HCl (Tab) REQUIP 1 MG Take 1 Tab by mouth every bedtime.        Rosuvastatin Calcium (Tab) CRESTOR 40 MG Take 1 Tab by mouth every day.        Topiramate (Tab) TOPAMAX 100 MG Take 1 Tab by mouth  every 12 hours.        .                 Medicines prescribed today were sent to:     [id number] - [place], [place] - [address] AT [facility] [email or web address] [place] [address]    Phone: [phone] Fax: [phone]    Open 24 Hours?: No      Medication refill instructions:       If your prescription bottle indicates you have medication refills left, it is not necessary to call your provider’s office. Please contact your pharmacy and they will refill your medication.    If your prescription bottle indicates you do not have any refills left, you may request refills at any time through one of the following ways: The online [employer] system (except Urgent Care), by calling your provider’s office, or by asking your pharmacy to contact your provider’s office with a refill request. Medication refills are processed only during regular business hours and may not be available until the next business day. Your provider may request additional information or to have a follow-up visit with you prior to refilling your medication.   *Please Note: Medication refills are assigned a new Rx number when refilled electronically. Your pharmacy may indicate that no refills were authorized even though a new prescription for the same medication is available at the pharmacy. Please request the medicine by name with the pharmacy before contacting your provider for a refill.        Your To Do List     Future Labs/Procedures Complete By Expires    CREATINE KINASE  As directed 6/1/2018    HEMOGLOBIN A1C  As directed 6/1/2018         [employer] Status: Patient Declined        Quit Tobacco Information     Do you want to quit using tobacco?    Quitting tobacco decreases risks of cancer, heart and lung disease, increases life expectancy, improves sense of taste and smell, and increases spending money, among other benefits.    If you are thinking about quitting, we can help.  • [facility] Quit Tobacco Program:  [phone]  o Program occurs weekly for four weeks and includes pharmacist consultation on products to support quitting smoking or chewing tobacco. A provider referral is needed for pharmacist consultation.  • Tobacco Users Help Hotline: 5-800QUIT-NOW (621-3859) or https://[place].quitlogix.org/  o Free, confidential telephone and online coaching for [place] residents. Sessions are designed on a schedule that is convenient for you. Eligible clients receive free nicotine replacement therapy.  • Nationally: www.smokefree.gov  o Information and professional assistance to support both immediate and long-term needs as you become, and remain, a non-smoker. Smokefree.gov allows you to choose the help that best fits your needs.

## 2017-06-01 NOTE — PATIENT INSTRUCTIONS
"Basic Carbohydrate Counting for Diabetes Mellitus  Carbohydrate counting is a method for keeping track of the amount of carbohydrates you eat. Eating carbohydrates naturally increases the level of sugar (glucose) in your blood, so it is important for you to know the amount that is okay for you to have in every meal. Carbohydrate counting helps keep the level of glucose in your blood within normal limits. The amount of carbohydrates allowed is different for every person. A dietitian can help you calculate the amount that is right for you. Once you know the amount of carbohydrates you can have, you can count the carbohydrates in the foods you want to eat.  Carbohydrates are found in the following foods:  · Grains, such as breads and cereals.  · Dried beans and soy products.  · Starchy vegetables, such as potatoes, peas, and corn.  · Fruit and fruit juices.  · Milk and yogurt.  · Sweets and snack foods, such as cake, cookies, candy, chips, soft drinks, and fruit drinks.  CARBOHYDRATE COUNTING  There are two ways to count the carbohydrates in your food. You can use either of the methods or a combination of both.  Reading the \"Nutrition Facts\" on Packaged Food  The \"Nutrition Facts\" is an area that is included on the labels of almost all packaged food and beverages in the [place]. It includes the serving size of that food or beverage and information about the nutrients in each serving of the food, including the grams (g) of carbohydrate per serving.   Decide the number of servings of this food or beverage that you will be able to eat or drink. Multiply that number of servings by the number of grams of carbohydrate that is listed on the label for that serving. The total will be the amount of carbohydrates you will be having when you eat or drink this food or beverage.  Learning Standard Serving Sizes of Food  When you eat food that is not packaged or does not include \"Nutrition Facts\" on the label, you need to " measure the servings in order to count the amount of carbohydrates. A serving of most carbohydrate-rich foods contains about 15 g of carbohydrates. The following list includes serving sizes of carbohydrate-rich foods that provide 15 g of carbohydrate per serving:    · 1 slice of bread (1 oz) or 1 six-inch tortilla.    · ½ of a hamburger bun or English muffin.  · 4-6 crackers.  · ¾ cup unsweetened dry cereal.    · ½ cup hot cereal.  ·  cup rice or pasta.    · ½ cup mashed potatoes or ¼ of a large baked potato.  · 1 cup fresh fruit or one small piece of fruit.    · ½ cup canned or frozen fruit or fruit juice.  · 1 cup milk.  ·  cup plain fat-free yogurt or yogurt sweetened with artificial sweeteners.  · ½ cup cooked dried beans or starchy vegetable, such as peas, corn, or potatoes.    Decide the number of standard-size servings that you will eat. Multiply that number of servings by 15 (the grams of carbohydrates in that serving). For example, if you eat 2 cups of strawberries, you will have eaten 2 servings and 30 g of carbohydrates (2 servings x 15 g = 30 g). For foods such as soups and casseroles, in which more than one food is mixed in, you will need to count the carbohydrates in each food that is included.  EXAMPLE OF CARBOHYDRATE COUNTING  Sample Dinner   · 3 oz chicken breast.  ·  cup of brown rice.  · ½ cup of corn.  · 1 cup milk.    · 1 cup strawberries with sugar-free whipped topping.    Carbohydrate Calculation   Step 1: Identify the foods that contain carbohydrates:   · Rice.    · Corn.    · Milk.    · Strawberries.  Step 2: Calculate the number of servings eaten of each:   · 2 servings of rice.    · 1 serving of corn.    · 1 serving of milk.    · 1 serving of strawberries.  Step 3:  Multiply each of those number of servings by 15 g:   · 2 servings of rice x 15 g = 30 g.    · 1 serving of corn x 15 g = 15 g.    · 1 serving of milk x 15 g = 15 g.    · 1 serving of strawberries x 15 g = 15 g.  Step 4: Add  together all of the amounts to find the total grams of carbohydrates eaten: 30 g + 15 g + 15 g + 15 g = 75 g.     This information is not intended to replace advice given to you by your health care provider. Make sure you discuss any questions you have with your health care provider.     Document Released: 12/18/2006 Document Revised: 01/08/2016 Document Reviewed: 11/14/2014  [employer] Interactive Patient Education ©2016 [employer]

## 2017-06-12 NOTE — TELEPHONE ENCOUNTER
Per patient, she takes the requip bid and her last refill was for once a day so she will be out in 15 days since she's taking two a day       Was the patient seen in the last year in this department? Yes  Last seen on 6/1/17 by Dr. Bahena Next Appt 7/6/17      Does patient have an active prescription for medications requested? No     Received Request Via: Patient

## 2017-06-14 RX ORDER — ROPINIROLE 1 MG/1
1 TABLET, FILM COATED ORAL 2 TIMES DAILY
Qty: 60 TAB | Refills: 0 | Status: SHIPPED | OUTPATIENT
Start: 2017-06-14 | End: 2017-07-03 | Stop reason: SDUPTHER

## 2017-06-26 DIAGNOSIS — M54.41 CHRONIC MIDLINE LOW BACK PAIN WITH RIGHT-SIDED SCIATICA: ICD-10-CM

## 2017-06-26 DIAGNOSIS — G89.29 CHRONIC MIDLINE LOW BACK PAIN WITH RIGHT-SIDED SCIATICA: ICD-10-CM

## 2017-06-26 NOTE — TELEPHONE ENCOUNTER
Was the patient seen in the last year in this department? Yes  Last seen on 6/1/17 by Dr. Bahena Next Appt 7/6/17      Does patient have an active prescription for medications requested? No     Received Request Via: Patient

## 2017-06-28 NOTE — TELEPHONE ENCOUNTER
Spoke to patient, she has multiple appts and will not be able to make it in for an appt.   I contact Dr. Bahena, he will be in tomorrow to write prescription.   Patient notified will call when ready.

## 2017-06-28 NOTE — TELEPHONE ENCOUNTER
Spoke to Dr. Bahena through tiger text, he won't be able to write prescription until next week.   I called to offer patient appt but per sister, patient not home. Will try again later.

## 2017-06-29 RX ORDER — HYDROCODONE BITARTRATE AND ACETAMINOPHEN 7.5; 325 MG/1; MG/1
1 TABLET ORAL EVERY 8 HOURS PRN
Qty: 90 TAB | Refills: 3 | Status: SHIPPED | OUTPATIENT
Start: 2017-06-29 | End: 2017-08-04 | Stop reason: SDUPTHER

## 2017-07-03 RX ORDER — ROPINIROLE 1 MG/1
TABLET, FILM COATED ORAL
Qty: 60 TAB | Refills: 4 | Status: SHIPPED | OUTPATIENT
Start: 2017-07-03 | End: 2018-01-30 | Stop reason: SDUPTHER

## 2017-07-24 NOTE — TELEPHONE ENCOUNTER
Last seen: 06/01/17 by Dr. Bahena  Next appt: 08/07/17 with Dr. Bahena    Was the patient seen in the last year in this department? Yes   Does patient have an active prescription for medications requested? No   Received Request Via: Patient

## 2017-07-25 RX ORDER — GABAPENTIN 600 MG/1
600 TABLET ORAL 2 TIMES DAILY
Qty: 60 TAB | Refills: 2 | Status: SHIPPED | OUTPATIENT
Start: 2017-07-25 | End: 2017-09-12 | Stop reason: SDUPTHER

## 2017-08-04 DIAGNOSIS — M54.41 CHRONIC MIDLINE LOW BACK PAIN WITH RIGHT-SIDED SCIATICA: ICD-10-CM

## 2017-08-04 DIAGNOSIS — G89.29 CHRONIC MIDLINE LOW BACK PAIN WITH RIGHT-SIDED SCIATICA: ICD-10-CM

## 2017-08-04 RX ORDER — HYDROCODONE BITARTRATE AND ACETAMINOPHEN 7.5; 325 MG/1; MG/1
1 TABLET ORAL EVERY 8 HOURS PRN
Qty: 90 TAB | Refills: 0 | Status: SHIPPED | OUTPATIENT
Start: 2017-09-02 | End: 2017-09-12 | Stop reason: SDUPTHER

## 2017-08-04 RX ORDER — HYDROCODONE BITARTRATE AND ACETAMINOPHEN 7.5; 325 MG/1; MG/1
1 TABLET ORAL EVERY 8 HOURS PRN
Qty: 90 TAB | Refills: 0 | Status: SHIPPED | OUTPATIENT
Start: 2017-08-04 | End: 2017-08-04 | Stop reason: SDUPTHER

## 2017-08-04 RX ORDER — HYDROCODONE BITARTRATE AND ACETAMINOPHEN 7.5; 325 MG/1; MG/1
1 TABLET ORAL EVERY 8 HOURS PRN
Qty: 90 TAB | Refills: 0 | Status: SHIPPED | OUTPATIENT
Start: 2017-10-02 | End: 2017-08-17

## 2017-08-04 NOTE — PROGRESS NOTES
I have ordered refills for patient's Norc 7.5 TID medication up until November 1,2017. Patient will need refills for medication to start after Nov 1,2017

## 2017-08-07 ENCOUNTER — APPOINTMENT (OUTPATIENT)
Dept: INTERNAL MEDICINE | Facility: MEDICAL CENTER | Age: 67
End: 2017-08-07
Payer: MEDICARE

## 2017-08-14 ENCOUNTER — APPOINTMENT (OUTPATIENT)
Dept: NEUROLOGY | Facility: MEDICAL CENTER | Age: 67
End: 2017-08-14
Payer: MEDICARE

## 2017-08-15 ENCOUNTER — OFFICE VISIT (OUTPATIENT)
Dept: NEUROLOGY | Facility: MEDICAL CENTER | Age: 67
End: 2017-08-15
Payer: MEDICARE

## 2017-08-15 ENCOUNTER — HOSPITAL ENCOUNTER (OUTPATIENT)
Dept: LAB | Facility: MEDICAL CENTER | Age: 67
DRG: 603 | End: 2017-08-15
Attending: INTERNAL MEDICINE
Payer: MEDICARE

## 2017-08-15 ENCOUNTER — HOSPITAL ENCOUNTER (OUTPATIENT)
Dept: RADIOLOGY | Facility: MEDICAL CENTER | Age: 67
DRG: 603 | End: 2017-08-15
Attending: INTERNAL MEDICINE
Payer: MEDICARE

## 2017-08-15 VITALS
DIASTOLIC BLOOD PRESSURE: 76 MMHG | TEMPERATURE: 98.6 F | OXYGEN SATURATION: 98 % | BODY MASS INDEX: 22.38 KG/M2 | WEIGHT: 114 LBS | HEIGHT: 60 IN | RESPIRATION RATE: 16 BRPM | HEART RATE: 76 BPM | SYSTOLIC BLOOD PRESSURE: 118 MMHG

## 2017-08-15 DIAGNOSIS — I63.39 CEREBROVASCULAR ACCIDENT (CVA) DUE TO THROMBOSIS OF OTHER CEREBRAL ARTERY (HCC): ICD-10-CM

## 2017-08-15 DIAGNOSIS — E11.9 TYPE 2 DIABETES MELLITUS WITHOUT COMPLICATION, WITH LONG-TERM CURRENT USE OF INSULIN (HCC): ICD-10-CM

## 2017-08-15 DIAGNOSIS — M79.672 FOOT PAIN, LEFT: ICD-10-CM

## 2017-08-15 DIAGNOSIS — R29.898 LEFT ARM WEAKNESS: ICD-10-CM

## 2017-08-15 DIAGNOSIS — G43.119 INTRACTABLE MIGRAINE WITH AURA WITHOUT STATUS MIGRAINOSUS: ICD-10-CM

## 2017-08-15 DIAGNOSIS — R41.3 MEMORY LOSS: ICD-10-CM

## 2017-08-15 DIAGNOSIS — Z79.4 TYPE 2 DIABETES MELLITUS WITHOUT COMPLICATION, WITH LONG-TERM CURRENT USE OF INSULIN (HCC): ICD-10-CM

## 2017-08-15 LAB
ALBUMIN SERPL BCP-MCNC: 4 G/DL (ref 3.2–4.9)
ALBUMIN/GLOB SERPL: 1.4 G/DL
ALP SERPL-CCNC: 80 U/L (ref 30–99)
ALT SERPL-CCNC: 5 U/L (ref 2–50)
ANION GAP SERPL CALC-SCNC: 7 MMOL/L (ref 0–11.9)
AST SERPL-CCNC: 8 U/L (ref 12–45)
BILIRUB SERPL-MCNC: 0.3 MG/DL (ref 0.1–1.5)
BUN SERPL-MCNC: 17 MG/DL (ref 8–22)
CALCIUM SERPL-MCNC: 10 MG/DL (ref 8.5–10.5)
CHLORIDE SERPL-SCNC: 108 MMOL/L (ref 96–112)
CHOLEST SERPL-MCNC: 205 MG/DL (ref 100–199)
CK SERPL-CCNC: 57 U/L (ref 0–154)
CO2 SERPL-SCNC: 24 MMOL/L (ref 20–33)
CREAT SERPL-MCNC: 0.96 MG/DL (ref 0.5–1.4)
EST. AVERAGE GLUCOSE BLD GHB EST-MCNC: 258 MG/DL
FOLATE SERPL-MCNC: 12.8 NG/ML
GFR SERPL CREATININE-BSD FRML MDRD: 58 ML/MIN/1.73 M 2
GLOBULIN SER CALC-MCNC: 2.9 G/DL (ref 1.9–3.5)
GLUCOSE SERPL-MCNC: 267 MG/DL (ref 65–99)
HBA1C MFR BLD: 10.6 % (ref 0–5.6)
HDLC SERPL-MCNC: 57 MG/DL
LDLC SERPL CALC-MCNC: 133 MG/DL
POTASSIUM SERPL-SCNC: 3.9 MMOL/L (ref 3.6–5.5)
PROT SERPL-MCNC: 6.9 G/DL (ref 6–8.2)
SODIUM SERPL-SCNC: 139 MMOL/L (ref 135–145)
T4 FREE SERPL-MCNC: 1.14 NG/DL (ref 0.53–1.43)
TRIGL SERPL-MCNC: 73 MG/DL (ref 0–149)
TSH SERPL DL<=0.005 MIU/L-ACNC: 0.65 UIU/ML (ref 0.3–3.7)
VIT B12 SERPL-MCNC: 235 PG/ML (ref 211–911)

## 2017-08-15 PROCEDURE — 99214 OFFICE O/P EST MOD 30 MIN: CPT | Performed by: PSYCHIATRY & NEUROLOGY

## 2017-08-15 RX ORDER — BUTALBITAL, ACETAMINOPHEN AND CAFFEINE 50; 325; 40 MG/1; MG/1; MG/1
TABLET ORAL
Refills: 2 | COMMUNITY
Start: 2017-07-17 | End: 2018-05-17 | Stop reason: SDUPTHER

## 2017-08-15 RX ORDER — IPRATROPIUM BROMIDE AND ALBUTEROL 20; 100 UG/1; UG/1
SPRAY, METERED RESPIRATORY (INHALATION)
COMMUNITY
Start: 2017-05-30 | End: 2017-08-17

## 2017-08-15 ASSESSMENT — PATIENT HEALTH QUESTIONNAIRE - PHQ9: CLINICAL INTERPRETATION OF PHQ2 SCORE: 0

## 2017-08-15 NOTE — MR AVS SNAPSHOT
"        Leena Bella   8/15/2017 9:20 AM   Office Visit   MRN: [id number]    Department:  [facility]   Dept Phone:  [phone]    Description:  Female : 1950   Provider:  Melissa P Bloch, M.D.           Reason for Visit     Follow-Up migraines      Allergies as of 8/15/2017     Allergen Noted Reactions    Cozaar [Losartan] 2016       Darvocet [Propoxyphene N-Apap] 2016       Lexapro 2016       Lisinopril 2016       Cephalexin 2014       Rash - \"looks like I have AIDS\"    Morphine 10/12/2008       Heart stopped?    Other Environmental 10/20/2011   Rash    tape    Penicillin G Potassium 10/12/2008   Rash    Has tolerated Unasyn in May 2014 and received amoxicillin on 12/29/15 without reaction    Potassium 2017       Headache     Sulfa Drugs 10/18/2011   Rash      You were diagnosed with     Cerebrovascular accident (CVA) due to thrombosis of other cerebral artery   [8405562]       Intractable migraine with aura without status migrainosus   [020383]         Vital Signs     Blood Pressure Pulse Temperature Respirations Height Weight    118/76 mmHg 76 37 °C (98.6 °F) 16 1.524 m (5') 51.71 kg (114 lb)    Body Mass Index Oxygen Saturation Last Menstrual Period Smoking Status          22.26 kg/m2 98% 06/15/1996 Current Every Day Smoker        Basic Information     Date Of Birth Sex Race Ethnicity Preferred Language    1950 Female White Non- English      Your appointments     Aug 17, 2017  2:30 PM   Established Patient with Desiree Mcwilliams M.D.   [facility] (--)    [address]  [address]  [facility] [address] [phone]           You will be receiving a confirmation call a few days before your appointment from our automated call confirmation system.            2018  1:20 PM   Follow Up Visit with Melissa P Bloch, M.D.   [facility] (--)    [address], [address]  [facility] " [address]   [phone]           You will be receiving a confirmation call a few days before your appointment from our automated call confirmation system.              Problem List              ICD-10-CM Priority Class Noted - Resolved    CAD (coronary artery disease) I25.10   10/18/2011 - Present    Ischemic cardiomyopathy I25.5   10/18/2011 - Present    Type 2 diabetes mellitus (CMS-HCC) E11.9   10/18/2011 - Present    CHF (congestive heart failure) (CMS-HCC) I50.9   10/18/2011 - Present    Migraines G43.909   12/23/2013 - Present    Polyarthralgia M25.50   11/3/2015 - Present    Chronic fatigue R53.82   11/3/2015 - Present    Dyspnea R06.00   11/3/2015 - Present    Skin rash R21   11/3/2015 - Present    Osteoporosis M81.0   11/3/2015 - Present    Vitamin D deficiency E55.9   11/11/2015 - Present    CVA (cerebral vascular accident) (CMS-HCC) I63.9   6/4/2016 - Present    Allergic rhinitis J30.9   6/4/2016 - Present    Automatic implantable cardioverter-defibrillator in situ Z95.810   6/4/2016 - Present    Osteoarthritis M19.90   6/4/2016 - Present    Labial abscess N76.4   6/4/2016 - Present    Nicotine dependence F17.200   6/4/2016 - Present    Diabetic neuropathy (CMS-HCC) E11.40   6/4/2016 - Present    GERD (gastroesophageal reflux disease) K21.9   6/4/2016 - Present    RLS (restless legs syndrome) G25.81   6/4/2016 - Present    PEYTON (obstructive sleep apnea) G47.33   6/4/2016 - Present    COPD (chronic obstructive pulmonary disease) (CMS-HCC) J44.9   6/4/2016 - Present    Anxiety disorder F41.9   6/4/2016 - Present    Dyslipidemia E78.5   6/4/2016 - Present    Abscess L02.91   6/4/2016 - Present    Hx of coronary artery bypass graft Z95.1   6/4/2016 - Present    HTN (hypertension) I10   6/4/2016 - Present    Opiate analgesic use agreement exists Z02.89   1/9/2017 - Present    Chronic back pain M54.9, G89.29   3/3/2017 - Present    Family history of stress Z81.8   4/7/2017 - Present    Syncope, near R55    4/12/2017 - Present    MARGARITA (acute kidney injury) (CMS-HCC) N17.9   4/12/2017 - Present    Acute exacerbation of congestive heart failure (CMS-HCC) I50.9   4/12/2017 - Present    Current smoker F17.200   4/12/2017 - Present    Foot pain, left M79.672   4/28/2017 - Present      Health Maintenance        Date Due Completion Dates    DIABETES MONOFILAMENT / LE EXAM 1950 ---    RETINAL SCREENING 4/6/1967 ---    PAP SMEAR 4/6/1970 ---    URINE ACR / MICROALBUMIN 12/14/2013 12/14/2012, 10/17/2011, 7/11/2011, 4/12/2010, 9/22/2008    COLONOSCOPY 8/11/2014 8/11/2004    MAMMOGRAM 2/3/2017 2/3/2016, 11/5/2012    FASTING LIPID PROFILE 2/11/2017 2/11/2016, 9/10/2015, 3/10/2015, 8/19/2014, 5/28/2014, 5/28/2014, 12/14/2012, 12/14/2012, 7/16/2012, 3/7/2012, 7/11/2011, 7/26/2010, 4/12/2010, 8/7/2009, 9/22/2008, 2/13/2007, 5/29/2006    IMM INFLUENZA (1) 9/1/2017 12/5/2016, 10/9/2013, 11/11/2011    A1C SCREENING 10/12/2017 4/12/2017, 2/28/2017, 9/10/2015, 8/19/2014, 5/28/2014, 5/28/2014, 12/14/2012, 3/9/2012, 3/7/2012, 10/17/2011, 7/11/2011, 7/26/2010, 4/12/2010, 8/7/2009, 9/22/2008, 2/13/2007, 5/29/2006    SERUM CREATININE 4/30/2018 4/30/2017, 4/12/2017, 4/11/2017, 2/28/2017, 12/15/2016, 11/9/2016, 4/1/2016, 11/6/2015, 9/10/2015, 3/10/2015, 12/17/2014, 8/19/2014, 8/1/2014, 6/2/2014, 6/1/2014, 5/31/2014, 5/30/2014, 5/29/2014, 5/28/2014, 5/27/2014, 10/10/2013, 10/9/2013, 10/8/2013, 12/14/2012, 10/30/2012, 7/16/2012, 3/9/2012, 3/8/2012, 3/7/2012, 3/6/2012, 10/20/2011, 10/17/2011, 7/11/2011, 7/26/2010, 4/12/2010, 8/7/2009, 9/22/2008, 2/13/2007, 2/12/2007, 7/17/2006, 5/30/2006, 5/29/2006, 5/28/2006    BONE DENSITY 11/30/2020 11/30/2015    IMM DTaP/Tdap/Td Vaccine (2 - Td) 1/9/2027 1/9/2017, 3/3/2015            Current Immunizations     13-VALENT PCV PREVNAR 1/9/2017    Influenza TIV (IM) 10/9/2013  6:30 PM, 11/11/2011    Influenza Vaccine Quad Inj (Preserved) 12/5/2016    Pneumococcal Vaccine (UF)Historical Data 11/11/2011     Pneumococcal polysaccharide vaccine (PPSV-23) 12/5/2016    SHINGLES VACCINE 10/11/2015    Tdap Vaccine 1/9/2017, 3/3/2015      Below and/or attached are the medications your provider expects you to take. Review all of your home medications and newly ordered medications with your provider and/or pharmacist. Follow medication instructions as directed by your provider and/or pharmacist. Please keep your medication list with you and share with your provider. Update the information when medications are discontinued, doses are changed, or new medications (including over-the-counter products) are added; and carry medication information at all times in the event of emergency situations     Allergies:  COZAAR - (reactions not documented)     DARVOCET - (reactions not documented)     LEXAPRO - (reactions not documented)     LISINOPRIL - (reactions not documented)     CEPHALEXIN - (reactions not documented)     MORPHINE - (reactions not documented)     OTHER ENVIRONMENTAL - Rash     PENICILLIN G POTASSIUM - Rash     POTASSIUM - (reactions not documented)     SULFA DRUGS - Rash               Medications  Valid as of: August 15, 2017 -  9:56 AM    Generic Name Brand Name Tablet Size Instructions for use    Amiodarone HCl (Tab) CORDARONE 200 MG Take 1 Tab by mouth every day.        Aspirin (Tab) aspirin 81 MG Take 325 mg by mouth every day.        Aspirin (Tab)  MG Take 1 Tab by mouth every day.        BuPROPion HCl (TABLET SR 12 HR) WELLBUTRIN- MG Take 1 Tab by mouth 2 times a day.        Butalbital-APAP-Caffeine (Tab) FIORICET -40 MG TK 1 T PO  Q 4 H PRN        Calcium Carb-Cholecalciferol (Tab) OS-ADAIR CALCIUM + D3 500-200 MG-UNIT Take 1 Tab by mouth 2 times a day, with meals.        Calcium Carbonate-Vitamin D (Tab) OSCAL 500 +D 500-200 MG-UNIT Take 1 Tab by mouth 2 times a day, with meals.        Carvedilol (Tab) COREG 12.5 MG Take 1 Tab by mouth 2 times a day.        Cetirizine HCl (Tab) ZYRTEC 10 MG TAKE  1 TAB BY MOUTH 1 TIME DAILY AS NEEDED FOR ALLERGIES.        Clopidogrel Bisulfate (Tab) PLAVIX 75 MG Take 1 Tab by mouth every day.        Docusate Sodium (Cap) COLACE 100 MG Take 1 Cap by mouth 2 times a day.        DULoxetine HCl (Cap DR Particles) CYMBALTA 20 MG Take 1 Cap by mouth every day.        Ferrous Sulfate (Tab) Iron 325 (65 FE) MG every day.         Fluticasone Propionate (Suspension) FLONASE 50 MCG/ACT Spray 1 Spray in nose every day.        Furosemide (Tab) LASIX 20 MG Take 1 Tab by mouth every day.        Gabapentin (Tab) NEURONTIN 600 MG Take 1 Tab by mouth 2 times a day.        Hydrocodone-Acetaminophen (Tab) NORCO 7.5-325 MG Take 1 Tab by mouth every 8 hours as needed for Severe Pain.        Hydrocodone-Acetaminophen (Tab) NORCO 7.5-325 MG Take 1 Tab by mouth every 8 hours as needed for Severe Pain.        Insulin Glargine (Solution Pen-injector) LANTUS 100 UNIT/ML INJECT 20 UNITS SUBCUTANEOUSLY DAILY AT BEDTIME AS DIRECTED        Insulin Glargine (Solution Pen-injector) LANTUS 100 UNIT/ML INJECT 20 UNITS SUBCUTANEOUSLY DAILY AT BEDTIME AS DIRECTED        Insulin Pen Needle (Misc) Insulin Pen Needle 31G X 8 MM Use to inject insulin daily.        Ipratropium-Albuterol (Aerosol) COMBIVENT  MCG/ACT Inhale 2 Puffs by mouth 4 times a day.        Ipratropium-Albuterol (Aero Soln) COMBIVENT RESPIMAT  MCG/ACT         MetFORMIN HCl (Tab) GLUCOPHAGE 1000 MG Take 1 Tab by mouth 2 times a day.        Nitrofurantoin Monohyd Macro (Cap) MACROBID 100 MG Take 1 Cap by mouth 2 times a day.        Nitroglycerin (SL Tab) NITROSTAT 0.4 MG Place 1 Tab under tongue as needed for Chest Pain. Max dose of 3 in 24 hrs until need for ED.        Ondansetron (TABLET DISPERSIBLE) ZOFRAN ODT 4 MG Take 1 Tab by mouth every 8 hours as needed for Nausea/Vomiting.        Pantoprazole Sodium (Tablet Delayed Response) PROTONIX 40 MG Take 1 Tab by mouth every day.        ROPINIRole HCl (Tab) REQUIP 1 MG TAKE 1 TABLET BY  MOUTH TWICE DAILY        Rosuvastatin Calcium (Tab) CRESTOR 40 MG Take 1 Tab by mouth every day.        Topiramate (Tab) TOPAMAX 100 MG Take 1 Tab by mouth every 12 hours.        .                 Medicines prescribed today were sent to:     [id number]  [place], [place] - [address] [facility] [address]    [address] [place] [address]    Phone: [phone] Fax: [phone]    Open 24 Hours?: No      Medication refill instructions:       If your prescription bottle indicates you have medication refills left, it is not necessary to call your provider’s office. Please contact your pharmacy and they will refill your medication.    If your prescription bottle indicates you do not have any refills left, you may request refills at any time through one of the following ways: The online [employer] system (except Urgent Care), by calling your provider’s office, or by asking your pharmacy to contact your provider’s office with a refill request. Medication refills are processed only during regular business hours and may not be available until the next business day. Your provider may request additional information or to have a follow-up visit with you prior to refilling your medication.   *Please Note: Medication refills are assigned a new Rx number when refilled electronically. Your pharmacy may indicate that no refills were authorized even though a new prescription for the same medication is available at the pharmacy. Please request the medicine by name with the pharmacy before contacting your provider for a refill.           MyChart Status: Patient Declined        Quit Tobacco Information     Do you want to quit using tobacco?    Quitting tobacco decreases risks of cancer, heart and lung disease, increases life expectancy, improves sense of taste and smell, and increases spending money, among other benefits.    If you are thinking about quitting, we can help.  • [facility] Quit Tobacco Program:  [phone]  o Program occurs weekly for four weeks and includes pharmacist consultation on products to support quitting smoking or chewing tobacco. A provider referral is needed for pharmacist consultation.  • Tobacco Users Help Hotline: 3-800QUIT-NOW (886-1689) or https://[place].quitlogix.org/  o Free, confidential telephone and online coaching for [place] residents. Sessions are designed on a schedule that is convenient for you. Eligible clients receive free nicotine replacement therapy.  • Nationally: www.smokefree.gov  o Information and professional assistance to support both immediate and long-term needs as you become, and remain, a non-smoker. Smokefree.gov allows you to choose the help that best fits your needs.
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
## 2017-08-17 ENCOUNTER — RESOLUTE PROFESSIONAL BILLING HOSPITAL PROF FEE (OUTPATIENT)
Dept: HOSPITALIST | Facility: MEDICAL CENTER | Age: 67
End: 2017-08-17
Payer: MEDICARE

## 2017-08-17 ENCOUNTER — HOSPITAL ENCOUNTER (INPATIENT)
Facility: MEDICAL CENTER | Age: 67
LOS: 2 days | DRG: 603 | End: 2017-08-19
Attending: EMERGENCY MEDICINE | Admitting: INTERNAL MEDICINE
Payer: MEDICARE

## 2017-08-17 ENCOUNTER — APPOINTMENT (OUTPATIENT)
Dept: INTERNAL MEDICINE | Facility: MEDICAL CENTER | Age: 67
End: 2017-08-17
Payer: MEDICARE

## 2017-08-17 PROBLEM — Z81.8 FAMILY HISTORY OF STRESS: Chronic | Status: ACTIVE | Noted: 2017-04-07

## 2017-08-17 PROBLEM — L03.211 FACIAL CELLULITIS: Status: ACTIVE | Noted: 2017-08-17

## 2017-08-17 LAB
ALBUMIN SERPL BCP-MCNC: 3.4 G/DL (ref 3.2–4.9)
ALBUMIN/GLOB SERPL: 0.9 G/DL
ALP SERPL-CCNC: 92 U/L (ref 30–99)
ALT SERPL-CCNC: 6 U/L (ref 2–50)
ANION GAP SERPL CALC-SCNC: 7 MMOL/L (ref 0–11.9)
AST SERPL-CCNC: 5 U/L (ref 12–45)
BASOPHILS # BLD AUTO: 0.4 % (ref 0–1.8)
BASOPHILS # BLD: 0.05 K/UL (ref 0–0.12)
BILIRUB SERPL-MCNC: 0.4 MG/DL (ref 0.1–1.5)
BUN SERPL-MCNC: 15 MG/DL (ref 8–22)
CALCIUM SERPL-MCNC: 10.3 MG/DL (ref 8.5–10.5)
CHLORIDE SERPL-SCNC: 107 MMOL/L (ref 96–112)
CO2 SERPL-SCNC: 22 MMOL/L (ref 20–33)
CREAT SERPL-MCNC: 1.01 MG/DL (ref 0.5–1.4)
EOSINOPHIL # BLD AUTO: 0.12 K/UL (ref 0–0.51)
EOSINOPHIL NFR BLD: 1 % (ref 0–6.9)
ERYTHROCYTE [DISTWIDTH] IN BLOOD BY AUTOMATED COUNT: 46.4 FL (ref 35.9–50)
EST. AVERAGE GLUCOSE BLD GHB EST-MCNC: 252 MG/DL
GFR SERPL CREATININE-BSD FRML MDRD: 54 ML/MIN/1.73 M 2
GLOBULIN SER CALC-MCNC: 3.6 G/DL (ref 1.9–3.5)
GLUCOSE BLD-MCNC: 113 MG/DL (ref 65–99)
GLUCOSE BLD-MCNC: 228 MG/DL (ref 65–99)
GLUCOSE BLD-MCNC: 70 MG/DL (ref 65–99)
GLUCOSE SERPL-MCNC: 269 MG/DL (ref 65–99)
GRAM STN SPEC: NORMAL
HBA1C MFR BLD: 10.4 % (ref 0–5.6)
HCT VFR BLD AUTO: 42.9 % (ref 37–47)
HGB BLD-MCNC: 13.8 G/DL (ref 12–16)
IMM GRANULOCYTES # BLD AUTO: 0.05 K/UL (ref 0–0.11)
IMM GRANULOCYTES NFR BLD AUTO: 0.4 % (ref 0–0.9)
LYMPHOCYTES # BLD AUTO: 2.24 K/UL (ref 1–4.8)
LYMPHOCYTES NFR BLD: 18.2 % (ref 22–41)
MAGNESIUM SERPL-MCNC: 2.1 MG/DL (ref 1.5–2.5)
MCH RBC QN AUTO: 30.1 PG (ref 27–33)
MCHC RBC AUTO-ENTMCNC: 32.2 G/DL (ref 33.6–35)
MCV RBC AUTO: 93.7 FL (ref 81.4–97.8)
MONOCYTES # BLD AUTO: 0.87 K/UL (ref 0–0.85)
MONOCYTES NFR BLD AUTO: 7.1 % (ref 0–13.4)
NEUTROPHILS # BLD AUTO: 9.01 K/UL (ref 2–7.15)
NEUTROPHILS NFR BLD: 72.9 % (ref 44–72)
NRBC # BLD AUTO: 0 K/UL
NRBC BLD AUTO-RTO: 0 /100 WBC
PLATELET # BLD AUTO: 211 K/UL (ref 164–446)
PMV BLD AUTO: 10.3 FL (ref 9–12.9)
POTASSIUM SERPL-SCNC: 4.2 MMOL/L (ref 3.6–5.5)
PROT SERPL-MCNC: 7 G/DL (ref 6–8.2)
RBC # BLD AUTO: 4.58 M/UL (ref 4.2–5.4)
SIGNIFICANT IND 70042: NORMAL
SITE SITE: NORMAL
SODIUM SERPL-SCNC: 136 MMOL/L (ref 135–145)
SOURCE SOURCE: NORMAL
WBC # BLD AUTO: 12.3 K/UL (ref 4.8–10.8)

## 2017-08-17 PROCEDURE — 83036 HEMOGLOBIN GLYCOSYLATED A1C: CPT

## 2017-08-17 PROCEDURE — 700102 HCHG RX REV CODE 250 W/ 637 OVERRIDE(OP): Performed by: STUDENT IN AN ORGANIZED HEALTH CARE EDUCATION/TRAINING PROGRAM

## 2017-08-17 PROCEDURE — 96372 THER/PROPH/DIAG INJ SC/IM: CPT

## 2017-08-17 PROCEDURE — 0H91XZX DRAINAGE OF FACE SKIN, EXTERNAL APPROACH, DIAGNOSTIC: ICD-10-PCS | Performed by: EMERGENCY MEDICINE

## 2017-08-17 PROCEDURE — 87205 SMEAR GRAM STAIN: CPT

## 2017-08-17 PROCEDURE — A9270 NON-COVERED ITEM OR SERVICE: HCPCS | Performed by: STUDENT IN AN ORGANIZED HEALTH CARE EDUCATION/TRAINING PROGRAM

## 2017-08-17 PROCEDURE — 36415 COLL VENOUS BLD VENIPUNCTURE: CPT

## 2017-08-17 PROCEDURE — 83735 ASSAY OF MAGNESIUM: CPT

## 2017-08-17 PROCEDURE — A9270 NON-COVERED ITEM OR SERVICE: HCPCS | Performed by: INTERNAL MEDICINE

## 2017-08-17 PROCEDURE — 99285 EMERGENCY DEPT VISIT HI MDM: CPT

## 2017-08-17 PROCEDURE — 82962 GLUCOSE BLOOD TEST: CPT

## 2017-08-17 PROCEDURE — 700101 HCHG RX REV CODE 250: Performed by: EMERGENCY MEDICINE

## 2017-08-17 PROCEDURE — 700102 HCHG RX REV CODE 250 W/ 637 OVERRIDE(OP): Performed by: INTERNAL MEDICINE

## 2017-08-17 PROCEDURE — 700105 HCHG RX REV CODE 258: Performed by: STUDENT IN AN ORGANIZED HEALTH CARE EDUCATION/TRAINING PROGRAM

## 2017-08-17 PROCEDURE — 303977 HCHG I & D

## 2017-08-17 PROCEDURE — 99223 1ST HOSP IP/OBS HIGH 75: CPT | Mod: AI,GC | Performed by: INTERNAL MEDICINE

## 2017-08-17 PROCEDURE — 85025 COMPLETE CBC W/AUTO DIFF WBC: CPT

## 2017-08-17 PROCEDURE — 700111 HCHG RX REV CODE 636 W/ 250 OVERRIDE (IP): Performed by: STUDENT IN AN ORGANIZED HEALTH CARE EDUCATION/TRAINING PROGRAM

## 2017-08-17 PROCEDURE — 87077 CULTURE AEROBIC IDENTIFY: CPT

## 2017-08-17 PROCEDURE — 700102 HCHG RX REV CODE 250 W/ 637 OVERRIDE(OP): Performed by: EMERGENCY MEDICINE

## 2017-08-17 PROCEDURE — 94760 N-INVAS EAR/PLS OXIMETRY 1: CPT

## 2017-08-17 PROCEDURE — 87186 SC STD MICRODIL/AGAR DIL: CPT

## 2017-08-17 PROCEDURE — 96365 THER/PROPH/DIAG IV INF INIT: CPT

## 2017-08-17 PROCEDURE — 770020 HCHG ROOM/CARE - TELE (206)

## 2017-08-17 PROCEDURE — 87040 BLOOD CULTURE FOR BACTERIA: CPT | Mod: 91

## 2017-08-17 PROCEDURE — 80053 COMPREHEN METABOLIC PANEL: CPT

## 2017-08-17 PROCEDURE — 87070 CULTURE OTHR SPECIMN AEROBIC: CPT

## 2017-08-17 PROCEDURE — 700105 HCHG RX REV CODE 258: Performed by: INTERNAL MEDICINE

## 2017-08-17 PROCEDURE — 700101 HCHG RX REV CODE 250: Performed by: STUDENT IN AN ORGANIZED HEALTH CARE EDUCATION/TRAINING PROGRAM

## 2017-08-17 RX ORDER — ROPINIROLE 1 MG/1
1 TABLET, FILM COATED ORAL 2 TIMES DAILY
Status: DISCONTINUED | OUTPATIENT
Start: 2017-08-17 | End: 2017-08-17

## 2017-08-17 RX ORDER — POLYETHYLENE GLYCOL 3350 17 G/17G
1 POWDER, FOR SOLUTION ORAL
Status: DISCONTINUED | OUTPATIENT
Start: 2017-08-17 | End: 2017-08-19 | Stop reason: HOSPADM

## 2017-08-17 RX ORDER — CLINDAMYCIN PHOSPHATE 600 MG/50ML
600 INJECTION, SOLUTION INTRAVENOUS ONCE
Status: COMPLETED | OUTPATIENT
Start: 2017-08-17 | End: 2017-08-17

## 2017-08-17 RX ORDER — FERROUS SULFATE 325(65) MG
325 TABLET ORAL DAILY
COMMUNITY
End: 2018-01-30 | Stop reason: SDUPTHER

## 2017-08-17 RX ORDER — FUROSEMIDE 20 MG/1
20 TABLET ORAL DAILY
Status: DISCONTINUED | OUTPATIENT
Start: 2017-08-17 | End: 2017-08-18

## 2017-08-17 RX ORDER — ONDANSETRON 4 MG/1
4 TABLET, ORALLY DISINTEGRATING ORAL EVERY 8 HOURS PRN
Status: DISCONTINUED | OUTPATIENT
Start: 2017-08-17 | End: 2017-08-19 | Stop reason: HOSPADM

## 2017-08-17 RX ORDER — FLUTICASONE PROPIONATE 50 MCG
1 SPRAY, SUSPENSION (ML) NASAL DAILY
Status: DISCONTINUED | OUTPATIENT
Start: 2017-08-17 | End: 2017-08-19 | Stop reason: HOSPADM

## 2017-08-17 RX ORDER — BUTALBITAL, ACETAMINOPHEN AND CAFFEINE 50; 325; 40 MG/1; MG/1; MG/1
1 TABLET ORAL EVERY 6 HOURS PRN
Status: DISCONTINUED | OUTPATIENT
Start: 2017-08-17 | End: 2017-08-19 | Stop reason: HOSPADM

## 2017-08-17 RX ORDER — CARVEDILOL 12.5 MG/1
12.5 TABLET ORAL 2 TIMES DAILY
Status: DISCONTINUED | OUTPATIENT
Start: 2017-08-17 | End: 2017-08-18

## 2017-08-17 RX ORDER — INSULIN GLARGINE 100 [IU]/ML
10 INJECTION, SOLUTION SUBCUTANEOUS EVERY EVENING
Status: DISCONTINUED | OUTPATIENT
Start: 2017-08-17 | End: 2017-08-17

## 2017-08-17 RX ORDER — AMOXICILLIN 250 MG
2 CAPSULE ORAL 2 TIMES DAILY
Status: DISCONTINUED | OUTPATIENT
Start: 2017-08-17 | End: 2017-08-19 | Stop reason: HOSPADM

## 2017-08-17 RX ORDER — INSULIN GLARGINE 100 [IU]/ML
5 INJECTION, SOLUTION SUBCUTANEOUS EVERY EVENING
Status: DISCONTINUED | OUTPATIENT
Start: 2017-08-17 | End: 2017-08-18

## 2017-08-17 RX ORDER — TOPIRAMATE 100 MG/1
100 TABLET, FILM COATED ORAL EVERY 12 HOURS
Status: DISCONTINUED | OUTPATIENT
Start: 2017-08-17 | End: 2017-08-19 | Stop reason: HOSPADM

## 2017-08-17 RX ORDER — OMEPRAZOLE 20 MG/1
20 CAPSULE, DELAYED RELEASE ORAL
Status: DISCONTINUED | OUTPATIENT
Start: 2017-08-18 | End: 2017-08-19 | Stop reason: HOSPADM

## 2017-08-17 RX ORDER — CETIRIZINE HYDROCHLORIDE 10 MG/1
5 TABLET ORAL
Status: DISCONTINUED | OUTPATIENT
Start: 2017-08-17 | End: 2017-08-19 | Stop reason: HOSPADM

## 2017-08-17 RX ORDER — GABAPENTIN 300 MG/1
600 CAPSULE ORAL 2 TIMES DAILY
Status: DISCONTINUED | OUTPATIENT
Start: 2017-08-17 | End: 2017-08-19 | Stop reason: HOSPADM

## 2017-08-17 RX ORDER — ASPIRIN 325 MG
325 TABLET ORAL
Status: DISCONTINUED | OUTPATIENT
Start: 2017-08-17 | End: 2017-08-19 | Stop reason: HOSPADM

## 2017-08-17 RX ORDER — HYDROCODONE BITARTRATE AND ACETAMINOPHEN 7.5; 325 MG/1; MG/1
1 TABLET ORAL EVERY 8 HOURS PRN
Status: DISCONTINUED | OUTPATIENT
Start: 2017-08-17 | End: 2017-08-19 | Stop reason: HOSPADM

## 2017-08-17 RX ORDER — ROPINIROLE 0.5 MG/1
1 TABLET, FILM COATED ORAL
Status: DISCONTINUED | OUTPATIENT
Start: 2017-08-17 | End: 2017-08-19 | Stop reason: HOSPADM

## 2017-08-17 RX ORDER — FERROUS SULFATE 325(65) MG
325 TABLET ORAL
Status: DISCONTINUED | OUTPATIENT
Start: 2017-08-18 | End: 2017-08-19 | Stop reason: HOSPADM

## 2017-08-17 RX ORDER — IPRATROPIUM BROMIDE AND ALBUTEROL SULFATE 2.5; .5 MG/3ML; MG/3ML
3 SOLUTION RESPIRATORY (INHALATION)
Status: DISCONTINUED | OUTPATIENT
Start: 2017-08-17 | End: 2017-08-19 | Stop reason: HOSPADM

## 2017-08-17 RX ORDER — NITROGLYCERIN 0.4 MG/1
0.4 TABLET SUBLINGUAL
Status: DISCONTINUED | OUTPATIENT
Start: 2017-08-17 | End: 2017-08-19 | Stop reason: HOSPADM

## 2017-08-17 RX ORDER — BUPROPION HYDROCHLORIDE 150 MG/1
150 TABLET, EXTENDED RELEASE ORAL 2 TIMES DAILY
Status: DISCONTINUED | OUTPATIENT
Start: 2017-08-17 | End: 2017-08-19 | Stop reason: HOSPADM

## 2017-08-17 RX ORDER — ROSUVASTATIN CALCIUM 20 MG/1
40 TABLET, COATED ORAL
Status: DISCONTINUED | OUTPATIENT
Start: 2017-08-17 | End: 2017-08-19 | Stop reason: HOSPADM

## 2017-08-17 RX ORDER — DEXTROSE MONOHYDRATE 25 G/50ML
25 INJECTION, SOLUTION INTRAVENOUS
Status: DISCONTINUED | OUTPATIENT
Start: 2017-08-17 | End: 2017-08-19 | Stop reason: HOSPADM

## 2017-08-17 RX ORDER — DOCUSATE SODIUM 100 MG/1
100 CAPSULE, LIQUID FILLED ORAL 2 TIMES DAILY
Status: DISCONTINUED | OUTPATIENT
Start: 2017-08-17 | End: 2017-08-17

## 2017-08-17 RX ORDER — BISACODYL 10 MG
10 SUPPOSITORY, RECTAL RECTAL
Status: DISCONTINUED | OUTPATIENT
Start: 2017-08-17 | End: 2017-08-19 | Stop reason: HOSPADM

## 2017-08-17 RX ORDER — AMIODARONE HYDROCHLORIDE 200 MG/1
200 TABLET ORAL
Status: DISCONTINUED | OUTPATIENT
Start: 2017-08-17 | End: 2017-08-19 | Stop reason: HOSPADM

## 2017-08-17 RX ORDER — SODIUM CHLORIDE 9 MG/ML
INJECTION, SOLUTION INTRAVENOUS CONTINUOUS
Status: DISCONTINUED | OUTPATIENT
Start: 2017-08-17 | End: 2017-08-18

## 2017-08-17 RX ORDER — CLOPIDOGREL BISULFATE 75 MG/1
75 TABLET ORAL DAILY
Status: DISCONTINUED | OUTPATIENT
Start: 2017-08-17 | End: 2017-08-19 | Stop reason: HOSPADM

## 2017-08-17 RX ORDER — DULOXETIN HYDROCHLORIDE 20 MG/1
20 CAPSULE, DELAYED RELEASE ORAL DAILY
Status: DISCONTINUED | OUTPATIENT
Start: 2017-08-18 | End: 2017-08-19 | Stop reason: HOSPADM

## 2017-08-17 RX ADMIN — INSULIN GLARGINE 5 UNITS: 100 INJECTION, SOLUTION SUBCUTANEOUS at 20:34

## 2017-08-17 RX ADMIN — BUPROPION HYDROCHLORIDE 150 MG: 150 TABLET, EXTENDED RELEASE ORAL at 20:37

## 2017-08-17 RX ADMIN — GABAPENTIN 600 MG: 300 CAPSULE ORAL at 20:23

## 2017-08-17 RX ADMIN — SODIUM CHLORIDE: 9 INJECTION, SOLUTION INTRAVENOUS at 17:59

## 2017-08-17 RX ADMIN — TOPIRAMATE 100 MG: 100 TABLET ORAL at 20:39

## 2017-08-17 RX ADMIN — INSULIN LISPRO 2 UNITS: 100 INJECTION, SOLUTION INTRAVENOUS; SUBCUTANEOUS at 20:31

## 2017-08-17 RX ADMIN — ROSUVASTATIN CALCIUM 40 MG: 20 TABLET, FILM COATED ORAL at 20:24

## 2017-08-17 RX ADMIN — FUROSEMIDE 20 MG: 20 TABLET ORAL at 17:59

## 2017-08-17 RX ADMIN — CARVEDILOL 12.5 MG: 12.5 TABLET, FILM COATED ORAL at 20:23

## 2017-08-17 RX ADMIN — CLINDAMYCIN IN 5 PERCENT DEXTROSE 600 MG: 12 INJECTION, SOLUTION INTRAVENOUS at 13:32

## 2017-08-17 RX ADMIN — CLOPIDOGREL 75 MG: 75 TABLET, FILM COATED ORAL at 17:59

## 2017-08-17 RX ADMIN — HYDROCODONE BITARTRATE AND ACETAMINOPHEN 1 TABLET: 7.5; 325 TABLET ORAL at 16:41

## 2017-08-17 RX ADMIN — AMIODARONE HYDROCHLORIDE 200 MG: 200 TABLET ORAL at 20:24

## 2017-08-17 RX ADMIN — FLUTICASONE PROPIONATE 50 MCG: 50 SPRAY, METERED NASAL at 18:08

## 2017-08-17 RX ADMIN — VANCOMYCIN HYDROCHLORIDE 1300 MG: 100 INJECTION, POWDER, LYOPHILIZED, FOR SOLUTION INTRAVENOUS at 18:44

## 2017-08-17 RX ADMIN — CALCIUM CARBONATE-CHOLECALCIFEROL TAB 250 MG-125 UNIT 2 TABLET: 250-125 TAB at 18:36

## 2017-08-17 RX ADMIN — ASPIRIN 325 MG: 325 TABLET, COATED ORAL at 20:24

## 2017-08-17 RX ADMIN — ROPINIROLE HYDROCHLORIDE 1 MG: 0.5 TABLET, FILM COATED ORAL at 20:23

## 2017-08-17 RX ADMIN — AMPICILLIN SODIUM AND SULBACTAM SODIUM 3 G: 2; 1 INJECTION, POWDER, FOR SOLUTION INTRAMUSCULAR; INTRAVENOUS at 18:04

## 2017-08-17 RX ADMIN — INSULIN HUMAN 5 UNITS: 100 INJECTION, SOLUTION PARENTERAL at 14:18

## 2017-08-17 RX ADMIN — STANDARDIZED SENNA CONCENTRATE AND DOCUSATE SODIUM 2 TABLET: 8.6; 5 TABLET, FILM COATED ORAL at 21:00

## 2017-08-17 RX ADMIN — BUTALBITAL, ACETAMINOPHEN, AND CAFFEINE 1 TABLET: 50; 325; 40 TABLET ORAL at 20:24

## 2017-08-17 ASSESSMENT — COGNITIVE AND FUNCTIONAL STATUS - GENERAL
CLIMB 3 TO 5 STEPS WITH RAILING: A LOT
MOBILITY SCORE: 20
WALKING IN HOSPITAL ROOM: A LITTLE
STANDING UP FROM CHAIR USING ARMS: A LITTLE
DRESSING REGULAR UPPER BODY CLOTHING: A LITTLE
SUGGESTED CMS G CODE MODIFIER DAILY ACTIVITY: CJ
PERSONAL GROOMING: A LITTLE
DRESSING REGULAR LOWER BODY CLOTHING: A LITTLE
DAILY ACTIVITIY SCORE: 20
HELP NEEDED FOR BATHING: A LITTLE
SUGGESTED CMS G CODE MODIFIER MOBILITY: CJ

## 2017-08-17 ASSESSMENT — ENCOUNTER SYMPTOMS
CHILLS: 1
ORTHOPNEA: 1
FOCAL WEAKNESS: 0
SORE THROAT: 0
HEARTBURN: 0
PND: 1
DIARRHEA: 0
TINGLING: 0
PHOTOPHOBIA: 1
SPEECH CHANGE: 0
WEIGHT LOSS: 0
DIZZINESS: 0
NAUSEA: 0
SHORTNESS OF BREATH: 0
CHILLS: 0
WEAKNESS: 1
BRUISES/BLEEDS EASILY: 0
PALPITATIONS: 0
PHOTOPHOBIA: 0
EYE PAIN: 0
BLOOD IN STOOL: 0
SPUTUM PRODUCTION: 0
FEVER: 1
VOMITING: 0
NAUSEA: 1
BLURRED VISION: 1
WHEEZING: 0
HEADACHES: 0
FEVER: 0
SEIZURES: 0
CONSTIPATION: 0
MYALGIAS: 0
HEADACHES: 1
SENSORY CHANGE: 0
WEAKNESS: 0
COUGH: 0
HEMOPTYSIS: 0
ABDOMINAL PAIN: 0

## 2017-08-17 ASSESSMENT — COPD QUESTIONNAIRES
DURING THE PAST 4 WEEKS HOW MUCH DID YOU FEEL SHORT OF BREATH: NONE/LITTLE OF THE TIME
DO YOU EVER COUGH UP ANY MUCUS OR PHLEGM?: NO/ONLY WITH OCCASIONAL COLDS OR INFECTIONS
COPD SCREENING SCORE: 5
HAVE YOU SMOKED AT LEAST 100 CIGARETTES IN YOUR ENTIRE LIFE: YES

## 2017-08-17 ASSESSMENT — PAIN SCALES - GENERAL
PAINLEVEL_OUTOF10: 9
PAINLEVEL_OUTOF10: 9
PAINLEVEL_OUTOF10: 8
PAINLEVEL_OUTOF10: 7

## 2017-08-17 ASSESSMENT — LIFESTYLE VARIABLES
SUBSTANCE_ABUSE: 0
EVER_SMOKED: YES
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
## 2017-08-17 NOTE — IP AVS SNAPSHOT
" <p align=\"LEFT\"><IMG SRC=\"//[email or web address]/blob$/[email or web address]\" alt=\"Image\" WIDTH=\"50%\" HEIGHT=\"200\" BORDER=\"\"></p>                   Name:Leena Bella  Medical Record Number:[id number]  CSN: [id number]    YOB: 1950   Age: 67 y.o.  Sex: female  HT:1.549 m (5' 0.98\") WT: 58.1 kg (128 lb 1.4 oz)          Admit Date: 8/17/2017     Discharge Date:   Today's Date: 8/19/2017  Attending Doctor:  Home Bender M.D.                  Allergies:  Cozaar; Darvocet; Lexapro; Lisinopril; Cephalexin; Morphine; Other environmental; Penicillin g potassium; Potassium; and Sulfa drugs          Your appointments     Feb 20, 2018  1:20 PM   Follow Up Visit with Melissa P Bloch, M.D.   [facility] (--)    [address], [address]  [facility] [address] [phone]           You will be receiving a confirmation call a few days before your appointment from our automated call confirmation system.              Follow-up Information     1. Follow up with Adriano Bahena M.D. In 3 days.    Specialty:  Internal Medicine    Why:  For lab work, [facility]    Contact information    [address]  [address]  [facility] [address] [phone]           Medication List      Take these Medications        Instructions    acetaminophen/caffeine/butalbital 325-40-50 mg -40 MG Tabs   Commonly known as:  FIORICET    TK 1 T PO  Q 4 H PRN       albuterol-ipratropium  MCG/ACT Aero   Commonly known as:  COMBIVENT    Inhale 2 Puffs by mouth 4 times a day.   Dose:  2 Puff       amiodarone 200 MG Tabs   Commonly known as:  CORDARONE    Take 1 Tab by mouth every day.   Dose:  200 mg       aspirin 325 MG Tabs   Commonly known as:  ASA    Take 1 Tab by mouth every day.   Dose:  325 mg       buPROPion  MG Tb12 sustained-release tablet   Commonly known as:  WELLBUTRIN-SR    Take 1 Tab by mouth 2 times a day.   Dose:  150 mg       calcium-vitamin D 500-200 MG-UNIT Tabs   Commonly known as:  OSCAL 500 +D    Take 1 Tab by mouth " 2 times a day, with meals.   Dose:  1 Tab       cetirizine 10 MG Tabs   Commonly known as:  ZYRTEC    TAKE 1 TAB BY MOUTH 1 TIME DAILY AS NEEDED FOR ALLERGIES.       clindamycin 300 MG Caps   Commonly known as:  CLEOCIN    Take 1 Cap by mouth 3 times a day.   Dose:  300 mg       clopidogrel 75 MG Tabs   Commonly known as:  PLAVIX    Take 1 Tab by mouth every day.   Dose:  75 mg       duloxetine 20 MG Cpep   Commonly known as:  CYMBALTA    Take 1 Cap by mouth every day.   Dose:  20 mg       ferrous sulfate 325 (65 Fe) MG tablet    Take 325 mg by mouth every day.   Dose:  325 mg       fluticasone 50 MCG/ACT nasal spray   Commonly known as:  FLONASE    Spray 1 Spray in nose every day.   Dose:  1 Spray       furosemide 20 MG Tabs   Commonly known as:  LASIX    Take 1 Tab by mouth every day.   Dose:  20 mg       gabapentin 600 MG tablet   Commonly known as:  NEURONTIN    Take 1 Tab by mouth 2 times a day.   Dose:  600 mg       hydrocodone-acetaminophen 7.5-325 MG per tablet   Start taking on:  9/2/2017   Commonly known as:  NORCO    Take 1 Tab by mouth every 8 hours as needed for Severe Pain.   Dose:  1 Tab       insulin glargine 100 UNIT/ML Sopn injection   Commonly known as:  LANTUS SOLOSTAR    INJECT 20 UNITS SUBCUTANEOUSLY DAILY AT BEDTIME AS DIRECTED       metformin 1000 MG tablet   Commonly known as:  GLUCOPHAGE    Take 1 Tab by mouth 2 times a day.   Dose:  1000 mg       metoprolol 25 MG Tabs   Commonly known as:  LOPRESSOR    Doctor's comments:  Do not take if you are feeling dizzy/lightheaded or if you have a slow heart rate (<60 bpm)   Take 0.5 Tabs by mouth 2 Times a Day.   Dose:  12.5 mg       nitroglycerin 0.4 MG Subl   Commonly known as:  NITROSTAT    Place 1 Tab under tongue as needed for Chest Pain. Max dose of 3 in 24 hrs until need for ED.   Dose:  0.4 mg       ondansetron 4 MG Tbdp   Commonly known as:  ZOFRAN ODT    Take 1 Tab by mouth every 8 hours as needed for Nausea/Vomiting.   Dose:  4 mg        pantoprazole 40 MG Tbec   Commonly known as:  PROTONIX    Take 1 Tab by mouth every day.   Dose:  40 mg       ropinirole 1 MG Tabs   Commonly known as:  REQUIP    TAKE 1 TABLET BY MOUTH TWICE DAILY       rosuvastatin 40 MG tablet   Commonly known as:  CRESTOR    Take 1 Tab by mouth every day.   Dose:  40 mg       topiramate 100 MG Tabs   Commonly known as:  TOPAMAX    Take 1 Tab by mouth every 12 hours.   Dose:  100 mg

## 2017-08-17 NOTE — IP AVS SNAPSHOT
8/19/2017    Leena Bella  [address] [place] [address]    Dear Leena:    [facility] wants to ensure your discharge home is safe and you or your loved ones have had all of your questions answered regarding your care after you leave the hospital.    Below is a list of resources and contact information should you have any questions regarding your hospital stay, follow-up instructions, or active medical symptoms.    Questions or Concerns Regarding… Contact   Medical Questions Related to Your Discharge  (7 days a week, 8am-5pm) Contact a Nurse Care Coordinator   [phone]   Medical Questions Not Related to Your Discharge  (24 hours a day / 7 days a week)  Contact the Nurse Health Line   [phone]    Medications or Discharge Instructions Refer to your discharge packet   or contact your [facility] Primary Care Provider   [phone]   Follow-up Appointment(s) Schedule your appointment via myJambi   or contact Scheduling [phone]   Billing Review your statement via myJambi  or contact Billing [phone]   Medical Records Review your records via myJambi   or contact Medical Records [phone]     You may receive a telephone call within two days of discharge. This call is to make certain you understand your discharge instructions and have the opportunity to have any questions answered. You can also easily access your medical information, test results and upcoming appointments via the myJambi free online health management tool. You can learn more and sign up at [email or web address]. For assistance setting up your myJambi account, please call [phone].    Once again, we want to ensure your discharge home is safe and that you have a clear understanding of any next steps in your care. If you have any questions or concerns, please do not hesitate to contact us, we are here for you. Thank you for choosing [facility] for your healthcare needs.    Sincerely,    Your [facility] Healthcare Team

## 2017-08-17 NOTE — IP AVS SNAPSHOT
" Home Care Instructions                                                                                                                  Name:Leena Bella  Medical Record Number:[id number]  CSN: [id number]    YOB: 1950   Age: 67 y.o.  Sex: female  HT:1.549 m (5' 0.98\") WT: 58.1 kg (128 lb 1.4 oz)          Admit Date: 8/17/2017     Discharge Date:   Today's Date: 8/19/2017  Attending Doctor:  Home Bender M.D.                  Allergies:  Cozaar; Darvocet; Lexapro; Lisinopril; Cephalexin; Morphine; Other environmental; Penicillin g potassium; Potassium; and Sulfa drugs            Discharge Instructions       Discharge Instructions    Discharged to home by car with relative. Discharged via wheelchair, hospital escort: Yes.  Special equipment needed: Not Applicable    Be sure to schedule a follow-up appointment with your primary care doctor or any specialists as instructed.     Discharge Plan:   Smoking Cessation Offered: Patient Refused  Influenza Vaccine Indication: Not indicated: Previously immunized this influenza season and > 8 years of age    I understand that a diet low in cholesterol, fat, and sodium is recommended for good health. Unless I have been given specific instructions below for another diet, I accept this instruction as my diet prescription.   Other diet: Cardiac    Special Instructions: None    Metoprolol tablets  What is this medicine?  METOPROLOL (me TOE proe lole) is a beta-blocker. Beta-blockers reduce the workload on the heart and help it to beat more regularly. This medicine is used to treat high blood pressure and to prevent chest pain. It is also used to after a heart attack and to prevent an additional heart attack from occurring.  This medicine may be used for other purposes; ask your health care provider or pharmacist if you have questions.  COMMON BRAND NAME(S): Lopressor  What should I tell my health care provider before I take this medicine?  They need to know if " you have any of these conditions:  -diabetes  -heart or vessel disease like slow heart rate, worsening heart failure, heart block, sick sinus syndrome or Raynaud's disease  -kidney disease  -liver disease  -lung or breathing disease, like asthma or emphysema  -pheochromocytoma  -thyroid disease  -an unusual or allergic reaction to metoprolol, other beta-blockers, medicines, foods, dyes, or preservatives  -pregnant or trying to get pregnant  -breast-feeding  How should I use this medicine?  Take this medicine by mouth with a drink of water. Follow the directions on the prescription label. Take this medicine immediately after meals. Take your doses at regular intervals. Do not take more medicine than directed. Do not stop taking this medicine suddenly. This could lead to serious heart-related effects.  Talk to your pediatrician regarding the use of this medicine in children. Special care may be needed.  Overdosage: If you think you have taken too much of this medicine contact a poison control center or emergency room at once.  NOTE: This medicine is only for you. Do not share this medicine with others.  What if I miss a dose?  If you miss a dose, take it as soon as you can. If it is almost time for your next dose, take only that dose. Do not take double or extra doses.  What may interact with this medicine?  Do not take this medicine with any of the following medications:  -sotalol  This medicine may also interact with the following medications:  -clonidine  -digoxin  -dobutamine  -epinephrine  -isoproterenol  -medicine to control heart rhythm like quinidine, propafenone  -medicine for depression like monoamine oxidase (MAO) inhibitors, fluoxetine, and paroxetine  -medicine for high blood pressure like calcium channel blockers  -reserpine  This list may not describe all possible interactions. Give your health care provider a list of all the medicines, herbs, non-prescription drugs, or dietary supplements you use. Also  tell them if you smoke, drink alcohol, or use illegal drugs. Some items may interact with your medicine.  What should I watch for while using this medicine?  Visit your doctor or health care professional for regular check ups. Contact your doctor right away if your symptoms worsen. Check your blood pressure and pulse rate regularly. Ask your health care professional what your blood pressure and pulse rate should be, and when you should contact them.  You may get drowsy or dizzy. Do not drive, use machinery, or do anything that needs mental alertness until you know how this medicine affects you. Do not sit or stand up quickly, especially if you are an older patient. This reduces the risk of dizzy or fainting spells. Contact your doctor if these symptoms continue. Alcohol may interfere with the effect of this medicine. Avoid alcoholic drinks.  What side effects may I notice from receiving this medicine?  Side effects that you should report to your doctor or health care professional as soon as possible:  -allergic reactions like skin rash, itching or hives  -cold or numb hands or feet  -depression  -difficulty breathing  -faint  -fever with sore throat  -irregular heartbeat, chest pain  -rapid weight gain  -swollen legs or ankles  Side effects that usually do not require medical attention (report to your doctor or health care professional if they continue or are bothersome):  -anxiety or nervousness  -change in sex drive or performance  -dry skin  -headache  -nightmares or trouble sleeping  -short term memory loss  -stomach upset or diarrhea  -unusually tired  This list may not describe all possible side effects. Call your doctor for medical advice about side effects. You may report side effects to FDA at 6-103-FDA-8995.  Where should I keep my medicine?  Keep out of the reach of children.  Store at room temperature between 15 and 30 degrees C (59 and 86 degrees F). Throw away any unused medicine after the expiration  date.  NOTE: This sheet is a summary. It may not cover all possible information. If you have questions about this medicine, talk to your doctor, pharmacist, or health care provider.  © 2014, [employer]. (2/27/2009 4:11:19 PM)    Cellulitis  Cellulitis is an infection of the skin and the tissue beneath it. The infected area is usually red and tender. Cellulitis occurs most often in the arms and lower legs.   CAUSES   Cellulitis is caused by bacteria that enter the skin through cracks or cuts in the skin. The most common types of bacteria that cause cellulitis are staphylococci and streptococci.  SIGNS AND SYMPTOMS    · Redness and warmth.  · Swelling.  · Tenderness or pain.  · Fever.  DIAGNOSIS   Your health care provider can usually determine what is wrong based on a physical exam. Blood tests may also be done.  TREATMENT   Treatment usually involves taking an antibiotic medicine.  HOME CARE INSTRUCTIONS   · Take your antibiotic medicine as directed by your health care provider. Finish the antibiotic even if you start to feel better.  · Keep the infected arm or leg elevated to reduce swelling.  · Apply a warm cloth to the affected area up to 4 times per day to relieve pain.  · Take medicines only as directed by your health care provider.  · Keep all follow-up visits as directed by your health care provider.  SEEK MEDICAL CARE IF:   · You notice red streaks coming from the infected area.  · Your red area gets larger or turns dark in color.  · Your bone or joint underneath the infected area becomes painful after the skin has healed.  · Your infection returns in the same area or another area.  · You notice a swollen bump in the infected area.  · You develop new symptoms.  · You have a fever.  SEEK IMMEDIATE MEDICAL CARE IF:   · You feel very sleepy.  · You develop vomiting or diarrhea.  · You have a general ill feeling (malaise) with muscle aches and pains.  MAKE SURE YOU:   · Understand these  instructions.  · Will watch your condition.  · Will get help right away if you are not doing well or get worse.     This information is not intended to replace advice given to you by your health care provider. Make sure you discuss any questions you have with your health care provider.     Document Released: 09/27/2006 Document Revised: 01/08/2016 Document Reviewed: 03/04/2013  [employer] Interactive Patient Education ©2016 [employer] Inc.    MRSA Infection, Adult  MRSA stands for methicillin-resistant Staphylococcus aureus. This type of infection is caused by Staphylococcus aureus bacteria that are no longer affected by the medicines used to kill them (drug resistant). Staphylococcus (staph) bacteria are normally found on the skin or in the nose of healthy people. In most cases, these bacteria do not cause infection. But if these resistant bacteria enter your body through a cut or sore, they can cause a serious infection on your skin or in other parts of your body. There is a slight chance that the staph on your skin or in your nose is MRSA.  There are two types of MRSA infections:  · Hospital-acquired MRSA is bacteria that you get in the hospital.  · Community-acquired MRSA is bacteria that you get somewhere other than in a hospital.  RISK FACTORS  Hospital-acquired MRSA is more common. You could be at risk for this infection if you are in the hospital and you:  · Have surgery or a procedure.  · Have an IV access or a catheter tube placed in your body.  · Have weak resistance to germs (weakened immune system).  · Are elderly.  · Are on kidney dialysis.  You could be at risk for community-acquired MRSA if you have a break in your skin and come into contact with MRSA. This may happen if you:  · Play sports where there is skin-to-skin contact.  · Live in a crowded setting, like a dormitory or a [employer].  · Share towels, razors, or sports equipment with other people.  SYMPTOMS   Symptoms of hospital-acquired  MRSA depend on where MRSA has spread. Symptoms may include:  · Wound infection.  · Skin infection.  · Rash.  · Pneumonia.  · Fever and chills.  · Difficulty breathing.  · Chest pain.   Community-acquired MRSA is most likely to start as a scratch or cut that becomes infected. Symptoms may include:  · A pus-filled pimple.  · A boil on your skin.  · Pus draining from your skin.  · A sore (abscess) under your skin or somewhere in your body.  · Fever with or without chills.  DIAGNOSIS   The diagnosis of MRSA is made by taking a sample from an infected area and sending it to a lab for testing. A  can grow (culture) MRSA and check it under a microscope. The cultured MRSA can be tested to see which type of antibiotic medicine will work to treat it. Newer tests can identify MRSA more quickly by testing bacteria samples for MRSA genes. Your health care provider can diagnose MRSA using samples from:   · Cuts or wounds in infected areas.  · Nasal swabs.  · Saliva or cough specimens from deep in the lungs (sputum).  · Urine.  · Blood.  You may also have:  · Imaging studies (such as X-ray or MRI) to check if the infection has spread to the lungs, bones, or joints.  · A culture and sensitivity test of blood or fluids from inside the joints.  TREATMENT   Treatment depends on how severe, deep, or extensive the infection is. Very bad infections may require a hospital stay.  · Some skin infections, such as a small boil or sore (abscess), may be treated by draining pus from the site of the infection.  · More extensive surgery to drain pus may be necessary for deeper or more widespread soft tissue infections.  · You may then have to take antibiotic medicine given by mouth or through a vein. You may start antibiotic treatment right away or after testing can be done to see what antibiotic medicine should be used.  HOME CARE INSTRUCTIONS   · Take your antibiotics as directed by your health care provider. Take the medicine as  prescribed until it is finished.  · Avoid close contact with those around you as much as possible. Do not use towels, razors, toothbrushes, bedding, or other items that will be used by others.  · Wash your hands frequently for 15 seconds with soap and water. Dry your hands with a clean or disposable towel.  · When you are not able to wash your hands, use hand  that is more than 60 percent alcohol.  · Wash towels, sheets, or clothes in the washing machine with detergent and hot water. Dry them in a hot dryer.  · Follow your health care provider's instructions for wound care. Wash your hands before and after changing your bandages.  · Always shower after exercising.  · Keep all cuts and scrapes clean and covered with a bandage.  · Be sure to tell all your health care providers that you have MRSA so they are aware of your infection.  SEEK MEDICAL CARE IF:  · You have a cut, scrape, pimple, or boil that becomes red, swollen, or painful or has pus in it.  · You have pus draining from your skin.  · You have an abscess under your skin or somewhere in your body.  SEEK IMMEDIATE MEDICAL CARE IF:   · You have symptoms of a skin infection with a fever or chills.  · You have trouble breathing.  · You have chest pain.  · You have a skin wound and you become nauseous or start vomiting.  MAKE SURE YOU:  · Understand these instructions.  · Will watch your condition.  · Will get help right away if you are not doing well or get worse.     This information is not intended to replace advice given to you by your health care provider. Make sure you discuss any questions you have with your health care provider.     Document Released: 12/18/2006 Document Revised: 05/03/2016 Document Reviewed: 10/10/2014  [employer] Interactive Patient Education ©2016 [employer]      · Is patient discharged on Warfarin / Coumadin?   No     · Is patient Post Blood Transfusion?  No    Depression / Suicide Risk    As you are discharged from this  [facility], it is important to learn how to keep safe from harming yourself.    Recognize the warning signs:  · Abrupt changes in personality, positive or negative- including increase in energy   · Giving away possessions  · Change in eating patterns- significant weight changes-  positive or negative  · Change in sleeping patterns- unable to sleep or sleeping all the time   · Unwillingness or inability to communicate  · Depression  · Unusual sadness, discouragement and loneliness  · Talk of wanting to die  · Neglect of personal appearance   · Rebelliousness- reckless behavior  · Withdrawal from people/activities they love  · Confusion- inability to concentrate     If you or a loved one observes any of these behaviors or has concerns about self-harm, here's what you can do:  · Talk about it- your feelings and reasons for harming yourself  · Remove any means that you might use to hurt yourself (examples: pills, rope, extension cords, firearm)  · Get professional help from the community (Mental Health, Substance Abuse, psychological counseling)  · Do not be alone:Call your Safe Contact- someone whom you trust who will be there for you.  · Call your local CRISIS HOTLINE 278-7179 or 487-365-7221  · Call your local Children's Mobile Crisis Response Team [place] (202) 942-0853 or www.medidametrics  · Call the toll free National Suicide Prevention Hotlines   · National Suicide Prevention Lifeline 035-001-GTAF (8313)  · National Hope Line Network 800-SUICIDE (883-9702)        Your appointments     Feb 20, 2018  1:20 PM   Follow Up Visit with Melissa P Bloch, M.D.   [facility] (--)    [address], [address]  [facility] [address] [phone]           You will be receiving a confirmation call a few days before your appointment from our automated call confirmation system.              Follow-up Information     1. Follow up with Adriano Bahena M.D. In 3 days.    Specialty:  Internal  Medicine    Why:  For lab work, [facility]    Contact information    [address]  [address]  [place] [address]  [phone]           Discharge Medication Instructions:    Below are the medications your physician expects you to take upon discharge:    Review all your home medications and newly ordered medications with your doctor and/or pharmacist. Follow medication instructions as directed by your doctor and/or pharmacist.    Please keep your medication list with you and share with your physician.               Medication List      START taking these medications        Instructions    Morning Afternoon Evening Bedtime    clindamycin 300 MG Caps   Commonly known as:  CLEOCIN   Next Dose Due:  Tonight 8/19        Take 1 Cap by mouth 3 times a day.   Dose:  300 mg                        metoprolol 25 MG Tabs   Last time this was given:  12.5 mg on 8/18/2017  6:56 PM   Commonly known as:  LOPRESSOR   Next Dose Due:  Tonight 8/19          Doctor's comments:  Do not take if you are feeling dizzy/lightheaded or if you have a slow heart rate (<60 bpm)   Take 0.5 Tabs by mouth 2 Times a Day.   Dose:  12.5 mg                          CONTINUE taking these medications        Instructions    Morning Afternoon Evening Bedtime    acetaminophen/caffeine/butalbital 325-40-50 mg -40 MG Tabs   Last time this was given:  1 Tab on 8/17/2017  8:24 PM   Commonly known as:  FIORICET        TK 1 T PO  Q 4 H PRN                        albuterol-ipratropium  MCG/ACT Aero   Commonly known as:  COMBIVENT   Next Dose Due:  Tonight 8/19        Inhale 2 Puffs by mouth 4 times a day.   Dose:  2 Puff                        amiodarone 200 MG Tabs   Last time this was given:  200 mg on 8/18/2017  9:00 PM   Commonly known as:  CORDARONE   Next Dose Due:  Tonight 8/19          Take 1 Tab by mouth every day.   Dose:  200 mg                        aspirin 325 MG Tabs   Last time this was given:  325 mg on 8/18/2017  9:05 PM   Commonly known as:   ASA   Next Dose Due:  Tomorrow 8/20        Take 1 Tab by mouth every day.   Dose:  325 mg                        buPROPion  MG Tb12 sustained-release tablet   Last time this was given:  150 mg on 8/19/2017  8:12 AM   Commonly known as:  WELLBUTRIN-SR   Next Dose Due:  Tonight 8/19        Take 1 Tab by mouth 2 times a day.   Dose:  150 mg                        calcium-vitamin D 500-200 MG-UNIT Tabs   Commonly known as:  OSCAL 500 +D   Next Dose Due:  Tonight        Take 1 Tab by mouth 2 times a day, with meals.   Dose:  1 Tab                        cetirizine 10 MG Tabs   Last time this was given:  5 mg on 8/18/2017  6:56 PM   Commonly known as:  ZYRTEC   Next Dose Due:  As needed        TAKE 1 TAB BY MOUTH 1 TIME DAILY AS NEEDED FOR ALLERGIES.                        clopidogrel 75 MG Tabs   Last time this was given:  75 mg on 8/19/2017  8:13 AM   Commonly known as:  PLAVIX   Next Dose Due:  Tomorrow 8/20        Take 1 Tab by mouth every day.   Dose:  75 mg                        duloxetine 20 MG Cpep   Last time this was given:  20 mg on 8/19/2017  8:13 AM   Commonly known as:  CYMBALTA   Next Dose Due:  Tomorrow 8/20        Take 1 Cap by mouth every day.   Dose:  20 mg                        ferrous sulfate 325 (65 Fe) MG tablet   Last time this was given:  325 mg on 8/19/2017  8:13 AM   Next Dose Due:  Tomorrow 8/20          Take 325 mg by mouth every day.   Dose:  325 mg                        fluticasone 50 MCG/ACT nasal spray   Last time this was given:  50 mcg on 8/19/2017  8:13 AM   Commonly known as:  FLONASE   Next Dose Due:  Tomorrow        Spray 1 Spray in nose every day.   Dose:  1 Spray                        furosemide 20 MG Tabs   Last time this was given:  20 mg on 8/17/2017  5:59 PM   Commonly known as:  LASIX   Next Dose Due:  Tomorrow        Take 1 Tab by mouth every day.   Dose:  20 mg                        gabapentin 600 MG tablet   Commonly known as:  NEURONTIN   Next Dose Due:  Tonight         Take 1 Tab by mouth 2 times a day.   Dose:  600 mg                        hydrocodone-acetaminophen 7.5-325 MG per tablet   Start taking on:  9/2/2017   Last time this was given:  1 Tab on 8/19/2017  6:19 AM   Commonly known as:  NORCO   Next Dose Due:  As needed        Take 1 Tab by mouth every 8 hours as needed for Severe Pain.   Dose:  1 Tab                        insulin glargine 100 UNIT/ML Sopn injection   Last time this was given:  8 Units on 8/18/2017 11:19 PM   Commonly known as:  LANTUS SOLOSTAR   Next Dose Due:  Tonight        INJECT 20 UNITS SUBCUTANEOUSLY DAILY AT BEDTIME AS DIRECTED                        metformin 1000 MG tablet   Commonly known as:  GLUCOPHAGE   Next Dose Due:  Tonight        Take 1 Tab by mouth 2 times a day.   Dose:  1000 mg                        nitroglycerin 0.4 MG Subl   Commonly known as:  NITROSTAT        Place 1 Tab under tongue as needed for Chest Pain. Max dose of 3 in 24 hrs until need for ED.   Dose:  0.4 mg                        ondansetron 4 MG Tbdp   Commonly known as:  ZOFRAN ODT   Next Dose Due:  As needed        Take 1 Tab by mouth every 8 hours as needed for Nausea/Vomiting.   Dose:  4 mg                        pantoprazole 40 MG Tbec   Commonly known as:  PROTONIX   Next Dose Due:  Tomorrow        Take 1 Tab by mouth every day.   Dose:  40 mg                        ropinirole 1 MG Tabs   Last time this was given:  1 mg on 8/18/2017  9:05 PM   Commonly known as:  REQUIP   Next Dose Due:  Tonight        TAKE 1 TABLET BY MOUTH TWICE DAILY                        rosuvastatin 40 MG tablet   Last time this was given:  40 mg on 8/18/2017  9:05 PM   Commonly known as:  CRESTOR   Next Dose Due:  Tonight        Take 1 Tab by mouth every day.   Dose:  40 mg                        topiramate 100 MG Tabs   Last time this was given:  100 mg on 8/19/2017  8:13 AM   Commonly known as:  TOPAMAX   Next Dose Due:  Tonight        Take 1 Tab by mouth every 12 hours.   Dose:   100 mg                          STOP taking these medications     carvedilol 12.5 MG Tabs   Commonly known as:  COREG                    Where to Get Your Medications      Information about where to get these medications is not yet available     ! Ask your nurse or doctor about these medications    - albuterol-ipratropium  MCG/ACT Aero  - clindamycin 300 MG Caps  - metoprolol 25 MG Tabs            Instructions           Diet / Nutrition:    Follow any diet instructions given to you by your doctor or the dietician, including how much salt (sodium) you are allowed each day.    If you are overweight, talk to your doctor about a weight reduction plan.    Activity:    Remain physically active following your doctor's instructions about exercise and activity.    Rest often.     Any time you become even a little tired or short of breath, SIT DOWN and rest.    Worsening Symptoms:    Report any of the following signs and symptoms to the doctor's office immediately:    *Pain of jaw, arm, or neck  *Chest pain not relieved by medication                               *Dizziness or loss of consciousness  *Difficulty breathing even when at rest   *More tired than usual                                       *Bleeding drainage or swelling of surgical site  *Swelling of feet, ankles, legs or stomach                 *Fever (>100ºF)  *Pink or blood tinged sputum  *Weight gain (3lbs/day or 5lbs /week)           *Shock from internal defibrillator (if applicable)  *Palpitations or irregular heartbeats                *Cool and/or numb extremities    Stroke Awareness    Common Risk Factors for Stroke include:    Age  Atrial Fibrillation  Carotid Artery Stenosis  Diabetes Mellitus  Excessive alcohol consumption  High blood pressure  Overweight   Physical inactivity  Smoking    Warning signs and symptoms of a stroke include:    *Sudden numbness or weakness of the face, arm or leg (especially on one side of the body).  *Sudden confusion,  trouble speaking or understanding.  *Sudden trouble seeing in one or both eyes.  *Sudden trouble walking, dizziness, loss of balance or coordination.Sudden severe headache with no known cause.    It is very important to get treatment quickly when a stroke occurs. If you experience any of the above warning signs, call 911 immediately.                   Disclaimer         Quit Smoking / Tobacco Use:    I understand the use of any tobacco products increases my chance of suffering from future heart disease or stroke and could cause other illnesses which may shorten my life. Quitting the use of tobacco products is the single most important thing I can do to improve my health. For further information on smoking / tobacco cessation call a Toll Free Quit Line at 1-802.136.3836 (*[employer]) or 1-680.416.7681 (American [employer] Association) or you can access the web based program at www.lungQueue-it.org.    [place] Tobacco Users Help Line:  (121) 522-1697       Toll Free: 1-836.341.2195  Quit Tobacco Program [facility] (738)571-9126    Crisis Hotline:    [place] Crisis Hotline:  1-439-JJXVBPU or 1-693.698.8346    [place] Crisis Hotline:    1-461.487.4151 or 475-369-9259    Discharge Survey:   Thank you for choosing [facility]. We hope we did everything we could to make your hospital stay a pleasant one. You may be receiving a phone survey and we would appreciate your time and participation in answering the questions. Your input is very valuable to us in our efforts to improve our service to our patients and their families.        My signature on this form indicates that:    1. I have reviewed and understand the above information.  2. My questions regarding this information have been answered to my satisfaction.  3. I have formulated a plan with my discharge nurse to obtain my prescribed medications for home.                  Disclaimer         __________________________________                      __________       ________                       Patient Signature                                                 Date                    Time

## 2017-08-17 NOTE — ED NOTES
Pt aware of admission.  Seen by admitting md.  Wound to head and behind left ear drained by erp.
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
## 2017-08-17 NOTE — SENIOR ADMIT NOTE
66 yo female with diabetes who presents with multiple abscesses on her forehead, scalp and behind her ears. S/p I&D in the ED. Will be admitting for concerns of facial cellulitis.     PMhx significant for TIA (2/2016, possible TIA 4/2017), stroke with left side residual weakness in December 2016 (CTA showed plaques bilaterally at bifurcation of carotid from [place]), CAD s/p CABG 4v in 1998 and AICD (following q6mon for device check, no recent issues with it), DM2 (on lantus 15u qhs and metformin), HTN, Migraine, RLS, COPD (2L O2 at home), afib on amiodarone, GERD, depression and polyarthralgia.    PE: thin female, appears older than her stated age. Abscess around 2cm on her R forehead with erythema around it, about 1cm lesions x2 on her scalp and about 2 cm pustule on the back of her L ear.     Plan:  -wound cx, blood cx, ua ordered.   -vancomycin + unasyn IV. Patient had reported allergies, however after discussion with pharmacy noted that pt had received in 2015 w/o any reaction.   -telemetry monitoring given her significant cardiac hx  -restart home medications  -will start her at a slightly reduced dose of lantus  -IVF

## 2017-08-17 NOTE — ED NOTES
Chief Complaint   Patient presents with   • Skin Lesion     Patient states painful skin lesions to head and behind ears starting 3 days ago. States pus in lesions. AAOx4, told to inform RN of changes.

## 2017-08-17 NOTE — IP AVS SNAPSHOT
Emtrics Access Code: Activation code not generated  Current Emtrics Status: Patient Declined    Your email address is not on file at [employer].  Email Addresses are required for you to sign up for Emtrics, please contact [phone] to verify your personal information and to provide your email address prior to attempting to register for Emtrics.    Leena Bella  [address] [place] [address]    Emtrics  A secure, online tool to manage your health information     [employer]’s Emtrics® is a secure, online tool that connects you to your personalized health information from the privacy of your home -- day or night - making it very easy for you to manage your healthcare. Once the activation process is completed, you can even access your medical information using the Emtrics jaki, which is available for free in the Apple Jaki store or Google Play store.     To learn more about Emtrics, visit [email or web address]    There are two levels of access available (as shown below):   My Chart Features  [facility] Primary Care Doctor [facility]  Specialists [facility]  Urgent  Care Non-[facility] Primary Care Doctor   Email your healthcare team securely and privately 24/7 X X X    Manage appointments: schedule your next appointment; view details of past/upcoming appointments X      Request prescription refills. X      View recent personal medical records, including lab and immunizations X X X X   View health record, including health history, allergies, medications X X X X   Read reports about your outpatient visits, procedures, consult and ER notes X X X X   See your discharge summary, which is a recap of your hospital and/or ER visit that includes your diagnosis, lab results, and care plan X X  X     How to register for Eyepict:  Once your e-mail address has been verified, follow the following steps to sign up for Eyepict.     1. Go to  [email or web address]  2. Click on the Sign Up Now box, which takes you to the New  Member Sign Up page. You will need to provide the following information:  a. Enter your [employer] Access Code exactly as it appears at the top of this page. (You will not need to use this code after you’ve completed the sign-up process. If you do not sign up before the expiration date, you must request a new code.)   b. Enter your date of birth.   c. Enter your home email address.   d. Click Submit, and follow the next screen’s instructions.  3. Create a [employer] [place] [employer] ID. This will be your [employer] login ID and cannot be changed, so think of one that is secure and easy to remember.  4. Create a [employer] password. You can change your password at any time.  5. Enter your Password Reset Question and Answer. This can be used at a later time if you forget your password.   6. Enter your e-mail address. This allows you to receive e-mail notifications when new information is available in [employer].  7. Click Sign Up. You can now view your health information.    For assistance activating your [employer] account, call [phone]
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
## 2017-08-18 LAB
ALBUMIN SERPL BCP-MCNC: 3.1 G/DL (ref 3.2–4.9)
ALBUMIN/GLOB SERPL: 1.1 G/DL
ALP SERPL-CCNC: 67 U/L (ref 30–99)
ALT SERPL-CCNC: <5 U/L (ref 2–50)
ANION GAP SERPL CALC-SCNC: 6 MMOL/L (ref 0–11.9)
AST SERPL-CCNC: 6 U/L (ref 12–45)
BASOPHILS # BLD AUTO: 0.6 % (ref 0–1.8)
BASOPHILS # BLD: 0.06 K/UL (ref 0–0.12)
BILIRUB SERPL-MCNC: 0.3 MG/DL (ref 0.1–1.5)
BUN SERPL-MCNC: 21 MG/DL (ref 8–22)
CALCIUM SERPL-MCNC: 9.9 MG/DL (ref 8.5–10.5)
CHLORIDE SERPL-SCNC: 108 MMOL/L (ref 96–112)
CO2 SERPL-SCNC: 23 MMOL/L (ref 20–33)
CREAT SERPL-MCNC: 1 MG/DL (ref 0.5–1.4)
EOSINOPHIL # BLD AUTO: 0.24 K/UL (ref 0–0.51)
EOSINOPHIL NFR BLD: 2.2 % (ref 0–6.9)
ERYTHROCYTE [DISTWIDTH] IN BLOOD BY AUTOMATED COUNT: 46.4 FL (ref 35.9–50)
GFR SERPL CREATININE-BSD FRML MDRD: 55 ML/MIN/1.73 M 2
GLOBULIN SER CALC-MCNC: 2.9 G/DL (ref 1.9–3.5)
GLUCOSE BLD-MCNC: 110 MG/DL (ref 65–99)
GLUCOSE BLD-MCNC: 119 MG/DL (ref 65–99)
GLUCOSE BLD-MCNC: 139 MG/DL (ref 65–99)
GLUCOSE BLD-MCNC: 280 MG/DL (ref 65–99)
GLUCOSE SERPL-MCNC: 115 MG/DL (ref 65–99)
HCT VFR BLD AUTO: 38.3 % (ref 37–47)
HGB BLD-MCNC: 12.5 G/DL (ref 12–16)
IMM GRANULOCYTES # BLD AUTO: 0.05 K/UL (ref 0–0.11)
IMM GRANULOCYTES NFR BLD AUTO: 0.5 % (ref 0–0.9)
LYMPHOCYTES # BLD AUTO: 1.68 K/UL (ref 1–4.8)
LYMPHOCYTES NFR BLD: 15.6 % (ref 22–41)
MCH RBC QN AUTO: 30.3 PG (ref 27–33)
MCHC RBC AUTO-ENTMCNC: 32.6 G/DL (ref 33.6–35)
MCV RBC AUTO: 92.7 FL (ref 81.4–97.8)
MONOCYTES # BLD AUTO: 1.19 K/UL (ref 0–0.85)
MONOCYTES NFR BLD AUTO: 11.1 % (ref 0–13.4)
NEUTROPHILS # BLD AUTO: 7.54 K/UL (ref 2–7.15)
NEUTROPHILS NFR BLD: 70 % (ref 44–72)
NRBC # BLD AUTO: 0 K/UL
NRBC BLD AUTO-RTO: 0 /100 WBC
PLATELET # BLD AUTO: 190 K/UL (ref 164–446)
PMV BLD AUTO: 10.5 FL (ref 9–12.9)
POTASSIUM SERPL-SCNC: 3.4 MMOL/L (ref 3.6–5.5)
PROT SERPL-MCNC: 6 G/DL (ref 6–8.2)
RBC # BLD AUTO: 4.13 M/UL (ref 4.2–5.4)
SODIUM SERPL-SCNC: 137 MMOL/L (ref 135–145)
WBC # BLD AUTO: 10.8 K/UL (ref 4.8–10.8)

## 2017-08-18 PROCEDURE — 36415 COLL VENOUS BLD VENIPUNCTURE: CPT

## 2017-08-18 PROCEDURE — A9270 NON-COVERED ITEM OR SERVICE: HCPCS | Performed by: STUDENT IN AN ORGANIZED HEALTH CARE EDUCATION/TRAINING PROGRAM

## 2017-08-18 PROCEDURE — 700101 HCHG RX REV CODE 250: Performed by: STUDENT IN AN ORGANIZED HEALTH CARE EDUCATION/TRAINING PROGRAM

## 2017-08-18 PROCEDURE — 700102 HCHG RX REV CODE 250 W/ 637 OVERRIDE(OP): Performed by: STUDENT IN AN ORGANIZED HEALTH CARE EDUCATION/TRAINING PROGRAM

## 2017-08-18 PROCEDURE — 700105 HCHG RX REV CODE 258

## 2017-08-18 PROCEDURE — 700105 HCHG RX REV CODE 258: Performed by: STUDENT IN AN ORGANIZED HEALTH CARE EDUCATION/TRAINING PROGRAM

## 2017-08-18 PROCEDURE — 770020 HCHG ROOM/CARE - TELE (206)

## 2017-08-18 PROCEDURE — 700111 HCHG RX REV CODE 636 W/ 250 OVERRIDE (IP): Performed by: STUDENT IN AN ORGANIZED HEALTH CARE EDUCATION/TRAINING PROGRAM

## 2017-08-18 PROCEDURE — 99232 SBSQ HOSP IP/OBS MODERATE 35: CPT | Mod: GC | Performed by: INTERNAL MEDICINE

## 2017-08-18 PROCEDURE — 85025 COMPLETE CBC W/AUTO DIFF WBC: CPT

## 2017-08-18 PROCEDURE — 82962 GLUCOSE BLOOD TEST: CPT | Mod: 91

## 2017-08-18 PROCEDURE — 700111 HCHG RX REV CODE 636 W/ 250 OVERRIDE (IP): Performed by: INTERNAL MEDICINE

## 2017-08-18 PROCEDURE — 80053 COMPREHEN METABOLIC PANEL: CPT

## 2017-08-18 PROCEDURE — 700111 HCHG RX REV CODE 636 W/ 250 OVERRIDE (IP)

## 2017-08-18 RX ORDER — POTASSIUM CHLORIDE 20 MEQ/1
40 TABLET, EXTENDED RELEASE ORAL 2 TIMES DAILY
Status: COMPLETED | OUTPATIENT
Start: 2017-08-18 | End: 2017-08-18

## 2017-08-18 RX ORDER — NICOTINE 21 MG/24HR
14 PATCH, TRANSDERMAL 24 HOURS TRANSDERMAL
Status: DISCONTINUED | OUTPATIENT
Start: 2017-08-18 | End: 2017-08-19 | Stop reason: HOSPADM

## 2017-08-18 RX ORDER — INSULIN GLARGINE 100 [IU]/ML
8 INJECTION, SOLUTION SUBCUTANEOUS EVERY EVENING
Status: DISCONTINUED | OUTPATIENT
Start: 2017-08-18 | End: 2017-08-19 | Stop reason: HOSPADM

## 2017-08-18 RX ORDER — CARVEDILOL 3.12 MG/1
3.12 TABLET ORAL 2 TIMES DAILY WITH MEALS
Status: DISCONTINUED | OUTPATIENT
Start: 2017-08-18 | End: 2017-08-18

## 2017-08-18 RX ORDER — CARVEDILOL 6.25 MG/1
6.25 TABLET ORAL 2 TIMES DAILY
Status: DISCONTINUED | OUTPATIENT
Start: 2017-08-18 | End: 2017-08-18

## 2017-08-18 RX ADMIN — DULOXETINE HYDROCHLORIDE 20 MG: 20 CAPSULE, DELAYED RELEASE ORAL at 11:02

## 2017-08-18 RX ADMIN — BUPROPION HYDROCHLORIDE 150 MG: 150 TABLET, EXTENDED RELEASE ORAL at 09:03

## 2017-08-18 RX ADMIN — AMPICILLIN SODIUM AND SULBACTAM SODIUM 3 G: 2; 1 INJECTION, POWDER, FOR SOLUTION INTRAMUSCULAR; INTRAVENOUS at 11:00

## 2017-08-18 RX ADMIN — CALCIUM CARBONATE-CHOLECALCIFEROL TAB 250 MG-125 UNIT 2 TABLET: 250-125 TAB at 17:37

## 2017-08-18 RX ADMIN — METOPROLOL TARTRATE 12.5 MG: 25 TABLET, FILM COATED ORAL at 18:56

## 2017-08-18 RX ADMIN — AMPICILLIN SODIUM AND SULBACTAM SODIUM 3 G: 2; 1 INJECTION, POWDER, FOR SOLUTION INTRAMUSCULAR; INTRAVENOUS at 05:42

## 2017-08-18 RX ADMIN — CALCIUM CARBONATE-CHOLECALCIFEROL TAB 250 MG-125 UNIT 2 TABLET: 250-125 TAB at 09:00

## 2017-08-18 RX ADMIN — ROPINIROLE HYDROCHLORIDE 1 MG: 0.5 TABLET, FILM COATED ORAL at 21:05

## 2017-08-18 RX ADMIN — TOPIRAMATE 100 MG: 100 TABLET ORAL at 21:06

## 2017-08-18 RX ADMIN — CLOPIDOGREL 75 MG: 75 TABLET, FILM COATED ORAL at 09:00

## 2017-08-18 RX ADMIN — CETIRIZINE HYDROCHLORIDE 5 MG: 10 TABLET, FILM COATED ORAL at 18:56

## 2017-08-18 RX ADMIN — ROSUVASTATIN CALCIUM 40 MG: 20 TABLET, FILM COATED ORAL at 21:05

## 2017-08-18 RX ADMIN — NICOTINE 14 MG: 14 PATCH, EXTENDED RELEASE TRANSDERMAL at 08:59

## 2017-08-18 RX ADMIN — FLUTICASONE PROPIONATE 50 MCG: 50 SPRAY, METERED NASAL at 09:03

## 2017-08-18 RX ADMIN — AMPICILLIN SODIUM AND SULBACTAM SODIUM 3 G: 2; 1 INJECTION, POWDER, FOR SOLUTION INTRAMUSCULAR; INTRAVENOUS at 17:37

## 2017-08-18 RX ADMIN — BUPROPION HYDROCHLORIDE 150 MG: 150 TABLET, EXTENDED RELEASE ORAL at 21:05

## 2017-08-18 RX ADMIN — POTASSIUM CHLORIDE 40 MEQ: 1500 TABLET, EXTENDED RELEASE ORAL at 21:06

## 2017-08-18 RX ADMIN — GABAPENTIN 600 MG: 300 CAPSULE ORAL at 21:05

## 2017-08-18 RX ADMIN — AMPICILLIN SODIUM AND SULBACTAM SODIUM 3 G: 2; 1 INJECTION, POWDER, FOR SOLUTION INTRAMUSCULAR; INTRAVENOUS at 23:21

## 2017-08-18 RX ADMIN — Medication 325 MG: at 09:00

## 2017-08-18 RX ADMIN — HYDROCODONE BITARTRATE AND ACETAMINOPHEN 1 TABLET: 7.5; 325 TABLET ORAL at 19:26

## 2017-08-18 RX ADMIN — INSULIN GLARGINE 8 UNITS: 100 INJECTION, SOLUTION SUBCUTANEOUS at 23:19

## 2017-08-18 RX ADMIN — ENOXAPARIN SODIUM 40 MG: 100 INJECTION SUBCUTANEOUS at 08:59

## 2017-08-18 RX ADMIN — GABAPENTIN 600 MG: 300 CAPSULE ORAL at 09:00

## 2017-08-18 RX ADMIN — POTASSIUM CHLORIDE 40 MEQ: 1500 TABLET, EXTENDED RELEASE ORAL at 09:00

## 2017-08-18 RX ADMIN — OMEPRAZOLE 20 MG: 20 CAPSULE, DELAYED RELEASE ORAL at 09:00

## 2017-08-18 RX ADMIN — HYDROCODONE BITARTRATE AND ACETAMINOPHEN 1 TABLET: 7.5; 325 TABLET ORAL at 11:06

## 2017-08-18 RX ADMIN — AMIODARONE HYDROCHLORIDE 200 MG: 200 TABLET ORAL at 21:00

## 2017-08-18 RX ADMIN — TOPIRAMATE 100 MG: 100 TABLET ORAL at 09:03

## 2017-08-18 RX ADMIN — VANCOMYCIN HYDROCHLORIDE 800 MG: 100 INJECTION, POWDER, LYOPHILIZED, FOR SOLUTION INTRAVENOUS at 19:14

## 2017-08-18 RX ADMIN — AMPICILLIN SODIUM AND SULBACTAM SODIUM 3 G: 2; 1 INJECTION, POWDER, FOR SOLUTION INTRAMUSCULAR; INTRAVENOUS at 00:36

## 2017-08-18 RX ADMIN — INSULIN LISPRO 3 UNITS: 100 INJECTION, SOLUTION INTRAVENOUS; SUBCUTANEOUS at 11:00

## 2017-08-18 RX ADMIN — ASPIRIN 325 MG: 325 TABLET, COATED ORAL at 21:05

## 2017-08-18 RX ADMIN — HYDROCODONE BITARTRATE AND ACETAMINOPHEN 1 TABLET: 7.5; 325 TABLET ORAL at 02:16

## 2017-08-18 ASSESSMENT — ENCOUNTER SYMPTOMS
TINGLING: 0
BLURRED VISION: 0
NAUSEA: 0
DIZZINESS: 0
HEADACHES: 1
DEPRESSION: 0
PALPITATIONS: 0
CHILLS: 0
ABDOMINAL PAIN: 0
EYE DISCHARGE: 0
TREMORS: 0
HEMOPTYSIS: 0
FEVER: 0
EYE PAIN: 0
HEADACHES: 0
HEARTBURN: 0
BRUISES/BLEEDS EASILY: 0
INSOMNIA: 0
DOUBLE VISION: 0
NECK PAIN: 0
SPEECH CHANGE: 0
WEIGHT LOSS: 0
COUGH: 0
DIARRHEA: 0
MYALGIAS: 0
SENSORY CHANGE: 0
VOMITING: 0

## 2017-08-18 ASSESSMENT — PAIN SCALES - GENERAL
PAINLEVEL_OUTOF10: 7
PAINLEVEL_OUTOF10: 9
PAINLEVEL_OUTOF10: 6
PAINLEVEL_OUTOF10: 10
PAINLEVEL_OUTOF10: 6
PAINLEVEL_OUTOF10: 9
PAINLEVEL_OUTOF10: 9
PAINLEVEL_OUTOF10: 7
PAINLEVEL_OUTOF10: 9

## 2017-08-18 ASSESSMENT — LIFESTYLE VARIABLES
ALCOHOL_USE: NO
EVER_SMOKED: YES

## 2017-08-18 ASSESSMENT — PATIENT HEALTH QUESTIONNAIRE - PHQ9
3. TROUBLE FALLING OR STAYING ASLEEP OR SLEEPING TOO MUCH: SEVERAL DAYS
1. LITTLE INTEREST OR PLEASURE IN DOING THINGS: NOT AT ALL
6. FEELING BAD ABOUT YOURSELF - OR THAT YOU ARE A FAILURE OR HAVE LET YOURSELF OR YOUR FAMILY DOWN: NOT AL ALL
9. THOUGHTS THAT YOU WOULD BE BETTER OFF DEAD, OR OF HURTING YOURSELF: NOT AT ALL
4. FEELING TIRED OR HAVING LITTLE ENERGY: SEVERAL DAYS
5. POOR APPETITE OR OVEREATING: NEARLY EVERY DAY
8. MOVING OR SPEAKING SO SLOWLY THAT OTHER PEOPLE COULD HAVE NOTICED. OR THE OPPOSITE, BEING SO FIGETY OR RESTLESS THAT YOU HAVE BEEN MOVING AROUND A LOT MORE THAN USUAL: NOT AT ALL
2. FEELING DOWN, DEPRESSED, IRRITABLE, OR HOPELESS: SEVERAL DAYS
SUM OF ALL RESPONSES TO PHQ9 QUESTIONS 1 AND 2: 1
SUM OF ALL RESPONSES TO PHQ QUESTIONS 1-9: 6
7. TROUBLE CONCENTRATING ON THINGS, SUCH AS READING THE NEWSPAPER OR WATCHING TELEVISION: NOT AT ALL

## 2017-08-18 NOTE — PROGRESS NOTES
2RN Skin check  Pt has furuncles x4 on right forehead and behind left ear. Pt also has nonblanching redness/discolored area to coccxy (photo taken).  Rest of skin is intact.

## 2017-08-18 NOTE — PROGRESS NOTES
Internal Medicine Interval Note    Name Leena Bella     1950   Age/Sex 67 y.o. female   MRN [id number]   Code Status Full     After 5PM or if no immediate response to page, please call for cross-coverage  Attending/Team: Dr. Bender See Patient List for primary contact information  Call [phone] to page    1st Call - Day Intern (R1): Dr. Villegas 2nd Call - Day Sr. Resident (R2/R3): Dr. Ribeiro     Reason for interval visit  (Principal Problem)   Facial cellulitis    Interval Problem Daily Status Update  (24 hours)   Areas of abscess seem to be improving with the lesions decreasing in size and erythema improving around the forehead, however not completely clear  Preliminary wound cx growing gram positive cocci, final results pending  Will de-escalate antibiotics after final cultures result given concern for possible MRSA  However currently holding coreg as bp has been on the lower side. Will consider restarting metoprolol if elevated HR as lesser effect on BP compared to coreg.     Review of Systems   Constitutional: Negative for fever and chills.   HENT: Negative for ear discharge.    Eyes: Negative for pain and discharge.   Respiratory: Negative for cough and hemoptysis.    Cardiovascular: Negative for chest pain and palpitations.   Gastrointestinal: Negative for heartburn, nausea, vomiting and abdominal pain.   Genitourinary: Negative for dysuria and urgency.   Musculoskeletal: Negative for myalgias and neck pain.   Skin: Positive for rash. Negative for itching.   Neurological: Positive for headaches. Negative for dizziness and tingling.   Psychiatric/Behavioral: Negative for depression and suicidal ideas.       Consultants/Specialty  None    Disposition  Inpatient pending culture results    Quality Measures    Waterman catheter: No Waterman      DVT Prophylaxis: Enoxaparin (Lovenox)              Physical Exam       Filed Vitals:    17 0330 17 0400 17 0738 17 1119    BP: 91/55  89/51 104/62   Pulse: 65  62 64   Temp: 37.1 °C (98.7 °F)  36.7 °C (98 °F) 36.5 °C (97.7 °F)   TempSrc:       Resp: 17  18 16   Height:       Weight:       SpO2: 93% 95% 98% 95%     Body mass index is 22.59 kg/(m^2). Weight: 54.2 kg (119 lb 7.8 oz)  Oxygen Therapy:  Pulse Oximetry: 95 %, O2 (LPM): 2, O2 Delivery: Silicone Nasal Cannula    Physical Exam   Constitutional: She is oriented to person, place, and time and well-developed, well-nourished, and in no distress. No distress.   HENT:   Head: Normocephalic.   Lesions on her R scalp: forming a scab around the wound, 2cm s/p i&d   Eyes: EOM are normal. No scleral icterus.   Neck: No tracheal deviation present. No thyromegaly present.   Cardiovascular: Normal rate and regular rhythm.  Exam reveals no friction rub.    No murmur heard.  Pulmonary/Chest: Effort normal and breath sounds normal. No respiratory distress. She has no wheezes.   Abdominal: Soft. Bowel sounds are normal. She exhibits no distension. There is no tenderness.   Musculoskeletal: She exhibits no edema or tenderness.   Neurological: She is alert and oriented to person, place, and time. No cranial nerve deficit.   Skin: Skin is warm and dry. She is not diaphoretic.   Multiple skin lesions: ~2 on her forehead, erythema slightly better today. Postauricular lesion on her L ear   Psychiatric: Affect and judgment normal.         Lab Data Review:         8/18/2017  12:36 PM    Recent Labs      08/17/17   1230  08/17/17   1658  08/18/17   0404   SODIUM  136   --   137   POTASSIUM  4.2   --   3.4*   CHLORIDE  107   --   108   CO2  22   --   23   BUN  15   --   21   CREATININE  1.01   --   1.00   MAGNESIUM   --   2.1   --    CALCIUM  10.3   --   9.9       Recent Labs      08/17/17   1230  08/18/17   0404   ALTSGPT  6  <5   ASTSGOT  5*  6*   ALKPHOSPHAT  92  67   TBILIRUBIN  0.4  0.3   GLUCOSE  269*  115*       Recent Labs      08/17/17   1230  08/18/17   0404   RBC  4.58  4.13*   HEMOGLOBIN  13.8   12.5   HEMATOCRIT  42.9  38.3   PLATELETCT  211  190       Recent Labs      08/17/17   1230  08/18/17   0404   WBC  12.3*  10.8   NEUTSPOLYS  72.90*  70.00   LYMPHOCYTES  18.20*  15.60*   MONOCYTES  7.10  11.10   EOSINOPHILS  1.00  2.20   BASOPHILS  0.40  0.60   ASTSGOT  5*  6*   ALTSGPT  6  <5   ALKPHOSPHAT  92  67   TBILIRUBIN  0.4  0.3           Assessment/Plan     Facial cellulitis  Assessment & Plan  -3 located on the R forehead and 1 located posteriorly on the L  post-auricular area. Abscess s/p I&D 8/17  Plan:  -Areas seem to be improving with the lesions decreasing in size and erythema improving around the forehead  -Continue IV antibiotics with unasyn and vancomycin: preliminary cultures showing gram positive cocci  -Will await wound cultures to de-escalate antibiotics.     Atrial fibrillation  -No longer on anticoagulation due to noncompliance with Coumadin  -Restarted home Amiodarone and coreg, however currently holding coreg as bp has been on the lower side.   -consider restarting metoprolol if elevated HR  -Last EKG in April 2017 was NSR  -Will continue to monitor     DM2  -Last HA1c on 8/15/2017 was 10.6  -BG elevated  -Holding home metformin  -Lantus increased to 8 u, sSI  -Start POC glucose checks and hypoglycemia protocol    CAD/CHF/CVA     -PMH of CAD (CABG x4 in 1998, AICD in 2011), CVA in December 2016, and three TIAs in 2017 (most recent in April 2017)  -Started home med regimen    Migranes  -Started home Fioricet PRN for onset of symptoms     Anxiety/Depression  -Started home psych med regimen    GERD  -Started home PPI    HTN  -BP stable wnl  -Started home anti-HTN regimen

## 2017-08-18 NOTE — PROGRESS NOTES
"Assumed pt care at 1900, bedside report received. Pt in no distress.  A&Ox4.  Complaining of head and back pain rating at \"9 out of 10.\"  No further complaints.  VSS.  Bed locked in lowest position and call light within reach.  Will continue to monitor and follow plan of care.    "

## 2017-08-18 NOTE — ED NOTES
Pt with pain starting to improve.  Wounds with no new drainage.  Sister at bedside.  Pt aware waiting for transport.

## 2017-08-18 NOTE — PROGRESS NOTES
"Pharmacy Kinetics 67 y.o. female on vancomycin day # 1 2017    Currently on Vancomycin 1300 mg IV loading dose    Indication for Treatment: SSTI    Pertinent history per medical record: Admitted on 2017 with multiple scalp pustules. Pustules were lanced and fluid sent for culture. Patient is afebrile but with elevated WBC. Started on empiric antibiotics for skin and soft tissue infection.    Other antibiotics: Unasyn 3 g IV q6 hrs    Allergies: Cozaar; Darvocet; Lexapro; Lisinopril; Cephalexin; Morphine; Other environmental; Penicillin g potassium; Potassium; and Sulfa drugs     List concerns for renal function: age, CHF    Pertinent cultures to date:   none    Recent Labs      17   1230   WBC  12.3*   NEUTSPOLYS  72.90*     Recent Labs      08/15/17   1030  17   1230   BUN  17  15   CREATININE  0.96  1.01   ALBUMIN  4.0  3.4     No results for input(s): VANCOTROUGH, VANCOPEAK, VANCORANDOM in the last 72 hours.No intake or output data in the 24 hours ending 17 1757   Blood pressure 141/62, pulse 80, temperature 36.5 °C (97.7 °F), temperature source Temporal, resp. rate 18, height 1.549 m (5' 0.98\"), weight 51.256 kg (113 lb), last menstrual period 06/15/1996, SpO2 94 %. Temp (24hrs), Av.8 °C (98.3 °F), Min:36.5 °C (97.7 °F), Max:37.2 °C (99 °F)      A/P   1. Vancomycin dose change: initiate 800 mg q 24 hrs (first dose  @1600)  2. Next vancomycin level: 2-3 days  3. Goal trough: 12-16 mcg/mL  4. Comments: Starting vanco 15 mg/kg q24 hrs in this patient with minimal concern for accumulation. Pharmacy to follow cultures and renal function.    Roc Morgan, PharmD        "

## 2017-08-18 NOTE — CARE PLAN
Problem: Safety  Goal: Will remain free from injury  Outcome: PROGRESSING AS EXPECTED  Patient educated and compliant on using call light before getting out of bed

## 2017-08-18 NOTE — DIETARY
NUTRITION SERVICES - Potential for newly identified wound. Wound team consult pending, will await wound staging to make recommendations. RD will monitor per dept policy.

## 2017-08-18 NOTE — DISCHARGE PLANNING
Care Transition Team Assessment    Met with pt at bedside, pt states she will discharge home with no needs. Pt lives with sister and drives. Pt on home oxygen with [employer].     Information Source  Orientation : Oriented x 4  Information Given By: Patient  Informant's Name: Leena Bella  Who is responsible for making decisions for patient? : Patient    Readmission Evaluation  Is this a readmission?: No    Elopement Risk  Legal Hold: No  Ambulatory or Self Mobile in Wheelchair: Yes  Disoriented: No  Psychiatric Symptoms: None  History of Wandering: No  Elopement this Admit: No  Vocalizing Wanting to Leave: No  Displays Behaviors, Body Language Wanting to Leave: No-Not at Risk for Elopement  Elopement Risk: Not at Risk for Elopement         Discharge Preparedness  What is your plan after discharge?: Home with help  What are your discharge supports?: Sibling  Prior Functional Level: Ambulatory, Drives Self, Independent with Activities of Daily Living, Independent with Medication Management, Uses Cane  Difficulity with ADLs: Walking  Difficulty with ADLs Comment: Pt uses cane  Difficulity with IADLs: None    Functional Assesment  Prior Functional Level: Ambulatory, Drives Self, Independent with Activities of Daily Living, Independent with Medication Management, Uses Cane    Finances  Financial Barriers to Discharge: No  Prescription Coverage: Yes (Pharmacy: [facility], [address]    Vision / Hearing Impairment  Right Eye Vision: Impaired, Wears Glasses  Left Eye Vision: Impaired, Wears Glasses         Advance Directive  Advance Directive?: None  Advance Directive offered?: AD Booklet refused         Psychological Assessment  History of Substance Abuse: None  History of Psychiatric Problems: No  Non-compliant with Treatment: No  Newly Diagnosed Illness: No    Discharge Risks or Barriers  Discharge risks or barriers?: No    Anticipated Discharge Information  Anticipated discharge disposition: Home  Discharge Address:  [address] [place] [address]  Discharge Contact Phone Number: [phone]

## 2017-08-18 NOTE — PROGRESS NOTES
"Pharmacy Kinetics 67 y.o. female on vancomycin day # 2 2017    Currently on Vancomycin 800 mg IV q 24 hrs    Indication for Treatment: SSTI    Pertinent history per medical record: Admitted on 2017 with multiple scalp pustules. Pustules were lanced and fluid sent for culture. Patient is afebrile but with elevated WBC. Started on empiric antibiotics for skin and soft tissue infection.    Other antibiotics: Unasyn 3 g IV q6 hrs    Allergies: Cozaar; Darvocet; Lexapro; Lisinopril; Cephalexin; Morphine; Other environmental; Penicillin g potassium; Potassium; and Sulfa drugs     List concerns for renal function: age, CHF    Pertinent cultures to date:   17 wound cx: Staph aureus  17 blood cx: NGTD    Recent Labs      17   1230  17   0404   WBC  12.3*  10.8   NEUTSPOLYS  72.90*  70.00     Recent Labs      17   1230  17   0404   BUN  15  21   CREATININE  1.01  1.00   ALBUMIN  3.4  3.1*     No results for input(s): VANCOTROUGH, VANCOPEAK, VANCORANDOM in the last 72 hours.No intake or output data in the 24 hours ending 17 1627   Blood pressure 104/62, pulse 64, temperature 36.5 °C (97.7 °F), temperature source Temporal, resp. rate 16, height 1.549 m (5' 0.98\"), weight 54.2 kg (119 lb 7.8 oz), last menstrual period 06/15/1996, SpO2 95 %, not currently breastfeeding. Temp (24hrs), Av.9 °C (98.4 °F), Min:36.5 °C (97.7 °F), Max:37.5 °C (99.5 °F)      A/P   1. Vancomycin dose change: continue 800 mg q 24 hrs  2. Next vancomycin level:  @1730  3. Goal trough: 12-16 mcg/mL  4. Comments: Wound culture growing Staph aureus, will continue vancomycin until sensitivities result. Renal indices stable, will check a level prior to 3rd total dose to ensure adequate dosing. Pharmacy to follow.    Drew HenleyD        "

## 2017-08-18 NOTE — PROGRESS NOTES
At 0042 Pt's BP low at 91/56, pt asymptomatic denies any complaints.  Informed Dr Dumont of pt's BP and pt requesting pain meds, MD stated to recheck in 1hr and hold pain meds at the time.  At 0150, pt's BP improved to 99/60 per Dr Dumont it was ok for pt to receive prn vicodin.  Will continue to monitor and follow plan of care.

## 2017-08-18 NOTE — NON-PROVIDER
Internal Medicine Medical Student Note    Name Leena Bella     1950   Age/Sex 67 y.o. female   MRN [id number]   Code Status FULl     After 5PM or if no immediate response to page, please call for cross-coverage  Attending/Team: Napoleon/Arian See Patient List for primary contact information  Call [phone] to page after hours   1st Call - Day Intern (R1):   Dr. Weir 2nd Call - Day Sr. Resident (R2/R3):   Dr. Ribeiro         Reason for Admission:   Pustules on forehead     Interval Problem Daily Status Update  (24 hours)   67 year old  female with past medical history of T2DM, CVA (2017), chronic daily migraines, CHF, COPD prsenting with 3-4 days of painful purulent bumps on the scale. This morning, the lesions that were incised and drained appear to be healing. The wounds are dry, non-draining; the swelling and erythema have significantly decreased and there is minimal pain to palpation of the scalp. Ms. Bella reports feeling much better than yesterday. She has been ambulating, eating, and voiding successfully.     Review of Systems   Constitutional: Negative for fever, chills, weight loss and malaise/fatigue.   HENT: Negative for hearing loss.    Eyes: Negative for blurred vision and double vision.   Respiratory: Negative for cough and hemoptysis.    Cardiovascular: Negative for chest pain and palpitations.   Gastrointestinal: Negative for heartburn, nausea, vomiting, abdominal pain and diarrhea.   Genitourinary: Negative for dysuria and urgency.   Musculoskeletal: Negative for myalgias and neck pain.   Skin: Negative for rash.   Neurological: Negative for dizziness, tingling, tremors, sensory change, speech change and headaches.   Endo/Heme/Allergies: Does not bruise/bleed easily.   Psychiatric/Behavioral: The patient does not have insomnia.        Physical Exam       Filed Vitals:    17 0330 17 0400 17 0738 17 1119   BP: 91/55  89/51 104/62    Pulse: 65  62 64   Temp: 37.1 °C (98.7 °F)  36.7 °C (98 °F) 36.5 °C (97.7 °F)   TempSrc:       Resp: 17  18 16   Height:       Weight:       SpO2: 93% 95% 98% 95%     Body mass index is 22.59 kg/(m^2). Weight: 54.2 kg (119 lb 7.8 oz)  Oxygen Therapy:  Pulse Oximetry: 95 %, O2 (LPM): 2, O2 Delivery: Silicone Nasal Cannula    Physical Exam   Constitutional: She is oriented to person, place, and time. No distress.   HENT:   Head: Normocephalic and atraumatic.   Dry, crusted blood on incisions. 4 new small pusules a few mm in size on forehead.    Eyes: EOM are normal. Pupils are equal, round, and reactive to light.   Cardiovascular: Normal rate, regular rhythm and normal heart sounds.  Exam reveals no gallop and no friction rub.    No murmur heard.  Pulmonary/Chest: No respiratory distress. She has no wheezes. She has no rales.   Mild crackles at bases bilaterally.    Abdominal: Soft. Bowel sounds are normal. She exhibits no distension. There is no tenderness. There is no rebound.   Musculoskeletal: She exhibits no edema.   Neurological: She is alert and oriented to person, place, and time.   Skin: No rash noted. She is not diaphoretic. No erythema.             Assessment/Plan     1. Facial cellulitis with abscesses:   -s/p I&D of 3/4 lesions in ER; no packing or sutures necessary.   -no signs of sepsis; WBC ~10  -gram stain shows gram positive cocci; waiting for culture and sensitivities to narrow antibiotics.   -blood cultures show no growth.   -received IV clindamycin and IV fluids in ER.   -currently on vancomycin and unasyn with no adverse reactions so far.     2. Leukocytosis:   -likely due to cellulitis  -now improved to ~10.   -continue vancomycin and unasyn until sensitivities and culture returns    3. T2DM, uncontrolled.   -A1C was 10.6 on 8/15/17  -patient is on 13 units Lantus and metformin at home.   -continue glargine 5 units and lispro    4. HFpEF  -echo 4/12/17 showed EF 60%  -holding home lasix and  carvedilol because blood pressure has been low/labile.   -continue aspirin.     5. History of CVA:   -per patient, CVA 12/2016  -possible TIAs 2/2017, 5/2017 (per records)  -residual aphasia, weakness on left lower extremity; upper extremity strength much improved.    -taking clopidogrel and aspirin; not on warfarin because of compliance issues.     6. Atrial fibrillation:    -on amiodarone (unclear how long). Continue.   -on warfarin in the past, but not compliant, not currently anticoagulated  -sinus rhythm over night.     7. Tobacco abuse:   -using nicotine patch.

## 2017-08-18 NOTE — PROGRESS NOTES
[facility] [facility] Fall Risk Assessment:     Last Known Fall: No falls  Mobility: Use of assistive device/requires assist of two people  Medications: Cardiovascular and central nervous system meds  Mental Status/LOC/Awareness: Awake, alert, and oriented to date, place, and person  Toileting Needs: No needs  Volume/Electrolyte Status: No problems  Communication/Sensory: Neuropathy  Behavior: Appropriate behavior  [facility] Fall Risk Total: 11  Fall Risk Level: MODERATE RISK    Universal Fall Precautions:  call light/belongings in reach, bed in low position and locked, wheelchairs and assistive devices out of sight, siderails up x 2, clutter free and spill free environment, educate on level of risk, educate to call for assistance    Fall Risk Level Interventions:    TRIAL (TELE 8, NEURO, MED MARGO 5) Moderate Fall Risk Interventions  Place yellow fall risk ID band on patient: completed  Provide patient/family education based on risk assessment : completed  Educate patient/family to call staff for assistance when getting out of bed: completed  Place fall precaution signage outside patient door: completed  Utilize bed/chair fall alarm: completed     Patient Specific Interventions:     Medication: review medications with patient and family, limit combination of prn medications and provide patients who received diuretics/laxatives frequent toileting  Mental Status/LOC/Awareness: reorient patient, reinforce falls education, check on patient hourly and reinforce the use of call light  Toileting: instruct patient/family on the need to call for assistance when toileting, do not leave patient unattended in bathroom/refer to toileting scripting and toilet prior to giving pain medications  Volume/Electrolyte Status: ensure patient remains hydrated, monitor blood sugars and maintain appropriate blood sugar levels if diabetic and ensure IV fluids are appropriate  Communication/Sensory: update plan of care on whiteboard  Behavioral:  engage patient in daily activities  Mobility: utilize bed/chair fall alarm, ensure bed is locked and in lowest position and instruct patient to exit bed on their strongest side

## 2017-08-18 NOTE — CARE PLAN
[facility] Nelson Fall Risk Assessment:     Last Known Fall: No falls  Mobility: Use of assistive device/requires assist of two people  Medications: Cardiovascular and central nervous system meds  Mental Status/LOC/Awareness: Awake, alert, and oriented to date, place, and person  Toileting Needs: No needs  Volume/Electrolyte Status: No problems  Communication/Sensory: Neuropathy  Behavior: Appropriate behavior  [facility] Nelson Fall Risk Total: 11  Fall Risk Level: MODERATE RISK    Universal Fall Precautions:  call light/belongings in reach, bed in low position and locked, wheelchairs and assistive devices out of sight, siderails up x 2, clutter free and spill free environment, educate on level of risk, educate to call for assistance    Fall Risk Level Interventions:    TRIAL (TELE 8, NEURO, MED MARGO 5) Moderate Fall Risk Interventions  Place yellow fall risk ID band on patient: completed  Provide patient/family education based on risk assessment : completed  Educate patient/family to call staff for assistance when getting out of bed: completed  Place fall precaution signage outside patient door: completed  Utilize bed/chair fall alarm: completed     Patient Specific Interventions:     Medication: assess for medications that can be discontinued or dosage decreased and limit combination of prn medications  Mental Status/LOC/Awareness: reinforce falls education, encourage family to stay with patient, check on patient hourly, utilize bed/chair fall alarm and reinforce the use of call light  Toileting: consider obtaining elevated toilet seat or bedside commode (BSC), provide frquent toileting, instruct patient/family on the need to call for assistance when toileting and do not leave patient unattended in bathroom/refer to toileting scripting  Volume/Electrolyte Status: ensure patient remains hydrated and monitor abnormal lab values  Communication/Sensory: update plan of care on whiteboard, ensure proper positioning when  transferrng/ambulating, ensure patient has glasses/contacts and hearing aids/dentures and for visually impaired patients orient to their room surrounding and do not change their surroundings  Behavioral: engage patient in daily activities, administer medication as ordered, instruct/reinforce fall program rationale and encourage family to stay with impulsive patients  Mobility: provide comfort measures during transport, utilize bed/chair fall alarm, ensure bed is locked and in lowest position, provide appropriate assistive device, instruct patient to exit bed on their strongest side and collaborate with doctor for possible PT/OT consult

## 2017-08-18 NOTE — PROGRESS NOTES
Received Pt from ED. Patient A&O x 4. VS'S. RA. Complains of 8/10 head and back pain will medicate per MAR. Patient placed on telemetry monitor, monitor room called to verify. POC discussed with patient. Pt verbalizes understanding. Call light and belongings with in reach. Bed locked and in lowest position, alarm and fall precautions in place.

## 2017-08-18 NOTE — PROGRESS NOTES
Report received. Care assumed. Patient A&Ox4. Pt reports feeling 9/10 pain, no SOB at this time.  Pt has no further needs.Discussed POC for the day with Pt. Call light within reach, encouraged pt to call for assistance.

## 2017-08-18 NOTE — CARE PLAN
Problem: Infection  Goal: Will remain free from infection   Pt receiving antibiotics for wounds, pt has remained afebrile    Problem: Knowledge Deficit  Goal: Knowledge of disease process/condition, treatment plan, diagnostic tests, and medications will improve  Outcome: PROGRESSING AS EXPECTED  Intervention: Assess knowledge level of disease process/condition, treatment plan, diagnostic tests, and medications  Pt updated about plan of care, pt expresses verbal understanding

## 2017-08-18 NOTE — ASSESSMENT & PLAN NOTE
With 3 areas of abscess s/p I&D  Plan:  -Areas seem to be improving with the lesions decreasing in size and erythema improving around the forehead  -Continue IV antibiotics with unasyn and vancomycin: preliminary cultures showing gram positive cocci  -Will await cultures to de-escalate antibiotics.

## 2017-08-19 VITALS
OXYGEN SATURATION: 91 % | DIASTOLIC BLOOD PRESSURE: 54 MMHG | TEMPERATURE: 97.2 F | HEIGHT: 61 IN | SYSTOLIC BLOOD PRESSURE: 118 MMHG | HEART RATE: 55 BPM | WEIGHT: 128.09 LBS | RESPIRATION RATE: 16 BRPM | BODY MASS INDEX: 24.18 KG/M2

## 2017-08-19 LAB
ALBUMIN SERPL BCP-MCNC: 3 G/DL (ref 3.2–4.9)
ALBUMIN/GLOB SERPL: 1.1 G/DL
ALP SERPL-CCNC: 69 U/L (ref 30–99)
ALT SERPL-CCNC: <5 U/L (ref 2–50)
ANION GAP SERPL CALC-SCNC: 7 MMOL/L (ref 0–11.9)
AST SERPL-CCNC: 5 U/L (ref 12–45)
BACTERIA WND AEROBE CULT: ABNORMAL
BACTERIA WND AEROBE CULT: ABNORMAL
BASOPHILS # BLD AUTO: 0.5 % (ref 0–1.8)
BASOPHILS # BLD: 0.04 K/UL (ref 0–0.12)
BILIRUB SERPL-MCNC: 0.2 MG/DL (ref 0.1–1.5)
BUN SERPL-MCNC: 17 MG/DL (ref 8–22)
CALCIUM SERPL-MCNC: 9.8 MG/DL (ref 8.5–10.5)
CHLORIDE SERPL-SCNC: 114 MMOL/L (ref 96–112)
CO2 SERPL-SCNC: 21 MMOL/L (ref 20–33)
CREAT SERPL-MCNC: 1.03 MG/DL (ref 0.5–1.4)
EOSINOPHIL # BLD AUTO: 0.32 K/UL (ref 0–0.51)
EOSINOPHIL NFR BLD: 4 % (ref 0–6.9)
ERYTHROCYTE [DISTWIDTH] IN BLOOD BY AUTOMATED COUNT: 45.7 FL (ref 35.9–50)
GFR SERPL CREATININE-BSD FRML MDRD: 53 ML/MIN/1.73 M 2
GLOBULIN SER CALC-MCNC: 2.8 G/DL (ref 1.9–3.5)
GLUCOSE BLD-MCNC: 217 MG/DL (ref 65–99)
GLUCOSE BLD-MCNC: 89 MG/DL (ref 65–99)
GLUCOSE SERPL-MCNC: 94 MG/DL (ref 65–99)
GRAM STN SPEC: ABNORMAL
HCT VFR BLD AUTO: 39.9 % (ref 37–47)
HGB BLD-MCNC: 12.7 G/DL (ref 12–16)
IMM GRANULOCYTES # BLD AUTO: 0.03 K/UL (ref 0–0.11)
IMM GRANULOCYTES NFR BLD AUTO: 0.4 % (ref 0–0.9)
LYMPHOCYTES # BLD AUTO: 2.32 K/UL (ref 1–4.8)
LYMPHOCYTES NFR BLD: 29 % (ref 22–41)
MCH RBC QN AUTO: 30.1 PG (ref 27–33)
MCHC RBC AUTO-ENTMCNC: 31.8 G/DL (ref 33.6–35)
MCV RBC AUTO: 94.5 FL (ref 81.4–97.8)
MONOCYTES # BLD AUTO: 0.9 K/UL (ref 0–0.85)
MONOCYTES NFR BLD AUTO: 11.3 % (ref 0–13.4)
NEUTROPHILS # BLD AUTO: 4.39 K/UL (ref 2–7.15)
NEUTROPHILS NFR BLD: 54.8 % (ref 44–72)
NRBC # BLD AUTO: 0 K/UL
NRBC BLD AUTO-RTO: 0 /100 WBC
PLATELET # BLD AUTO: 202 K/UL (ref 164–446)
PMV BLD AUTO: 10.4 FL (ref 9–12.9)
POTASSIUM SERPL-SCNC: 4.1 MMOL/L (ref 3.6–5.5)
PROT SERPL-MCNC: 5.8 G/DL (ref 6–8.2)
RBC # BLD AUTO: 4.22 M/UL (ref 4.2–5.4)
SIGNIFICANT IND 70042: ABNORMAL
SITE SITE: ABNORMAL
SODIUM SERPL-SCNC: 142 MMOL/L (ref 135–145)
SOURCE SOURCE: ABNORMAL
WBC # BLD AUTO: 8 K/UL (ref 4.8–10.8)

## 2017-08-19 PROCEDURE — A9270 NON-COVERED ITEM OR SERVICE: HCPCS | Performed by: STUDENT IN AN ORGANIZED HEALTH CARE EDUCATION/TRAINING PROGRAM

## 2017-08-19 PROCEDURE — 85025 COMPLETE CBC W/AUTO DIFF WBC: CPT

## 2017-08-19 PROCEDURE — 80053 COMPREHEN METABOLIC PANEL: CPT

## 2017-08-19 PROCEDURE — 700102 HCHG RX REV CODE 250 W/ 637 OVERRIDE(OP): Performed by: STUDENT IN AN ORGANIZED HEALTH CARE EDUCATION/TRAINING PROGRAM

## 2017-08-19 PROCEDURE — 82962 GLUCOSE BLOOD TEST: CPT | Mod: 91

## 2017-08-19 PROCEDURE — 700111 HCHG RX REV CODE 636 W/ 250 OVERRIDE (IP): Performed by: INTERNAL MEDICINE

## 2017-08-19 PROCEDURE — 36415 COLL VENOUS BLD VENIPUNCTURE: CPT

## 2017-08-19 PROCEDURE — 700101 HCHG RX REV CODE 250: Performed by: STUDENT IN AN ORGANIZED HEALTH CARE EDUCATION/TRAINING PROGRAM

## 2017-08-19 PROCEDURE — 700105 HCHG RX REV CODE 258: Performed by: STUDENT IN AN ORGANIZED HEALTH CARE EDUCATION/TRAINING PROGRAM

## 2017-08-19 PROCEDURE — 99239 HOSP IP/OBS DSCHRG MGMT >30: CPT | Mod: GC | Performed by: INTERNAL MEDICINE

## 2017-08-19 PROCEDURE — 700111 HCHG RX REV CODE 636 W/ 250 OVERRIDE (IP): Performed by: STUDENT IN AN ORGANIZED HEALTH CARE EDUCATION/TRAINING PROGRAM

## 2017-08-19 RX ORDER — CLINDAMYCIN HYDROCHLORIDE 300 MG/1
300 CAPSULE ORAL 3 TIMES DAILY
Qty: 21 CAP | Refills: 0 | Status: SHIPPED | OUTPATIENT
Start: 2017-08-19 | End: 2017-09-12

## 2017-08-19 RX ADMIN — HYDROCODONE BITARTRATE AND ACETAMINOPHEN 1 TABLET: 7.5; 325 TABLET ORAL at 06:19

## 2017-08-19 RX ADMIN — ENOXAPARIN SODIUM 40 MG: 100 INJECTION SUBCUTANEOUS at 08:13

## 2017-08-19 RX ADMIN — FLUTICASONE PROPIONATE 50 MCG: 50 SPRAY, METERED NASAL at 08:13

## 2017-08-19 RX ADMIN — OMEPRAZOLE 20 MG: 20 CAPSULE, DELAYED RELEASE ORAL at 08:13

## 2017-08-19 RX ADMIN — BUPROPION HYDROCHLORIDE 150 MG: 150 TABLET, EXTENDED RELEASE ORAL at 08:12

## 2017-08-19 RX ADMIN — DULOXETINE HYDROCHLORIDE 20 MG: 20 CAPSULE, DELAYED RELEASE ORAL at 08:13

## 2017-08-19 RX ADMIN — CALCIUM CARBONATE-CHOLECALCIFEROL TAB 250 MG-125 UNIT 2 TABLET: 250-125 TAB at 08:13

## 2017-08-19 RX ADMIN — CLOPIDOGREL 75 MG: 75 TABLET, FILM COATED ORAL at 08:13

## 2017-08-19 RX ADMIN — GABAPENTIN 600 MG: 300 CAPSULE ORAL at 08:13

## 2017-08-19 RX ADMIN — AMPICILLIN SODIUM AND SULBACTAM SODIUM 3 G: 2; 1 INJECTION, POWDER, FOR SOLUTION INTRAMUSCULAR; INTRAVENOUS at 11:16

## 2017-08-19 RX ADMIN — Medication 325 MG: at 08:13

## 2017-08-19 RX ADMIN — INSULIN LISPRO 2 UNITS: 100 INJECTION, SOLUTION INTRAVENOUS; SUBCUTANEOUS at 11:17

## 2017-08-19 RX ADMIN — NICOTINE 14 MG: 14 PATCH, EXTENDED RELEASE TRANSDERMAL at 06:19

## 2017-08-19 RX ADMIN — TOPIRAMATE 100 MG: 100 TABLET ORAL at 08:13

## 2017-08-19 RX ADMIN — AMPICILLIN SODIUM AND SULBACTAM SODIUM 3 G: 2; 1 INJECTION, POWDER, FOR SOLUTION INTRAMUSCULAR; INTRAVENOUS at 06:00

## 2017-08-19 ASSESSMENT — PAIN SCALES - GENERAL
PAINLEVEL_OUTOF10: 9
PAINLEVEL_OUTOF10: 4

## 2017-08-19 ASSESSMENT — LIFESTYLE VARIABLES: EVER_SMOKED: YES

## 2017-08-19 NOTE — PROGRESS NOTES
Pt discharge paperwork discussed and signed. Copy provided to patient. IV and tele monitor d/c'd. Pt accompanied by sister to home for discharge.

## 2017-08-19 NOTE — WOUND TEAM
Wound Team consulted to assess the abscesses on the head. The ER physician performed and irrigation and debridement on these on 08/17/2017. The one behind the left ear, as well as the ones on the right side of the forehead, are healing well. The wound on the right side of the superior aspect of head had some dry and crusted blood. Warm water and wash cloth used to clean that wound. This wound stable, not draining, and in the hair so it was left open to air. Patient has no advanced wound care needs at this time.

## 2017-08-19 NOTE — DISCHARGE SUMMARY
Internal Medicine Discharge Summary      Admit Date:  8/17/2017       Discharge Date:   08/19/2017    Service:   R Internal Medicine Purple Team  Attending Physician(s):   Napoleon       Senior Resident(s):   Quintin  Abhi Resident(s):   Carmella      Primary Diagnosis:   Facial Cellulitis    Secondary Diagnoses:                Active Problems:    Facial cellulitis POA: Unknown  Resolved Problems:    * No resolved hospital problems. *      Hospital Summary (Brief Narrative):         Patient is a 68 yo female with past medical history significant for coronary artery disease, hypertension, diabetes mellitus type 2, multiple cerebrovascular attacks, chronic migraines, coronary artery disease, depression/anxiety, and chronic atrial fibrillation(on amiodarone) that presented to the ED with multiple scalp(~4) pustules that had been present for around 4 days and had gotten more indurated.     The pustules were painful and tender to touch and mildly pruritic. The patient has baseline left sided weakness and orthopnea, however denied any other systemic signs of infection including fevers, chills, nausea, vomiting. Given her multiple co-morbidities and the location of her lesions, with erythema around her forehead concerning for facial cellulitis, patient was admitted to the hospital. Her pustules were lanced in the ED and wound cultures were sent for analysis. She was initiated on vancomycin and unasyn given her multiple drug allergies and the concern for MRSA.     Patient showed significant improvement of her pustules with almost complete resolution of her scalp lesions. Her sensitivities came back for MRSA and we will be discharging her home on a 7 day course of clindamycin(limited in choice secondary to her drug allergies).     Patient has remained afebrile throughout this hospitalization and her mild leukocytosis has since resolved. She was noted to have somewhat lower blood pressures once her home dose of  coreg was initiated, on further investigation, patient noted that she had been irregular with taking her medications. She was transitioned to metoprolol, given its better side effect profile on blood pressure and we recommend further follow up and titration as per your pcp.     Summary of follow up issues:   Follow up with PCP for ongoing management of your chronic co-morbidities and resolution of the pustules.   Follow up on medication compliance and blood pressure management.    Consultants:     None     Procedures:        None     Imaging/ Testing:      None     Discharge Medications:         Medication Reconciliation: Completed       Medication List      START taking these medications       Instructions    clindamycin 300 MG Caps   Commonly known as:  CLEOCIN    Take 1 Cap by mouth 3 times a day.   Dose:  300 mg       metoprolol 25 MG Tabs   Last time this was given:  12.5 mg on 8/18/2017  6:56 PM   Commonly known as:  LOPRESSOR    Doctor's comments:  Do not take if you are feeling dizzy/lightheaded or if you have a slow heart rate (<60 bpm)   Take 0.5 Tabs by mouth 2 Times a Day.   Dose:  12.5 mg         CONTINUE taking these medications       Instructions    acetaminophen/caffeine/butalbital 325-40-50 mg -40 MG Tabs   Last time this was given:  1 Tab on 8/17/2017  8:24 PM   Commonly known as:  FIORICET    TK 1 T PO  Q 4 H PRN       albuterol-ipratropium  MCG/ACT Aero   Commonly known as:  COMBIVENT   Next Dose Due:  Tonight 8/19    Inhale 2 Puffs by mouth 4 times a day.   Dose:  2 Puff       amiodarone 200 MG Tabs   Last time this was given:  200 mg on 8/18/2017  9:00 PM   Commonly known as:  CORDARONE   Next Dose Due:  Tonight 8/19      Take 1 Tab by mouth every day.   Dose:  200 mg       aspirin 325 MG Tabs   Last time this was given:  325 mg on 8/18/2017  9:05 PM   Commonly known as:  ASA   Next Dose Due:  Tomorrow 8/20    Take 1 Tab by mouth every day.   Dose:  325 mg       buPROPion  MG  Tb12 sustained-release tablet   Last time this was given:  150 mg on 8/19/2017  8:12 AM   Commonly known as:  WELLBUTRIN-SR   Next Dose Due:  Tonight 8/19    Take 1 Tab by mouth 2 times a day.   Dose:  150 mg       calcium-vitamin D 500-200 MG-UNIT Tabs   Commonly known as:  OSCAL 500 +D   Next Dose Due:  Tonight    Take 1 Tab by mouth 2 times a day, with meals.   Dose:  1 Tab       cetirizine 10 MG Tabs   Last time this was given:  5 mg on 8/18/2017  6:56 PM   Commonly known as:  ZYRTEC   Next Dose Due:  As needed    TAKE 1 TAB BY MOUTH 1 TIME DAILY AS NEEDED FOR ALLERGIES.       clopidogrel 75 MG Tabs   Last time this was given:  75 mg on 8/19/2017  8:13 AM   Commonly known as:  PLAVIX    Take 1 Tab by mouth every day.   Dose:  75 mg       duloxetine 20 MG Cpep   Last time this was given:  20 mg on 8/19/2017  8:13 AM   Commonly known as:  CYMBALTA    Take 1 Cap by mouth every day.   Dose:  20 mg       ferrous sulfate 325 (65 Fe) MG tablet   Last time this was given:  325 mg on 8/19/2017  8:13 AM    Take 325 mg by mouth every day.   Dose:  325 mg       fluticasone 50 MCG/ACT nasal spray   Last time this was given:  50 mcg on 8/19/2017  8:13 AM   Commonly known as:  FLONASE    Spray 1 Spray in nose every day.   Dose:  1 Spray       furosemide 20 MG Tabs   Last time this was given:  20 mg on 8/17/2017  5:59 PM   Commonly known as:  LASIX    Take 1 Tab by mouth every day.   Dose:  20 mg       gabapentin 600 MG tablet   Commonly known as:  NEURONTIN    Take 1 Tab by mouth 2 times a day.   Dose:  600 mg       hydrocodone-acetaminophen 7.5-325 MG per tablet   Start taking on:  9/2/2017   Last time this was given:  1 Tab on 8/19/2017  6:19 AM   Commonly known as:  NORCO    Take 1 Tab by mouth every 8 hours as needed for Severe Pain.   Dose:  1 Tab       insulin glargine 100 UNIT/ML Sopn injection   Last time this was given:  8 Units on 8/18/2017 11:19 PM   Commonly known as:  LANTUS SOLOSTAR    INJECT 20 UNITS  SUBCUTANEOUSLY DAILY AT BEDTIME AS DIRECTED       metformin 1000 MG tablet   Commonly known as:  GLUCOPHAGE    Take 1 Tab by mouth 2 times a day.   Dose:  1000 mg       nitroglycerin 0.4 MG Subl   Commonly known as:  NITROSTAT    Place 1 Tab under tongue as needed for Chest Pain. Max dose of 3 in 24 hrs until need for ED.   Dose:  0.4 mg       ondansetron 4 MG Tbdp   Commonly known as:  ZOFRAN ODT    Take 1 Tab by mouth every 8 hours as needed for Nausea/Vomiting.   Dose:  4 mg       pantoprazole 40 MG Tbec   Commonly known as:  PROTONIX    Take 1 Tab by mouth every day.   Dose:  40 mg       ropinirole 1 MG Tabs   Last time this was given:  1 mg on 8/18/2017  9:05 PM   Commonly known as:  REQUIP    TAKE 1 TABLET BY MOUTH TWICE DAILY       rosuvastatin 40 MG tablet   Last time this was given:  40 mg on 8/18/2017  9:05 PM   Commonly known as:  CRESTOR    Take 1 Tab by mouth every day.   Dose:  40 mg       topiramate 100 MG Tabs   Last time this was given:  100 mg on 8/19/2017  8:13 AM   Commonly known as:  TOPAMAX    Take 1 Tab by mouth every 12 hours.   Dose:  100 mg         STOP taking these medications          carvedilol 12.5 MG Tabs   Commonly known as:  COREG           Disposition:   Fair    Diet:   Diabetic     Activity:   Weight Bearing as Tolerated    Instructions:       The patient was instructed to return to the ER in the event of worsening symptoms. I have counseled the patient on the importance of compliance and the patient has agreed to proceed with all medical recommendations and follow up plan indicated above.   The patient understands that all medications come with benefits and risks. Risks may include permanent injury or death and these risks can be minimized with close reassessment and monitoring.        Primary Care Provider:    Adriano Bahena M.D.    Discharge summary faxed to primary care provider:  Completed  Copy of discharge summary given to the patient: Completed      Follow up appointment  details :      Follow up with PCP in 1-2 weeks    Pending Studies:        None    Time spent on discharge day patient visit, preparing discharge paperwork and arranging for patient follow up.    Discharge Time (Minutes) :    60mins    Condition on Discharge    ______________________________________________________________________    Interval history/exam for day of discharge:     Pt is feeling well, her pustules have successfully drained and now appear dry and without purulence. Pt denies symptoms of systemic infection or intercranial infection. Pt denies any other concerns or complaints. Pt was curious about source of infection, said she has gotten bitten by ants recently. Told her that that could have possibly lead to her cellulitis though it is difficult to say with any certainty.     Filed Vitals:    08/18/17 1925 08/18/17 2323 08/19/17 0319 08/19/17 0850   BP: 136/80 114/64 112/61 105/51   Pulse: 65 61 60 56   Temp: 37.1 °C (98.8 °F) 36.7 °C (98 °F) 36.8 °C (98.3 °F) 36.7 °C (98 °F)   TempSrc:       Resp: 16 16 18 14   Height:       Weight: 58.1 kg (128 lb 1.4 oz)      SpO2: 95% 93% 95% 91%     Weight/BMI: Body mass index is 24.21 kg/(m^2).  Pulse Oximetry: 91 %, O2 (LPM): 0, O2 Delivery: None (Room Air)    General: WNWD. No acute distress   CVS: RRR, No murmurs   PULM: Mild bibasilar crackles in lower lobes    Most Recent Labs:    Lab Results   Component Value Date/Time    WBC 8.0 08/19/2017 03:15 AM    RBC 4.22 08/19/2017 03:15 AM    HEMOGLOBIN 12.7 08/19/2017 03:15 AM    HEMATOCRIT 39.9 08/19/2017 03:15 AM    MCV 94.5 08/19/2017 03:15 AM    MCH 30.1 08/19/2017 03:15 AM    MCHC 31.8* 08/19/2017 03:15 AM    MPV 10.4 08/19/2017 03:15 AM    NEUTROPHILS-POLYS 54.80 08/19/2017 03:15 AM    LYMPHOCYTES 29.00 08/19/2017 03:15 AM    MONOCYTES 11.30 08/19/2017 03:15 AM    EOSINOPHILS 4.00 08/19/2017 03:15 AM    BASOPHILS 0.50 08/19/2017 03:15 AM    HYPOCHROMIA 1+ 10/10/2013 04:41 AM      Lab Results   Component Value  Date/Time    SODIUM 142 08/19/2017 03:15 AM    POTASSIUM 4.1 08/19/2017 03:15 AM    CHLORIDE 114* 08/19/2017 03:15 AM    CO2 21 08/19/2017 03:15 AM    GLUCOSE 94 08/19/2017 03:15 AM    BUN 17 08/19/2017 03:15 AM    CREATININE 1.03 08/19/2017 03:15 AM      Lab Results   Component Value Date/Time    ALT(SGPT) <5 08/19/2017 03:15 AM    AST(SGOT) 5* 08/19/2017 03:15 AM    ALKALINE PHOSPHATASE 69 08/19/2017 03:15 AM    TOTAL BILIRUBIN 0.2 08/19/2017 03:15 AM    DIRECT BILIRUBIN <0.1 11/09/2016 12:46 PM    LIPASE 45 03/06/2012 04:40 PM    ALBUMIN 3.0* 08/19/2017 03:15 AM    GLOBULIN 2.8 08/19/2017 03:15 AM    INR 1.02 04/11/2017 09:35 PM     Lab Results   Component Value Date/Time    PT 13.7 04/11/2017 09:35 PM    INR 1.02 04/11/2017 09:35 PM

## 2017-08-19 NOTE — CARE PLAN
Problem: Safety  Goal: Will remain free from injury  Intervention: Provide assistance with mobility  Pt ambulates with one person hand held assist. Pt calls for assistance.

## 2017-08-19 NOTE — CARE PLAN
Problem: Pain Management  Goal: Pain level will decrease to patient’s comfort goal  Outcome: PROGRESSING AS EXPECTED    Problem: Safety  Goal: Will remain free from falls  Outcome: PROGRESSING AS EXPECTED  [place] Fall Risk Assessment:     Last Known Fall: Within the last six months  Mobility: Use of assistive device/requires assist of two people, Dizziness/generalized weakness  Medications: Cardiovascular and central nervous system meds  Mental Status/LOC/Awareness: Awake, alert, and oriented to date, place, and person  Toileting Needs: No needs  Volume/Electrolyte Status: No problems  Communication/Sensory: Visual (Glasses)/hearing deficit  Behavior: Appropriate behavior  [place] Nelson Fall Risk Total: 11  Fall Risk Level: MODERATE RISK    Universal Fall Precautions:  siderails up x 2, use non-slip footwear, call light/belongings in reach, clutter free and spill free environment    Fall Risk Level Interventions:    TRIAL (TELE 8, NEURO, MED MARGO 5) Moderate Fall Risk Interventions  Place yellow fall risk ID band on patient: verified  Provide patient/family education based on risk assessment : verified  Educate patient/family to call staff for assistance when getting out of bed: verified  Place fall precaution signage outside patient door: verified  Utilize bed/chair fall alarm: verified     Patient Specific Interventions:     Medication: review medications with patient and family  Mental Status/LOC/Awareness: encourage family to stay with patient  Toileting: provide frquent toileting and monitor intake and output/use of appropriate interventions  Volume/Electrolyte Status: monitor blood sugars and maintain appropriate blood sugar levels if diabetic  Communication/Sensory: update plan of care on whiteboard and ensure patient has glasses/contacts and hearing aids/dentures  Behavioral: encourage patient to voice feelings, engage patient in daily activities and administer medication as ordered  Mobility:  schedule physical activity throughout the day, dangle prior to standing and utilize bed/chair fall alarm

## 2017-08-19 NOTE — DISCHARGE INSTRUCTIONS
Discharge Instructions    Discharged to home by car with relative. Discharged via wheelchair, hospital escort: Yes.  Special equipment needed: Not Applicable    Be sure to schedule a follow-up appointment with your primary care doctor or any specialists as instructed.     Discharge Plan:   Smoking Cessation Offered: Patient Refused  Influenza Vaccine Indication: Not indicated: Previously immunized this influenza season and > 8 years of age    I understand that a diet low in cholesterol, fat, and sodium is recommended for good health. Unless I have been given specific instructions below for another diet, I accept this instruction as my diet prescription.   Other diet: Cardiac    Special Instructions: None    Metoprolol tablets  What is this medicine?  METOPROLOL (me TOE proe lole) is a beta-blocker. Beta-blockers reduce the workload on the heart and help it to beat more regularly. This medicine is used to treat high blood pressure and to prevent chest pain. It is also used to after a heart attack and to prevent an additional heart attack from occurring.  This medicine may be used for other purposes; ask your health care provider or pharmacist if you have questions.  COMMON BRAND NAME(S): Lopressor  What should I tell my health care provider before I take this medicine?  They need to know if you have any of these conditions:  -diabetes  -heart or vessel disease like slow heart rate, worsening heart failure, heart block, sick sinus syndrome or Raynaud's disease  -kidney disease  -liver disease  -lung or breathing disease, like asthma or emphysema  -pheochromocytoma  -thyroid disease  -an unusual or allergic reaction to metoprolol, other beta-blockers, medicines, foods, dyes, or preservatives  -pregnant or trying to get pregnant  -breast-feeding  How should I use this medicine?  Take this medicine by mouth with a drink of water. Follow the directions on the prescription label. Take this medicine immediately after meals.  Take your doses at regular intervals. Do not take more medicine than directed. Do not stop taking this medicine suddenly. This could lead to serious heart-related effects.  Talk to your pediatrician regarding the use of this medicine in children. Special care may be needed.  Overdosage: If you think you have taken too much of this medicine contact a poison control center or emergency room at once.  NOTE: This medicine is only for you. Do not share this medicine with others.  What if I miss a dose?  If you miss a dose, take it as soon as you can. If it is almost time for your next dose, take only that dose. Do not take double or extra doses.  What may interact with this medicine?  Do not take this medicine with any of the following medications:  -sotalol  This medicine may also interact with the following medications:  -clonidine  -digoxin  -dobutamine  -epinephrine  -isoproterenol  -medicine to control heart rhythm like quinidine, propafenone  -medicine for depression like monoamine oxidase (MAO) inhibitors, fluoxetine, and paroxetine  -medicine for high blood pressure like calcium channel blockers  -reserpine  This list may not describe all possible interactions. Give your health care provider a list of all the medicines, herbs, non-prescription drugs, or dietary supplements you use. Also tell them if you smoke, drink alcohol, or use illegal drugs. Some items may interact with your medicine.  What should I watch for while using this medicine?  Visit your doctor or health care professional for regular check ups. Contact your doctor right away if your symptoms worsen. Check your blood pressure and pulse rate regularly. Ask your health care professional what your blood pressure and pulse rate should be, and when you should contact them.  You may get drowsy or dizzy. Do not drive, use machinery, or do anything that needs mental alertness until you know how this medicine affects you. Do not sit or stand up quickly,  especially if you are an older patient. This reduces the risk of dizzy or fainting spells. Contact your doctor if these symptoms continue. Alcohol may interfere with the effect of this medicine. Avoid alcoholic drinks.  What side effects may I notice from receiving this medicine?  Side effects that you should report to your doctor or health care professional as soon as possible:  -allergic reactions like skin rash, itching or hives  -cold or numb hands or feet  -depression  -difficulty breathing  -faint  -fever with sore throat  -irregular heartbeat, chest pain  -rapid weight gain  -swollen legs or ankles  Side effects that usually do not require medical attention (report to your doctor or health care professional if they continue or are bothersome):  -anxiety or nervousness  -change in sex drive or performance  -dry skin  -headache  -nightmares or trouble sleeping  -short term memory loss  -stomach upset or diarrhea  -unusually tired  This list may not describe all possible side effects. Call your doctor for medical advice about side effects. You may report side effects to FDA at 0-660-FDA-3068.  Where should I keep my medicine?  Keep out of the reach of children.  Store at room temperature between 15 and 30 degrees C (59 and 86 degrees F). Throw away any unused medicine after the expiration date.  NOTE: This sheet is a summary. It may not cover all possible information. If you have questions about this medicine, talk to your doctor, pharmacist, or health care provider.  © 2014, [employer]. (2/27/2009 4:11:19 PM)    Cellulitis  Cellulitis is an infection of the skin and the tissue beneath it. The infected area is usually red and tender. Cellulitis occurs most often in the arms and lower legs.   CAUSES   Cellulitis is caused by bacteria that enter the skin through cracks or cuts in the skin. The most common types of bacteria that cause cellulitis are staphylococci and streptococci.  SIGNS AND SYMPTOMS     · Redness and warmth.  · Swelling.  · Tenderness or pain.  · Fever.  DIAGNOSIS   Your health care provider can usually determine what is wrong based on a physical exam. Blood tests may also be done.  TREATMENT   Treatment usually involves taking an antibiotic medicine.  HOME CARE INSTRUCTIONS   · Take your antibiotic medicine as directed by your health care provider. Finish the antibiotic even if you start to feel better.  · Keep the infected arm or leg elevated to reduce swelling.  · Apply a warm cloth to the affected area up to 4 times per day to relieve pain.  · Take medicines only as directed by your health care provider.  · Keep all follow-up visits as directed by your health care provider.  SEEK MEDICAL CARE IF:   · You notice red streaks coming from the infected area.  · Your red area gets larger or turns dark in color.  · Your bone or joint underneath the infected area becomes painful after the skin has healed.  · Your infection returns in the same area or another area.  · You notice a swollen bump in the infected area.  · You develop new symptoms.  · You have a fever.  SEEK IMMEDIATE MEDICAL CARE IF:   · You feel very sleepy.  · You develop vomiting or diarrhea.  · You have a general ill feeling (malaise) with muscle aches and pains.  MAKE SURE YOU:   · Understand these instructions.  · Will watch your condition.  · Will get help right away if you are not doing well or get worse.     This information is not intended to replace advice given to you by your health care provider. Make sure you discuss any questions you have with your health care provider.     Document Released: 09/27/2006 Document Revised: 01/08/2016 Document Reviewed: 03/04/2013  [employer] Interactive Patient Education ©2016 [employer]    MRSA Infection, Adult  MRSA stands for methicillin-resistant Staphylococcus aureus. This type of infection is caused by Staphylococcus aureus bacteria that are no longer affected by the medicines used to  kill them (drug resistant). Staphylococcus (staph) bacteria are normally found on the skin or in the nose of healthy people. In most cases, these bacteria do not cause infection. But if these resistant bacteria enter your body through a cut or sore, they can cause a serious infection on your skin or in other parts of your body. There is a slight chance that the staph on your skin or in your nose is MRSA.  There are two types of MRSA infections:  · Hospital-acquired MRSA is bacteria that you get in the hospital.  · Community-acquired MRSA is bacteria that you get somewhere other than in a hospital.  RISK FACTORS  Hospital-acquired MRSA is more common. You could be at risk for this infection if you are in the hospital and you:  · Have surgery or a procedure.  · Have an IV access or a catheter tube placed in your body.  · Have weak resistance to germs (weakened immune system).  · Are elderly.  · Are on kidney dialysis.  You could be at risk for community-acquired MRSA if you have a break in your skin and come into contact with MRSA. This may happen if you:  · Play sports where there is skin-to-skin contact.  · Live in a crowded setting, like a dormitory or a [employer].  · Share towels, razors, or sports equipment with other people.  SYMPTOMS   Symptoms of hospital-acquired MRSA depend on where MRSA has spread. Symptoms may include:  · Wound infection.  · Skin infection.  · Rash.  · Pneumonia.  · Fever and chills.  · Difficulty breathing.  · Chest pain.   Community-acquired MRSA is most likely to start as a scratch or cut that becomes infected. Symptoms may include:  · A pus-filled pimple.  · A boil on your skin.  · Pus draining from your skin.  · A sore (abscess) under your skin or somewhere in your body.  · Fever with or without chills.  DIAGNOSIS   The diagnosis of MRSA is made by taking a sample from an infected area and sending it to a lab for testing. A  can grow (culture) MRSA and check it  under a microscope. The cultured MRSA can be tested to see which type of antibiotic medicine will work to treat it. Newer tests can identify MRSA more quickly by testing bacteria samples for MRSA genes. Your health care provider can diagnose MRSA using samples from:   · Cuts or wounds in infected areas.  · Nasal swabs.  · Saliva or cough specimens from deep in the lungs (sputum).  · Urine.  · Blood.  You may also have:  · Imaging studies (such as X-ray or MRI) to check if the infection has spread to the lungs, bones, or joints.  · A culture and sensitivity test of blood or fluids from inside the joints.  TREATMENT   Treatment depends on how severe, deep, or extensive the infection is. Very bad infections may require a hospital stay.  · Some skin infections, such as a small boil or sore (abscess), may be treated by draining pus from the site of the infection.  · More extensive surgery to drain pus may be necessary for deeper or more widespread soft tissue infections.  · You may then have to take antibiotic medicine given by mouth or through a vein. You may start antibiotic treatment right away or after testing can be done to see what antibiotic medicine should be used.  HOME CARE INSTRUCTIONS   · Take your antibiotics as directed by your health care provider. Take the medicine as prescribed until it is finished.  · Avoid close contact with those around you as much as possible. Do not use towels, razors, toothbrushes, bedding, or other items that will be used by others.  · Wash your hands frequently for 15 seconds with soap and water. Dry your hands with a clean or disposable towel.  · When you are not able to wash your hands, use hand  that is more than 60 percent alcohol.  · Wash towels, sheets, or clothes in the washing machine with detergent and hot water. Dry them in a hot dryer.  · Follow your health care provider's instructions for wound care. Wash your hands before and after changing your  bandages.  · Always shower after exercising.  · Keep all cuts and scrapes clean and covered with a bandage.  · Be sure to tell all your health care providers that you have MRSA so they are aware of your infection.  SEEK MEDICAL CARE IF:  · You have a cut, scrape, pimple, or boil that becomes red, swollen, or painful or has pus in it.  · You have pus draining from your skin.  · You have an abscess under your skin or somewhere in your body.  SEEK IMMEDIATE MEDICAL CARE IF:   · You have symptoms of a skin infection with a fever or chills.  · You have trouble breathing.  · You have chest pain.  · You have a skin wound and you become nauseous or start vomiting.  MAKE SURE YOU:  · Understand these instructions.  · Will watch your condition.  · Will get help right away if you are not doing well or get worse.     This information is not intended to replace advice given to you by your health care provider. Make sure you discuss any questions you have with your health care provider.     Document Released: 12/18/2006 Document Revised: 05/03/2016 Document Reviewed: 10/10/2014  [employer] Interactive Patient Education ©2016 [employer]      · Is patient discharged on Warfarin / Coumadin?   No     · Is patient Post Blood Transfusion?  No    Depression / Suicide Risk    As you are discharged from this [facility] facility, it is important to learn how to keep safe from harming yourself.    Recognize the warning signs:  · Abrupt changes in personality, positive or negative- including increase in energy   · Giving away possessions  · Change in eating patterns- significant weight changes-  positive or negative  · Change in sleeping patterns- unable to sleep or sleeping all the time   · Unwillingness or inability to communicate  · Depression  · Unusual sadness, discouragement and loneliness  · Talk of wanting to die  · Neglect of personal appearance   · Rebelliousness- reckless behavior  · Withdrawal from people/activities they  love  · Confusion- inability to concentrate     If you or a loved one observes any of these behaviors or has concerns about self-harm, here's what you can do:  · Talk about it- your feelings and reasons for harming yourself  · Remove any means that you might use to hurt yourself (examples: pills, rope, extension cords, firearm)  · Get professional help from the community (Mental Health, Substance Abuse, psychological counseling)  · Do not be alone:Call your Safe Contact- someone whom you trust who will be there for you.  · Call your local CRISIS HOTLINE 155-2320 or 925-755-8994  · Call your local Children's Mobile Crisis Response Team [place] (949) 802-9952 or www.[facility]  · Call the toll free National Suicide Prevention Hotlines   · National Suicide Prevention Lifeline 512-566-WIEH (4415)  · National Hope Line Network 800-SUICIDE (482-8319)

## 2017-08-19 NOTE — PROGRESS NOTES
Met with patient during beside report, discussed plan of care for the day. Pt agreeable, has no questions or needs at this time. Bed is locked and in lowest position, call bell and side table within reach.

## 2017-08-19 NOTE — CARE PLAN
Problem: Pain Management  Goal: Pain level will decrease to patient’s comfort goal  Intervention: Follow pain managment plan developed in collaboration with patient and Interdisciplinary Team  Pt c/o chronic back pain. Pt medicated per MAR and repositioned.

## 2017-08-19 NOTE — PROGRESS NOTES
Report received by day shift RN. Pt sitting up in bed with sister at bed side with c/o pain in her back. Pt medicated per MAR. Pt updated to POC and verbalized understanding. Bed locked in low position with fall precautions in place and bed side table in reach. Call light in place.

## 2017-08-20 ENCOUNTER — PATIENT OUTREACH (OUTPATIENT)
Dept: HEALTH INFORMATION MANAGEMENT | Facility: OTHER | Age: 67
End: 2017-08-20

## 2017-08-21 ENCOUNTER — PATIENT OUTREACH (OUTPATIENT)
Dept: HEALTH INFORMATION MANAGEMENT | Facility: OTHER | Age: 67
End: 2017-08-21

## 2017-08-21 NOTE — TELEPHONE ENCOUNTER
Last seen: 06/01/17 by Dr. Bahena  Next appt: None    Was the patient seen in the last year in this department? Yes   Does patient have an active prescription for medications requested? No   Received Request Via: Pharmacy

## 2017-08-22 LAB
BACTERIA BLD CULT: NORMAL
BACTERIA BLD CULT: NORMAL
SIGNIFICANT IND 70042: NORMAL
SIGNIFICANT IND 70042: NORMAL
SITE SITE: NORMAL
SITE SITE: NORMAL
SOURCE SOURCE: NORMAL
SOURCE SOURCE: NORMAL

## 2017-08-22 RX ORDER — INSULIN GLARGINE 100 [IU]/ML
INJECTION, SOLUTION SUBCUTANEOUS
Qty: 15 ML | Refills: 0 | Status: SHIPPED | OUTPATIENT
Start: 2017-08-22 | End: 2018-01-30

## 2017-09-12 ENCOUNTER — OFFICE VISIT (OUTPATIENT)
Dept: INTERNAL MEDICINE | Facility: MEDICAL CENTER | Age: 67
End: 2017-09-12
Payer: MEDICARE

## 2017-09-12 VITALS
TEMPERATURE: 97.4 F | DIASTOLIC BLOOD PRESSURE: 72 MMHG | OXYGEN SATURATION: 96 % | HEIGHT: 60 IN | BODY MASS INDEX: 21.44 KG/M2 | HEART RATE: 72 BPM | WEIGHT: 109.2 LBS | SYSTOLIC BLOOD PRESSURE: 120 MMHG

## 2017-09-12 DIAGNOSIS — M54.41 CHRONIC MIDLINE LOW BACK PAIN WITH RIGHT-SIDED SCIATICA: ICD-10-CM

## 2017-09-12 DIAGNOSIS — Z79.4 TYPE 2 DIABETES MELLITUS WITH DIABETIC NEUROPATHY, WITH LONG-TERM CURRENT USE OF INSULIN (HCC): ICD-10-CM

## 2017-09-12 DIAGNOSIS — L02.91 ABSCESS: ICD-10-CM

## 2017-09-12 DIAGNOSIS — I25.5 CARDIOMYOPATHY, ISCHEMIC: ICD-10-CM

## 2017-09-12 DIAGNOSIS — E11.40 TYPE 2 DIABETES MELLITUS WITH DIABETIC NEUROPATHY, WITH LONG-TERM CURRENT USE OF INSULIN (HCC): ICD-10-CM

## 2017-09-12 DIAGNOSIS — G89.29 CHRONIC MIDLINE LOW BACK PAIN WITH RIGHT-SIDED SCIATICA: ICD-10-CM

## 2017-09-12 DIAGNOSIS — E13.8 OTHER SPECIFIED DIABETES MELLITUS WITH UNSPECIFIED COMPLICATIONS (HCC): ICD-10-CM

## 2017-09-12 DIAGNOSIS — Z23 INFLUENZA VACCINATION ADMINISTERED AT CURRENT VISIT: ICD-10-CM

## 2017-09-12 DIAGNOSIS — F39 MOOD DISORDER (HCC): ICD-10-CM

## 2017-09-12 PROCEDURE — 99214 OFFICE O/P EST MOD 30 MIN: CPT | Mod: GC,25 | Performed by: INTERNAL MEDICINE

## 2017-09-12 PROCEDURE — 90686 IIV4 VACC NO PRSV 0.5 ML IM: CPT | Performed by: INTERNAL MEDICINE

## 2017-09-12 PROCEDURE — G0008 ADMIN INFLUENZA VIRUS VAC: HCPCS | Performed by: INTERNAL MEDICINE

## 2017-09-12 RX ORDER — HYDROCODONE BITARTRATE AND ACETAMINOPHEN 7.5; 325 MG/1; MG/1
1 TABLET ORAL EVERY 8 HOURS PRN
Qty: 90 TAB | Refills: 0 | Status: SHIPPED | OUTPATIENT
Start: 2017-12-01 | End: 2017-11-03 | Stop reason: SDUPTHER

## 2017-09-12 RX ORDER — BUPROPION HYDROCHLORIDE 150 MG/1
150 TABLET, EXTENDED RELEASE ORAL 2 TIMES DAILY
Qty: 60 TAB | Refills: 5 | Status: SHIPPED | OUTPATIENT
Start: 2017-09-12 | End: 2018-01-30 | Stop reason: SDUPTHER

## 2017-09-12 RX ORDER — ROSUVASTATIN CALCIUM 40 MG/1
40 TABLET, COATED ORAL
Qty: 30 TAB | Refills: 3 | Status: SHIPPED | OUTPATIENT
Start: 2017-09-12 | End: 2018-01-30 | Stop reason: SDUPTHER

## 2017-09-12 RX ORDER — HYDROCODONE BITARTRATE AND ACETAMINOPHEN 7.5; 325 MG/1; MG/1
1 TABLET ORAL EVERY 8 HOURS PRN
Qty: 90 TAB | Refills: 0 | Status: SHIPPED | OUTPATIENT
Start: 2017-11-01 | End: 2017-09-12 | Stop reason: SDUPTHER

## 2017-09-12 RX ORDER — DULOXETIN HYDROCHLORIDE 20 MG/1
20 CAPSULE, DELAYED RELEASE ORAL DAILY
Qty: 30 CAP | Refills: 3 | Status: SHIPPED | OUTPATIENT
Start: 2017-09-12 | End: 2018-01-30 | Stop reason: SDUPTHER

## 2017-09-12 RX ORDER — CARVEDILOL 12.5 MG/1
12.5 TABLET ORAL 2 TIMES DAILY WITH MEALS
COMMUNITY
End: 2017-09-12 | Stop reason: SDUPTHER

## 2017-09-12 RX ORDER — AMIODARONE HYDROCHLORIDE 200 MG/1
200 TABLET ORAL DAILY
Qty: 30 TAB | Refills: 3 | Status: SHIPPED | OUTPATIENT
Start: 2017-09-12 | End: 2018-01-30 | Stop reason: SDUPTHER

## 2017-09-12 RX ORDER — CETIRIZINE HYDROCHLORIDE 10 MG/1
TABLET ORAL
Qty: 90 TAB | Refills: 0 | Status: SHIPPED | OUTPATIENT
Start: 2017-09-12 | End: 2018-01-30 | Stop reason: SDUPTHER

## 2017-09-12 RX ORDER — CLOPIDOGREL BISULFATE 75 MG/1
75 TABLET ORAL
Qty: 30 TAB | Refills: 5 | Status: SHIPPED | OUTPATIENT
Start: 2017-09-12 | End: 2018-01-30 | Stop reason: SDUPTHER

## 2017-09-12 RX ORDER — CARVEDILOL 12.5 MG/1
12.5 TABLET ORAL 2 TIMES DAILY WITH MEALS
Qty: 60 TAB | Refills: 3 | Status: SHIPPED | OUTPATIENT
Start: 2017-09-12 | End: 2018-01-30 | Stop reason: SDUPTHER

## 2017-09-12 RX ORDER — FUROSEMIDE 20 MG/1
20 TABLET ORAL
Qty: 30 TAB | Refills: 3 | Status: SHIPPED | OUTPATIENT
Start: 2017-09-12 | End: 2018-01-30 | Stop reason: SDUPTHER

## 2017-09-12 RX ORDER — GABAPENTIN 600 MG/1
600 TABLET ORAL 2 TIMES DAILY
Qty: 60 TAB | Refills: 5 | Status: SHIPPED | OUTPATIENT
Start: 2017-09-12 | End: 2018-01-30 | Stop reason: SDUPTHER

## 2017-09-12 RX ORDER — NITROGLYCERIN 0.4 MG/1
0.4 TABLET SUBLINGUAL PRN
Qty: 25 TAB | Refills: 2 | Status: SHIPPED | OUTPATIENT
Start: 2017-09-12 | End: 2018-01-30 | Stop reason: SDUPTHER

## 2017-09-12 RX ORDER — PANTOPRAZOLE SODIUM 40 MG/1
40 TABLET, DELAYED RELEASE ORAL
Qty: 30 TAB | Refills: 3 | Status: SHIPPED | OUTPATIENT
Start: 2017-09-12 | End: 2018-01-30 | Stop reason: SDUPTHER

## 2017-09-12 RX ORDER — TOPIRAMATE 100 MG/1
100 TABLET, FILM COATED ORAL EVERY 12 HOURS
Qty: 60 TAB | Refills: 3 | Status: SHIPPED | OUTPATIENT
Start: 2017-09-12 | End: 2018-01-30 | Stop reason: SDUPTHER

## 2017-09-12 RX ORDER — IPRATROPIUM BROMIDE AND ALBUTEROL 20; 100 UG/1; UG/1
SPRAY, METERED RESPIRATORY (INHALATION)
COMMUNITY
Start: 2017-08-20 | End: 2018-01-30

## 2017-09-12 NOTE — PATIENT INSTRUCTIONS
Diabetes and Standards of Medical Care  Diabetes is complicated. You may find that your diabetes team includes a dietitian, nurse, diabetes educator, eye doctor, and more. To help everyone know what is going on and to help you get the care you deserve, the following schedule of care was developed to help keep you on track. Below are the tests, exams, vaccines, medicines, education, and plans you will need.  HbA1c test  This test shows how well you have controlled your glucose over the past 2-3 months. It is used to see if your diabetes management plan needs to be adjusted.   · It is performed at least 2 times a year if you are meeting treatment goals.  · It is performed 4 times a year if therapy has changed or if you are not meeting treatment goals.  Blood pressure test  · This test is performed at every routine medical visit. The goal is less than 140/90 mm Hg for most people, but 130/80 mm Hg in some cases. Ask your health care provider about your goal.  Dental exam  · Follow up with the dentist regularly.  Eye exam  · If you are diagnosed with type 1 diabetes as a child, get an exam upon reaching the age of 10 years or older and having had diabetes for 3-5 years. Yearly eye exams are recommended after that initial eye exam.  · If you are diagnosed with type 1 diabetes as an adult, get an exam within 5 years of diagnosis and then yearly.  · If you are diagnosed with type 2 diabetes, get an exam as soon as possible after the diagnosis and then yearly.  Foot care exam  · Visual foot exams are performed at every routine medical visit. The exams check for cuts, injuries, or other problems with the feet.  · You should have a complete foot exam performed every year. This exam includes an inspection of the structure and skin of your feet, a check of the pulses in your feet, and a check of the sensation in your feet.  ¨ Type 1 diabetes: The first exam is performed 5 years after diagnosis.  ¨ Type 2 diabetes: The first  exam is performed at the time of diagnosis.  · Check your feet nightly for cuts, injuries, or other problems with your feet. Tell your health care provider if anything is not healing.  Kidney function test (urine microalbumin)  · This test is performed once a year.  ¨ Type 1 diabetes: The first test is performed 5 years after diagnosis.  ¨ Type 2 diabetes: The first test is performed at the time of diagnosis.  · A serum creatinine and estimated glomerular filtration rate (eGFR) test is done once a year to assess the level of chronic kidney disease (CKD), if present.  Lipid profile (cholesterol, HDL, LDL, triglycerides)  · Performed every 5 years for most people.  ¨ The goal for LDL is less than 100 mg/dL. If you are at high risk, the goal is less than 70 mg/dL.  ¨ The goal for HDL is 40 mg/dL-50 mg/dL for men and 50 mg/dL-60 mg/dL for women. An HDL cholesterol of 60 mg/dL or higher gives some protection against heart disease.  ¨ The goal for triglycerides is less than 150 mg/dL.  Immunizations  · The flu (influenza) vaccine is recommended yearly for every person 6 months of age or older who has diabetes.  · The pneumonia (pneumococcal) vaccine is recommended for every person 2 years of age or older who has diabetes. Adults 65 years of age or older may receive the pneumonia vaccine as a series of two separate shots.  · The hepatitis B vaccine is recommended for adults shortly after they have been diagnosed with diabetes.  · The Tdap (tetanus, diphtheria, and pertussis) vaccine should be given:  ¨ According to normal childhood vaccination schedules, for children.  ¨ Every 10 years, for adults who have diabetes.  Diabetes self-management education  · Education is recommended at diagnosis and ongoing as needed.  Treatment plan  · Your treatment plan is reviewed at every medical visit.     This information is not intended to replace advice given to you by your health care provider. Make sure you discuss any questions you  have with your health care provider.     Document Released: 10/15/2010 Document Revised: 01/08/2016 Document Reviewed: 05/20/2014  [employer] Interactive Patient Education ©2016 [employer]

## 2017-09-12 NOTE — PROGRESS NOTES
Established Patient    Leena presents today with the following:    CC: Uncontrolled diabetes, CAD, hypertension, A. fib    HPI:     Recent hospitalization: Patient was recently admitted for multiple head and face abscesses positive for MRSA. She was treated with appropriate antibiotics and was discharged on clindamycin. She's completed her course and denies any worsening, new pustules, or unhealing wounds. Denies any diarrhea, abdominal pain, fevers, or chills.    Uncontrolled diabetes: She takes 30 units of Lantus every day along with metformin and Crestor. A1c at her recent hospitalization was 10.4 which is reduced from 11.2 in April, when I saw her last. Reports compliance with all her medications including insulin. She has lost 9 pounds since her last visit in April which he attributes to eating a healthier diet and exercising frequently. Denies any episodes of hypoglycemia.    Hypertension: Stable on current meds. Normotensive in the clinic today.    Chronic lower back pain: Stable symptoms. Controlled on Norco 7.5 every 8 hours. Medication refills had been provided up until November 1st 2017. Further refills have been provided for the month of November and December. Next refill has to be provided starting from December 31. She reports that her son had picked up her last prescriptions and currently fills them and provides medications to her. She states that as of recent he has been abusing drugs and would prefer that the clinic not provide him with any of her future medication slips. I informed the patient that I would not fill repeat slips for the months that the prescription was already provided for. She met with understanding.      Patient Active Problem List    Diagnosis Date Noted   • Facial cellulitis 08/17/2017   • Foot pain, left 04/28/2017   • Syncope, near 04/12/2017   • MARGARITA (acute kidney injury) (CMS-HCC) 04/12/2017   • Acute exacerbation of congestive heart failure (CMS-HCC) 04/12/2017   •  Current smoker 04/12/2017   • Family history of stress 04/07/2017   • Chronic back pain 03/03/2017   • Opiate analgesic use agreement exists 01/09/2017   • CVA (cerebral vascular accident) (CMS-HCC) 06/04/2016   • Allergic rhinitis 06/04/2016   • Automatic implantable cardioverter-defibrillator in situ 06/04/2016   • Osteoarthritis 06/04/2016   • Labial abscess 06/04/2016   • Nicotine dependence 06/04/2016   • Diabetic neuropathy (CMS-HCC) 06/04/2016   • GERD (gastroesophageal reflux disease) 06/04/2016   • RLS (restless legs syndrome) 06/04/2016   • PEYTON (obstructive sleep apnea) 06/04/2016   • COPD (chronic obstructive pulmonary disease) (CMS-HCC) 06/04/2016   • Anxiety disorder 06/04/2016   • Dyslipidemia 06/04/2016   • Abscess 06/04/2016   • Hx of coronary artery bypass graft 06/04/2016   • HTN (hypertension) 06/04/2016   • Vitamin D deficiency 11/11/2015   • Polyarthralgia 11/03/2015   • Chronic fatigue 11/03/2015   • Dyspnea 11/03/2015   • Skin rash 11/03/2015   • Osteoporosis 11/03/2015   • Migraines 12/23/2013   • CAD (coronary artery disease) 10/18/2011   • Ischemic cardiomyopathy 10/18/2011   • Type 2 diabetes mellitus (CMS-HCC) 10/18/2011   • CHF (congestive heart failure) (CMS-HCC) 10/18/2011       Current Outpatient Prescriptions   Medication Sig Dispense Refill   • cetirizine (ZYRTEC) 10 MG Tab TAKE 1 TAB BY MOUTH 1 TIME DAILY AS NEEDED FOR ALLERGIES. 90 Tab 0   • duloxetine (CYMBALTA) 20 MG Cap DR Particles Take 1 Cap by mouth every day. 30 Cap 3   • metformin (GLUCOPHAGE) 1000 MG tablet Take 1 Tab by mouth 2 times a day. 60 Tab 5   • topiramate (TOPAMAX) 100 MG Tab Take 1 Tab by mouth every 12 hours. 60 Tab 3   • pantoprazole (PROTONIX) 40 MG Tablet Delayed Response Take 1 Tab by mouth every day. 30 Tab 3   • rosuvastatin (CRESTOR) 40 MG tablet Take 1 Tab by mouth every day. 30 Tab 3   • furosemide (LASIX) 20 MG Tab Take 1 Tab by mouth every day. 30 Tab 3   • amiodarone (CORDARONE) 200 MG Tab Take 1  Tab by mouth every day. 30 Tab 3   • clopidogrel (PLAVIX) 75 MG Tab Take 1 Tab by mouth every day. 30 Tab 5   • carvedilol (COREG) 12.5 MG Tab Take 1 Tab by mouth 2 times a day, with meals. 60 Tab 3   • nitroglycerin (NITROSTAT) 0.4 MG SL Tab Place 1 Tab under tongue as needed for Chest Pain. 25 Tab 2   • gabapentin (NEURONTIN) 600 MG tablet Take 1 Tab by mouth 2 times a day. 60 Tab 5   • insulin glargine (LANTUS SOLOSTAR) 100 UNIT/ML Solution Pen-injector injection INJECT 35 UNITS SUBCUTANEOUSLY DAILY AT BEDTIME AS DIRECTED 5 PEN 3   • buPROPion SR (WELLBUTRIN-SR) 150 MG TABLET SR 12 HR sustained-release tablet Take 1 Tab by mouth 2 times a day. 60 Tab 5   • calcium-vitamin D (OSCAL 500 +D) 500-200 MG-UNIT Tab Take 1 Tab by mouth 2 times a day, with meals. 100 Tab 3   • [START ON 12/1/2017] hydrocodone-acetaminophen (NORCO) 7.5-325 MG per tablet Take 1 Tab by mouth every 8 hours as needed for Moderate Pain or Severe Pain. 90 Tab 0   • ferrous sulfate 325 (65 Fe) MG tablet Take 325 mg by mouth every day.     • ropinirole (REQUIP) 1 MG Tab TAKE 1 TABLET BY MOUTH TWICE DAILY 60 Tab 4   • fluticasone (FLONASE) 50 MCG/ACT nasal spray Spray 1 Spray in nose every day. 16 g 3   • COMBIVENT RESPIMAT  MCG/ACT Aero Soln      • LANTUS SOLOSTAR 100 UNIT/ML Solution Pen-injector injection INJECT 20 UNITS UNDER THE SKIN EVERY NIGHT AT BEDTIME AS DIRECTED 15 mL 0   • albuterol-ipratropium (COMBIVENT)  MCG/ACT Aerosol Inhale 2 Puffs by mouth 4 times a day. 1 Inhaler 0   • metoprolol (LOPRESSOR) 25 MG Tab Take 0.5 Tabs by mouth 2 Times a Day. 60 Tab 0   • acetaminophen/caffeine/butalbital 325-40-50 mg (FIORICET) -40 MG Tab TK 1 T PO  Q 4 H PRN  2   • ondansetron (ZOFRAN ODT) 4 MG TABLET DISPERSIBLE Take 1 Tab by mouth every 8 hours as needed for Nausea/Vomiting. 10 Tab 1     No current facility-administered medications for this visit.        ROS: As per HPI. Additional pertinent symptoms as noted  below.    Constitutional: Denies unintentional weight loss or fatigue  Cardiovascular: Reports stable angina unchanged in years. Denies palpitations  Respiratory: Exertional dyspnea noted. Current smoker  Skin: Healing scabs without pruritis or bleeding  All others negative    /72   Pulse 72   Temp 36.3 °C (97.4 °F)   Ht 1.524 m (5')   Wt 49.5 kg (109 lb 3.2 oz)   LMP 06/15/1996   SpO2 96%   BMI 21.33 kg/m²     Physical Exam   GEN: Well developed, AO x 3, in no apparent distress    HEENT: Atraumatic, normocephalic, oral mucosa is moist  CVS: S1 S2 present, R/R/R, no M/R/G  RS: Air entry equal bilaterally. Distant breath sounds. No W/R/R   ABD: Soft, nontender, BS present in all quadrants  EXT: Mild right pedal edema noted  NEUR: Patient is able to move all of her extremities. CN 2-12 are grossly intact.   Note: I have reviewed all pertinent labs and diagnostic tests associated with this visit with specific comments listed under the assessment and plan below    Assessment and Plan    1. Chronic midline low back pain with right-sided sciatica  - Norco 7.5 Q8 and gabapentin   - Refills provided until Dec 31, 2017  - Stable complaints. No new onset motor or sensory symptoms.     2. Type 2 diabetes mellitus with diabetic neuropathy, with long-term current use of insulin (CMS-HCC)  - Increased Lantus from 30 to 35 units.   - Continue Crestor and ASA  - Repeat A1C upon next visit.  - Continue weight loss and exercise along with a healthy diabetic diet.       3. Abscess  - Resolved      Followup: Return in about 3 months (around 12/12/2017).      Signed by: Adriano Bahena M.D.

## 2017-09-12 NOTE — PROGRESS NOTES
Leena Bella is a 67 y.o. female here for a non-provider visit for:   FLU    Reason for immunization: Annual Flu Vaccine  Immunization records indicate need for vaccine: Yes, confirmed with Epic  Minimum interval has been met for this vaccine: Yes  ABN completed: Not Indicated    Order and dose verified by: FESTUS  VIS Dated  08/07/15 was given to patient: Yes  IAC Questionnaire abnormal. Questionnaire reviewed and administration of injection approved by provider: Dr. Bahena    Patient tolerated injection and no adverse effects were observed or reported: Yes    Pt scheduled for next dose in series: Not Indicated

## 2017-09-21 ENCOUNTER — PATIENT OUTREACH (OUTPATIENT)
Dept: HEALTH INFORMATION MANAGEMENT | Facility: OTHER | Age: 67
End: 2017-09-21

## 2017-09-21 NOTE — PROGRESS NOTES
Patient Leena Bella discharged on 8/19/2017. [facility] Patient Advocate assisted with multiple discharge orders including scheduling 1 primary care follow-up visit which the patient kept. The patient has 2 future appointments - 1 primary care follow-up and 1 neurology follow-up.

## 2017-10-16 DIAGNOSIS — M54.41 CHRONIC MIDLINE LOW BACK PAIN WITH RIGHT-SIDED SCIATICA: ICD-10-CM

## 2017-10-16 DIAGNOSIS — G89.29 CHRONIC MIDLINE LOW BACK PAIN WITH RIGHT-SIDED SCIATICA: ICD-10-CM

## 2017-10-16 RX ORDER — HYDROCODONE BITARTRATE AND ACETAMINOPHEN 7.5; 325 MG/1; MG/1
1 TABLET ORAL EVERY 8 HOURS PRN
Qty: 90 TAB | Refills: 0 | OUTPATIENT
Start: 2017-12-01

## 2017-10-16 NOTE — TELEPHONE ENCOUNTER
1. Caller Name: Chema (son)                      Call Back Number: [phone] (home)     2. Message: Chema called and left a message asking for med refill for patient. They took in only 2 of the 3 scripts that were given last. When they went to go turn in the 3rd this month they didn't accept it since it was past 14 days.    3. Patient approves office to leave a detailed voicemail/MyChart message: N\A    Last seen: 09/12/17 by Dr. Bahena  Next appt: 12/27/17 with Dr. Bahena    Was the patient seen in the last year in this department? Yes   Does patient have an active prescription for medications requested? No   Received Request Via: Pharmacy

## 2017-10-16 NOTE — TELEPHONE ENCOUNTER
Patient requesting a refill of Norco, however denied as the prescription has been provided up until Dec 31,2017

## 2017-10-16 NOTE — TELEPHONE ENCOUNTER
Adriano Bahena M.D.   You 6 hours ago (9:52 AM)     She was provided refill slips for the month of November and December. Her refill slips for the month of October are in the presence of her son which she approved to be allowed to  in the past (howevever recently told the office not to let that happen). She was told that we cannot give another slip because of this reason at our last visit. (Routing comment)      Son called again asking for prescription fill. I personally talked to him letting him know on Leena's last visit she took him off of her chart and we can not discuss medical information with him as well as he can not  prescriptions for patient either. He sounded frustrated as well as stating that it is not true. He stated Leena was with him and it sounded like he was forcing her to talk on the phone to tell us that it is fine for her to give scripts to him. After waiting on the phone listening to him try to convince Leena to talk to us he hung up the phone. Will be calling patient tomorrow. To check up on her

## 2017-10-18 NOTE — TELEPHONE ENCOUNTER
Called patient twice with line ringing disconnected. Checked who was on patient's to discuss list and it was only sister. It seems to be the same number as patient's home phone. I can not get a hold of patient. Chema called again yesterday and left a message asking for patient's prescription. He is not allowed to  rx or discuss patient's treatment.

## 2017-11-03 DIAGNOSIS — M54.41 CHRONIC MIDLINE LOW BACK PAIN WITH RIGHT-SIDED SCIATICA: ICD-10-CM

## 2017-11-03 DIAGNOSIS — G89.29 CHRONIC MIDLINE LOW BACK PAIN WITH RIGHT-SIDED SCIATICA: ICD-10-CM

## 2017-11-07 RX ORDER — HYDROCODONE BITARTRATE AND ACETAMINOPHEN 7.5; 325 MG/1; MG/1
1 TABLET ORAL EVERY 8 HOURS PRN
Qty: 90 TAB | Refills: 0 | Status: SHIPPED | OUTPATIENT
Start: 2017-12-01 | End: 2017-12-14 | Stop reason: SDUPTHER

## 2017-11-07 NOTE — TELEPHONE ENCOUNTER
Adriano Bahena M.D.  Mirlande Parry, Med Ass't   Caller: Unspecified (3 days ago,  4:42 PM)             This patient has not filled her [place] script and can get a new Norco script filled up until 2 more months but I am in [place]. I am in [place] currently. I am not sure if either Olga or Odalys is covering for me. I will try to ask one of them to do it        I sent it to Dr. Christiansen. He is in your pool and is willing to cover those scripts. Thank you

## 2017-11-16 NOTE — TELEPHONE ENCOUNTER
Last seen: 09/12/17 by Dr. Bahena  Next appt: 12/27/17 with Dr. Bahena    Was the patient seen in the last year in this department? Yes   Does patient have an active prescription for medications requested? No   Received Request Via: Pharmacy

## 2017-11-20 RX ORDER — FUROSEMIDE 20 MG/1
TABLET ORAL
Qty: 90 TAB | Refills: 0 | Status: SHIPPED | OUTPATIENT
Start: 2017-11-20 | End: 2018-01-30

## 2017-11-20 RX ORDER — PANTOPRAZOLE SODIUM 40 MG/1
TABLET, DELAYED RELEASE ORAL
Qty: 30 TAB | Refills: 3 | Status: SHIPPED | OUTPATIENT
Start: 2017-11-20 | End: 2018-01-30

## 2017-11-20 RX ORDER — DOCUSATE SODIUM 100 MG/1
CAPSULE, LIQUID FILLED ORAL
Qty: 180 CAP | Refills: 0 | Status: SHIPPED | OUTPATIENT
Start: 2017-11-20 | End: 2018-08-27 | Stop reason: SDUPTHER

## 2017-11-20 RX ORDER — AMIODARONE HYDROCHLORIDE 200 MG/1
TABLET ORAL
Qty: 90 TAB | Refills: 0 | Status: SHIPPED | OUTPATIENT
Start: 2017-11-20 | End: 2018-01-30

## 2017-12-14 DIAGNOSIS — G89.29 CHRONIC MIDLINE LOW BACK PAIN WITH RIGHT-SIDED SCIATICA: ICD-10-CM

## 2017-12-14 DIAGNOSIS — M54.41 CHRONIC MIDLINE LOW BACK PAIN WITH RIGHT-SIDED SCIATICA: ICD-10-CM

## 2017-12-14 NOTE — TELEPHONE ENCOUNTER
1. Caller Name: Pt                      Call Back Number: [phone] (home)     2. Message: Patient called and left a message stating she had the same dilemma with her prescription she had the script for December but since she didn't take it to the pharmacy to drop it off she can not fill. She will drop off script that can not be filled when she gets her December prescription.    3. Patient approves office to leave a detailed voicemail/MyChart message: N\A    Last seen: 09/12/17 by Dr. Bahena  Next appt: 12/27/17 with Dr. Bahena    Was the patient seen in the last year in this department? Yes   Does patient have an active prescription for medications requested? No   Received Request Via: Pharmacy

## 2017-12-18 NOTE — TELEPHONE ENCOUNTER
Adriano Bahena M.D.   You 22 minutes ago (10:17 AM)     I thought Dr. Christiansen filled her prescription in December. What is the reason that the patient did not fill her prescription?   (Routing comment)      I saw that too, but I don't know how it was printed and when the rx, was given to her because Dr. Christiansen was not in that week and patient wasn't seen in office that day to get script.

## 2017-12-19 NOTE — TELEPHONE ENCOUNTER
Patient and son have called multiple times today asking for the medication refill. Patient states that she did get the script from 12/01 but did not drop it off so now she can not fill just like she did last month's. She kept the scripts in hand but did not turn in and with these types of rx's if the patient does not turn in to pharmacy within 14 days of it being written it expires and can not be filled.

## 2017-12-20 DIAGNOSIS — M54.41 CHRONIC MIDLINE LOW BACK PAIN WITH RIGHT-SIDED SCIATICA: ICD-10-CM

## 2017-12-20 DIAGNOSIS — G89.29 CHRONIC MIDLINE LOW BACK PAIN WITH RIGHT-SIDED SCIATICA: ICD-10-CM

## 2017-12-20 RX ORDER — HYDROCODONE BITARTRATE AND ACETAMINOPHEN 7.5; 325 MG/1; MG/1
1 TABLET ORAL EVERY 8 HOURS PRN
Qty: 90 TAB | Refills: 0 | Status: SHIPPED | OUTPATIENT
Start: 2018-02-18 | End: 2018-03-06 | Stop reason: SDUPTHER

## 2017-12-20 RX ORDER — HYDROCODONE BITARTRATE AND ACETAMINOPHEN 7.5; 325 MG/1; MG/1
1 TABLET ORAL EVERY 8 HOURS PRN
Qty: 90 TAB | Refills: 0 | Status: SHIPPED | OUTPATIENT
Start: 2018-01-19 | End: 2017-12-20 | Stop reason: SDUPTHER

## 2017-12-20 RX ORDER — HYDROCODONE BITARTRATE AND ACETAMINOPHEN 7.5; 325 MG/1; MG/1
1 TABLET ORAL EVERY 8 HOURS PRN
Qty: 90 TAB | Refills: 0 | Status: SHIPPED | OUTPATIENT
Start: 2017-12-20 | End: 2017-12-20 | Stop reason: SDUPTHER

## 2017-12-20 NOTE — TELEPHONE ENCOUNTER
Norco filled up to 3/17/18. Patient needs to be told to turn the prescription in to the pharmacy within 14 days from today otherwise they will . NO SCRIPTS to be given before 3/17/18. Scripts need to be filled by the patient herself in the state of [place] only.

## 2017-12-20 NOTE — TELEPHONE ENCOUNTER
Called pt and spoke with pt's sister, Jemima. Notified her of this. Repeated 3 times. She understood. She will pick them up today.    Placed all 3 rx's in the front office

## 2017-12-21 NOTE — TELEPHONE ENCOUNTER
3 months of prescriptions signed and given to Laura.  (Routing comment)     Laura has documented on another encounter re: this outcome.

## 2017-12-26 ENCOUNTER — TELEPHONE (OUTPATIENT)
Dept: INTERNAL MEDICINE | Facility: MEDICAL CENTER | Age: 67
End: 2017-12-26

## 2017-12-27 NOTE — TELEPHONE ENCOUNTER
Patients son came in stating that the patient is abusive to her medication especially with her migraine medication. Pt son will be coming in tomorrow to discuss further more with Dr. Bahena
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
## 2018-01-08 NOTE — TELEPHONE ENCOUNTER
Last seen: 09/12/17 by Dr. Bahena  Next appt: None     Was the patient seen in the last year in this department? Yes   Does patient have an active prescription for medications requested? No   Received Request Via: Pharmacy

## 2018-01-11 RX ORDER — ALCOHOL ANTISEPTIC PADS
PADS, MEDICATED (EA) TOPICAL
Qty: 100 EACH | Refills: 3 | Status: SHIPPED | OUTPATIENT
Start: 2018-01-11 | End: 2018-01-31 | Stop reason: SDUPTHER

## 2018-01-30 ENCOUNTER — OFFICE VISIT (OUTPATIENT)
Dept: INTERNAL MEDICINE | Facility: MEDICAL CENTER | Age: 68
End: 2018-01-30
Payer: MEDICARE

## 2018-01-30 VITALS
OXYGEN SATURATION: 97 % | BODY MASS INDEX: 23.52 KG/M2 | HEIGHT: 60 IN | DIASTOLIC BLOOD PRESSURE: 82 MMHG | SYSTOLIC BLOOD PRESSURE: 130 MMHG | WEIGHT: 119.8 LBS | TEMPERATURE: 97.7 F | HEART RATE: 84 BPM

## 2018-01-30 DIAGNOSIS — Z79.4 TYPE 2 DIABETES MELLITUS WITH DIABETIC NEUROPATHY, WITH LONG-TERM CURRENT USE OF INSULIN (HCC): ICD-10-CM

## 2018-01-30 DIAGNOSIS — I25.5 CARDIOMYOPATHY, ISCHEMIC: ICD-10-CM

## 2018-01-30 DIAGNOSIS — J44.9 CHRONIC OBSTRUCTIVE PULMONARY DISEASE, UNSPECIFIED COPD TYPE (HCC): ICD-10-CM

## 2018-01-30 DIAGNOSIS — H53.8 BLURRY VISION, LEFT EYE: ICD-10-CM

## 2018-01-30 DIAGNOSIS — E11.40 TYPE 2 DIABETES MELLITUS WITH DIABETIC NEUROPATHY, WITH LONG-TERM CURRENT USE OF INSULIN (HCC): ICD-10-CM

## 2018-01-30 DIAGNOSIS — F11.90 CHRONIC, CONTINUOUS USE OF OPIOIDS: ICD-10-CM

## 2018-01-30 DIAGNOSIS — R21 SKIN RASH: ICD-10-CM

## 2018-01-30 DIAGNOSIS — F39 MOOD DISORDER (HCC): ICD-10-CM

## 2018-01-30 DIAGNOSIS — M25.50 POLYARTHRALGIA: ICD-10-CM

## 2018-01-30 PROCEDURE — 99214 OFFICE O/P EST MOD 30 MIN: CPT | Mod: GC | Performed by: INTERNAL MEDICINE

## 2018-01-30 RX ORDER — HYDROCODONE BITARTRATE AND ACETAMINOPHEN 7.5; 325 MG/1; MG/1
1-2 TABLET ORAL EVERY 6 HOURS PRN
Qty: 90 TAB | Refills: 0 | Status: SHIPPED | OUTPATIENT
Start: 2018-01-30 | End: 2018-03-01

## 2018-01-30 RX ORDER — HYDROCODONE BITARTRATE AND ACETAMINOPHEN 7.5; 325 MG/1; MG/1
1-2 TABLET ORAL EVERY 6 HOURS PRN
Qty: 90 TAB | Refills: 0 | Status: SHIPPED | OUTPATIENT
Start: 2018-02-01 | End: 2018-03-03

## 2018-01-30 RX ORDER — AMIODARONE HYDROCHLORIDE 200 MG/1
200 TABLET ORAL DAILY
Qty: 30 TAB | Refills: 3 | Status: SHIPPED | OUTPATIENT
Start: 2018-01-30 | End: 2018-05-02 | Stop reason: SDUPTHER

## 2018-01-30 RX ORDER — ROSUVASTATIN CALCIUM 40 MG/1
40 TABLET, COATED ORAL
Qty: 30 TAB | Refills: 3 | Status: SHIPPED | OUTPATIENT
Start: 2018-01-30 | End: 2018-05-15 | Stop reason: SDUPTHER

## 2018-01-30 RX ORDER — CLOPIDOGREL BISULFATE 75 MG/1
75 TABLET ORAL
Qty: 30 TAB | Refills: 5 | Status: SHIPPED | OUTPATIENT
Start: 2018-01-30 | End: 2018-08-09 | Stop reason: SDUPTHER

## 2018-01-30 RX ORDER — CARVEDILOL 12.5 MG/1
12.5 TABLET ORAL 2 TIMES DAILY WITH MEALS
Qty: 60 TAB | Refills: 3 | Status: SHIPPED | OUTPATIENT
Start: 2018-01-30 | End: 2018-09-05 | Stop reason: SDUPTHER

## 2018-01-30 RX ORDER — ONDANSETRON 4 MG/1
4 TABLET, ORALLY DISINTEGRATING ORAL EVERY 8 HOURS PRN
Qty: 10 TAB | Refills: 1 | Status: SHIPPED | OUTPATIENT
Start: 2018-01-30 | End: 2018-08-09 | Stop reason: SDUPTHER

## 2018-01-30 RX ORDER — BUPROPION HYDROCHLORIDE 150 MG/1
150 TABLET, EXTENDED RELEASE ORAL 2 TIMES DAILY
Qty: 60 TAB | Refills: 5 | Status: SHIPPED | OUTPATIENT
Start: 2018-01-30 | End: 2018-05-02 | Stop reason: SDUPTHER

## 2018-01-30 RX ORDER — NITROGLYCERIN 0.4 MG/1
0.4 TABLET SUBLINGUAL PRN
Qty: 25 TAB | Refills: 2 | Status: SHIPPED | OUTPATIENT
Start: 2018-01-30 | End: 2018-05-15 | Stop reason: SDUPTHER

## 2018-01-30 RX ORDER — FUROSEMIDE 20 MG/1
20 TABLET ORAL
Qty: 30 TAB | Refills: 3 | Status: SHIPPED | OUTPATIENT
Start: 2018-01-30 | End: 2019-01-24 | Stop reason: SDUPTHER

## 2018-01-30 RX ORDER — ROPINIROLE 1 MG/1
TABLET, FILM COATED ORAL
Qty: 60 TAB | Refills: 4 | Status: SHIPPED | OUTPATIENT
Start: 2018-01-30 | End: 2018-02-12 | Stop reason: SDUPTHER

## 2018-01-30 RX ORDER — PANTOPRAZOLE SODIUM 40 MG/1
40 TABLET, DELAYED RELEASE ORAL
Qty: 30 TAB | Refills: 3 | Status: SHIPPED | OUTPATIENT
Start: 2018-01-30 | End: 2018-08-27 | Stop reason: SDUPTHER

## 2018-01-30 RX ORDER — CETIRIZINE HYDROCHLORIDE 10 MG/1
TABLET ORAL
Qty: 90 TAB | Refills: 0 | Status: SHIPPED | OUTPATIENT
Start: 2018-01-30 | End: 2018-05-15 | Stop reason: SDUPTHER

## 2018-01-30 RX ORDER — DULOXETIN HYDROCHLORIDE 20 MG/1
20 CAPSULE, DELAYED RELEASE ORAL DAILY
Qty: 30 CAP | Refills: 3 | Status: SHIPPED | OUTPATIENT
Start: 2018-01-30 | End: 2018-05-15 | Stop reason: SDUPTHER

## 2018-01-30 RX ORDER — MOMETASONE FUROATE 50 UG/1
2 SPRAY, METERED NASAL DAILY
Qty: 1 INHALER | Refills: 3 | Status: SHIPPED | OUTPATIENT
Start: 2018-01-30 | End: 2018-08-27 | Stop reason: SDUPTHER

## 2018-01-30 RX ORDER — TOPIRAMATE 100 MG/1
100 TABLET, FILM COATED ORAL EVERY 12 HOURS
Qty: 60 TAB | Refills: 3 | Status: SHIPPED | OUTPATIENT
Start: 2018-01-30 | End: 2018-05-15 | Stop reason: SDUPTHER

## 2018-01-30 RX ORDER — GABAPENTIN 600 MG/1
600 TABLET ORAL 2 TIMES DAILY
Qty: 60 TAB | Refills: 5 | Status: SHIPPED | OUTPATIENT
Start: 2018-01-30 | End: 2018-08-27 | Stop reason: SDUPTHER

## 2018-01-30 RX ORDER — FERROUS SULFATE 325(65) MG
325 TABLET ORAL DAILY
Qty: 30 TAB | Refills: 3 | Status: SHIPPED | OUTPATIENT
Start: 2018-01-30 | End: 2018-11-18 | Stop reason: SDUPTHER

## 2018-01-30 NOTE — LETTER
January 30, 2018    To Whom It May Concern:         This is confirmation that Leena Villegas Shayne attended her scheduled appointment with Adriano Bahena M.D. on 1/30/18.    Leena needs to have her electricity reinstated immediately after paying her bill due to her medical conditions that require this. Please call if you have any questions.           Adriano Bahena M.D.  [phone]
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
## 2018-01-31 RX ORDER — ALCOHOL ANTISEPTIC PADS
PADS, MEDICATED (EA) TOPICAL
Qty: 600 EACH | Refills: 0 | Status: SHIPPED | OUTPATIENT
Start: 2018-01-31 | End: 2018-04-13 | Stop reason: SDUPTHER

## 2018-01-31 NOTE — TELEPHONE ENCOUNTER
Last seen: 01/30/18 by Dr. Bahena  Next appt: 03/06/18 with Dr. Bahena    Was the patient seen in the last year in this department? Yes   Does patient have an active prescription for medications requested? No   Received Request Via: Pharmacy

## 2018-02-01 RX ORDER — BLOOD SUGAR DIAGNOSTIC
STRIP MISCELLANEOUS
Qty: 600 EACH | Refills: 0 | Status: SHIPPED | OUTPATIENT
Start: 2018-02-01 | End: 2018-04-13 | Stop reason: SDUPTHER

## 2018-02-01 RX ORDER — BLOOD-GLUCOSE METER
EACH MISCELLANEOUS
Qty: 600 STRIP | Refills: 0 | Status: SHIPPED | OUTPATIENT
Start: 2018-02-01 | End: 2018-04-13 | Stop reason: SDUPTHER

## 2018-02-01 RX ORDER — BLOOD-GLUCOSE METER
EACH MISCELLANEOUS
Qty: 600 EACH | Refills: 0 | Status: SHIPPED | OUTPATIENT
Start: 2018-02-01 | End: 2018-04-13 | Stop reason: SDUPTHER

## 2018-02-12 DIAGNOSIS — G25.81 RLS (RESTLESS LEGS SYNDROME): ICD-10-CM

## 2018-02-12 RX ORDER — ROPINIROLE 0.5 MG/1
TABLET, FILM COATED ORAL
Refills: 0 | OUTPATIENT
Start: 2018-02-12

## 2018-02-21 RX ORDER — POTASSIUM CHLORIDE 750 MG/1
CAPSULE, EXTENDED RELEASE ORAL
Qty: 180 CAP | Refills: 0 | Status: SHIPPED | OUTPATIENT
Start: 2018-02-21 | End: 2018-08-09 | Stop reason: SDUPTHER

## 2018-02-21 RX ORDER — ROPINIROLE 1 MG/1
TABLET, FILM COATED ORAL
Qty: 180 TAB | Refills: 0 | Status: SHIPPED | OUTPATIENT
Start: 2018-02-21 | End: 2018-08-27 | Stop reason: SDUPTHER

## 2018-02-21 RX ORDER — AMIODARONE HYDROCHLORIDE 200 MG/1
TABLET ORAL
Qty: 90 TAB | Refills: 0 | Status: SHIPPED | OUTPATIENT
Start: 2018-02-21 | End: 2018-03-06

## 2018-02-21 RX ORDER — FUROSEMIDE 20 MG/1
TABLET ORAL
Qty: 90 TAB | Refills: 0 | Status: SHIPPED | OUTPATIENT
Start: 2018-02-21 | End: 2018-03-06

## 2018-03-06 ENCOUNTER — OFFICE VISIT (OUTPATIENT)
Dept: INTERNAL MEDICINE | Facility: MEDICAL CENTER | Age: 68
End: 2018-03-06
Payer: MEDICARE

## 2018-03-06 VITALS
DIASTOLIC BLOOD PRESSURE: 80 MMHG | TEMPERATURE: 98.6 F | WEIGHT: 121 LBS | SYSTOLIC BLOOD PRESSURE: 120 MMHG | OXYGEN SATURATION: 95 % | HEART RATE: 76 BPM | HEIGHT: 60 IN | BODY MASS INDEX: 23.75 KG/M2

## 2018-03-06 DIAGNOSIS — G89.29 CHRONIC MIDLINE LOW BACK PAIN WITH RIGHT-SIDED SCIATICA: ICD-10-CM

## 2018-03-06 DIAGNOSIS — Z79.4 TYPE 2 DIABETES MELLITUS WITHOUT COMPLICATION, WITH LONG-TERM CURRENT USE OF INSULIN (HCC): ICD-10-CM

## 2018-03-06 DIAGNOSIS — M54.41 CHRONIC MIDLINE LOW BACK PAIN WITH RIGHT-SIDED SCIATICA: ICD-10-CM

## 2018-03-06 DIAGNOSIS — I47.20 VENTRICULAR TACHYCARDIA (HCC): ICD-10-CM

## 2018-03-06 DIAGNOSIS — E11.9 TYPE 2 DIABETES MELLITUS WITHOUT COMPLICATION, WITH LONG-TERM CURRENT USE OF INSULIN (HCC): ICD-10-CM

## 2018-03-06 DIAGNOSIS — I25.810 CORONARY ARTERY DISEASE INVOLVING CORONARY BYPASS GRAFT OF NATIVE HEART WITHOUT ANGINA PECTORIS: ICD-10-CM

## 2018-03-06 DIAGNOSIS — Z95.810 AUTOMATIC IMPLANTABLE CARDIOVERTER-DEFIBRILLATOR IN SITU: ICD-10-CM

## 2018-03-06 DIAGNOSIS — R21 SKIN RASH: ICD-10-CM

## 2018-03-06 PROBLEM — I50.9 ACUTE EXACERBATION OF CONGESTIVE HEART FAILURE (HCC): Status: RESOLVED | Noted: 2017-04-12 | Resolved: 2018-03-06

## 2018-03-06 PROBLEM — N17.9 AKI (ACUTE KIDNEY INJURY) (HCC): Status: RESOLVED | Noted: 2017-04-12 | Resolved: 2018-03-06

## 2018-03-06 PROBLEM — R55 SYNCOPE, NEAR: Status: RESOLVED | Noted: 2017-04-12 | Resolved: 2018-03-06

## 2018-03-06 PROBLEM — F17.200 CURRENT SMOKER: Status: RESOLVED | Noted: 2017-04-12 | Resolved: 2018-03-06

## 2018-03-06 LAB
HBA1C MFR BLD: 8.9 % (ref ?–5.8)
INT CON NEG: NORMAL
INT CON POS: NORMAL

## 2018-03-06 PROCEDURE — 99214 OFFICE O/P EST MOD 30 MIN: CPT | Mod: GC | Performed by: INTERNAL MEDICINE

## 2018-03-06 PROCEDURE — 83036 HEMOGLOBIN GLYCOSYLATED A1C: CPT | Performed by: INTERNAL MEDICINE

## 2018-03-06 RX ORDER — HYDROCODONE BITARTRATE AND ACETAMINOPHEN 7.5; 325 MG/1; MG/1
1 TABLET ORAL EVERY 8 HOURS PRN
Qty: 90 TAB | Refills: 0 | Status: SHIPPED | OUTPATIENT
Start: 2018-03-28 | End: 2018-03-06 | Stop reason: SDUPTHER

## 2018-03-06 RX ORDER — HYDROCODONE BITARTRATE AND ACETAMINOPHEN 7.5; 325 MG/1; MG/1
1 TABLET ORAL EVERY 8 HOURS PRN
Qty: 90 TAB | Refills: 0 | Status: SHIPPED | OUTPATIENT
Start: 2018-04-28 | End: 2018-05-25 | Stop reason: SDUPTHER
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
## 2018-03-13 ENCOUNTER — TELEPHONE (OUTPATIENT)
Dept: NEUROLOGY | Facility: MEDICAL CENTER | Age: 68
End: 2018-03-13

## 2018-03-13 NOTE — TELEPHONE ENCOUNTER
Pt called left message to call her back regarding seizures.    Called back left message returning call

## 2018-04-13 RX ORDER — BLOOD SUGAR DIAGNOSTIC
STRIP MISCELLANEOUS
Qty: 600 EACH | Refills: 0 | Status: SHIPPED | OUTPATIENT
Start: 2018-04-13 | End: 2018-09-18 | Stop reason: SDUPTHER

## 2018-04-13 RX ORDER — PEN NEEDLE, DIABETIC 32GX 5/32"
NEEDLE, DISPOSABLE MISCELLANEOUS
Qty: 600 EACH | Refills: 0 | Status: SHIPPED | OUTPATIENT
Start: 2018-04-13 | End: 2018-05-15 | Stop reason: SDUPTHER

## 2018-04-13 RX ORDER — ALCOHOL ANTISEPTIC PADS
PADS, MEDICATED (EA) TOPICAL
Qty: 600 EACH | Refills: 3 | Status: SHIPPED | OUTPATIENT
Start: 2018-04-13 | End: 2019-04-15 | Stop reason: SDUPTHER

## 2018-04-13 RX ORDER — BLOOD-GLUCOSE METER
EACH MISCELLANEOUS
Qty: 600 STRIP | Refills: 0 | Status: SHIPPED | OUTPATIENT
Start: 2018-04-13 | End: 2018-09-18 | Stop reason: SDUPTHER

## 2018-04-13 NOTE — TELEPHONE ENCOUNTER
Last seen: 03/06/18 by Dr. Bahena  Next appt: None    Was the patient seen in the last year in this department? Yes   Does patient have an active prescription for medications requested? No   Received Request Via: Pharmacy

## 2018-04-13 NOTE — TELEPHONE ENCOUNTER
Last seen: 01/30/18 by Dr. Bahena  Next appt: None     Was the patient seen in the last year in this department? Yes   Does patient have an active prescription for medications requested? No   Received Request Via: Pharmacy

## 2018-05-02 DIAGNOSIS — F39 MOOD DISORDER (HCC): ICD-10-CM

## 2018-05-02 RX ORDER — AMIODARONE HYDROCHLORIDE 200 MG/1
200 TABLET ORAL DAILY
Qty: 30 TAB | Refills: 0 | Status: SHIPPED | OUTPATIENT
Start: 2018-05-02 | End: 2018-08-27 | Stop reason: SDUPTHER

## 2018-05-02 RX ORDER — BUPROPION HYDROCHLORIDE 150 MG/1
150 TABLET, EXTENDED RELEASE ORAL 2 TIMES DAILY
Qty: 60 TAB | Refills: 0 | Status: SHIPPED | OUTPATIENT
Start: 2018-05-02 | End: 2019-01-22

## 2018-05-11 NOTE — TELEPHONE ENCOUNTER
Last seen: 03/06/18 by Dr. Bahena  Next appt: 06/1818 with Dr. Bahena    Was the patient seen in the last year in this department? Yes   Does patient have an active prescription for medications requested? No   Received Request Via: Pharmacy

## 2018-05-14 RX ORDER — AMIODARONE HYDROCHLORIDE 200 MG/1
200 TABLET ORAL DAILY
Qty: 90 TAB | Refills: 0 | Status: SHIPPED | OUTPATIENT
Start: 2018-05-14 | End: 2018-08-27

## 2018-05-14 RX ORDER — ASPIRIN 325 MG
325 TABLET ORAL DAILY
Qty: 90 TAB | Refills: 0 | Status: SHIPPED | OUTPATIENT
Start: 2018-05-14 | End: 2018-07-23

## 2018-05-15 DIAGNOSIS — I25.5 CARDIOMYOPATHY, ISCHEMIC: ICD-10-CM

## 2018-05-15 NOTE — TELEPHONE ENCOUNTER
Last seen: 03/06/18 by Dr. Bahena  Next appt: 06/18/18 with Dr. Bahena    Was the patient seen in the last year in this department? Yes   Does patient have an active prescription for medications requested? No   Received Request Via: Pharmacy

## 2018-05-17 ENCOUNTER — OFFICE VISIT (OUTPATIENT)
Dept: NEUROLOGY | Facility: MEDICAL CENTER | Age: 68
End: 2018-05-17
Payer: MEDICARE

## 2018-05-17 ENCOUNTER — HOSPITAL ENCOUNTER (OUTPATIENT)
Dept: RADIOLOGY | Facility: MEDICAL CENTER | Age: 68
End: 2018-05-17
Attending: PSYCHIATRY & NEUROLOGY
Payer: MEDICARE

## 2018-05-17 VITALS
BODY MASS INDEX: 22.94 KG/M2 | OXYGEN SATURATION: 93 % | HEIGHT: 60 IN | WEIGHT: 116.84 LBS | TEMPERATURE: 98.4 F | SYSTOLIC BLOOD PRESSURE: 126 MMHG | DIASTOLIC BLOOD PRESSURE: 80 MMHG | HEART RATE: 83 BPM

## 2018-05-17 DIAGNOSIS — M79.642 HAND PAIN, LEFT: ICD-10-CM

## 2018-05-17 DIAGNOSIS — G43.119 INTRACTABLE MIGRAINE WITH AURA WITHOUT STATUS MIGRAINOSUS: ICD-10-CM

## 2018-05-17 PROCEDURE — 99214 OFFICE O/P EST MOD 30 MIN: CPT | Performed by: PSYCHIATRY & NEUROLOGY

## 2018-05-17 PROCEDURE — 73130 X-RAY EXAM OF HAND: CPT | Mod: LT

## 2018-05-17 RX ORDER — NITROGLYCERIN 0.4 MG/1
0.4 TABLET SUBLINGUAL PRN
Qty: 25 TAB | Refills: 0 | Status: SHIPPED | OUTPATIENT
Start: 2018-05-17 | End: 2018-08-27 | Stop reason: SDUPTHER

## 2018-05-17 RX ORDER — CETIRIZINE HYDROCHLORIDE 10 MG/1
10 TABLET ORAL
Qty: 90 TAB | Refills: 2 | Status: SHIPPED | OUTPATIENT
Start: 2018-05-17 | End: 2019-04-04 | Stop reason: SDUPTHER

## 2018-05-17 RX ORDER — DULOXETIN HYDROCHLORIDE 20 MG/1
20 CAPSULE, DELAYED RELEASE ORAL DAILY
Qty: 90 CAP | Refills: 3 | Status: SHIPPED | OUTPATIENT
Start: 2018-05-17 | End: 2018-06-29 | Stop reason: SDUPTHER

## 2018-05-17 RX ORDER — FLUTICASONE PROPIONATE 50 MCG
SPRAY, SUSPENSION (ML) NASAL
Qty: 3 BOTTLE | Refills: 0 | Status: SHIPPED | OUTPATIENT
Start: 2018-05-17 | End: 2018-08-09 | Stop reason: SDUPTHER

## 2018-05-17 RX ORDER — ROSUVASTATIN CALCIUM 40 MG/1
40 TABLET, COATED ORAL
Qty: 90 TAB | Refills: 0 | Status: SHIPPED | OUTPATIENT
Start: 2018-05-17 | End: 2018-08-27 | Stop reason: SDUPTHER

## 2018-05-17 RX ORDER — BLOOD-GLUCOSE METER
EACH MISCELLANEOUS
Qty: 600 EACH | Refills: 0 | Status: SHIPPED | OUTPATIENT
Start: 2018-05-17 | End: 2018-09-18 | Stop reason: SDUPTHER

## 2018-05-17 RX ORDER — BUTALBITAL, ACETAMINOPHEN AND CAFFEINE 50; 325; 40 MG/1; MG/1; MG/1
TABLET ORAL
Qty: 60 TAB | Refills: 5 | Status: SHIPPED | OUTPATIENT
Start: 2018-05-17 | End: 2018-11-20 | Stop reason: SDUPTHER

## 2018-05-17 RX ORDER — TOPIRAMATE 100 MG/1
100 TABLET, FILM COATED ORAL EVERY 12 HOURS
Qty: 180 TAB | Refills: 2 | Status: SHIPPED | OUTPATIENT
Start: 2018-05-17 | End: 2018-08-27 | Stop reason: SDUPTHER

## 2018-05-25 ENCOUNTER — OFFICE VISIT (OUTPATIENT)
Dept: INTERNAL MEDICINE | Facility: MEDICAL CENTER | Age: 68
End: 2018-05-25
Payer: MEDICARE

## 2018-05-25 VITALS
TEMPERATURE: 97.7 F | SYSTOLIC BLOOD PRESSURE: 138 MMHG | BODY MASS INDEX: 24.15 KG/M2 | OXYGEN SATURATION: 93 % | HEART RATE: 70 BPM | HEIGHT: 60 IN | WEIGHT: 123 LBS | DIASTOLIC BLOOD PRESSURE: 70 MMHG | RESPIRATION RATE: 18 BRPM

## 2018-05-25 DIAGNOSIS — M85.80 OSTEOPENIA, UNSPECIFIED LOCATION: ICD-10-CM

## 2018-05-25 DIAGNOSIS — M54.41 CHRONIC MIDLINE LOW BACK PAIN WITH RIGHT-SIDED SCIATICA: ICD-10-CM

## 2018-05-25 DIAGNOSIS — G89.29 CHRONIC MIDLINE LOW BACK PAIN WITH RIGHT-SIDED SCIATICA: ICD-10-CM

## 2018-05-25 DIAGNOSIS — Z79.891 CHRONIC USE OF OPIATE DRUGS THERAPEUTIC PURPOSES: ICD-10-CM

## 2018-05-25 PROCEDURE — 99000 SPECIMEN HANDLING OFFICE-LAB: CPT | Performed by: INTERNAL MEDICINE

## 2018-05-25 PROCEDURE — 99213 OFFICE O/P EST LOW 20 MIN: CPT | Mod: GE | Performed by: INTERNAL MEDICINE

## 2018-05-25 RX ORDER — HYDROCODONE BITARTRATE AND ACETAMINOPHEN 7.5; 325 MG/1; MG/1
1 TABLET ORAL EVERY 8 HOURS PRN
Qty: 90 TAB | Refills: 0 | Status: SHIPPED | OUTPATIENT
Start: 2018-05-28 | End: 2018-06-18 | Stop reason: SDUPTHER

## 2018-05-25 ASSESSMENT — PAIN SCALES - GENERAL: PAINLEVEL: 8=MODERATE-SEVERE PAIN

## 2018-05-25 NOTE — PROGRESS NOTES
Established Patient    Leena presents today with the following:    CC: Pain medications refills    HPI: Patient is a 68-year-old female with past medical history of chronic back pain due to degenerative disc disease, diabetes, CAD S/P CABG, and V. tach S/P AICD who presents today for refill of her pain medications consisting of Norco 7.5-325. Patient receiving refills regularly through our clinic. Her pain is due to degenerative disc disease. She reported benefit from Norco in the form of being more functional and able to ambulate and move after taking the medicine. Denied any weakness, numbness, tingling, bladder or bowel problems. Also denied any side effects from the medication. Patient otherwise denies any complaints for today.    Patient Active Problem List    Diagnosis Date Noted   • Hand pain, left 05/17/2018   • Blurry vision, left eye 01/30/2018   • Facial cellulitis 08/17/2017   • Foot pain, left 04/28/2017   • Family history of stress 04/07/2017   • Chronic back pain 03/03/2017   • Opiate analgesic use agreement exists 01/09/2017   • CVA (cerebral vascular accident) ([facility]) 06/04/2016   • Allergic rhinitis 06/04/2016   • Automatic implantable cardioverter-defibrillator in situ 06/04/2016   • Osteoarthritis 06/04/2016   • Labial abscess 06/04/2016   • Nicotine dependence 06/04/2016   • Diabetic neuropathy ([facility]) 06/04/2016   • GERD (gastroesophageal reflux disease) 06/04/2016   • RLS (restless legs syndrome) 06/04/2016   • PEYTON (obstructive sleep apnea) 06/04/2016   • COPD (chronic obstructive pulmonary disease) ([facility]) 06/04/2016   • Anxiety disorder 06/04/2016   • Dyslipidemia 06/04/2016   • Hx of coronary artery bypass graft 06/04/2016   • HTN (hypertension) 06/04/2016   • Vitamin D deficiency 11/11/2015   • Polyarthralgia 11/03/2015   • Dyspnea 11/03/2015   • Skin rash 11/03/2015   • Osteoporosis 11/03/2015   • Migraines 12/23/2013   • CAD (coronary artery disease) 10/18/2011   • Ischemic cardiomyopathy  10/18/2011   • Type 2 diabetes mellitus (HCC) 10/18/2011       Current Outpatient Prescriptions   Medication Sig Dispense Refill   • aspirin 81 MG tablet Take 81 mg by mouth every day.     • [START ON 5/28/2018] HYDROcodone-acetaminophen (NORCO) 7.5-325 MG per tablet Take 1 Tab by mouth every 8 hours as needed for Moderate Pain or Severe Pain for up to 30 days. 90 Tab 0   • topiramate (TOPAMAX) 100 MG Tab Take 1 Tab by mouth every 12 hours. 180 Tab 2   • rosuvastatin (CRESTOR) 40 MG tablet TAKE 1 TAB BY MOUTH EVERY DAY. 90 Tab 0   • nitroglycerin (NITROSTAT) 0.4 MG SL Tab PLACE 1 TAB UNDER TONGUE AS NEEDED FOR CHEST PAIN. 25 Tab 0   • EASY COMFORT LANCETS Misc AS DIRECTED 600 Each 0   • fluticasone (FLONASE) 50 MCG/ACT nasal spray INHALE 1 SPRAY INTO EACH NOSTRIL DAILY 3 Bottle 0   • cetirizine (ZYRTEC) 10 MG Tab Take 1 Tab by mouth 1 time daily as needed for Allergies. 90 Tab 2   • acetaminophen/caffeine/butalbital 325-40-50 mg (FIORICET) -40 MG Tab TK 1 T PO  Q 4 H PRN 60 Tab 5   • aspirin (ASA) 325 MG Tab TAKE 1 TAB BY MOUTH EVERY DAY. 90 Tab 0   • buPROPion SR (WELLBUTRIN-SR) 150 MG TABLET SR 12 HR sustained-release tablet Take 1 Tab by mouth 2 times a day. 60 Tab 0   • amiodarone (CORDARONE) 200 MG Tab Take 1 Tab by mouth every day. 30 Tab 0   • Alcohol Swabs (ALCOHOL PADS) 70 % Pads AS DIRECTED 600 Each 0   • EASY TALK BLOOD GLUCOSE TEST strip AS DIRECTED 600 Strip 0   • EASY COMFORT PEN NEEDLES 32G X 4 MM Misc AS DIRECTED 600 Each 3   • potassium chloride (MICRO-K) 10 MEQ capsule TAKE ONE CAPSULE BY MOUTH TWICE DAILY 180 Cap 0   • ROPINIRole (REQUIP) 1 MG Tab TAKE 1 TABLET BY MOUTH TWICE DAILY 180 Tab 0   • pantoprazole (PROTONIX) 40 MG Tablet Delayed Response Take 1 Tab by mouth every day. 30 Tab 3   • ondansetron (ZOFRAN ODT) 4 MG TABLET DISPERSIBLE Take 1 Tab by mouth every 8 hours as needed. 10 Tab 1   • metformin (GLUCOPHAGE) 1000 MG tablet Take 1 Tab by mouth 2 times a day. 60 Tab 5   • insulin  glargine (LANTUS SOLOSTAR) 100 UNIT/ML Solution Pen-injector injection INJECT 35 UNITS SUBCUTANEOUSLY DAILY AT BEDTIME AS DIRECTED 5 PEN 3   • gabapentin (NEURONTIN) 600 MG tablet Take 1 Tab by mouth 2 times a day. 60 Tab 5   • furosemide (LASIX) 20 MG Tab Take 1 Tab by mouth every day. 30 Tab 3   • ferrous sulfate 325 (65 Fe) MG tablet Take 1 Tab by mouth every day. 30 Tab 3   • clopidogrel (PLAVIX) 75 MG Tab Take 1 Tab by mouth every day. 30 Tab 5   • carvedilol (COREG) 12.5 MG Tab Take 1 Tab by mouth 2 times a day, with meals. 60 Tab 3   • calcium-vitamin D (OSCAL 500 +D) 500-200 MG-UNIT Tab Take 1 Tab by mouth 2 times a day, with meals. 100 Tab 3   • albuterol-ipratropium (COMBIVENT)  MCG/ACT Aerosol Inhale 2 Puffs by mouth 4 times a day. 1 Inhaler 0   • mometasone (NASONEX) 50 MCG/ACT nasal spray Spray 2 Sprays in nose every day. 1 Inhaler 3   •  MG Cap TAKE 1 CAPSULE BY MOUTH TWICE DAILY 180 Cap 0   • DULoxetine (CYMBALTA) 20 MG Cap DR Particles TAKE 1 CAP BY MOUTH EVERY DAY. 90 Cap 3   • amiodarone (CORDARONE) 200 MG Tab TAKE 1 TAB BY MOUTH EVERY DAY. 90 Tab 0     No current facility-administered medications for this visit.            Review of Systems:     Constitutional: Denies fevers, Denies weight changes  Eyes: Denies changes in vision, no eye pain  Ears/Nose/Throat/Mouth: Denies nasal congestion or sore throat   Cardiovascular: Denies chest pain or palpitations   Respiratory: Denies shortness of breath , Denies cough  Gastrointestinal/Hepatic: Denies abdominal pain, nausea, vomiting, diarrhea or constipation.  Genitourinary: Denies bladder dysfunction, dysuria or frequency  Musculoskeletal/Rheum: Chronic back pain  Skin: Denies rash.  Neurological: Denies headache, confusion, memory loss or focal weakness/parasthesias  Psychiatric: denies mood disorder               /70 Comment: repeat bp 5 minutes  Pulse 70   Temp 36.5 °C (97.7 °F)   Resp 18   Ht 1.524 m (5')   Wt 55.8 kg (123  lb)   LMP 06/15/1996   SpO2 93%   Breastfeeding? No   BMI 24.02 kg/m²     Physical Exam   Constitutional:  Comfortable. No acute distress. Uses walker for ambulation  Eyes:  No scleral icterus.  Neck: Neck supple. No thyromegaly present.   Cardiovascular: Normal rate, regular rhythm and normal heart sounds.  Exam reveals no gallop and no friction rub.  No murmur heard.  Pulmonary/Chest: Lungs clear to ascultation bilaterally. Breath sounds normal. No rhonchi, wheezes or crackles.   Musculoskeletal:   Bending forward upon ambulation. Pain upon standing up from chair. No spinal tenderness.  Lymphadenopathy: no cervical adenopathy  Neurological: alert and oriented to person, place, and time.  No obvious motor or sensory deficits.  Extremities: No edema. No clubbing. No cyanosis. Distal pulses 2+ bilaterally.  Skin: No Rash. No cyanosis. Nails show no clubbing.     Opioid Risk Score: 6    Interpretation of Opioid Risk Score   Score 0-3 = Low risk of abuse. Do UDS at least once per year.  Score 4-7 = Moderate risk of abuse. Do UDS 1-4 times per year.  Score 8+ = High risk of abuse. Refer to specialist.    Chronic pain recheck:   Last dose of controlled substance: 5/25/2018  Chronic pain treated with Norco 7.5/325 every 8 hours as needed  Current alcohol or substance use: No    Consequences of Chronic Opiate therapy:  (5 A's)  Analgesia:  improved  Activity:  improved  Adverse Events:  none  Aberrant Behaviors: None   Affect/Mood: Normal    Nonnarcotic treatments that are being used: Gabapentin    Pain management agreement initiated/updated and signed on: 5/25/2018  Urine drug screen done: 5/25/2018, with results pending  Treatment consent less than a    I have reviewed the medical records, the Prescription Monitoring Program and I have determined that controlled substance treatment is medically indicated.      Assessment and Plan    1. Chronic midline low back pain with right-sided sciatica  2. Chronic use of opiate  drugs therapeutic purposes  -Chronic back pain secondary to degenerative disc disease.  -No CT scan noted on chart.  -We'll obtain new CAT scan.  -We'll continue her Norco. Patient provided with prescription for 30 days.  -We'll refer to our pain clinic for further refills and management.  -Reviewed PMA aware and patient is constant.  -Last drug screen was a year ago and is consistent.  -We'll obtain urine drug screen.  -We'll renew substance treatment agreement.  - CT-LSPINE W/O PLUS RECONS; Future  - HYDROcodone-acetaminophen (NORCO) 7.5-325 MG per tablet; Take 1 Tab by mouth every 8 hours as needed for Moderate Pain or Severe Pain for up to 30 days.  Dispense: 90 Tab; Refill: 0  - PAIN MANAGEMENT SCRN, UR; Future  - CONTROLLED SUBSTANCE TREATMENT AGREEMENT    3. Osteopenia, unspecified location  -Bone scan in 2015 showed osteopenia with major osteoporotic fracture 36.5% and hip fracture risk 11.5%.  -Patient meets criteria for treatment with anti-resorptive therapy.  -Primary care provider made aware for further management.                    Signed by: Tr Patel M.D.

## 2018-05-27 ENCOUNTER — TELEPHONE (OUTPATIENT)
Dept: INTERNAL MEDICINE | Facility: MEDICAL CENTER | Age: 68
End: 2018-05-27

## 2018-05-27 DIAGNOSIS — M81.0 OSTEOPOROSIS, UNSPECIFIED OSTEOPOROSIS TYPE, UNSPECIFIED PATHOLOGICAL FRACTURE PRESENCE: ICD-10-CM

## 2018-05-27 DIAGNOSIS — M06.9 RHEUMATOID ARTHRITIS, INVOLVING UNSPECIFIED SITE, UNSPECIFIED RHEUMATOID FACTOR PRESENCE: ICD-10-CM

## 2018-05-27 RX ORDER — ALENDRONATE SODIUM 70 MG/1
70 TABLET ORAL
Qty: 4 TAB | Refills: 4 | Status: SHIPPED | OUTPATIENT
Start: 2018-05-27 | End: 2018-08-27 | Stop reason: SDUPTHER

## 2018-05-28 NOTE — TELEPHONE ENCOUNTER
Spoke with the patient regarding her abnormal DEXA scan. She is unsure of whether Fosamax of resorptive therapy was ever initiated. This issue was brought up with her last rheumatologist. Repeat referral is still pending.   Educated the patient on the A/E of osteonecrosis of the jaw, atypical femur fractures, and GI mucosal irritation prior to initiating the medication
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
## 2018-06-12 ENCOUNTER — APPOINTMENT (OUTPATIENT)
Dept: PULMONOLOGY | Facility: MEDICAL CENTER | Age: 68
End: 2018-06-12
Payer: MEDICARE

## 2018-06-13 ENCOUNTER — HOSPITAL ENCOUNTER (OUTPATIENT)
Dept: RADIOLOGY | Facility: MEDICAL CENTER | Age: 68
End: 2018-06-13
Attending: INTERNAL MEDICINE
Payer: MEDICARE

## 2018-06-13 DIAGNOSIS — G89.29 CHRONIC MIDLINE LOW BACK PAIN WITH RIGHT-SIDED SCIATICA: ICD-10-CM

## 2018-06-13 DIAGNOSIS — M54.41 CHRONIC MIDLINE LOW BACK PAIN WITH RIGHT-SIDED SCIATICA: ICD-10-CM

## 2018-06-13 PROCEDURE — 72131 CT LUMBAR SPINE W/O DYE: CPT

## 2018-06-18 ENCOUNTER — OFFICE VISIT (OUTPATIENT)
Dept: INTERNAL MEDICINE | Facility: MEDICAL CENTER | Age: 68
End: 2018-06-18
Payer: MEDICARE

## 2018-06-18 VITALS
WEIGHT: 116.6 LBS | BODY MASS INDEX: 22.89 KG/M2 | SYSTOLIC BLOOD PRESSURE: 110 MMHG | DIASTOLIC BLOOD PRESSURE: 60 MMHG | HEIGHT: 60 IN | OXYGEN SATURATION: 95 % | HEART RATE: 77 BPM | TEMPERATURE: 97.3 F

## 2018-06-18 DIAGNOSIS — M54.41 CHRONIC MIDLINE LOW BACK PAIN WITH RIGHT-SIDED SCIATICA: ICD-10-CM

## 2018-06-18 DIAGNOSIS — I47.20 VENTRICULAR TACHYCARDIA (HCC): ICD-10-CM

## 2018-06-18 DIAGNOSIS — E11.8 TYPE 2 DIABETES MELLITUS WITH COMPLICATION, WITH LONG-TERM CURRENT USE OF INSULIN (HCC): ICD-10-CM
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
## 2018-06-18 DIAGNOSIS — M81.0 OSTEOPOROSIS, UNSPECIFIED OSTEOPOROSIS TYPE, UNSPECIFIED PATHOLOGICAL FRACTURE PRESENCE: ICD-10-CM

## 2018-06-18 DIAGNOSIS — Z91.89 AT HIGH RISK FOR OSTEOPOROSIS: ICD-10-CM

## 2018-06-18 DIAGNOSIS — Z79.891 OPIATE ANALGESIC USE AGREEMENT EXISTS: ICD-10-CM

## 2018-06-18 DIAGNOSIS — I25.810 CORONARY ARTERY DISEASE INVOLVING CORONARY BYPASS GRAFT OF NATIVE HEART WITHOUT ANGINA PECTORIS: ICD-10-CM

## 2018-06-18 DIAGNOSIS — G43.119 INTRACTABLE MIGRAINE WITH AURA WITHOUT STATUS MIGRAINOSUS: ICD-10-CM

## 2018-06-18 DIAGNOSIS — G89.29 CHRONIC MIDLINE LOW BACK PAIN WITH RIGHT-SIDED SCIATICA: ICD-10-CM

## 2018-06-18 DIAGNOSIS — Z12.11 SCREEN FOR COLON CANCER: ICD-10-CM

## 2018-06-18 DIAGNOSIS — I25.5 ISCHEMIC CARDIOMYOPATHY: ICD-10-CM

## 2018-06-18 DIAGNOSIS — Z79.4 TYPE 2 DIABETES MELLITUS WITH COMPLICATION, WITH LONG-TERM CURRENT USE OF INSULIN (HCC): ICD-10-CM

## 2018-06-18 DIAGNOSIS — M48.062 SPINAL STENOSIS OF LUMBAR REGION WITH NEUROGENIC CLAUDICATION: ICD-10-CM

## 2018-06-18 PROBLEM — M48.00 SPINAL STENOSIS: Status: ACTIVE | Noted: 2018-06-18

## 2018-06-18 LAB
HBA1C MFR BLD: 8.7 % (ref ?–5.8)
INT CON NEG: NORMAL
INT CON POS: NORMAL

## 2018-06-18 PROCEDURE — 83036 HEMOGLOBIN GLYCOSYLATED A1C: CPT | Mod: GC | Performed by: INTERNAL MEDICINE

## 2018-06-18 PROCEDURE — 99214 OFFICE O/P EST MOD 30 MIN: CPT | Mod: GC | Performed by: INTERNAL MEDICINE

## 2018-06-18 RX ORDER — HYDROCODONE BITARTRATE AND ACETAMINOPHEN 7.5; 325 MG/1; MG/1
1 TABLET ORAL EVERY 8 HOURS PRN
Qty: 90 TAB | Refills: 0 | Status: SHIPPED | OUTPATIENT
Start: 2018-06-18 | End: 2018-06-29 | Stop reason: SDUPTHER
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
## 2018-06-18 NOTE — PATIENT INSTRUCTIONS
Please ask Dr. Quezada  Whether to continue taking Lasix and potassium currently.   Please forward Cardiology office notes to our clinic.

## 2018-06-21 ENCOUNTER — TELEPHONE (OUTPATIENT)
Dept: INTERNAL MEDICINE | Facility: MEDICAL CENTER | Age: 68
End: 2018-06-21

## 2018-06-21 DIAGNOSIS — H53.8 BLURRY VISION, LEFT EYE: ICD-10-CM

## 2018-06-21 NOTE — TELEPHONE ENCOUNTER
1. Caller Name: Pt                      Call Back Number: [phone] (home)     2. Message: Patient called and left a message stating she needs new referral sent to [facility] because the first one sent .    3. Patient approves office to leave a detailed voicemail/MyChart message: N\A

## 2018-06-21 NOTE — TELEPHONE ENCOUNTER
Repeat referral to [facility] associates provided to the patient.

## 2018-06-25 ENCOUNTER — HOSPITAL ENCOUNTER (OUTPATIENT)
Dept: LAB | Facility: MEDICAL CENTER | Age: 68
End: 2018-06-25
Attending: INTERNAL MEDICINE
Payer: MEDICARE

## 2018-06-25 LAB
ALBUMIN SERPL BCP-MCNC: 3.8 G/DL (ref 3.2–4.9)
ALBUMIN/GLOB SERPL: 1.2 G/DL
ALP SERPL-CCNC: 86 U/L (ref 30–99)
ALT SERPL-CCNC: 7 U/L (ref 2–50)
ANION GAP SERPL CALC-SCNC: 7 MMOL/L (ref 0–11.9)
AST SERPL-CCNC: 11 U/L (ref 12–45)
BASOPHILS # BLD AUTO: 0.9 % (ref 0–1.8)
BASOPHILS # BLD: 0.08 K/UL (ref 0–0.12)
BILIRUB SERPL-MCNC: 0.2 MG/DL (ref 0.1–1.5)
BUN SERPL-MCNC: 22 MG/DL (ref 8–22)
CALCIUM SERPL-MCNC: 9.5 MG/DL (ref 8.5–10.5)
CHLORIDE SERPL-SCNC: 108 MMOL/L (ref 96–112)
CHOLEST SERPL-MCNC: 194 MG/DL (ref 100–199)
CO2 SERPL-SCNC: 26 MMOL/L (ref 20–33)
CREAT SERPL-MCNC: 1.15 MG/DL (ref 0.5–1.4)
EOSINOPHIL # BLD AUTO: 0.37 K/UL (ref 0–0.51)
EOSINOPHIL NFR BLD: 4.2 % (ref 0–6.9)
ERYTHROCYTE [DISTWIDTH] IN BLOOD BY AUTOMATED COUNT: 51.6 FL (ref 35.9–50)
FOLATE SERPL-MCNC: 6.9 NG/ML
GLOBULIN SER CALC-MCNC: 3.3 G/DL (ref 1.9–3.5)
GLUCOSE SERPL-MCNC: 183 MG/DL (ref 65–99)
HCT VFR BLD AUTO: 42.6 % (ref 37–47)
HDLC SERPL-MCNC: 66 MG/DL
HGB BLD-MCNC: 13.2 G/DL (ref 12–16)
IMM GRANULOCYTES # BLD AUTO: 0.03 K/UL (ref 0–0.11)
IMM GRANULOCYTES NFR BLD AUTO: 0.3 % (ref 0–0.9)
LDLC SERPL CALC-MCNC: 117 MG/DL
LYMPHOCYTES # BLD AUTO: 1.71 K/UL (ref 1–4.8)
LYMPHOCYTES NFR BLD: 19.3 % (ref 22–41)
MCH RBC QN AUTO: 30.1 PG (ref 27–33)
MCHC RBC AUTO-ENTMCNC: 31 G/DL (ref 33.6–35)
MCV RBC AUTO: 97.3 FL (ref 81.4–97.8)
MONOCYTES # BLD AUTO: 0.61 K/UL (ref 0–0.85)
MONOCYTES NFR BLD AUTO: 6.9 % (ref 0–13.4)
NEUTROPHILS # BLD AUTO: 6.08 K/UL (ref 2–7.15)
NEUTROPHILS NFR BLD: 68.4 % (ref 44–72)
NRBC # BLD AUTO: 0 K/UL
NRBC BLD-RTO: 0 /100 WBC
PLATELET # BLD AUTO: 237 K/UL (ref 164–446)
PMV BLD AUTO: 10.9 FL (ref 9–12.9)
POTASSIUM SERPL-SCNC: 4.3 MMOL/L (ref 3.6–5.5)
PROT SERPL-MCNC: 7.1 G/DL (ref 6–8.2)
RBC # BLD AUTO: 4.38 M/UL (ref 4.2–5.4)
SODIUM SERPL-SCNC: 141 MMOL/L (ref 135–145)
TRIGL SERPL-MCNC: 57 MG/DL (ref 0–149)
TSH SERPL DL<=0.005 MIU/L-ACNC: 1.25 UIU/ML (ref 0.38–5.33)
VIT B12 SERPL-MCNC: 162 PG/ML (ref 211–911)
WBC # BLD AUTO: 8.9 K/UL (ref 4.8–10.8)

## 2018-06-25 PROCEDURE — 36415 COLL VENOUS BLD VENIPUNCTURE: CPT

## 2018-06-25 PROCEDURE — 82607 VITAMIN B-12: CPT

## 2018-06-25 PROCEDURE — 80053 COMPREHEN METABOLIC PANEL: CPT

## 2018-06-25 PROCEDURE — 80061 LIPID PANEL: CPT

## 2018-06-25 PROCEDURE — 82746 ASSAY OF FOLIC ACID SERUM: CPT

## 2018-06-25 PROCEDURE — 84443 ASSAY THYROID STIM HORMONE: CPT

## 2018-06-25 PROCEDURE — 85025 COMPLETE CBC W/AUTO DIFF WBC: CPT

## 2018-06-26 DIAGNOSIS — E53.8 B12 DEFICIENCY: ICD-10-CM

## 2018-06-26 RX ORDER — LANOLIN ALCOHOL/MO/W.PET/CERES
1000 CREAM (GRAM) TOPICAL DAILY
Qty: 30 TAB | Refills: 3 | Status: SHIPPED | OUTPATIENT
Start: 2018-06-26 | End: 2018-07-23 | Stop reason: SDUPTHER

## 2018-06-28 ENCOUNTER — HOSPITAL ENCOUNTER (OUTPATIENT)
Dept: RADIOLOGY | Facility: MEDICAL CENTER | Age: 68
End: 2018-06-28
Attending: INTERNAL MEDICINE
Payer: MEDICARE

## 2018-06-28 DIAGNOSIS — M81.0 OSTEOPOROSIS, UNSPECIFIED OSTEOPOROSIS TYPE, UNSPECIFIED PATHOLOGICAL FRACTURE PRESENCE: ICD-10-CM

## 2018-06-28 DIAGNOSIS — Z91.89 AT HIGH RISK FOR OSTEOPOROSIS: ICD-10-CM

## 2018-06-28 PROCEDURE — 77080 DXA BONE DENSITY AXIAL: CPT

## 2018-06-29 ENCOUNTER — OFFICE VISIT (OUTPATIENT)
Dept: INTERNAL MEDICINE | Facility: MEDICAL CENTER | Age: 68
End: 2018-06-29
Payer: MEDICARE

## 2018-06-29 VITALS
WEIGHT: 123 LBS | HEIGHT: 60 IN | OXYGEN SATURATION: 93 % | DIASTOLIC BLOOD PRESSURE: 72 MMHG | BODY MASS INDEX: 24.15 KG/M2 | HEART RATE: 82 BPM | TEMPERATURE: 98.3 F | SYSTOLIC BLOOD PRESSURE: 128 MMHG

## 2018-06-29 DIAGNOSIS — M79.7 FIBROMYALGIA: ICD-10-CM

## 2018-06-29 DIAGNOSIS — Z79.891 CHRONIC USE OF OPIATE DRUGS THERAPEUTIC PURPOSES: ICD-10-CM

## 2018-06-29 DIAGNOSIS — G89.29 CHRONIC RIGHT-SIDED LOW BACK PAIN WITH RIGHT-SIDED SCIATICA: ICD-10-CM

## 2018-06-29 DIAGNOSIS — M54.41 CHRONIC RIGHT-SIDED LOW BACK PAIN WITH RIGHT-SIDED SCIATICA: ICD-10-CM

## 2018-06-29 PROBLEM — L03.211 FACIAL CELLULITIS: Status: RESOLVED | Noted: 2017-08-17 | Resolved: 2018-06-29

## 2018-06-29 PROCEDURE — 99214 OFFICE O/P EST MOD 30 MIN: CPT | Performed by: INTERNAL MEDICINE

## 2018-06-29 RX ORDER — DULOXETIN HYDROCHLORIDE 30 MG/1
30 CAPSULE, DELAYED RELEASE ORAL DAILY
Qty: 30 CAP | Refills: 1 | Status: SHIPPED | OUTPATIENT
Start: 2018-06-29 | End: 2018-08-27 | Stop reason: SDUPTHER

## 2018-06-29 RX ORDER — HYDROCODONE BITARTRATE AND ACETAMINOPHEN 7.5; 325 MG/1; MG/1
1 TABLET ORAL EVERY 8 HOURS PRN
Qty: 90 TAB | Refills: 0 | Status: SHIPPED | OUTPATIENT
Start: 2018-07-30 | End: 2018-07-23 | Stop reason: SDUPTHER

## 2018-06-29 ASSESSMENT — PAIN SCALES - GENERAL: PAINLEVEL: 8=MODERATE-SEVERE PAIN

## 2018-06-29 NOTE — PROGRESS NOTES
date  Chief Complaint   Patient presents with   • Back Pain     lower       HISTORY OF PRESENT ILLNESS: Patient is a 68 y.o. female established patient who presents today for the following.      1. Chronic right-sided low back pain with right-sided sciatica  2. Chronic use of opiate drugs therapeutic purposes  Patient has chronic back issues and is seen neurosurgery group in the past. Last seen them in March 2015 and surgery was recommended. Patient thought she was seen a year ago but I requested records and they did show that she has significant spinal stenosis and needs surgery. Today patient states she continues to have right side pain that goes into her leg and has trouble with some weakness at times. Most of the time she is okay when she is using her cane and walking without difficulty but at times she feels weak. Norco helps her with her mobility to do her regular activities at home. She is taking Cymbalta as well as gabapentin as prescribed.Patient denies having any constipation.    Chronic pain recheck: Plan to f/u with Dr. Sterling for back surgery.   Last dose of controlled substance: today  Chronic pain treated with Norco 7.5 taken 3 times a day  Current alcohol or substance use: no     Consequences of Chronic Opiate therapy:  (5 A's)  Analgesia:  not changed  Activity:  not changed  Adverse Events:  none  Aberrant Behaviors: no inappropriate refills requested, lost or stolen meds reported.   Affect/Mood: Normal full facial expressions     Nonnarcotic treatments that are being used: cymbalta 20 mg, Gabapentin 600 mg BID, Heat  Physical Therapy:   PT tried / currently going: No  When: years ago  Where: not sure  How many sessions:not sure   PT note in epic: no    Opioid Risk Score: Opioid Risk Score: 6    Interpretation of Opioid Risk Score      Interpretation of Opioid Risk Score   Score 0-3 = Low risk of abuse. Do UDS at least once per year.  Score 4-7 = Moderate risk of abuse. Do UDS 1-4 times per  year.  Score 8+ = High risk of abuse. Refer to specialist.    Pain management agreement initiated/updated and signed on: 6/29/18   Urine drug screen done: 5/31/18  which was reviewed today and is consistent with prescribed medications.     I have reviewed the medical records, the Prescription Monitoring Program and I have determined that controlled substance treatment is medically indicated    CT Lumbar spine 6/2018  1. There appear to be severe spinal canal narrowing at L3-4 and L4-5.    2. Mild anterolisthesis of L4 on L5. Severe degenerative change of the lower lumbar spine.    3. Fibromyalgia  Patient has pain all over her body and sensitive overall. Taking her Cymbalta 20 mg once a day.      Past Medical History:   Diagnosis Date   • Abscess 6/4/2016   • Allergic rhinitis 6/4/2016   • Angina    • Anxiety disorder 6/4/2016   • Arrhythmia    • Arthritis    • ASTHMA    • Automatic implantable cardiac defibrillator in situ    • Automatic implantable cardioverter-defibrillator in situ 6/4/2016   • Breath shortness    • Bronchitis    • Cardiomyopathy, ischemic 6/4/2016   • CATARACT    • Cold    • Congestive heart failure ([facility])    • COPD (chronic obstructive pulmonary disease) ([facility]) 6/4/2016   • Coronary bypass    • CVA (cerebral vascular accident) ([facility]) 6/4/2016   • Diabetes    • Diabetes ([facility])    • Diabetic neuropathy ([facility]) 6/4/2016   • Dyslipidemia 6/4/2016   • GERD (gastroesophageal reflux disease) 6/4/2016   • Heart burn    • Hiatus hernia syndrome    • HTN (hypertension) 6/4/2016   • Hx of coronary artery bypass graft 6/4/2016   • Hypertension    • Indigestion    • Infectious disease     6 weeks ago   • Labial abscess 6/4/2016   • Myocardial infarct ([facility])    • Nicotine dependence 6/4/2016   • On home oxygen therapy    • PEYTON (obstructive sleep apnea) 6/4/2016   • Osteoarthritis 6/4/2016   • Other acute pain     feet   • Pacemaker    • RLS (restless legs syndrome) 6/4/2016   • Ventricular tachycardia ([facility])  6/4/2016       Patient Active Problem List    Diagnosis Date Noted   • Spinal stenosis 06/18/2018   • Hand pain, left 05/17/2018   • Blurry vision, left eye 01/30/2018   • Foot pain, left 04/28/2017   • Family history of stress 04/07/2017   • Chronic right-sided back pain 03/03/2017   • Chronic use of opiate drugs therapeutic purposes 01/09/2017   • CVA (cerebral vascular accident) ([facility]) 06/04/2016   • Allergic rhinitis 06/04/2016   • Automatic implantable cardioverter-defibrillator in situ 06/04/2016   • Osteoarthritis 06/04/2016   • Nicotine dependence 06/04/2016   • Diabetic neuropathy ([facility]) 06/04/2016   • GERD (gastroesophageal reflux disease) 06/04/2016   • RLS (restless legs syndrome) 06/04/2016   • PEYTON (obstructive sleep apnea) 06/04/2016   • COPD (chronic obstructive pulmonary disease) ([facility]) 06/04/2016   • Anxiety disorder 06/04/2016   • Dyslipidemia 06/04/2016   • Hx of coronary artery bypass graft 06/04/2016   • HTN (hypertension) 06/04/2016   • Vitamin D deficiency 11/11/2015   • Polyarthralgia 11/03/2015   • Dyspnea 11/03/2015   • Skin rash 11/03/2015   • Osteoporosis 11/03/2015   • Migraines 12/23/2013   • CAD (coronary artery disease) 10/18/2011   • Ischemic cardiomyopathy 10/18/2011   • Type 2 diabetes mellitus ([facility]) 10/18/2011       Allergies:Cozaar [losartan]; Darvocet [propoxyphene n-apap]; Lexapro; Lisinopril; Cephalexin; Morphine; Other environmental; Penicillin g potassium; Potassium; and Sulfa drugs    Current Outpatient Prescriptions   Medication Sig Dispense Refill   • DULoxetine (CYMBALTA) 30 MG Cap DR Particles Take 1 Cap by mouth every day. 30 Cap 1   • [START ON 7/30/2018] HYDROcodone-acetaminophen (NORCO) 7.5-325 MG per tablet Take 1 Tab by mouth every 8 hours as needed for Severe Pain for up to 30 days. 90 Tab 0   • Cyanocobalamin (VITAMIN B-12) 1000 MCG Tab Take 1 Tab by mouth every day. 30 Tab 3   • insulin glargine (LANTUS SOLOSTAR) 100 UNIT/ML Solution Pen-injector injection INJECT  40 UNITS SUBCUTANEOUSLY DAILY AT BEDTIME AS DIRECTED 5 PEN 3   • alendronate (FOSAMAX) 70 MG Tab Take 1 Tab by mouth every 7 days. 4 Tab 4   • aspirin 81 MG tablet Take 81 mg by mouth every day.     • topiramate (TOPAMAX) 100 MG Tab Take 1 Tab by mouth every 12 hours. 180 Tab 2   • rosuvastatin (CRESTOR) 40 MG tablet TAKE 1 TAB BY MOUTH EVERY DAY. 90 Tab 0   • nitroglycerin (NITROSTAT) 0.4 MG SL Tab PLACE 1 TAB UNDER TONGUE AS NEEDED FOR CHEST PAIN. 25 Tab 0   • EASY COMFORT LANCETS Misc AS DIRECTED 600 Each 0   • fluticasone (FLONASE) 50 MCG/ACT nasal spray INHALE 1 SPRAY INTO EACH NOSTRIL DAILY 3 Bottle 0   • cetirizine (ZYRTEC) 10 MG Tab Take 1 Tab by mouth 1 time daily as needed for Allergies. 90 Tab 2   • acetaminophen/caffeine/butalbital 325-40-50 mg (FIORICET) -40 MG Tab TK 1 T PO  Q 4 H PRN 60 Tab 5   • aspirin (ASA) 325 MG Tab TAKE 1 TAB BY MOUTH EVERY DAY. 90 Tab 0   • buPROPion SR (WELLBUTRIN-SR) 150 MG TABLET SR 12 HR sustained-release tablet Take 1 Tab by mouth 2 times a day. 60 Tab 0   • amiodarone (CORDARONE) 200 MG Tab Take 1 Tab by mouth every day. 30 Tab 0   • Alcohol Swabs (ALCOHOL PADS) 70 % Pads AS DIRECTED 600 Each 0   • EASY TALK BLOOD GLUCOSE TEST strip AS DIRECTED 600 Strip 0   • EASY COMFORT PEN NEEDLES 32G X 4 MM Misc AS DIRECTED 600 Each 3   • potassium chloride (MICRO-K) 10 MEQ capsule TAKE ONE CAPSULE BY MOUTH TWICE DAILY 180 Cap 0   • ROPINIRole (REQUIP) 1 MG Tab TAKE 1 TABLET BY MOUTH TWICE DAILY 180 Tab 0   • pantoprazole (PROTONIX) 40 MG Tablet Delayed Response Take 1 Tab by mouth every day. 30 Tab 3   • ondansetron (ZOFRAN ODT) 4 MG TABLET DISPERSIBLE Take 1 Tab by mouth every 8 hours as needed. 10 Tab 1   • gabapentin (NEURONTIN) 600 MG tablet Take 1 Tab by mouth 2 times a day. 60 Tab 5   • furosemide (LASIX) 20 MG Tab Take 1 Tab by mouth every day. 30 Tab 3   • ferrous sulfate 325 (65 Fe) MG tablet Take 1 Tab by mouth every day. 30 Tab 3   • clopidogrel (PLAVIX) 75 MG Tab  Take 1 Tab by mouth every day. 30 Tab 5   • carvedilol (COREG) 12.5 MG Tab Take 1 Tab by mouth 2 times a day, with meals. 60 Tab 3   • calcium-vitamin D (OSCAL 500 +D) 500-200 MG-UNIT Tab Take 1 Tab by mouth 2 times a day, with meals. 100 Tab 3   • albuterol-ipratropium (COMBIVENT)  MCG/ACT Aerosol Inhale 2 Puffs by mouth 4 times a day. 1 Inhaler 0   • mometasone (NASONEX) 50 MCG/ACT nasal spray Spray 2 Sprays in nose every day. 1 Inhaler 3   •  MG Cap TAKE 1 CAPSULE BY MOUTH TWICE DAILY 180 Cap 0   • amiodarone (CORDARONE) 200 MG Tab TAKE 1 TAB BY MOUTH EVERY DAY. 90 Tab 0     No current facility-administered medications for this visit.        Social History   Substance Use Topics   • Smoking status: Former Smoker     Packs/day: 0.25     Years: 25.00     Types: Cigarettes     Quit date: 3/6/2018   • Smokeless tobacco: Never Used   • Alcohol use No       Family History   Problem Relation Age of Onset   • Arthritis Mother    • Heart Disease Mother    • Heart Disease Father    • Arthritis Sister          Review of Systems   Patient denies Fevers/chills/nausea/vomiting/chest pain/sob/blood in stools/black stools/blood in urine.all other systems are reviewed See HPI    Exam:  Blood pressure 128/72, pulse 82, temperature 36.8 °C (98.3 °F), height 1.524 m (5'), weight 55.8 kg (123 lb), last menstrual period 06/15/1996, SpO2 93 %. Body mass index is 24.02 kg/m².  Constitutional:  NAD, well appearing.  HEENT:   NC/AT  Cardiovascular: RRR.   No m/r/g. No carotid bruits.       Abdomen: Soft, NT/ND + BS, no masses, no suprapubic tenderness  Extremities:  2+ DP and radial pulses bilaterally.  No c/c/e.  Musculoskeletal: Patient has significant tenderness on exam of the entire spine and worse in the lumbosacral area. She has positive paravertebral tenderness as well to even slight palpation where she is sensitive to touch.  Skin:  Warm and dry.    Neurologic: Alert & oriented x 3, CN II-XII grossly intact, strength  and sensation grossly intact.  No focal deficits noted. Walking slowly with this slight limp to the right side using her cane  Psychiatric:  Affect normal, mood normal, judgment normal.    Assessment/Plan:     1. Chronic right-sided low back pain with right-sided sciatica  2. Chronic use of opiate drugs therapeutic purposes  Patient has chronic low back pain with an abnormal CT scan. She has ASCVD and not a candidate for MRI. She has seen neurosurgery in the past and a new referral was placed again on June 18. Patient advised to follow-up with neurosurgery. Today pain medications were refilled for July 30. As patient already had a prescription to fill for tomorrow on June 30.  -Norco 7.5/ 3 25 one pill by mouth every 8 hours when necessary for pain quantity 90 .   - control substance agreement done today  -  reviewed  - Referred to physical therapy to help with her back problems and strengthening    3. Fibromyalgia  Increase Cymbalta to 30 mg once a day and continue gabapentin twice daily as prescribed. Advised to keep up with exercises      All imaging results and lab results and consult notes are reviewed at this visit.  Followup: Return in about 8 weeks (around 8/24/2018).    Please note that this dictation was created using voice recognition software. I have made every reasonable attempt to correct obvious errors, but I expect that there are errors of grammar and possibly content that I did not discover before finalizing the note.

## 2018-07-06 ENCOUNTER — APPOINTMENT (OUTPATIENT)
Dept: RADIOLOGY | Facility: MEDICAL CENTER | Age: 68
End: 2018-07-06
Attending: INTERNAL MEDICINE
Payer: MEDICARE

## 2018-07-09 ENCOUNTER — HOSPITAL ENCOUNTER (OUTPATIENT)
Dept: PULMONOLOGY | Facility: MEDICAL CENTER | Age: 68
End: 2018-07-09
Attending: INTERNAL MEDICINE
Payer: MEDICARE

## 2018-07-09 DIAGNOSIS — J44.9 CHRONIC OBSTRUCTIVE PULMONARY DISEASE, UNSPECIFIED COPD TYPE (HCC): ICD-10-CM

## 2018-07-09 PROCEDURE — 94729 DIFFUSING CAPACITY: CPT

## 2018-07-09 PROCEDURE — 94726 PLETHYSMOGRAPHY LUNG VOLUMES: CPT | Mod: 26 | Performed by: INTERNAL MEDICINE

## 2018-07-09 PROCEDURE — 94060 EVALUATION OF WHEEZING: CPT

## 2018-07-09 PROCEDURE — 94729 DIFFUSING CAPACITY: CPT | Mod: 26 | Performed by: INTERNAL MEDICINE

## 2018-07-09 PROCEDURE — 94060 EVALUATION OF WHEEZING: CPT | Mod: 26 | Performed by: INTERNAL MEDICINE

## 2018-07-09 PROCEDURE — 94726 PLETHYSMOGRAPHY LUNG VOLUMES: CPT

## 2018-07-11 ASSESSMENT — PULMONARY FUNCTION TESTS
FEV1/FVC_PERCENT_CHANGE: 0
FEV1: 1.38
FEV1/FVC_PERCENT_CHANGE: 100
FEV1/FVC: 71
FEV1_LLN: 1.612
FVC_LLN: 2.06
FVC_PERCENT_PREDICTED: 74
FEV1/FVC_PERCENT_PREDICTED: 92
FEV1/FVC_PERCENT_PREDICTED: 78
FEV1/FVC_PERCENT_LLN: 66
FEV1/FVC: 71
FEV1_PERCENT_CHANGE: 4
FEV1_PERCENT_CHANGE: 4
FEV1/FVC_PREDICTED: 79
FEV1/FVC_PERCENT_PREDICTED: 90
FVC: 1.92
FEV1: 1.32
FVC_PERCENT_PREDICTED: 77
FEV1_PERCENT_PREDICTED: 71
FEV1_PREDICTED: 1.93
FEV1/FVC_PERCENT_PREDICTED: 92
FVC: 1.85
FEV1/FVC_PERCENT_PREDICTED: 90
FVC_LLN: 2.06
FEV1/FVC_PERCENT_LLN: 66
FEV1/FVC: 71.88
FEV1_PERCENT_PREDICTED: 68
FEV1/FVC: 72
FEV1_LLN: 1.61
FVC_PREDICTED: 2.47

## 2018-07-12 NOTE — PROCEDURES
PULMONARY FUNCTION INTERPRETATION    DATE OF STUDY:  07/09/2018    AGE:  68.    GENDER:  Female.    RACE:  .    HEIGHT:  152 cm.    WEIGHT:  123 pounds.    INDICATION:  COPD and patient is on amiodarone therapy.    INTERPRETATION:  The spirometry shows mild reduction in forced vital capacity   and FEV1 with no postbronchodilator change.  The post-bronchodilator ratio of   FEV1 to forced vital capacity is 72%, which is normal.  Prebronchodilator FEV1   is 1.32 liters, 68% of predicted and post-bronchodilator, it is 1.38 liters,   71% of predicted with 4% change.  The prebronchodilator forced vital capacity   is 1.85 liters, 74% of predicted and postbronchodilator, it is 1.92 liters,   77% of predicted with 4% change.  The lung volumes show  evidence of air trapping.    Residual volume increased to 2.60 liters, 132% of predicted and ratio of residual   volume to total lung capacity is increased to 59%.  However, the slow vital capacity   is mildly decreased to 1.84 liters 74% of predicted  The diffusing capacity of the lung   for carbon monoxide is severely reduced to 4.26 mL per minute per mmHg, 29% of predicted.    Flow volume loop is also suggestive of obstructive defect.    IMPRESSION:  Mild obstruction an airtrapping with severe reduction in diffusing capacity is  Suggestive of chronic obstructive pulmonary disease / emphysema.  Correlate   clinically.       ____________________________________     Muhammad K. Gondal, MD [facility] / JARROD    DD:  07/12/2018 06:50:48  DT:  07/12/2018 07:18:43    D#:  [id number]  Job#:  [id number]

## 2018-07-23 ENCOUNTER — OFFICE VISIT (OUTPATIENT)
Dept: INTERNAL MEDICINE | Facility: MEDICAL CENTER | Age: 68
End: 2018-07-23
Payer: MEDICARE

## 2018-07-23 VITALS
OXYGEN SATURATION: 91 % | HEART RATE: 80 BPM | HEIGHT: 60 IN | SYSTOLIC BLOOD PRESSURE: 132 MMHG | WEIGHT: 123 LBS | BODY MASS INDEX: 24.15 KG/M2 | DIASTOLIC BLOOD PRESSURE: 61 MMHG | TEMPERATURE: 98 F

## 2018-07-23 DIAGNOSIS — M48.062 SPINAL STENOSIS OF LUMBAR REGION WITH NEUROGENIC CLAUDICATION: ICD-10-CM

## 2018-07-23 DIAGNOSIS — E53.8 B12 DEFICIENCY: ICD-10-CM

## 2018-07-23 DIAGNOSIS — M79.7 FIBROMYALGIA: ICD-10-CM

## 2018-07-23 DIAGNOSIS — I47.20 VENTRICULAR TACHYCARDIA (HCC): ICD-10-CM

## 2018-07-23 DIAGNOSIS — G89.29 CHRONIC MIDLINE LOW BACK PAIN WITH RIGHT-SIDED SCIATICA: ICD-10-CM

## 2018-07-23 DIAGNOSIS — Z79.4 TYPE 2 DIABETES MELLITUS WITH COMPLICATION, WITH LONG-TERM CURRENT USE OF INSULIN (HCC): ICD-10-CM

## 2018-07-23 DIAGNOSIS — I25.810 CORONARY ARTERY DISEASE INVOLVING CORONARY BYPASS GRAFT OF NATIVE HEART WITHOUT ANGINA PECTORIS: ICD-10-CM

## 2018-07-23 DIAGNOSIS — F43.0 ACUTE STRESS REACTION: ICD-10-CM

## 2018-07-23 DIAGNOSIS — M81.0 OSTEOPOROSIS, UNSPECIFIED OSTEOPOROSIS TYPE, UNSPECIFIED PATHOLOGICAL FRACTURE PRESENCE: ICD-10-CM

## 2018-07-23 DIAGNOSIS — Z12.11 SCREEN FOR COLON CANCER: ICD-10-CM

## 2018-07-23 DIAGNOSIS — G89.29 CHRONIC RIGHT-SIDED LOW BACK PAIN WITH RIGHT-SIDED SCIATICA: ICD-10-CM

## 2018-07-23 DIAGNOSIS — Z79.891 CHRONIC USE OF OPIATE DRUGS THERAPEUTIC PURPOSES: ICD-10-CM

## 2018-07-23 DIAGNOSIS — E11.8 TYPE 2 DIABETES MELLITUS WITH COMPLICATION, WITH LONG-TERM CURRENT USE OF INSULIN (HCC): ICD-10-CM

## 2018-07-23 DIAGNOSIS — M54.41 CHRONIC RIGHT-SIDED LOW BACK PAIN WITH RIGHT-SIDED SCIATICA: ICD-10-CM

## 2018-07-23 DIAGNOSIS — R94.2 ABNORMAL PFTS: ICD-10-CM

## 2018-07-23 DIAGNOSIS — M54.41 CHRONIC MIDLINE LOW BACK PAIN WITH RIGHT-SIDED SCIATICA: ICD-10-CM

## 2018-07-23 DIAGNOSIS — Z79.891 OPIATE ANALGESIC USE AGREEMENT EXISTS: ICD-10-CM
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
## 2018-07-23 DIAGNOSIS — J44.9 CHRONIC OBSTRUCTIVE PULMONARY DISEASE, UNSPECIFIED COPD TYPE (HCC): ICD-10-CM

## 2018-07-23 PROCEDURE — 99214 OFFICE O/P EST MOD 30 MIN: CPT | Mod: GC | Performed by: INTERNAL MEDICINE

## 2018-07-23 RX ORDER — HYDROCODONE BITARTRATE AND ACETAMINOPHEN 7.5; 325 MG/1; MG/1
1 TABLET ORAL EVERY 8 HOURS PRN
Qty: 90 TAB | Refills: 0 | Status: SHIPPED | OUTPATIENT
Start: 2018-07-30 | End: 2018-08-27 | Stop reason: SDUPTHER

## 2018-07-23 RX ORDER — LANOLIN ALCOHOL/MO/W.PET/CERES
1000 CREAM (GRAM) TOPICAL DAILY
Qty: 30 TAB | Refills: 3 | Status: SHIPPED | OUTPATIENT
Start: 2018-07-23 | End: 2018-11-08 | Stop reason: SDUPTHER

## 2018-07-23 ASSESSMENT — PATIENT HEALTH QUESTIONNAIRE - PHQ9
SUM OF ALL RESPONSES TO PHQ QUESTIONS 1-9: 19
5. POOR APPETITE OR OVEREATING: 3 - NEARLY EVERY DAY
CLINICAL INTERPRETATION OF PHQ2 SCORE: 4

## 2018-07-24 ENCOUNTER — TELEPHONE (OUTPATIENT)
Dept: INTERNAL MEDICINE | Facility: MEDICAL CENTER | Age: 68
End: 2018-07-24

## 2018-07-24 DIAGNOSIS — M48.061 SPINAL STENOSIS OF LUMBAR REGION, UNSPECIFIED WHETHER NEUROGENIC CLAUDICATION PRESENT: ICD-10-CM
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
## 2018-07-24 NOTE — TELEPHONE ENCOUNTER
Pt calling for a referral to a neurosurgeon. She would like to see Dr. Alice Sterling at [address] [place] [address] phone number [phone]

## 2018-08-10 ENCOUNTER — PATIENT OUTREACH (OUTPATIENT)
Dept: HEALTH INFORMATION MANAGEMENT | Facility: OTHER | Age: 68
End: 2018-08-10

## 2018-08-10 NOTE — TELEPHONE ENCOUNTER
Last seen: 07/23/18 by Dr. Bahena  Next appt: 08/24/18 with Dr. Perry    Was the patient seen in the last year in this department? Yes   Does patient have an active prescription for medications requested? No   Received Request Via: Pharmacy

## 2018-08-10 NOTE — PROGRESS NOTES
1. Attempt #:1    2. WebIZ Checked & Epic Updated: Yes  3. HealthConnect Verified: no  4. Verify PCP: yes    5. Communication Preference Obtained: yes    6. Diabetes Visit Scheduling  Scheduling Status:Scheduled      7. Care Gap Scheduling (Attempt to Schedule EACH Overdue Care Gap!)    Health Maintenance Due   Topic Date Due   • Annual Wellness Visit  1950   • DIABETES MONOFILAMENT / LE EXAM  1950   • RETINAL SCREENING  04/06/1967   • PAP SMEAR  04/06/1970   • URINE ACR / MICROALBUMIN  12/14/2013   • COLONOSCOPY  08/11/2014   • IMM ZOSTER VACCINES (2 of 3) 12/06/2015   • MAMMOGRAM  02/03/2017        8. Patient was directed to Health and Wellness Website: yes         9. Screened for [employer] Prescription? yes  10. [employer] Activation: declined  11. [employer] Vera: no  12. Virtual Visits: no  13. Opt In to Text Messages: no

## 2018-08-14 RX ORDER — POTASSIUM CHLORIDE 750 MG/1
CAPSULE, EXTENDED RELEASE ORAL
Qty: 180 CAP | Refills: 0 | Status: SHIPPED | OUTPATIENT
Start: 2018-08-14 | End: 2018-08-27 | Stop reason: SDUPTHER

## 2018-08-14 RX ORDER — ONDANSETRON 4 MG/1
TABLET, ORALLY DISINTEGRATING ORAL
Qty: 10 TAB | Refills: 0 | Status: SHIPPED | OUTPATIENT
Start: 2018-08-14 | End: 2018-08-21 | Stop reason: SDUPTHER

## 2018-08-14 RX ORDER — FLUTICASONE PROPIONATE 50 MCG
SPRAY, SUSPENSION (ML) NASAL
Qty: 3 BOTTLE | Refills: 0 | Status: SHIPPED | OUTPATIENT
Start: 2018-08-14 | End: 2018-11-20 | Stop reason: SDUPTHER

## 2018-08-14 RX ORDER — CLOPIDOGREL BISULFATE 75 MG/1
75 TABLET ORAL
Qty: 90 TAB | Refills: 0 | Status: SHIPPED | OUTPATIENT
Start: 2018-08-14 | End: 2018-11-20

## 2018-08-24 RX ORDER — ONDANSETRON 4 MG/1
TABLET, ORALLY DISINTEGRATING ORAL
Qty: 10 TAB | Refills: 0 | Status: SHIPPED | OUTPATIENT
Start: 2018-08-24 | End: 2018-08-27 | Stop reason: SDUPTHER

## 2018-08-27 ENCOUNTER — OFFICE VISIT (OUTPATIENT)
Dept: INTERNAL MEDICINE | Facility: MEDICAL CENTER | Age: 68
End: 2018-08-27
Payer: MEDICARE

## 2018-08-27 VITALS
BODY MASS INDEX: 23.22 KG/M2 | SYSTOLIC BLOOD PRESSURE: 142 MMHG | WEIGHT: 118.25 LBS | HEIGHT: 60 IN | DIASTOLIC BLOOD PRESSURE: 76 MMHG | TEMPERATURE: 97.3 F | OXYGEN SATURATION: 94 % | HEART RATE: 89 BPM

## 2018-08-27 DIAGNOSIS — M48.062 SPINAL STENOSIS OF LUMBAR REGION WITH NEUROGENIC CLAUDICATION: ICD-10-CM

## 2018-08-27 DIAGNOSIS — G89.29 CHRONIC RIGHT-SIDED LOW BACK PAIN WITH RIGHT-SIDED SCIATICA: ICD-10-CM

## 2018-08-27 DIAGNOSIS — Z95.1 HX OF CORONARY ARTERY BYPASS GRAFT: ICD-10-CM

## 2018-08-27 DIAGNOSIS — M81.0 OSTEOPOROSIS, UNSPECIFIED OSTEOPOROSIS TYPE, UNSPECIFIED PATHOLOGICAL FRACTURE PRESENCE: ICD-10-CM

## 2018-08-27 DIAGNOSIS — I10 ESSENTIAL HYPERTENSION: ICD-10-CM

## 2018-08-27 DIAGNOSIS — Z79.891 CHRONIC USE OF OPIATE DRUGS THERAPEUTIC PURPOSES: ICD-10-CM

## 2018-08-27 DIAGNOSIS — I25.5 ISCHEMIC CARDIOMYOPATHY: ICD-10-CM

## 2018-08-27 DIAGNOSIS — E11.9 TYPE 2 DIABETES MELLITUS WITHOUT COMPLICATION, WITH LONG-TERM CURRENT USE OF INSULIN (HCC): ICD-10-CM

## 2018-08-27 DIAGNOSIS — Z79.4 TYPE 2 DIABETES MELLITUS WITHOUT COMPLICATION, WITH LONG-TERM CURRENT USE OF INSULIN (HCC): ICD-10-CM

## 2018-08-27 DIAGNOSIS — M54.41 CHRONIC RIGHT-SIDED LOW BACK PAIN WITH RIGHT-SIDED SCIATICA: ICD-10-CM

## 2018-08-27 DIAGNOSIS — M79.7 FIBROMYALGIA: ICD-10-CM

## 2018-08-27 DIAGNOSIS — I25.5 CARDIOMYOPATHY, ISCHEMIC: ICD-10-CM

## 2018-08-27 PROBLEM — Z81.8 FAMILY HISTORY OF STRESS: Chronic | Status: RESOLVED | Noted: 2017-04-07 | Resolved: 2018-08-27

## 2018-08-27 PROBLEM — H53.8 BLURRY VISION, LEFT EYE: Status: RESOLVED | Noted: 2018-01-30 | Resolved: 2018-08-27

## 2018-08-27 PROBLEM — M79.672 FOOT PAIN, LEFT: Status: RESOLVED | Noted: 2017-04-28 | Resolved: 2018-08-27

## 2018-08-27 PROBLEM — M79.642 HAND PAIN, LEFT: Status: RESOLVED | Noted: 2018-05-17 | Resolved: 2018-08-27

## 2018-08-27 LAB
HBA1C MFR BLD: 8.7 % (ref ?–5.8)
INT CON NEG: NORMAL
INT CON POS: NORMAL

## 2018-08-27 PROCEDURE — 99215 OFFICE O/P EST HI 40 MIN: CPT | Mod: GC | Performed by: INTERNAL MEDICINE

## 2018-08-27 PROCEDURE — 83036 HEMOGLOBIN GLYCOSYLATED A1C: CPT | Mod: GC | Performed by: INTERNAL MEDICINE

## 2018-08-27 RX ORDER — TOPIRAMATE 100 MG/1
100 TABLET, FILM COATED ORAL EVERY 12 HOURS
Qty: 180 TAB | Refills: 2 | Status: SHIPPED | OUTPATIENT
Start: 2018-08-27 | End: 2018-11-20 | Stop reason: SDUPTHER

## 2018-08-27 RX ORDER — DULOXETIN HYDROCHLORIDE 30 MG/1
30 CAPSULE, DELAYED RELEASE ORAL DAILY
Qty: 30 CAP | Refills: 1 | Status: SHIPPED | OUTPATIENT
Start: 2018-08-27 | End: 2018-11-11 | Stop reason: SDUPTHER

## 2018-08-27 RX ORDER — ALENDRONATE SODIUM 70 MG/1
70 TABLET ORAL
Qty: 4 TAB | Refills: 4 | Status: SHIPPED | OUTPATIENT
Start: 2018-08-27 | End: 2021-01-01

## 2018-08-27 RX ORDER — POTASSIUM CHLORIDE 750 MG/1
CAPSULE, EXTENDED RELEASE ORAL
Qty: 180 CAP | Refills: 0 | Status: SHIPPED | OUTPATIENT
Start: 2018-08-27 | End: 2018-11-20 | Stop reason: SDUPTHER

## 2018-08-27 RX ORDER — HYDROCODONE BITARTRATE AND ACETAMINOPHEN 7.5; 325 MG/1; MG/1
1 TABLET ORAL EVERY 8 HOURS PRN
Qty: 90 TAB | Refills: 0 | Status: SHIPPED | OUTPATIENT
Start: 2018-09-27 | End: 2018-10-27

## 2018-08-27 RX ORDER — ROSUVASTATIN CALCIUM 40 MG/1
40 TABLET, COATED ORAL
Qty: 90 TAB | Refills: 0 | Status: SHIPPED | OUTPATIENT
Start: 2018-08-27 | End: 2018-11-27 | Stop reason: SDUPTHER

## 2018-08-27 RX ORDER — MOMETASONE FUROATE 50 UG/1
2 SPRAY, METERED NASAL DAILY
Qty: 1 INHALER | Refills: 3 | Status: SHIPPED | OUTPATIENT
Start: 2018-08-27 | End: 2021-01-01

## 2018-08-27 RX ORDER — ONDANSETRON 4 MG/1
TABLET, ORALLY DISINTEGRATING ORAL
Qty: 10 TAB | Refills: 0 | Status: SHIPPED | OUTPATIENT
Start: 2018-08-27 | End: 2018-08-29 | Stop reason: SDUPTHER

## 2018-08-27 RX ORDER — DOCUSATE SODIUM 100 MG/1
CAPSULE, LIQUID FILLED ORAL
Qty: 180 CAP | Refills: 0 | Status: SHIPPED | OUTPATIENT
Start: 2018-08-27 | End: 2018-12-17 | Stop reason: SDUPTHER

## 2018-08-27 RX ORDER — HYDROCODONE BITARTRATE AND ACETAMINOPHEN 7.5; 325 MG/1; MG/1
1 TABLET ORAL EVERY 8 HOURS PRN
Qty: 90 TAB | Refills: 0 | Status: SHIPPED | OUTPATIENT
Start: 2018-08-27 | End: 2018-08-27 | Stop reason: SDUPTHER

## 2018-08-27 RX ORDER — NITROGLYCERIN 0.4 MG/1
0.4 TABLET SUBLINGUAL PRN
Qty: 25 TAB | Refills: 0 | Status: SHIPPED | OUTPATIENT
Start: 2018-08-27 | End: 2018-09-29 | Stop reason: SDUPTHER

## 2018-08-27 RX ORDER — ALENDRONATE SODIUM 70 MG/1
70 TABLET ORAL
Qty: 4 TAB | Refills: 4 | Status: SHIPPED | OUTPATIENT
Start: 2018-08-27 | End: 2018-08-27 | Stop reason: SDUPTHER

## 2018-08-27 RX ORDER — GABAPENTIN 600 MG/1
600 TABLET ORAL 2 TIMES DAILY
Qty: 60 TAB | Refills: 5 | Status: SHIPPED | OUTPATIENT
Start: 2018-08-27 | End: 2019-02-13 | Stop reason: SDUPTHER

## 2018-08-27 RX ORDER — ROPINIROLE 1 MG/1
TABLET, FILM COATED ORAL
Qty: 180 TAB | Refills: 0 | Status: SHIPPED | OUTPATIENT
Start: 2018-08-27 | End: 2018-11-20 | Stop reason: SDUPTHER

## 2018-08-27 RX ORDER — AMIODARONE HYDROCHLORIDE 200 MG/1
200 TABLET ORAL DAILY
Qty: 30 TAB | Refills: 0 | Status: SHIPPED | OUTPATIENT
Start: 2018-08-27 | End: 2018-09-05 | Stop reason: SDUPTHER

## 2018-08-27 RX ORDER — PANTOPRAZOLE SODIUM 40 MG/1
40 TABLET, DELAYED RELEASE ORAL
Qty: 30 TAB | Refills: 3 | Status: SHIPPED | OUTPATIENT
Start: 2018-08-27 | End: 2018-11-20
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
## 2018-08-28 ENCOUNTER — APPOINTMENT (OUTPATIENT)
Dept: INTERNAL MEDICINE | Facility: MEDICAL CENTER | Age: 68
End: 2018-08-28
Payer: MEDICARE

## 2018-08-29 RX ORDER — ONDANSETRON 4 MG/1
4 TABLET, ORALLY DISINTEGRATING ORAL EVERY 8 HOURS PRN
Qty: 20 TAB | Refills: 0 | Status: SHIPPED | OUTPATIENT
Start: 2018-08-29 | End: 2018-09-01 | Stop reason: SDUPTHER

## 2018-08-29 NOTE — TELEPHONE ENCOUNTER
Last seen: 08/27/18 by Dr. Bahena  Next appt: 11/05/18 with Dr. Bahena    Was the patient seen in the last year in this department? Yes   Does patient have an active prescription for medications requested? No   Received Request Via: Pharmacy

## 2018-08-30 ENCOUNTER — TELEPHONE (OUTPATIENT)
Dept: INTERNAL MEDICINE | Facility: MEDICAL CENTER | Age: 68
End: 2018-08-30

## 2018-08-30 NOTE — TELEPHONE ENCOUNTER
1. Caller Name: Pt                      Call Back Number: [phone] (home)     2. Message: Patient called and left a message stating she was having trouble with the pharmacy dispensing her the 81 mg aspirin. She was stating that the pharmacy was still giving her the 325 mg aspirin. She would like for us to call pharmacy and fix this.    3. Patient approves office to leave a detailed voicemail/MyChart message: N\A    I called pharmacy and they let me know situation was fixed and patient received her meds

## 2018-09-04 RX ORDER — ONDANSETRON 4 MG/1
TABLET, ORALLY DISINTEGRATING ORAL
Qty: 20 TAB | Refills: 0 | Status: SHIPPED | OUTPATIENT
Start: 2018-09-04 | End: 2021-01-01

## 2018-09-05 DIAGNOSIS — F39 MOOD DISORDER (HCC): ICD-10-CM

## 2018-09-05 RX ORDER — CETIRIZINE HYDROCHLORIDE 10 MG/1
10 TABLET ORAL
Refills: 2 | OUTPATIENT
Start: 2018-09-05

## 2018-09-06 RX ORDER — BUPROPION HYDROCHLORIDE 150 MG/1
TABLET, EXTENDED RELEASE ORAL
Qty: 180 TAB | Refills: 0 | Status: SHIPPED | OUTPATIENT
Start: 2018-09-06 | End: 2019-01-15

## 2018-09-06 RX ORDER — CARVEDILOL 12.5 MG/1
TABLET ORAL
Qty: 360 TAB | Refills: 0 | Status: SHIPPED | OUTPATIENT
Start: 2018-09-06 | End: 2019-01-17 | Stop reason: SDUPTHER

## 2018-09-06 RX ORDER — AMIODARONE HYDROCHLORIDE 200 MG/1
200 TABLET ORAL DAILY
Qty: 90 TAB | Refills: 0 | Status: SHIPPED | OUTPATIENT
Start: 2018-09-06 | End: 2018-10-30 | Stop reason: SDUPTHER

## 2018-09-11 ENCOUNTER — HOSPITAL ENCOUNTER (OUTPATIENT)
Dept: RADIOLOGY | Facility: MEDICAL CENTER | Age: 68
End: 2018-09-11
Attending: INTERNAL MEDICINE
Payer: MEDICARE

## 2018-09-11 DIAGNOSIS — Z12.31 SCREENING MAMMOGRAM, ENCOUNTER FOR: ICD-10-CM

## 2018-09-11 PROCEDURE — 77067 SCR MAMMO BI INCL CAD: CPT

## 2018-09-23 RX ORDER — BLOOD SUGAR DIAGNOSTIC
STRIP MISCELLANEOUS
Qty: 600 EACH | Refills: 0 | Status: SHIPPED | OUTPATIENT
Start: 2018-09-23 | End: 2018-12-17 | Stop reason: SDUPTHER

## 2018-09-23 RX ORDER — BLOOD-GLUCOSE METER
EACH MISCELLANEOUS
Qty: 600 STRIP | Refills: 0 | Status: SHIPPED | OUTPATIENT
Start: 2018-09-23 | End: 2018-12-17 | Stop reason: SDUPTHER

## 2018-09-23 RX ORDER — BLOOD-GLUCOSE METER
EACH MISCELLANEOUS
Qty: 600 EACH | Refills: 0 | Status: SHIPPED | OUTPATIENT
Start: 2018-09-23 | End: 2019-02-16 | Stop reason: SDUPTHER

## 2018-09-25 ENCOUNTER — HOSPITAL ENCOUNTER (OUTPATIENT)
Dept: RADIOLOGY | Facility: MEDICAL CENTER | Age: 68
End: 2018-09-25
Attending: NEUROLOGICAL SURGERY
Payer: MEDICARE

## 2018-09-25 DIAGNOSIS — M54.5 LOW BACK PAIN, UNSPECIFIED BACK PAIN LATERALITY, UNSPECIFIED CHRONICITY, WITH SCIATICA PRESENCE UNSPECIFIED: ICD-10-CM

## 2018-09-25 PROCEDURE — 72110 X-RAY EXAM L-2 SPINE 4/>VWS: CPT

## 2018-09-29 DIAGNOSIS — G89.29 CHRONIC RIGHT-SIDED LOW BACK PAIN WITH RIGHT-SIDED SCIATICA: ICD-10-CM

## 2018-09-29 DIAGNOSIS — Z79.891 CHRONIC USE OF OPIATE DRUGS THERAPEUTIC PURPOSES: ICD-10-CM

## 2018-09-29 DIAGNOSIS — M54.41 CHRONIC RIGHT-SIDED LOW BACK PAIN WITH RIGHT-SIDED SCIATICA: ICD-10-CM

## 2018-10-02 RX ORDER — HYDROCODONE BITARTRATE AND ACETAMINOPHEN 7.5; 325 MG/1; MG/1
TABLET ORAL
OUTPATIENT
Start: 2018-10-02 | End: 2018-10-05

## 2018-10-02 RX ORDER — NITROGLYCERIN 0.4 MG/1
0.4 TABLET SUBLINGUAL PRN
Qty: 100 TAB | Refills: 0 | Status: SHIPPED | OUTPATIENT
Start: 2018-10-02 | End: 2019-01-17 | Stop reason: SDUPTHER

## 2018-10-17 DIAGNOSIS — M81.0 OSTEOPOROSIS, UNSPECIFIED OSTEOPOROSIS TYPE, UNSPECIFIED PATHOLOGICAL FRACTURE PRESENCE: ICD-10-CM

## 2018-10-17 RX ORDER — ALENDRONATE SODIUM 70 MG/1
TABLET ORAL
Qty: 20 TAB | Refills: 1 | Status: SHIPPED | OUTPATIENT
Start: 2018-10-17 | End: 2019-01-15

## 2018-10-17 NOTE — TELEPHONE ENCOUNTER
PT SEEN: 8/27/18 WITH Dr. Bahena NEXT APPT 11/5/18 WITH Dr. Bahena  Was the patient seen in the last year in this department? Yes     Does patient have an active prescription for medications requested? No     Received Request Via: Pharmacy

## 2018-10-24 DIAGNOSIS — M81.0 OSTEOPOROSIS, UNSPECIFIED OSTEOPOROSIS TYPE, UNSPECIFIED PATHOLOGICAL FRACTURE PRESENCE: ICD-10-CM

## 2018-10-24 RX ORDER — ALENDRONATE SODIUM 70 MG/1
TABLET ORAL
OUTPATIENT
Start: 2018-10-24

## 2018-11-05 ENCOUNTER — APPOINTMENT (OUTPATIENT)
Dept: INTERNAL MEDICINE | Facility: MEDICAL CENTER | Age: 68
End: 2018-11-05
Payer: MEDICARE

## 2018-11-05 RX ORDER — AMIODARONE HYDROCHLORIDE 200 MG/1
200 TABLET ORAL DAILY
Qty: 90 TAB | Refills: 0 | Status: SHIPPED | OUTPATIENT
Start: 2018-11-05 | End: 2019-02-26 | Stop reason: SDUPTHER

## 2018-11-08 RX ORDER — LANOLIN ALCOHOL/MO/W.PET/CERES
1000 CREAM (GRAM) TOPICAL DAILY
Qty: 90 TAB | Refills: 0 | Status: SHIPPED | OUTPATIENT
Start: 2018-11-08 | End: 2019-02-04 | Stop reason: SDUPTHER

## 2018-11-08 NOTE — TELEPHONE ENCOUNTER
Last seen: 08/27/18 by Dr. Bahena  Next appt: None     Was the patient seen in the last year in this department? Yes   Does patient have an active prescription for medications requested? No   Received Request Via: Pharmacy

## 2018-11-11 DIAGNOSIS — M79.7 FIBROMYALGIA: ICD-10-CM

## 2018-11-12 RX ORDER — DULOXETIN HYDROCHLORIDE 30 MG/1
30 CAPSULE, DELAYED RELEASE ORAL DAILY
Qty: 90 CAP | Refills: 0 | Status: SHIPPED | OUTPATIENT
Start: 2018-11-12 | End: 2018-11-20

## 2018-11-20 ENCOUNTER — OFFICE VISIT (OUTPATIENT)
Dept: NEUROLOGY | Facility: MEDICAL CENTER | Age: 68
End: 2018-11-20
Payer: MEDICARE

## 2018-11-20 VITALS
DIASTOLIC BLOOD PRESSURE: 80 MMHG | BODY MASS INDEX: 23.16 KG/M2 | WEIGHT: 118 LBS | SYSTOLIC BLOOD PRESSURE: 136 MMHG | TEMPERATURE: 96.9 F | HEART RATE: 85 BPM | OXYGEN SATURATION: 94 % | HEIGHT: 60 IN

## 2018-11-20 DIAGNOSIS — G43.119 INTRACTABLE MIGRAINE WITH AURA WITHOUT STATUS MIGRAINOSUS: ICD-10-CM

## 2018-11-20 PROCEDURE — 99214 OFFICE O/P EST MOD 30 MIN: CPT | Mod: 25 | Performed by: PSYCHIATRY & NEUROLOGY

## 2018-11-20 RX ORDER — BUTALBITAL, ACETAMINOPHEN AND CAFFEINE 50; 325; 40 MG/1; MG/1; MG/1
TABLET ORAL
Qty: 60 TAB | Refills: 5 | Status: SHIPPED | OUTPATIENT
Start: 2018-11-20 | End: 2018-12-20

## 2018-11-20 RX ORDER — TOPIRAMATE 100 MG/1
100 TABLET, FILM COATED ORAL EVERY 12 HOURS
Qty: 180 TAB | Refills: 3 | Status: SHIPPED | OUTPATIENT
Start: 2018-11-20 | End: 2019-11-01 | Stop reason: SDUPTHER

## 2018-11-20 RX ORDER — POTASSIUM CHLORIDE 750 MG/1
CAPSULE, EXTENDED RELEASE ORAL
Qty: 180 CAP | Refills: 0 | Status: SHIPPED | OUTPATIENT
Start: 2018-11-20 | End: 2018-11-20 | Stop reason: SDUPTHER

## 2018-11-20 RX ORDER — POTASSIUM CHLORIDE 750 MG/1
10 CAPSULE, EXTENDED RELEASE ORAL 2 TIMES DAILY
Qty: 180 CAP | Refills: 0 | Status: SHIPPED | OUTPATIENT
Start: 2018-11-20 | End: 2019-01-22

## 2018-11-20 RX ORDER — KETOROLAC TROMETHAMINE 30 MG/ML
30 INJECTION, SOLUTION INTRAMUSCULAR; INTRAVENOUS ONCE
Status: COMPLETED | OUTPATIENT
Start: 2018-11-20 | End: 2018-11-20

## 2018-11-20 RX ADMIN — KETOROLAC TROMETHAMINE 30 MG: 30 INJECTION, SOLUTION INTRAMUSCULAR; INTRAVENOUS at 16:34
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
## 2018-11-27 DIAGNOSIS — I25.5 CARDIOMYOPATHY, ISCHEMIC: ICD-10-CM

## 2018-11-27 RX ORDER — POTASSIUM CHLORIDE 750 MG/1
CAPSULE, EXTENDED RELEASE ORAL
Qty: 180 CAP | Refills: 0 | Status: SHIPPED | OUTPATIENT
Start: 2018-11-27 | End: 2019-02-19 | Stop reason: SDUPTHER

## 2018-11-27 RX ORDER — ROSUVASTATIN CALCIUM 40 MG/1
40 TABLET, COATED ORAL
Qty: 90 TAB | Refills: 0 | Status: SHIPPED | OUTPATIENT
Start: 2018-11-27 | End: 2019-02-19 | Stop reason: SDUPTHER

## 2018-11-27 NOTE — TELEPHONE ENCOUNTER
Last seen: 08/27/18 by Dr. Bahena  Next appt: 12/10/18 with Dr. Bahena    Was the patient seen in the last year in this department? Yes   Does patient have an active prescription for medications requested? No   Received Request Via: Pharmacy

## 2018-12-11 RX ORDER — INSULIN GLARGINE 100 [IU]/ML
INJECTION, SOLUTION SUBCUTANEOUS
Qty: 15 ML | Refills: 0 | Status: SHIPPED | OUTPATIENT
Start: 2018-12-11 | End: 2019-01-20 | Stop reason: SDUPTHER

## 2018-12-14 ENCOUNTER — OFFICE VISIT (OUTPATIENT)
Dept: INTERNAL MEDICINE | Facility: MEDICAL CENTER | Age: 68
End: 2018-12-14
Payer: MEDICARE

## 2018-12-14 VITALS
HEART RATE: 73 BPM | BODY MASS INDEX: 24.35 KG/M2 | WEIGHT: 124 LBS | OXYGEN SATURATION: 96 % | SYSTOLIC BLOOD PRESSURE: 126 MMHG | DIASTOLIC BLOOD PRESSURE: 64 MMHG | HEIGHT: 60 IN | TEMPERATURE: 98.8 F

## 2018-12-14 DIAGNOSIS — G89.29 CHRONIC RIGHT-SIDED LOW BACK PAIN WITH RIGHT-SIDED SCIATICA: ICD-10-CM

## 2018-12-14 DIAGNOSIS — M54.41 CHRONIC RIGHT-SIDED LOW BACK PAIN WITH RIGHT-SIDED SCIATICA: ICD-10-CM

## 2018-12-14 DIAGNOSIS — M48.062 SPINAL STENOSIS OF LUMBAR REGION WITH NEUROGENIC CLAUDICATION: ICD-10-CM

## 2018-12-14 DIAGNOSIS — Z79.891 CHRONIC USE OF OPIATE DRUGS THERAPEUTIC PURPOSES: ICD-10-CM

## 2018-12-14 PROCEDURE — 99213 OFFICE O/P EST LOW 20 MIN: CPT | Performed by: INTERNAL MEDICINE

## 2018-12-14 RX ORDER — HYDROCODONE BITARTRATE AND ACETAMINOPHEN 7.5; 325 MG/1; MG/1
1 TABLET ORAL EVERY 8 HOURS PRN
Qty: 90 TAB | Refills: 0 | Status: SHIPPED | OUTPATIENT
Start: 2018-12-14 | End: 2019-01-11

## 2018-12-14 RX ORDER — PANTOPRAZOLE SODIUM 40 MG/1
TABLET, DELAYED RELEASE ORAL
COMMUNITY
Start: 2018-11-27 | End: 2019-02-19

## 2018-12-14 RX ORDER — HYDROCODONE BITARTRATE AND ACETAMINOPHEN 7.5; 325 MG/1; MG/1
TABLET ORAL
COMMUNITY
Start: 2018-10-30 | End: 2018-12-14 | Stop reason: SDUPTHER

## 2018-12-14 RX ORDER — DULOXETIN HYDROCHLORIDE 20 MG/1
CAPSULE, DELAYED RELEASE ORAL
COMMUNITY
Start: 2018-12-01 | End: 2021-01-01

## 2018-12-14 RX ORDER — ALCOHOL ANTISEPTIC PADS
PADS, MEDICATED (EA) TOPICAL
COMMUNITY
Start: 2018-10-20 | End: 2021-01-01

## 2018-12-14 ASSESSMENT — PAIN SCALES - GENERAL: PAINLEVEL: 10=SEVERE PAIN

## 2018-12-14 ASSESSMENT — PATIENT HEALTH QUESTIONNAIRE - PHQ9: CLINICAL INTERPRETATION OF PHQ2 SCORE: 0

## 2018-12-14 NOTE — PROGRESS NOTES
Coverage Clinic Note  Patient: Leena Bella  Age: 68 y.o.   Gender: female  Author: Carl Cannon M.D.  PCP: Adriano Bahena M.D.    Today's date: 12/14/18  Chief Complaint   Patient presents with   • Medication Refill     follow up        HPI:  History obtained from patient, medical chart.    Needs refill on hydrocodone. Been on this medication for three years. Helps with pain in back and right-sided sciatica. Going ot see Dr. Singletary for back surgery to see if it will help with the back pain, not so much sciatica.   Using the pain medicaitons makes it easier to get up and wash dishes.     Assessment: 68-year-old female coming f in for refill of her narcotic medication for chronic back pain.    Plan:  1. Spinal stenosis of lumbar region with neurogenic claudication  2. Chronic use of opiate drugs therapeutic purposes  3. Chronic right-sided low back pain with right-sided sciatica  -Medication refilled for 28 days; narc check was consistent with her prescribed medications.;  The patient is opioid risk tool score was 6.  Per clinic policy she does not need a urinary drug screen today, her urinary drug screen from May was reviewed; patient does not need to renew her medication management agreement, but may need to do so at her next visit.  One the printed out was different than we had seen before but we did have the patient read it and signed the papers.  Prescription was filled for only 28 days as the providing a 30-day prescription would have the prescription and on a Sunday.    The patient is on multiple centrally acting medications and I cautioned her on the use of alcohol and/or marijuana or any other substance significantly affect her mental status.  cautioned against it and occurring the patient only to use medications that were helping with her symptoms.  - HYDROcodone-acetaminophen (NORCO) 7.5-325 MG per tablet; Take 1 Tab by mouth every 8 hours as needed for up to 28 days.  Dispense: 90 Tab; Refill:  0  - Consent for Opiate Prescription    Return in about 4 weeks (around 1/11/2019), or if symptoms worsen or fail to improve, for Long with PCP for general follow up.     ROS:    A 14 point ROS is negative, other than as stated above.     Past Medical History:   Diagnosis Date   • Abscess 6/4/2016   • Allergic rhinitis 6/4/2016   • Angina    • Anxiety disorder 6/4/2016   • Arrhythmia    • Arthritis    • ASTHMA    • Automatic implantable cardiac defibrillator in situ    • Automatic implantable cardioverter-defibrillator in situ 6/4/2016   • Breath shortness    • Bronchitis    • Cardiomyopathy, ischemic 6/4/2016   • CATARACT    • Cold    • Congestive heart failure ([facility])    • COPD (chronic obstructive pulmonary disease) ([facility]) 6/4/2016   • Coronary bypass    • CVA (cerebral vascular accident) ([facility]) 6/4/2016   • Diabetes    • Diabetes ([facility])    • Diabetic neuropathy ([facility]) 6/4/2016   • Dyslipidemia 6/4/2016   • GERD (gastroesophageal reflux disease) 6/4/2016   • Heart burn    • Hiatus hernia syndrome    • HTN (hypertension) 6/4/2016   • Hx of coronary artery bypass graft 6/4/2016   • Hypertension    • Indigestion    • Infectious disease     6 weeks ago   • Labial abscess 6/4/2016   • Myocardial infarct ([facility])    • Nicotine dependence 6/4/2016   • On home oxygen therapy    • PEYTON (obstructive sleep apnea) 6/4/2016   • Osteoarthritis 6/4/2016   • Other acute pain     feet   • Pacemaker    • RLS (restless legs syndrome) 6/4/2016   • Ventricular tachycardia ([facility]) 6/4/2016     Social History     Social History   • Marital status: Single     Spouse name: N/A   • Number of children: N/A   • Years of education: N/A     Occupational History   • Not on file.     Social History Main Topics   • Smoking status: Former Smoker     Packs/day: 0.25     Years: 25.00     Types: Cigarettes     Quit date: 3/6/2018   • Smokeless tobacco: Never Used      Comment: per pt every now and then   • Alcohol use No   • Drug use: No   • Sexual activity:  Not on file     Other Topics Concern   • Not on file     Social History Narrative   • No narrative on file     Family History   Problem Relation Age of Onset   • Arthritis Mother    • Heart Disease Mother    • Heart Disease Father    • Arthritis Sister      Allergies: Cozaar [losartan]; Darvocet [propoxyphene n-apap]; Lexapro; Lisinopril; Cephalexin; Morphine; Other environmental; Penicillin g potassium; Potassium; and Sulfa drugs  Current Outpatient Prescriptions on File Prior to Visit   Medication Sig Dispense Refill   • LANTUS SOLOSTAR 100 UNIT/ML Solution Pen-injector injection INJECT 40 UNITS SUBCUTANEOUSLY DAILY AT BEDTIME AS DIRECTED 15 mL 0   • rosuvastatin (CRESTOR) 40 MG tablet TAKE 1 TAB BY MOUTH EVERY DAY. 90 Tab 0   • potassium chloride (MICRO-K) 10 MEQ capsule TAKE ONE CAPSULE BY MOUTH TWICE DAILY 180 Cap 0   • clopidogrel (PLAVIX) 75 MG Tab TAKE 1 TAB BY MOUTH EVERY DAY. 90 Tab 0   • fluticasone (FLONASE) 50 MCG/ACT nasal spray INHALE 1 SPRAY INTO EACH NOSTRIL DAILY 3 Bottle 0   • ROPINIRole (REQUIP) 1 MG Tab TAKE 1 TABLET BY MOUTH TWICE DAILY 180 Tab 0   • ferrous sulfate 325 (65 Fe) MG tablet Take 1 Tab by mouth every day for 60 days. 60 Tab 0   • acetaminophen/caffeine/butalbital 325-40-50 mg (FIORICET) -40 MG Tab TK 1 T PO  Q 4 H PRN 60 Tab 5   • topiramate (TOPAMAX) 100 MG Tab Take 1 Tab by mouth every 12 hours. 180 Tab 3   • potassium chloride (MICRO-K) 10 MEQ capsule Take 1 Cap by mouth 2 times a day. TWICE DAILY 180 Cap 0   • Cyanocobalamin (VITAMIN B-12) 1000 MCG Tab TAKE 1 TAB BY MOUTH EVERY DAY. 90 Tab 0   • amiodarone (CORDARONE) 200 MG Tab TAKE 1 TAB BY MOUTH EVERY DAY. 90 Tab 0   • alendronate (FOSAMAX) 70 MG Tab TAKE 1 TAB BY MOUTH EVERY SEVEN DAYS. 20 Tab 1   • Alcohol Swabs (ALCOHOL PADS) 70 % Pads AS DIRECTED 600 Each 0   • buPROPion SR (WELLBUTRIN-SR) 150 MG TABLET SR 12 HR sustained-release tablet TAKE 1 TAB BY MOUTH TWO TIMES A DAY. 180 Tab 0   • carvedilol (COREG) 12.5 MG  Tab TAKE 1 TAB BY MOUTH TWO TIMES A DAY, WITH MEALS. 360 Tab 0   • ondansetron (ZOFRAN ODT) 4 MG TABLET DISPERSIBLE TAKE 1 TAB BY MOUTH EVERY EIGHT HOURS AS NEEDED FOR NAUSEA. TAKE 1 TAB BY MOUTH EVERY EIGHT HOURS AS NEEDED. 20 Tab 0   • gabapentin (NEURONTIN) 600 MG tablet Take 1 Tab by mouth 2 times a day. 60 Tab 5   • docusate sodium (DOK) 100 MG Cap TAKE 1 CAPSULE BY MOUTH TWICE DAILY 180 Cap 0   • aspirin 81 MG tablet Take 1 Tab by mouth every day. 100 Tab 3   • mometasone (NASONEX) 50 MCG/ACT nasal spray Spray 2 Sprays in nose every day. 1 Inhaler 3   • metformin (GLUCOPHAGE) 1000 MG tablet Take 1 Tab by mouth 2 times a day, with meals. 60 Tab 3   • alendronate (FOSAMAX) 70 MG Tab Take 1 Tab by mouth every 7 days. 4 Tab 4   • cetirizine (ZYRTEC) 10 MG Tab Take 1 Tab by mouth 1 time daily as needed for Allergies. 90 Tab 2   • buPROPion SR (WELLBUTRIN-SR) 150 MG TABLET SR 12 HR sustained-release tablet Take 1 Tab by mouth 2 times a day. 60 Tab 0   • EASY COMFORT PEN NEEDLES 32G X 4 MM Misc AS DIRECTED 600 Each 3   • furosemide (LASIX) 20 MG Tab Take 1 Tab by mouth every day. 30 Tab 3   • calcium-vitamin D (OSCAL 500 +D) 500-200 MG-UNIT Tab Take 1 Tab by mouth 2 times a day, with meals. 100 Tab 3   • EASY COMFORT LANCETS Misc AS DIRECTED (Patient not taking: Reported on 12/14/2018) 600 Each 0   • aspirin EC (ECOTRIN) 325 MG Tablet Delayed Response TAKE 1 TAB BY MOUTH EVERY DAY. (Patient not taking: Reported on 12/14/2018) 90 Tab 0   • nitroglycerin (NITROSTAT) 0.4 MG SL Tab PLACE 1 TAB UNDER TONGUE AS NEEDED FOR CHEST PAIN. 100 Tab 0   • EASY COMFORT LANCETS Misc AS DIRECTED (Patient not taking: Reported on 12/14/2018) 600 Each 0   • EASY TALK BLOOD GLUCOSE TEST strip AS DIRECTED (Patient not taking: Reported on 12/14/2018) 600 Strip 0     No current facility-administered medications on file prior to visit.        Physical Exam:  /64 (BP Location: Right arm, Patient Position: Sitting, BP Cuff Size: Adult)    Pulse 73   Temp 37.1 °C (98.8 °F) (Temporal)   Ht 1.524 m (5')   Wt 56.2 kg (124 lb)   LMP 06/15/1996   SpO2 96%   Breastfeeding? No   BMI 24.22 kg/m² Body mass index is 24.22 kg/m².    Gen: No acute distress, pleasant, frail appearing  Lungs: Normal effort  Abd: Thin  Ext: Osteoporotic changes noted to hands bilaterally  Neuro: Walks with an assistive device  Psych: Does not appear to be responding to internal stimuli  Delirium Screening.  Patient showed good attention and did not have any signs of disorganized thinking, fluctuating mental status or altered levels of consciousness during the visit.      Labs: Reviewed most recent CBC and chemistry panel no signs of obvious liver or kidney issues      Carl Cannon M.D.  Geriatric Physician    This note was partially dictated with voice recognition software, for any confusion please do not hesitate to contact me.

## 2018-12-17 NOTE — TELEPHONE ENCOUNTER
Last seen: 12/14/18 by Dr. Cannon  Next appt: 03/25/19 with Dr. Bahena    Was the patient seen in the last year in this department? Yes   Does patient have an active prescription for medications requested? No   Received Request Via: Pharmacy

## 2018-12-18 RX ORDER — BLOOD SUGAR DIAGNOSTIC
STRIP MISCELLANEOUS
Qty: 600 EACH | Refills: 0 | Status: SHIPPED | OUTPATIENT
Start: 2018-12-18 | End: 2019-05-16 | Stop reason: SDUPTHER

## 2018-12-18 RX ORDER — BLOOD-GLUCOSE METER
EACH MISCELLANEOUS
Qty: 600 STRIP | Refills: 0 | Status: SHIPPED | OUTPATIENT
Start: 2018-12-18 | End: 2019-06-11 | Stop reason: SDUPTHER

## 2018-12-18 RX ORDER — DOCUSATE SODIUM 100 MG/1
CAPSULE, LIQUID FILLED ORAL
Qty: 180 CAP | Refills: 0 | Status: SHIPPED | OUTPATIENT
Start: 2018-12-18 | End: 2019-03-21 | Stop reason: SDUPTHER

## 2019-01-13 DIAGNOSIS — M81.0 OSTEOPOROSIS, UNSPECIFIED OSTEOPOROSIS TYPE, UNSPECIFIED PATHOLOGICAL FRACTURE PRESENCE: ICD-10-CM

## 2019-01-14 RX ORDER — ALENDRONATE SODIUM 70 MG/1
TABLET ORAL
Qty: 12 TAB | Refills: 0 | Status: SHIPPED | OUTPATIENT
Start: 2019-01-14 | End: 2019-01-15

## 2019-01-14 RX ORDER — PNV NO.95/FERROUS FUM/FOLIC AC 28MG-0.8MG
325 TABLET ORAL DAILY
Qty: 60 TAB | Refills: 0 | Status: SHIPPED | OUTPATIENT
Start: 2019-01-14 | End: 2019-03-11 | Stop reason: SDUPTHER

## 2019-01-14 NOTE — TELEPHONE ENCOUNTER
Last seen: 12/114/18 by Dr. Cannon  Next appt: 01/15/19 with Dr. Bahena    Was the patient seen in the last year in this department? Yes   Does patient have an active prescription for medications requested? No   Received Request Via: Pharmacy

## 2019-01-15 ENCOUNTER — OFFICE VISIT (OUTPATIENT)
Dept: INTERNAL MEDICINE | Facility: MEDICAL CENTER | Age: 69
End: 2019-01-15
Payer: MEDICARE

## 2019-01-15 VITALS
HEART RATE: 96 BPM | OXYGEN SATURATION: 97 % | HEIGHT: 60 IN | TEMPERATURE: 100.8 F | BODY MASS INDEX: 23.05 KG/M2 | WEIGHT: 117.4 LBS | DIASTOLIC BLOOD PRESSURE: 70 MMHG | SYSTOLIC BLOOD PRESSURE: 110 MMHG

## 2019-01-15 DIAGNOSIS — M48.062 SPINAL STENOSIS OF LUMBAR REGION WITH NEUROGENIC CLAUDICATION: ICD-10-CM

## 2019-01-15 DIAGNOSIS — E11.8 TYPE 2 DIABETES MELLITUS WITH COMPLICATION, WITH LONG-TERM CURRENT USE OF INSULIN (HCC): ICD-10-CM

## 2019-01-15 DIAGNOSIS — Z79.4 TYPE 2 DIABETES MELLITUS WITH COMPLICATION, WITH LONG-TERM CURRENT USE OF INSULIN (HCC): ICD-10-CM

## 2019-01-15 DIAGNOSIS — H53.8 BLURRY VISION, RIGHT EYE: ICD-10-CM

## 2019-01-15 DIAGNOSIS — I25.810 CORONARY ARTERY DISEASE INVOLVING CORONARY BYPASS GRAFT OF NATIVE HEART WITHOUT ANGINA PECTORIS: ICD-10-CM

## 2019-01-15 DIAGNOSIS — Z79.891 CHRONIC USE OF OPIATE DRUGS THERAPEUTIC PURPOSES: ICD-10-CM

## 2019-01-15 LAB
HBA1C MFR BLD: 9.7 % (ref ?–5.8)
INT CON NEG: NORMAL
INT CON POS: NORMAL

## 2019-01-15 PROCEDURE — 99214 OFFICE O/P EST MOD 30 MIN: CPT | Mod: GC | Performed by: INTERNAL MEDICINE

## 2019-01-15 PROCEDURE — 83036 HEMOGLOBIN GLYCOSYLATED A1C: CPT | Mod: GC,QW | Performed by: INTERNAL MEDICINE

## 2019-01-15 PROCEDURE — 99000 SPECIMEN HANDLING OFFICE-LAB: CPT | Performed by: INTERNAL MEDICINE

## 2019-01-15 RX ORDER — HYDROCODONE BITARTRATE AND ACETAMINOPHEN 7.5; 325 MG/1; MG/1
1 TABLET ORAL EVERY 8 HOURS PRN
Qty: 90 TAB | Refills: 0 | Status: SHIPPED | OUTPATIENT
Start: 2019-02-15 | End: 2019-02-19 | Stop reason: SDUPTHER

## 2019-01-15 RX ORDER — HYDROCODONE BITARTRATE AND ACETAMINOPHEN 7.5; 325 MG/1; MG/1
1 TABLET ORAL EVERY 8 HOURS PRN
Qty: 90 TAB | Refills: 0 | Status: SHIPPED | OUTPATIENT
Start: 2019-01-15 | End: 2019-01-15 | Stop reason: SDUPTHER
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
## 2019-01-16 NOTE — PATIENT INSTRUCTIONS
[facility]  [address]   [place] [address]  [phone]

## 2019-01-17 DIAGNOSIS — F39 MOOD DISORDER (HCC): ICD-10-CM

## 2019-01-17 NOTE — TELEPHONE ENCOUNTER
Last seen: 01/15/19 by Dr. Bahena  Next appt: 02/19/19 with Dr. Bahena    Was the patient seen in the last year in this department? Yes   Does patient have an active prescription for medications requested? No   Received Request Via: Pharmacy

## 2019-01-18 RX ORDER — CARVEDILOL 12.5 MG/1
TABLET ORAL
Qty: 360 TAB | Refills: 0 | Status: SHIPPED | OUTPATIENT
Start: 2019-01-18 | End: 2021-01-01

## 2019-01-18 RX ORDER — BUPROPION HYDROCHLORIDE 150 MG/1
TABLET, EXTENDED RELEASE ORAL
Qty: 180 TAB | Refills: 0 | Status: SHIPPED | OUTPATIENT
Start: 2019-01-18 | End: 2019-05-16 | Stop reason: SDUPTHER

## 2019-01-18 RX ORDER — NITROGLYCERIN 0.4 MG/1
0.4 TABLET SUBLINGUAL PRN
Qty: 100 TAB | Refills: 0 | Status: SHIPPED | OUTPATIENT
Start: 2019-01-18 | End: 2019-02-15 | Stop reason: SDUPTHER

## 2019-01-21 DIAGNOSIS — Z79.891 CHRONIC USE OF OPIATE DRUGS THERAPEUTIC PURPOSES: ICD-10-CM

## 2019-01-21 RX ORDER — INSULIN GLARGINE 100 [IU]/ML
INJECTION, SOLUTION SUBCUTANEOUS
Qty: 15 PEN | Refills: 0 | Status: SHIPPED | OUTPATIENT
Start: 2019-01-21 | End: 2019-02-15 | Stop reason: SDUPTHER

## 2019-01-22 ENCOUNTER — OFFICE VISIT (OUTPATIENT)
Dept: NEUROLOGY | Facility: MEDICAL CENTER | Age: 69
End: 2019-01-22
Payer: MEDICARE

## 2019-01-22 VITALS
HEART RATE: 82 BPM | RESPIRATION RATE: 16 BRPM | SYSTOLIC BLOOD PRESSURE: 132 MMHG | HEIGHT: 60 IN | WEIGHT: 121 LBS | TEMPERATURE: 98.7 F | BODY MASS INDEX: 23.75 KG/M2 | DIASTOLIC BLOOD PRESSURE: 64 MMHG | OXYGEN SATURATION: 94 %

## 2019-01-22 DIAGNOSIS — E11.49 OTHER DIABETIC NEUROLOGICAL COMPLICATION ASSOCIATED WITH TYPE 2 DIABETES MELLITUS (HCC): ICD-10-CM

## 2019-01-22 DIAGNOSIS — G25.0 ESSENTIAL TREMOR: ICD-10-CM

## 2019-01-22 DIAGNOSIS — E61.1 IRON DEFICIENCY: ICD-10-CM

## 2019-01-22 DIAGNOSIS — G25.81 RLS (RESTLESS LEGS SYNDROME): ICD-10-CM

## 2019-01-22 DIAGNOSIS — G45.9 TIA (TRANSIENT ISCHEMIC ATTACK): ICD-10-CM

## 2019-01-22 DIAGNOSIS — G25.81 RESTLESS LEG SYNDROME, UNCONTROLLED: ICD-10-CM

## 2019-01-22 DIAGNOSIS — G43.119 INTRACTABLE MIGRAINE WITH AURA WITHOUT STATUS MIGRAINOSUS: ICD-10-CM

## 2019-01-22 PROCEDURE — 99215 OFFICE O/P EST HI 40 MIN: CPT | Performed by: PSYCHIATRY & NEUROLOGY

## 2019-01-22 RX ORDER — BUTALBITAL, ACETAMINOPHEN AND CAFFEINE 50; 325; 40 MG/1; MG/1; MG/1
1 TABLET ORAL EVERY 4 HOURS PRN
COMMUNITY
End: 2019-06-13 | Stop reason: SDUPTHER

## 2019-01-22 ASSESSMENT — ENCOUNTER SYMPTOMS
SHORTNESS OF BREATH: 0
EYE DISCHARGE: 0
ABDOMINAL PAIN: 0
FALLS: 0
DEPRESSION: 0
BRUISES/BLEEDS EASILY: 0
FEVER: 0
WEIGHT LOSS: 0

## 2019-01-22 ASSESSMENT — PATIENT HEALTH QUESTIONNAIRE - PHQ9: CLINICAL INTERPRETATION OF PHQ2 SCORE: 0
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
## 2019-01-23 ENCOUNTER — TELEPHONE (OUTPATIENT)
Dept: INTERNAL MEDICINE | Facility: MEDICAL CENTER | Age: 69
End: 2019-01-23

## 2019-01-24 NOTE — TELEPHONE ENCOUNTER
1. Caller Name: Pt                      Call Back Number: [phone] (home)     2. Message: Patient called and left a message stating she went to see her neurologist today and she was recommended to start taking melatonin. Would like to know what are Dr. Bahena's thoughts on her starting this?    3. Patient approves office to leave a detailed voicemail/MyChart message: N\A

## 2019-01-25 RX ORDER — FUROSEMIDE 20 MG/1
20 TABLET ORAL
Qty: 90 TAB | Refills: 0 | Status: SHIPPED | OUTPATIENT
Start: 2019-01-25 | End: 2019-04-20 | Stop reason: SDUPTHER

## 2019-01-25 NOTE — TELEPHONE ENCOUNTER
Adriano Bahena M.D.  Mirlande Parry, Med Ass't   Caller: Unspecified (2 days ago,  4:15 PM)             Melatonin can be started for sleep disturbances. Okay to be started.      Called patient and notified it is okay to start melatonin

## 2019-01-25 NOTE — TELEPHONE ENCOUNTER
Patient called again to see if melatonin is something she can start taking for insomnia as neuro recommended

## 2019-02-04 RX ORDER — LANOLIN ALCOHOL/MO/W.PET/CERES
1000 CREAM (GRAM) TOPICAL DAILY
Qty: 90 TAB | Refills: 3 | Status: SHIPPED | OUTPATIENT
Start: 2019-02-04 | End: 2021-01-01

## 2019-02-12 DIAGNOSIS — M79.7 FIBROMYALGIA: ICD-10-CM

## 2019-02-12 NOTE — TELEPHONE ENCOUNTER
Last seen: 01/15/19by Dr. Bahena  Next appt: 0219/19 with Dr. Bahena    Was the patient seen in the last year in this department? Yes   Does patient have an active prescription for medications requested? No   Received Request Via: Pharmacy

## 2019-02-15 RX ORDER — DULOXETIN HYDROCHLORIDE 30 MG/1
30 CAPSULE, DELAYED RELEASE ORAL DAILY
Qty: 90 CAP | Refills: 0 | Status: SHIPPED | OUTPATIENT
Start: 2019-02-15 | End: 2019-05-16 | Stop reason: SDUPTHER

## 2019-02-15 RX ORDER — GABAPENTIN 600 MG/1
TABLET ORAL
Qty: 180 TAB | Refills: 0 | Status: SHIPPED | OUTPATIENT
Start: 2019-02-15 | End: 2019-05-16 | Stop reason: SDUPTHER

## 2019-02-15 RX ORDER — NITROGLYCERIN 0.4 MG/1
0.4 TABLET SUBLINGUAL PRN
Qty: 100 TAB | Refills: 0 | Status: SHIPPED | OUTPATIENT
Start: 2019-02-15 | End: 2019-04-05 | Stop reason: SDUPTHER

## 2019-02-15 RX ORDER — INSULIN GLARGINE 100 [IU]/ML
INJECTION, SOLUTION SUBCUTANEOUS
Qty: 15 PEN | Refills: 3 | Status: SHIPPED | OUTPATIENT
Start: 2019-02-15 | End: 2021-01-01

## 2019-02-15 RX ORDER — POTASSIUM CHLORIDE 750 MG/1
CAPSULE, EXTENDED RELEASE ORAL
Qty: 90 CAP | Refills: 0 | Status: SHIPPED | OUTPATIENT
Start: 2019-02-15 | End: 2019-04-01 | Stop reason: SDUPTHER

## 2019-02-19 ENCOUNTER — OFFICE VISIT (OUTPATIENT)
Dept: INTERNAL MEDICINE | Facility: MEDICAL CENTER | Age: 69
End: 2019-02-19
Payer: MEDICARE

## 2019-02-19 VITALS
HEIGHT: 60 IN | SYSTOLIC BLOOD PRESSURE: 130 MMHG | TEMPERATURE: 98.3 F | BODY MASS INDEX: 26 KG/M2 | HEART RATE: 90 BPM | DIASTOLIC BLOOD PRESSURE: 80 MMHG | OXYGEN SATURATION: 92 % | WEIGHT: 132.4 LBS

## 2019-02-19 DIAGNOSIS — I25.5 CARDIOMYOPATHY, ISCHEMIC: ICD-10-CM

## 2019-02-19 DIAGNOSIS — M54.41 CHRONIC RIGHT-SIDED LOW BACK PAIN WITH RIGHT-SIDED SCIATICA: ICD-10-CM

## 2019-02-19 DIAGNOSIS — Z79.4 TYPE 2 DIABETES MELLITUS WITH COMPLICATION, WITH LONG-TERM CURRENT USE OF INSULIN (HCC): ICD-10-CM

## 2019-02-19 DIAGNOSIS — Z79.4 TYPE 2 DIABETES MELLITUS WITHOUT COMPLICATION, WITH LONG-TERM CURRENT USE OF INSULIN (HCC): ICD-10-CM

## 2019-02-19 DIAGNOSIS — H53.9 VISION CHANGES: ICD-10-CM

## 2019-02-19 DIAGNOSIS — E11.9 TYPE 2 DIABETES MELLITUS WITHOUT COMPLICATION, WITH LONG-TERM CURRENT USE OF INSULIN (HCC): ICD-10-CM

## 2019-02-19 DIAGNOSIS — M48.062 SPINAL STENOSIS OF LUMBAR REGION WITH NEUROGENIC CLAUDICATION: ICD-10-CM

## 2019-02-19 DIAGNOSIS — G89.29 CHRONIC RIGHT-SIDED LOW BACK PAIN WITH RIGHT-SIDED SCIATICA: ICD-10-CM

## 2019-02-19 DIAGNOSIS — E11.8 TYPE 2 DIABETES MELLITUS WITH COMPLICATION, WITH LONG-TERM CURRENT USE OF INSULIN (HCC): ICD-10-CM

## 2019-02-19 DIAGNOSIS — I25.810 CORONARY ARTERY DISEASE INVOLVING CORONARY BYPASS GRAFT OF NATIVE HEART WITHOUT ANGINA PECTORIS: ICD-10-CM

## 2019-02-19 DIAGNOSIS — H53.8 BLURRY VISION, RIGHT EYE: ICD-10-CM

## 2019-02-19 DIAGNOSIS — M79.7 FIBROMYALGIA: ICD-10-CM

## 2019-02-19 DIAGNOSIS — Z79.891 CHRONIC USE OF OPIATE DRUGS THERAPEUTIC PURPOSES: ICD-10-CM

## 2019-02-19 DIAGNOSIS — M48.061 SPINAL STENOSIS OF LUMBAR REGION, UNSPECIFIED WHETHER NEUROGENIC CLAUDICATION PRESENT: ICD-10-CM

## 2019-02-19 DIAGNOSIS — M81.0 OSTEOPOROSIS, UNSPECIFIED OSTEOPOROSIS TYPE, UNSPECIFIED PATHOLOGICAL FRACTURE PRESENCE: ICD-10-CM

## 2019-02-19 PROCEDURE — 99214 OFFICE O/P EST MOD 30 MIN: CPT | Mod: GC | Performed by: INTERNAL MEDICINE

## 2019-02-19 RX ORDER — FLUTICASONE PROPIONATE 50 MCG
SPRAY, SUSPENSION (ML) NASAL
Qty: 3 BOTTLE | Refills: 3 | Status: SHIPPED | OUTPATIENT
Start: 2019-02-19 | End: 2021-01-01

## 2019-02-19 RX ORDER — BLOOD-GLUCOSE METER
EACH MISCELLANEOUS
Qty: 600 EACH | Refills: 2 | Status: SHIPPED | OUTPATIENT
Start: 2019-02-19 | End: 2021-01-01

## 2019-02-19 RX ORDER — PANTOPRAZOLE SODIUM 40 MG/1
40 TABLET, DELAYED RELEASE ORAL
Qty: 90 TAB | Refills: 3 | Status: SHIPPED | OUTPATIENT
Start: 2019-02-19 | End: 2021-01-01

## 2019-02-19 RX ORDER — BLOOD-GLUCOSE METER
EACH MISCELLANEOUS
Qty: 600 EACH | Refills: 2 | Status: SHIPPED | OUTPATIENT
Start: 2019-02-19 | End: 2019-02-19

## 2019-02-19 RX ORDER — ROSUVASTATIN CALCIUM 40 MG/1
40 TABLET, COATED ORAL
Qty: 90 TAB | Refills: 3 | Status: SHIPPED | OUTPATIENT
Start: 2019-02-19 | End: 2021-01-01

## 2019-02-19 RX ORDER — ROPINIROLE 1 MG/1
TABLET, FILM COATED ORAL
Qty: 180 TAB | Refills: 3 | Status: SHIPPED | OUTPATIENT
Start: 2019-02-19 | End: 2021-01-01

## 2019-02-19 RX ORDER — CLOPIDOGREL BISULFATE 75 MG/1
75 TABLET ORAL
Qty: 90 TAB | Refills: 3 | Status: SHIPPED | OUTPATIENT
Start: 2019-02-19 | End: 2021-01-01

## 2019-02-19 RX ORDER — POTASSIUM CHLORIDE 750 MG/1
CAPSULE, EXTENDED RELEASE ORAL
Qty: 180 CAP | Refills: 0 | Status: SHIPPED | OUTPATIENT
Start: 2019-02-19 | End: 2019-05-16 | Stop reason: SDUPTHER

## 2019-02-19 RX ORDER — HYDROCODONE BITARTRATE AND ACETAMINOPHEN 7.5; 325 MG/1; MG/1
1 TABLET ORAL EVERY 8 HOURS PRN
Qty: 90 TAB | Refills: 0 | Status: SHIPPED | OUTPATIENT
Start: 2019-03-17 | End: 2019-04-16
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
## 2019-02-27 NOTE — TELEPHONE ENCOUNTER
Last seen: 02/19/19 by Dr. Bahena  Next appt: 03/26/19 with Dr. Bahena    Was the patient seen in the last year in this department? Yes   Does patient have an active prescription for medications requested? No   Received Request Via: Pharmacy

## 2019-03-01 RX ORDER — AMIODARONE HYDROCHLORIDE 200 MG/1
200 TABLET ORAL DAILY
Qty: 90 TAB | Refills: 0 | Status: SHIPPED | OUTPATIENT
Start: 2019-03-01 | End: 2019-06-06 | Stop reason: SDUPTHER

## 2019-03-11 NOTE — TELEPHONE ENCOUNTER
PT SEEN: 2/19/19 WITH Dr. Bahena NEXT APPT 3/26/19 WITH Dr. Bahena  Was the patient seen in the last year in this department? Yes     Does patient have an active prescription for medications requested? No     Received Request Via: Pharmacy

## 2019-03-13 RX ORDER — PNV NO.95/FERROUS FUM/FOLIC AC 28MG-0.8MG
325 TABLET ORAL DAILY
Qty: 60 TAB | Refills: 0 | Status: SHIPPED | OUTPATIENT
Start: 2019-03-13 | End: 2019-04-15 | Stop reason: SDUPTHER

## 2019-03-21 RX ORDER — DOCUSATE SODIUM 100 MG/1
CAPSULE, LIQUID FILLED ORAL
Qty: 180 CAP | Refills: 0 | Status: SHIPPED | OUTPATIENT
Start: 2019-03-21 | End: 2021-01-01

## 2019-03-21 NOTE — TELEPHONE ENCOUNTER
Last seen: 02/19/19 by Dr. Bahena  Next appt: 03/26/19 with Dr. Bahena-could not find on med list     Was the patient seen in the last year in this department? Yes   Does patient have an active prescription for medications requested? No   Received Request Via: Pharmacy

## 2019-03-22 RX ORDER — MEMANTINE HYDROCHLORIDE 5 MG/1
TABLET ORAL
Qty: 60 TAB | Refills: 0 | Status: SHIPPED | OUTPATIENT
Start: 2019-03-22 | End: 2019-12-18

## 2019-04-01 NOTE — TELEPHONE ENCOUNTER
Last seen: 02/19/19 by Dr. Bahena  Next appt: None     Was the patient seen in the last year in this department? Yes   Does patient have an active prescription for medications requested? No   Received Request Via: Pharmacy

## 2019-04-04 RX ORDER — CETIRIZINE HYDROCHLORIDE 10 MG/1
10 TABLET ORAL
Qty: 90 TAB | Refills: 2 | Status: SHIPPED | OUTPATIENT
Start: 2019-04-04 | End: 2021-01-01

## 2019-04-04 RX ORDER — POTASSIUM CHLORIDE 750 MG/1
CAPSULE, EXTENDED RELEASE ORAL
Qty: 180 CAP | Refills: 2 | Status: SHIPPED | OUTPATIENT
Start: 2019-04-04 | End: 2021-01-01

## 2019-04-05 RX ORDER — NITROGLYCERIN 0.4 MG/1
0.4 TABLET SUBLINGUAL PRN
Qty: 100 TAB | Refills: 0 | Status: SHIPPED | OUTPATIENT
Start: 2019-04-05 | End: 2019-05-16 | Stop reason: SDUPTHER

## 2019-04-15 DIAGNOSIS — M81.0 OSTEOPOROSIS, UNSPECIFIED OSTEOPOROSIS TYPE, UNSPECIFIED PATHOLOGICAL FRACTURE PRESENCE: ICD-10-CM

## 2019-04-15 DIAGNOSIS — Z79.4 TYPE 2 DIABETES MELLITUS WITH DIABETIC NEUROPATHY, WITH LONG-TERM CURRENT USE OF INSULIN (HCC): ICD-10-CM

## 2019-04-15 DIAGNOSIS — E11.40 TYPE 2 DIABETES MELLITUS WITH DIABETIC NEUROPATHY, WITH LONG-TERM CURRENT USE OF INSULIN (HCC): ICD-10-CM

## 2019-04-15 RX ORDER — PNV NO.95/FERROUS FUM/FOLIC AC 28MG-0.8MG
325 TABLET ORAL DAILY
Qty: 90 TAB | Refills: 0 | Status: SHIPPED | OUTPATIENT
Start: 2019-04-15 | End: 2019-06-14

## 2019-04-15 RX ORDER — ALENDRONATE SODIUM 70 MG/1
TABLET ORAL
Qty: 12 TAB | Refills: 0 | Status: SHIPPED | OUTPATIENT
Start: 2019-04-15 | End: 2021-01-01

## 2019-04-15 RX ORDER — ALCOHOL ANTISEPTIC PADS
PADS, MEDICATED (EA) TOPICAL
Qty: 600 EACH | Refills: 0 | Status: SHIPPED | OUTPATIENT
Start: 2019-04-15 | End: 2021-01-01

## 2019-04-22 RX ORDER — FUROSEMIDE 20 MG/1
20 TABLET ORAL
Qty: 90 TAB | Refills: 1 | Status: SHIPPED | OUTPATIENT
Start: 2019-04-22 | End: 2021-01-01

## 2019-04-22 NOTE — TELEPHONE ENCOUNTER
Last seen: 02/1919 by Dr. Bahena  Next appt: None     Was the patient seen in the last year in this department? Yes   Does patient have an active prescription for medications requested? No   Received Request Via: Pharmacy

## 2019-05-16 DIAGNOSIS — M79.7 FIBROMYALGIA: ICD-10-CM

## 2019-05-16 DIAGNOSIS — F39 MOOD DISORDER (HCC): ICD-10-CM

## 2019-05-16 RX ORDER — BLOOD SUGAR DIAGNOSTIC
STRIP MISCELLANEOUS
Qty: 200 EACH | Refills: 2 | Status: SHIPPED | OUTPATIENT
Start: 2019-05-16 | End: 2021-01-01

## 2019-05-16 RX ORDER — NITROGLYCERIN 0.4 MG/1
0.4 TABLET SUBLINGUAL PRN
Qty: 90 TAB | Refills: 0 | Status: SHIPPED | OUTPATIENT
Start: 2019-05-16 | End: 2021-01-01

## 2019-05-16 RX ORDER — BUPROPION HYDROCHLORIDE 150 MG/1
TABLET, EXTENDED RELEASE ORAL
Qty: 180 TAB | Refills: 0 | Status: SHIPPED | OUTPATIENT
Start: 2019-05-16 | End: 2021-01-01

## 2019-05-16 RX ORDER — GABAPENTIN 600 MG/1
TABLET ORAL
Qty: 180 TAB | Refills: 0 | Status: SHIPPED | OUTPATIENT
Start: 2019-05-16 | End: 2021-01-01

## 2019-05-16 RX ORDER — CARVEDILOL 12.5 MG/1
TABLET ORAL
Qty: 180 TAB | Refills: 0 | Status: SHIPPED | OUTPATIENT
Start: 2019-05-16 | End: 2021-01-01

## 2019-05-16 RX ORDER — POTASSIUM CHLORIDE 750 MG/1
CAPSULE, EXTENDED RELEASE ORAL
Qty: 180 CAP | Refills: 0 | Status: SHIPPED | OUTPATIENT
Start: 2019-05-16 | End: 2021-01-01

## 2019-05-16 RX ORDER — DULOXETIN HYDROCHLORIDE 30 MG/1
30 CAPSULE, DELAYED RELEASE ORAL DAILY
Qty: 90 CAP | Refills: 0 | Status: SHIPPED | OUTPATIENT
Start: 2019-05-16 | End: 2019-12-18

## 2019-06-06 RX ORDER — AMIODARONE HYDROCHLORIDE 200 MG/1
200 TABLET ORAL DAILY
Qty: 30 TAB | Refills: 1 | Status: SHIPPED | OUTPATIENT
Start: 2019-06-06 | End: 2021-01-01

## 2019-06-06 NOTE — TELEPHONE ENCOUNTER
Last seen: 2/19/19 by Dr. Bahena  Next appt: None     Was the patient seen in the last year in this department? Yes   Does patient have an active prescription for medications requested? No   Received Request Via: Pharmacy

## 2019-06-12 RX ORDER — BLOOD-GLUCOSE METER
EACH MISCELLANEOUS
Qty: 200 STRIP | Refills: 4 | Status: SHIPPED | OUTPATIENT
Start: 2019-06-12 | End: 2021-01-01

## 2019-06-12 RX ORDER — BLOOD-GLUCOSE METER
EACH MISCELLANEOUS
Qty: 200 EACH | Refills: 4 | Status: SHIPPED | OUTPATIENT
Start: 2019-06-12 | End: 2021-01-01

## 2019-06-13 DIAGNOSIS — G43.719 INTRACTABLE CHRONIC COMMON MIGRAINE WITHOUT AURA: ICD-10-CM

## 2019-06-13 NOTE — TELEPHONE ENCOUNTER
Was the patient seen in the last year in this department? Yes    Does patient have an active prescription for medications requested? Yes last seen by dr Ward 1/22/19    Received Request Via: Pharmacy

## 2019-06-14 RX ORDER — BUTALBITAL, ACETAMINOPHEN AND CAFFEINE 50; 325; 40 MG/1; MG/1; MG/1
TABLET ORAL
Qty: 25 TAB | Refills: 0 | Status: SHIPPED | OUTPATIENT
Start: 2019-06-14 | End: 2019-06-18

## 2019-06-14 NOTE — TELEPHONE ENCOUNTER
Will need to rediscuss treatment options. I do not think she replied regarding botox treatment. Will need controlled susbtance form.

## 2019-06-19 RX ORDER — INSULIN GLARGINE 100 [IU]/ML
INJECTION, SOLUTION SUBCUTANEOUS
Qty: 15 ML | Refills: 5 | OUTPATIENT
Start: 2019-06-19

## 2019-09-03 DIAGNOSIS — G43.719 INTRACTABLE CHRONIC COMMON MIGRAINE WITHOUT AURA: ICD-10-CM

## 2019-09-03 NOTE — TELEPHONE ENCOUNTER
Was the patient seen in the last year in this department? Yes    Does patient have an active prescription for medications requested? Yes    Received Request Via: Patient     Spoke to patient. She has an appointment 12/18 to establish with Dr. Ward. She is completely out medication. Last fill was 6/14 for 25 tablets.

## 2019-09-06 RX ORDER — BUTALBITAL, ACETAMINOPHEN AND CAFFEINE 50; 325; 40 MG/1; MG/1; MG/1
TABLET ORAL
Qty: 25 TAB | Refills: 0 | Status: SHIPPED | OUTPATIENT
Start: 2019-09-06 | End: 2019-10-03

## 2019-10-22 DIAGNOSIS — G43.809 OTHER MIGRAINE WITHOUT STATUS MIGRAINOSUS, NOT INTRACTABLE: ICD-10-CM

## 2019-10-23 NOTE — TELEPHONE ENCOUNTER
Was the patient seen in the last year in this department? Yes    Does patient have an active prescription for medications requested? Yes    Received Request Via: Pharmacy     Pt sees dr Ward

## 2019-10-24 RX ORDER — BUTALBITAL, ACETAMINOPHEN AND CAFFEINE 50; 325; 40 MG/1; MG/1; MG/1
TABLET ORAL
Qty: 25 TAB | Refills: 1 | Status: SHIPPED | OUTPATIENT
Start: 2019-10-24 | End: 2019-12-18 | Stop reason: SDUPTHER

## 2019-10-24 NOTE — TELEPHONE ENCOUNTER
Chart reviewed.  Patient has not followed up as requested.  She was last seen in January 2019.  Will extend her Fioricet, but she needs to follow-up as instructed.  Patient is high risk for triptan use due to ischemic heart disease.

## 2019-10-24 NOTE — TELEPHONE ENCOUNTER
I refilled her prescription, please call it in.  She has not been seen since January 2019 and was recommended that she be seen on a regular basis.  Please call the patient and inform her she is not seen within a year that I am not continuing any further prescriptions for her, especially her Fioricet.  Please confirm a response.

## 2019-11-04 RX ORDER — TOPIRAMATE 100 MG/1
100 TABLET, FILM COATED ORAL EVERY 12 HOURS
Qty: 180 TAB | Refills: 0 | Status: SHIPPED | OUTPATIENT
Start: 2019-11-04 | End: 2019-12-18 | Stop reason: SDUPTHER

## 2019-12-18 ENCOUNTER — TELEPHONE (OUTPATIENT)
Dept: NEUROLOGY | Facility: MEDICAL CENTER | Age: 69
End: 2019-12-18

## 2019-12-18 ENCOUNTER — OFFICE VISIT (OUTPATIENT)
Dept: NEUROLOGY | Facility: MEDICAL CENTER | Age: 69
End: 2019-12-18
Payer: MEDICARE

## 2019-12-18 VITALS
HEIGHT: 60 IN | WEIGHT: 118 LBS | HEART RATE: 73 BPM | OXYGEN SATURATION: 92 % | SYSTOLIC BLOOD PRESSURE: 126 MMHG | TEMPERATURE: 99.7 F | BODY MASS INDEX: 23.16 KG/M2 | DIASTOLIC BLOOD PRESSURE: 88 MMHG

## 2019-12-18 DIAGNOSIS — G43.119 INTRACTABLE MIGRAINE WITH AURA WITHOUT STATUS MIGRAINOSUS: ICD-10-CM

## 2019-12-18 DIAGNOSIS — G43.809 OTHER MIGRAINE WITHOUT STATUS MIGRAINOSUS, NOT INTRACTABLE: ICD-10-CM

## 2019-12-18 PROCEDURE — 99214 OFFICE O/P EST MOD 30 MIN: CPT | Performed by: PSYCHIATRY & NEUROLOGY

## 2019-12-18 RX ORDER — TOPIRAMATE 100 MG/1
100 TABLET, FILM COATED ORAL EVERY 12 HOURS
Qty: 180 TAB | Refills: 2
Start: 2019-12-18 | End: 2021-01-01

## 2019-12-18 RX ORDER — BUTALBITAL, ACETAMINOPHEN AND CAFFEINE 50; 325; 40 MG/1; MG/1; MG/1
TABLET ORAL
Qty: 15 TAB | Refills: 3
Start: 2019-12-18 | End: 2020-05-11 | Stop reason: SDUPTHER

## 2019-12-18 ASSESSMENT — ENCOUNTER SYMPTOMS
WEIGHT LOSS: 0
SHORTNESS OF BREATH: 0
FALLS: 0
DEPRESSION: 0
ABDOMINAL PAIN: 0
FEVER: 0
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
## 2020-05-11 DIAGNOSIS — G43.809 OTHER MIGRAINE WITHOUT STATUS MIGRAINOSUS, NOT INTRACTABLE: ICD-10-CM

## 2020-05-11 RX ORDER — BUTALBITAL, ACETAMINOPHEN AND CAFFEINE 50; 325; 40 MG/1; MG/1; MG/1
TABLET ORAL
Qty: 15 TAB | Refills: 2 | Status: SHIPPED
Start: 2020-05-11 | End: 2020-08-11

## 2020-05-11 NOTE — TELEPHONE ENCOUNTER
Intractable migraines with multiple medical comorbidities which are barriers to treatment.  Patient was pending Botox for chronic migraine which I suspect has been postponed due to the COVID pandemic.  Will limit amount of Fioricet.  Patient will need to schedule follow-up when she is clinically able to given her recent surgery.

## 2020-05-11 NOTE — TELEPHONE ENCOUNTER
Can she schedule a follow-up when she thinks she is able to come in or have a telehealth appointment?

## 2020-05-11 NOTE — TELEPHONE ENCOUNTER
Received request via: Patient    Was the patient seen in the last year in this department? Yes    Does the patient have an active prescription (recently filled or refills available) for medication(s) requested? No     Patient left a message on my voicemail requesting a refill for medication .     Patient was last seen on 12/18/2019 , medication was last filled on 12/18/2019.    Patient does not have an upcoming appointment scheduled. She notified in the voicemail she is aware she might need a follow up . Per patient she just has surgery and can't come into clinic .

## 2020-05-11 NOTE — TELEPHONE ENCOUNTER
Called patient notified her medication was filled but she will need a follow up .     I offered her a virtual visit she declined and stated she doesn't have the proper technology . Notified her I can schedule a clinical visit she also declined and stated she will call when she she is ready .    Patient stated she will call and follow up with me sometime this week .

## 2021-01-01 ENCOUNTER — APPOINTMENT (OUTPATIENT)
Dept: RADIOLOGY | Facility: MEDICAL CENTER | Age: 71
DRG: 305 | End: 2021-01-01
Attending: STUDENT IN AN ORGANIZED HEALTH CARE EDUCATION/TRAINING PROGRAM
Payer: MEDICARE

## 2021-01-01 ENCOUNTER — PHARMACY VISIT (OUTPATIENT)
Dept: PHARMACY | Facility: MEDICAL CENTER | Age: 71
End: 2021-01-01
Payer: COMMERCIAL

## 2021-01-01 ENCOUNTER — APPOINTMENT (OUTPATIENT)
Dept: MEDICAL GROUP | Facility: CLINIC | Age: 71
End: 2021-01-01
Payer: MEDICARE

## 2021-01-01 ENCOUNTER — APPOINTMENT (OUTPATIENT)
Dept: RADIOLOGY | Facility: MEDICAL CENTER | Age: 71
DRG: 305 | End: 2021-01-01
Payer: MEDICARE

## 2021-01-01 ENCOUNTER — HOSPITAL ENCOUNTER (OUTPATIENT)
Facility: MEDICAL CENTER | Age: 71
End: 2021-12-03
Attending: NURSE PRACTITIONER
Payer: MEDICARE

## 2021-01-01 ENCOUNTER — HOSPITAL ENCOUNTER (INPATIENT)
Facility: MEDICAL CENTER | Age: 71
LOS: 4 days | DRG: 305 | End: 2021-11-07
Attending: EMERGENCY MEDICINE | Admitting: FAMILY MEDICINE
Payer: MEDICARE

## 2021-01-01 ENCOUNTER — APPOINTMENT (OUTPATIENT)
Dept: RADIOLOGY | Facility: MEDICAL CENTER | Age: 71
DRG: 305 | End: 2021-01-01
Attending: EMERGENCY MEDICINE
Payer: MEDICARE

## 2021-01-01 ENCOUNTER — OFFICE VISIT (OUTPATIENT)
Dept: MEDICAL GROUP | Facility: CLINIC | Age: 71
End: 2021-01-01
Payer: MEDICARE

## 2021-01-01 ENCOUNTER — APPOINTMENT (OUTPATIENT)
Dept: RADIOLOGY | Facility: MEDICAL CENTER | Age: 71
DRG: 305 | End: 2021-01-01
Attending: FAMILY MEDICINE
Payer: MEDICARE

## 2021-01-01 ENCOUNTER — APPOINTMENT (OUTPATIENT)
Dept: CARDIOLOGY | Facility: MEDICAL CENTER | Age: 71
DRG: 305 | End: 2021-01-01
Attending: STUDENT IN AN ORGANIZED HEALTH CARE EDUCATION/TRAINING PROGRAM
Payer: MEDICARE

## 2021-01-01 VITALS
BODY MASS INDEX: 21.83 KG/M2 | SYSTOLIC BLOOD PRESSURE: 110 MMHG | RESPIRATION RATE: 16 BRPM | HEART RATE: 77 BPM | HEIGHT: 62 IN | DIASTOLIC BLOOD PRESSURE: 50 MMHG | WEIGHT: 118.61 LBS | TEMPERATURE: 98.1 F | OXYGEN SATURATION: 92 %

## 2021-01-01 VITALS
TEMPERATURE: 98 F | BODY MASS INDEX: 21.58 KG/M2 | OXYGEN SATURATION: 96 % | RESPIRATION RATE: 12 BRPM | DIASTOLIC BLOOD PRESSURE: 137 MMHG | HEIGHT: 62 IN | HEART RATE: 86 BPM | SYSTOLIC BLOOD PRESSURE: 191 MMHG

## 2021-01-01 DIAGNOSIS — R53.81 DEBILITY: ICD-10-CM

## 2021-01-01 DIAGNOSIS — I10 PRIMARY HYPERTENSION: ICD-10-CM

## 2021-01-01 DIAGNOSIS — I63.39 CEREBROVASCULAR ACCIDENT (CVA) DUE TO THROMBOSIS OF OTHER CEREBRAL ARTERY (HCC): ICD-10-CM

## 2021-01-01 LAB
25(OH)D3 SERPL-MCNC: 16 NG/ML (ref 30–80)
ALBUMIN SERPL BCP-MCNC: 3.8 G/DL (ref 3.2–4.9)
ALBUMIN SERPL BCP-MCNC: 4.4 G/DL (ref 3.2–4.9)
ALBUMIN/GLOB SERPL: 1.1 G/DL
ALBUMIN/GLOB SERPL: 1.3 G/DL
ALP SERPL-CCNC: 111 U/L (ref 30–99)
ALP SERPL-CCNC: 116 U/L (ref 30–99)
ALT SERPL-CCNC: 6 U/L (ref 2–50)
ALT SERPL-CCNC: 7 U/L (ref 2–50)
ANION GAP SERPL CALC-SCNC: 14 MMOL/L (ref 7–16)
ANION GAP SERPL CALC-SCNC: 14 MMOL/L (ref 7–16)
APPEARANCE UR: ABNORMAL
APPEARANCE UR: ABNORMAL
AST SERPL-CCNC: 7 U/L (ref 12–45)
AST SERPL-CCNC: 9 U/L (ref 12–45)
BACTERIA #/AREA URNS HPF: ABNORMAL /HPF
BACTERIA #/AREA URNS HPF: ABNORMAL /HPF
BACTERIA UR CULT: ABNORMAL
BACTERIA UR CULT: ABNORMAL
BASOPHILS # BLD AUTO: 0.8 % (ref 0–1.8)
BASOPHILS # BLD AUTO: 1 % (ref 0–1.8)
BASOPHILS # BLD: 0.09 K/UL (ref 0–0.12)
BASOPHILS # BLD: 0.09 K/UL (ref 0–0.12)
BILIRUB SERPL-MCNC: 0.2 MG/DL (ref 0.1–1.5)
BILIRUB SERPL-MCNC: 0.2 MG/DL (ref 0.1–1.5)
BILIRUB UR QL STRIP.AUTO: NEGATIVE
BILIRUB UR QL STRIP.AUTO: NEGATIVE
BUN SERPL-MCNC: 26 MG/DL (ref 8–22)
BUN SERPL-MCNC: 27 MG/DL (ref 8–22)
CALCIUM SERPL-MCNC: 10.3 MG/DL (ref 8.5–10.5)
CALCIUM SERPL-MCNC: 10.6 MG/DL (ref 8.5–10.5)
CHLORIDE SERPL-SCNC: 96 MMOL/L (ref 96–112)
CHLORIDE SERPL-SCNC: 98 MMOL/L (ref 96–112)
CO2 SERPL-SCNC: 23 MMOL/L (ref 20–33)
CO2 SERPL-SCNC: 24 MMOL/L (ref 20–33)
COLOR UR: YELLOW
COLOR UR: YELLOW
CREAT SERPL-MCNC: 1.03 MG/DL (ref 0.5–1.4)
CREAT SERPL-MCNC: 1.22 MG/DL (ref 0.5–1.4)
EKG IMPRESSION: NORMAL
EOSINOPHIL # BLD AUTO: 0.29 K/UL (ref 0–0.51)
EOSINOPHIL # BLD AUTO: 0.58 K/UL (ref 0–0.51)
EOSINOPHIL NFR BLD: 3.1 % (ref 0–6.9)
EOSINOPHIL NFR BLD: 5.4 % (ref 0–6.9)
EPI CELLS #/AREA URNS HPF: ABNORMAL /HPF
EPI CELLS #/AREA URNS HPF: NEGATIVE /HPF
ERYTHROCYTE [DISTWIDTH] IN BLOOD BY AUTOMATED COUNT: 41.9 FL (ref 35.9–50)
ERYTHROCYTE [DISTWIDTH] IN BLOOD BY AUTOMATED COUNT: 44.6 FL (ref 35.9–50)
EST. AVERAGE GLUCOSE BLD GHB EST-MCNC: 289 MG/DL
EST. AVERAGE GLUCOSE BLD GHB EST-MCNC: 341 MG/DL
FOLATE SERPL-MCNC: 15.5 NG/ML
GLOBULIN SER CALC-MCNC: 3.3 G/DL (ref 1.9–3.5)
GLOBULIN SER CALC-MCNC: 3.4 G/DL (ref 1.9–3.5)
GLUCOSE BLD-MCNC: 127 MG/DL (ref 65–99)
GLUCOSE BLD-MCNC: 185 MG/DL (ref 65–99)
GLUCOSE BLD-MCNC: 203 MG/DL (ref 65–99)
GLUCOSE BLD-MCNC: 217 MG/DL (ref 65–99)
GLUCOSE BLD-MCNC: 218 MG/DL (ref 65–99)
GLUCOSE BLD-MCNC: 252 MG/DL (ref 65–99)
GLUCOSE BLD-MCNC: 255 MG/DL (ref 65–99)
GLUCOSE BLD-MCNC: 276 MG/DL (ref 65–99)
GLUCOSE BLD-MCNC: 295 MG/DL (ref 65–99)
GLUCOSE BLD-MCNC: 296 MG/DL (ref 65–99)
GLUCOSE BLD-MCNC: 302 MG/DL (ref 65–99)
GLUCOSE BLD-MCNC: 306 MG/DL (ref 65–99)
GLUCOSE BLD-MCNC: 311 MG/DL (ref 65–99)
GLUCOSE BLD-MCNC: 316 MG/DL (ref 65–99)
GLUCOSE BLD-MCNC: 317 MG/DL (ref 65–99)
GLUCOSE BLD-MCNC: 347 MG/DL (ref 65–99)
GLUCOSE SERPL-MCNC: 426 MG/DL (ref 65–99)
GLUCOSE SERPL-MCNC: 480 MG/DL (ref 65–99)
GLUCOSE UR STRIP.AUTO-MCNC: >=1000 MG/DL
GLUCOSE UR STRIP.AUTO-MCNC: >=1000 MG/DL
HBA1C MFR BLD: 11.7 % (ref 4–5.6)
HBA1C MFR BLD: 13.5 % (ref 4–5.6)
HCT VFR BLD AUTO: 47 % (ref 37–47)
HCT VFR BLD AUTO: 49.2 % (ref 37–47)
HGB BLD-MCNC: 15 G/DL (ref 12–16)
HGB BLD-MCNC: 15.6 G/DL (ref 12–16)
HYALINE CASTS #/AREA URNS LPF: ABNORMAL /LPF
HYALINE CASTS #/AREA URNS LPF: ABNORMAL /LPF
IMM GRANULOCYTES # BLD AUTO: 0.03 K/UL (ref 0–0.11)
IMM GRANULOCYTES # BLD AUTO: 0.04 K/UL (ref 0–0.11)
IMM GRANULOCYTES NFR BLD AUTO: 0.3 % (ref 0–0.9)
IMM GRANULOCYTES NFR BLD AUTO: 0.4 % (ref 0–0.9)
KETONES UR STRIP.AUTO-MCNC: NEGATIVE MG/DL
KETONES UR STRIP.AUTO-MCNC: NEGATIVE MG/DL
LEUKOCYTE ESTERASE UR QL STRIP.AUTO: ABNORMAL
LEUKOCYTE ESTERASE UR QL STRIP.AUTO: ABNORMAL
LV EJECT FRACT  99904: 60
LV EJECT FRACT MOD 2C 99903: 59.86
LV EJECT FRACT MOD 4C 99902: 62.41
LV EJECT FRACT MOD BP 99901: 61.38
LYMPHOCYTES # BLD AUTO: 1.98 K/UL (ref 1–4.8)
LYMPHOCYTES # BLD AUTO: 2.42 K/UL (ref 1–4.8)
LYMPHOCYTES NFR BLD: 21.3 % (ref 22–41)
LYMPHOCYTES NFR BLD: 22.4 % (ref 22–41)
MCH RBC QN AUTO: 28.6 PG (ref 27–33)
MCH RBC QN AUTO: 29 PG (ref 27–33)
MCHC RBC AUTO-ENTMCNC: 31.7 G/DL (ref 33.6–35)
MCHC RBC AUTO-ENTMCNC: 31.9 G/DL (ref 33.6–35)
MCV RBC AUTO: 89.5 FL (ref 81.4–97.8)
MCV RBC AUTO: 91.4 FL (ref 81.4–97.8)
MICRO URNS: ABNORMAL
MICRO URNS: ABNORMAL
MONOCYTES # BLD AUTO: 0.46 K/UL (ref 0–0.85)
MONOCYTES # BLD AUTO: 0.93 K/UL (ref 0–0.85)
MONOCYTES NFR BLD AUTO: 5 % (ref 0–13.4)
MONOCYTES NFR BLD AUTO: 8.6 % (ref 0–13.4)
NEUTROPHILS # BLD AUTO: 6.43 K/UL (ref 2–7.15)
NEUTROPHILS # BLD AUTO: 6.74 K/UL (ref 2–7.15)
NEUTROPHILS NFR BLD: 62.5 % (ref 44–72)
NEUTROPHILS NFR BLD: 69.2 % (ref 44–72)
NITRITE UR QL STRIP.AUTO: NEGATIVE
NITRITE UR QL STRIP.AUTO: POSITIVE
NRBC # BLD AUTO: 0 K/UL
NRBC # BLD AUTO: 0 K/UL
NRBC BLD-RTO: 0 /100 WBC
NRBC BLD-RTO: 0 /100 WBC
NT-PROBNP SERPL IA-MCNC: 1066 PG/ML (ref 0–125)
PH UR STRIP.AUTO: 7 [PH] (ref 5–8)
PH UR STRIP.AUTO: 7.5 [PH] (ref 5–8)
PLATELET # BLD AUTO: 172 K/UL (ref 164–446)
PLATELET # BLD AUTO: 176 K/UL (ref 164–446)
PMV BLD AUTO: 11.4 FL (ref 9–12.9)
PMV BLD AUTO: 11.7 FL (ref 9–12.9)
POTASSIUM SERPL-SCNC: 4.3 MMOL/L (ref 3.6–5.5)
POTASSIUM SERPL-SCNC: 4.3 MMOL/L (ref 3.6–5.5)
PROT SERPL-MCNC: 7.2 G/DL (ref 6–8.2)
PROT SERPL-MCNC: 7.7 G/DL (ref 6–8.2)
PROT UR QL STRIP: 30 MG/DL
PROT UR QL STRIP: NEGATIVE MG/DL
RBC # BLD AUTO: 5.25 M/UL (ref 4.2–5.4)
RBC # BLD AUTO: 5.38 M/UL (ref 4.2–5.4)
RBC # URNS HPF: ABNORMAL /HPF
RBC # URNS HPF: ABNORMAL /HPF
RBC UR QL AUTO: ABNORMAL
RBC UR QL AUTO: NEGATIVE
SIGNIFICANT IND 70042: ABNORMAL
SITE SITE: ABNORMAL
SODIUM SERPL-SCNC: 133 MMOL/L (ref 135–145)
SODIUM SERPL-SCNC: 136 MMOL/L (ref 135–145)
SOURCE SOURCE: ABNORMAL
SP GR UR STRIP.AUTO: 1.02
SP GR UR STRIP.AUTO: 1.02
TROPONIN T SERPL-MCNC: 17 NG/L (ref 6–19)
TROPONIN T SERPL-MCNC: 19 NG/L (ref 6–19)
TSH SERPL DL<=0.005 MIU/L-ACNC: 0.44 UIU/ML (ref 0.38–5.33)
UROBILINOGEN UR STRIP.AUTO-MCNC: 0.2 MG/DL
UROBILINOGEN UR STRIP.AUTO-MCNC: 0.2 MG/DL
VIT B12 SERPL-MCNC: 919 PG/ML (ref 211–911)
WBC # BLD AUTO: 10.8 K/UL (ref 4.8–10.8)
WBC # BLD AUTO: 9.3 K/UL (ref 4.8–10.8)
WBC #/AREA URNS HPF: ABNORMAL /HPF
WBC #/AREA URNS HPF: ABNORMAL /HPF

## 2021-01-01 PROCEDURE — 82962 GLUCOSE BLOOD TEST: CPT | Mod: 91

## 2021-01-01 PROCEDURE — A9270 NON-COVERED ITEM OR SERVICE: HCPCS | Performed by: STUDENT IN AN ORGANIZED HEALTH CARE EDUCATION/TRAINING PROGRAM

## 2021-01-01 PROCEDURE — RXMED WILLOW AMBULATORY MEDICATION CHARGE: Performed by: STUDENT IN AN ORGANIZED HEALTH CARE EDUCATION/TRAINING PROGRAM

## 2021-01-01 PROCEDURE — 97116 GAIT TRAINING THERAPY: CPT | Mod: CQ

## 2021-01-01 PROCEDURE — 93880 EXTRACRANIAL BILAT STUDY: CPT | Mod: 26 | Performed by: INTERNAL MEDICINE

## 2021-01-01 PROCEDURE — 93306 TTE W/DOPPLER COMPLETE: CPT

## 2021-01-01 PROCEDURE — 94664 DEMO&/EVAL PT USE INHALER: CPT

## 2021-01-01 PROCEDURE — 84443 ASSAY THYROID STIM HORMONE: CPT

## 2021-01-01 PROCEDURE — 700111 HCHG RX REV CODE 636 W/ 250 OVERRIDE (IP): Performed by: STUDENT IN AN ORGANIZED HEALTH CARE EDUCATION/TRAINING PROGRAM

## 2021-01-01 PROCEDURE — 700102 HCHG RX REV CODE 250 W/ 637 OVERRIDE(OP): Performed by: STUDENT IN AN ORGANIZED HEALTH CARE EDUCATION/TRAINING PROGRAM

## 2021-01-01 PROCEDURE — 72040 X-RAY EXAM NECK SPINE 2-3 VW: CPT

## 2021-01-01 PROCEDURE — 70450 CT HEAD/BRAIN W/O DYE: CPT | Mod: MG

## 2021-01-01 PROCEDURE — 92523 SPEECH SOUND LANG COMPREHEN: CPT

## 2021-01-01 PROCEDURE — 700102 HCHG RX REV CODE 250 W/ 637 OVERRIDE(OP): Performed by: EMERGENCY MEDICINE

## 2021-01-01 PROCEDURE — 97116 GAIT TRAINING THERAPY: CPT

## 2021-01-01 PROCEDURE — 82746 ASSAY OF FOLIC ACID SERUM: CPT

## 2021-01-01 PROCEDURE — 99406 BEHAV CHNG SMOKING 3-10 MIN: CPT

## 2021-01-01 PROCEDURE — 97166 OT EVAL MOD COMPLEX 45 MIN: CPT

## 2021-01-01 PROCEDURE — 82607 VITAMIN B-12: CPT

## 2021-01-01 PROCEDURE — 82962 GLUCOSE BLOOD TEST: CPT

## 2021-01-01 PROCEDURE — 99232 SBSQ HOSP IP/OBS MODERATE 35: CPT | Mod: GC | Performed by: FAMILY MEDICINE

## 2021-01-01 PROCEDURE — 72100 X-RAY EXAM L-S SPINE 2/3 VWS: CPT

## 2021-01-01 PROCEDURE — 83036 HEMOGLOBIN GLYCOSYLATED A1C: CPT | Mod: GA

## 2021-01-01 PROCEDURE — 84484 ASSAY OF TROPONIN QUANT: CPT

## 2021-01-01 PROCEDURE — 93005 ELECTROCARDIOGRAM TRACING: CPT | Performed by: EMERGENCY MEDICINE

## 2021-01-01 PROCEDURE — 87077 CULTURE AEROBIC IDENTIFY: CPT

## 2021-01-01 PROCEDURE — 81001 URINALYSIS AUTO W/SCOPE: CPT

## 2021-01-01 PROCEDURE — 770020 HCHG ROOM/CARE - TELE (206)

## 2021-01-01 PROCEDURE — 97530 THERAPEUTIC ACTIVITIES: CPT | Mod: CQ

## 2021-01-01 PROCEDURE — 93306 TTE W/DOPPLER COMPLETE: CPT | Mod: 26 | Performed by: INTERNAL MEDICINE

## 2021-01-01 PROCEDURE — 85025 COMPLETE CBC W/AUTO DIFF WBC: CPT

## 2021-01-01 PROCEDURE — 87086 URINE CULTURE/COLONY COUNT: CPT

## 2021-01-01 PROCEDURE — 94760 N-INVAS EAR/PLS OXIMETRY 1: CPT

## 2021-01-01 PROCEDURE — 96374 THER/PROPH/DIAG INJ IV PUSH: CPT

## 2021-01-01 PROCEDURE — 97162 PT EVAL MOD COMPLEX 30 MIN: CPT

## 2021-01-01 PROCEDURE — 83880 ASSAY OF NATRIURETIC PEPTIDE: CPT

## 2021-01-01 PROCEDURE — 93971 EXTREMITY STUDY: CPT | Mod: 26,LT | Performed by: INTERNAL MEDICINE

## 2021-01-01 PROCEDURE — 71045 X-RAY EXAM CHEST 1 VIEW: CPT

## 2021-01-01 PROCEDURE — 99238 HOSP IP/OBS DSCHRG MGMT 30/<: CPT | Mod: GC | Performed by: FAMILY MEDICINE

## 2021-01-01 PROCEDURE — 80053 COMPREHEN METABOLIC PANEL: CPT

## 2021-01-01 PROCEDURE — 92610 EVALUATE SWALLOWING FUNCTION: CPT

## 2021-01-01 PROCEDURE — 82306 VITAMIN D 25 HYDROXY: CPT | Mod: GA

## 2021-01-01 PROCEDURE — 99204 OFFICE O/P NEW MOD 45 MIN: CPT | Performed by: STUDENT IN AN ORGANIZED HEALTH CARE EDUCATION/TRAINING PROGRAM

## 2021-01-01 PROCEDURE — 93971 EXTREMITY STUDY: CPT | Mod: LT

## 2021-01-01 PROCEDURE — 93005 ELECTROCARDIOGRAM TRACING: CPT

## 2021-01-01 PROCEDURE — 93880 EXTRACRANIAL BILAT STUDY: CPT

## 2021-01-01 PROCEDURE — 87186 SC STD MICRODIL/AGAR DIL: CPT

## 2021-01-01 PROCEDURE — 99285 EMERGENCY DEPT VISIT HI MDM: CPT

## 2021-01-01 PROCEDURE — 99222 1ST HOSP IP/OBS MODERATE 55: CPT | Mod: AI,GC | Performed by: FAMILY MEDICINE

## 2021-01-01 PROCEDURE — 36415 COLL VENOUS BLD VENIPUNCTURE: CPT

## 2021-01-01 PROCEDURE — 83036 HEMOGLOBIN GLYCOSYLATED A1C: CPT

## 2021-01-01 RX ORDER — OMEPRAZOLE 20 MG/1
20 CAPSULE, DELAYED RELEASE ORAL DAILY
Status: DISCONTINUED | OUTPATIENT
Start: 2021-01-01 | End: 2021-01-01 | Stop reason: HOSPADM

## 2021-01-01 RX ORDER — DULOXETIN HYDROCHLORIDE 20 MG/1
20 CAPSULE, DELAYED RELEASE ORAL DAILY
Qty: 30 CAPSULE | Refills: 0 | Status: SHIPPED | OUTPATIENT
Start: 2021-01-01 | End: 2021-01-01 | Stop reason: SDUPTHER

## 2021-01-01 RX ORDER — CARVEDILOL 6.25 MG/1
6.25 TABLET ORAL 2 TIMES DAILY WITH MEALS
Qty: 60 TABLET | Refills: 0 | Status: SHIPPED | OUTPATIENT
Start: 2021-01-01 | End: 2021-01-01 | Stop reason: SDUPTHER

## 2021-01-01 RX ORDER — CLOPIDOGREL BISULFATE 75 MG/1
75 TABLET ORAL DAILY
Qty: 30 TABLET | Refills: 0 | Status: SHIPPED | OUTPATIENT
Start: 2021-01-01 | End: 2021-01-01 | Stop reason: SDUPTHER

## 2021-01-01 RX ORDER — CHOLECALCIFEROL (VITAMIN D3) 125 MCG
1000 CAPSULE ORAL DAILY
Status: DISCONTINUED | OUTPATIENT
Start: 2021-01-01 | End: 2021-01-01 | Stop reason: HOSPADM

## 2021-01-01 RX ORDER — ROPINIROLE 0.25 MG/1
0.25 TABLET, FILM COATED ORAL
Status: DISCONTINUED | OUTPATIENT
Start: 2021-01-01 | End: 2021-01-01 | Stop reason: HOSPADM

## 2021-01-01 RX ORDER — BUDESONIDE AND FORMOTEROL FUMARATE DIHYDRATE 160; 4.5 UG/1; UG/1
2 AEROSOL RESPIRATORY (INHALATION) 2 TIMES DAILY
Qty: 10.2 G | Refills: 0 | Status: SHIPPED | OUTPATIENT
Start: 2021-01-01 | End: 2021-01-01 | Stop reason: SDUPTHER

## 2021-01-01 RX ORDER — ACETAMINOPHEN 325 MG/1
650 TABLET ORAL EVERY 6 HOURS PRN
Status: DISCONTINUED | OUTPATIENT
Start: 2021-01-01 | End: 2021-01-01 | Stop reason: HOSPADM

## 2021-01-01 RX ORDER — ACETAMINOPHEN 325 MG/1
650 TABLET ORAL EVERY 6 HOURS PRN
Qty: 30 TABLET | Refills: 0 | Status: SHIPPED | OUTPATIENT
Start: 2021-01-01 | End: 2021-01-01 | Stop reason: SDUPTHER

## 2021-01-01 RX ORDER — VALSARTAN 80 MG/1
80 TABLET ORAL DAILY
Qty: 30 TABLET | Refills: 3 | Status: SHIPPED | OUTPATIENT
Start: 2021-01-01 | End: 2021-01-01 | Stop reason: SDUPTHER

## 2021-01-01 RX ORDER — FUROSEMIDE 20 MG/1
20 TABLET ORAL
Status: DISCONTINUED | OUTPATIENT
Start: 2021-01-01 | End: 2021-01-01 | Stop reason: HOSPADM

## 2021-01-01 RX ORDER — CYCLOBENZAPRINE HCL 10 MG
10 TABLET ORAL 3 TIMES DAILY PRN
Qty: 30 TABLET | Refills: 0 | Status: SHIPPED | OUTPATIENT
Start: 2021-01-01 | End: 2021-01-01 | Stop reason: SDUPTHER

## 2021-01-01 RX ORDER — INSULIN GLARGINE 100 [IU]/ML
10 INJECTION, SOLUTION SUBCUTANEOUS EVERY EVENING
Qty: 3 ML | Refills: 0 | Status: SHIPPED | OUTPATIENT
Start: 2021-01-01 | End: 2021-01-01 | Stop reason: SDUPTHER

## 2021-01-01 RX ORDER — CLOPIDOGREL BISULFATE 75 MG/1
75 TABLET ORAL DAILY
Status: DISCONTINUED | OUTPATIENT
Start: 2021-01-01 | End: 2021-01-01 | Stop reason: HOSPADM

## 2021-01-01 RX ORDER — INSULIN LISPRO 100 [IU]/ML
1-6 INJECTION, SOLUTION INTRAVENOUS; SUBCUTANEOUS
Status: DISCONTINUED | OUTPATIENT
Start: 2021-01-01 | End: 2021-01-01 | Stop reason: HOSPADM

## 2021-01-01 RX ORDER — LABETALOL HYDROCHLORIDE 5 MG/ML
10 INJECTION, SOLUTION INTRAVENOUS EVERY 4 HOURS PRN
Status: DISCONTINUED | OUTPATIENT
Start: 2021-01-01 | End: 2021-01-01 | Stop reason: HOSPADM

## 2021-01-01 RX ORDER — VALSARTAN AND HYDROCHLOROTHIAZIDE 320; 25 MG/1; MG/1
TABLET, FILM COATED ORAL
COMMUNITY
Start: 2015-07-09 | End: 2021-01-01

## 2021-01-01 RX ORDER — FUROSEMIDE 20 MG/1
20 TABLET ORAL DAILY
Qty: 30 TABLET | Refills: 0 | Status: SHIPPED | OUTPATIENT
Start: 2021-01-01 | End: 2021-01-01 | Stop reason: SDUPTHER

## 2021-01-01 RX ORDER — ASPIRIN 81 MG/1
81 TABLET, CHEWABLE ORAL DAILY
Qty: 30 TABLET | Refills: 0 | Status: SHIPPED | OUTPATIENT
Start: 2021-01-01 | End: 2021-01-01 | Stop reason: SDUPTHER

## 2021-01-01 RX ORDER — POLYETHYLENE GLYCOL 3350 17 G/17G
1 POWDER, FOR SOLUTION ORAL
Status: DISCONTINUED | OUTPATIENT
Start: 2021-01-01 | End: 2021-01-01 | Stop reason: HOSPADM

## 2021-01-01 RX ORDER — HYDROCODONE BITARTRATE AND ACETAMINOPHEN 7.5; 3 MG/1; MG/1
TABLET ORAL
COMMUNITY
Start: 2014-08-20 | End: 2021-01-01

## 2021-01-01 RX ORDER — DEXTROSE MONOHYDRATE 25 G/50ML
50 INJECTION, SOLUTION INTRAVENOUS
Status: DISCONTINUED | OUTPATIENT
Start: 2021-01-01 | End: 2021-01-01 | Stop reason: HOSPADM

## 2021-01-01 RX ORDER — BISACODYL 10 MG
10 SUPPOSITORY, RECTAL RECTAL
Status: DISCONTINUED | OUTPATIENT
Start: 2021-01-01 | End: 2021-01-01 | Stop reason: HOSPADM

## 2021-01-01 RX ORDER — AMIODARONE HYDROCHLORIDE 200 MG/1
200 TABLET ORAL DAILY
Status: DISCONTINUED | OUTPATIENT
Start: 2021-01-01 | End: 2021-01-01 | Stop reason: HOSPADM

## 2021-01-01 RX ORDER — GABAPENTIN 300 MG/1
300 CAPSULE ORAL 3 TIMES DAILY
Qty: 90 CAPSULE | Refills: 0 | Status: SHIPPED | OUTPATIENT
Start: 2021-01-01 | End: 2021-01-01 | Stop reason: SDUPTHER

## 2021-01-01 RX ORDER — ALBUTEROL SULFATE 2.5 MG/3ML
SOLUTION RESPIRATORY (INHALATION)
COMMUNITY
Start: 2015-09-24 | End: 2021-01-01

## 2021-01-01 RX ORDER — FERROUS SULFATE 325(65) MG
TABLET ORAL
COMMUNITY
Start: 2014-07-08 | End: 2021-01-01

## 2021-01-01 RX ORDER — HYDROCHLOROTHIAZIDE 12.5 MG/1
12.5 TABLET ORAL DAILY
Qty: 30 TABLET | Refills: 0 | Status: SHIPPED | OUTPATIENT
Start: 2021-01-01 | End: 2021-01-01 | Stop reason: SDUPTHER

## 2021-01-01 RX ORDER — ASPIRIN 81 MG/1
81 TABLET, CHEWABLE ORAL DAILY
Status: DISCONTINUED | OUTPATIENT
Start: 2021-01-01 | End: 2021-01-01 | Stop reason: HOSPADM

## 2021-01-01 RX ORDER — OMEPRAZOLE 20 MG/1
20 CAPSULE, DELAYED RELEASE ORAL DAILY
Qty: 30 CAPSULE | Refills: 0 | Status: SHIPPED | OUTPATIENT
Start: 2021-01-01 | End: 2021-01-01 | Stop reason: SDUPTHER

## 2021-01-01 RX ORDER — AMIODARONE HYDROCHLORIDE 200 MG/1
200 TABLET ORAL DAILY
Qty: 30 TABLET | Refills: 0 | Status: SHIPPED | OUTPATIENT
Start: 2021-01-01 | End: 2021-01-01 | Stop reason: SDUPTHER

## 2021-01-01 RX ORDER — ROPINIROLE 0.25 MG/1
0.25 TABLET, FILM COATED ORAL
Qty: 90 TABLET | Refills: 11 | Status: SHIPPED | OUTPATIENT
Start: 2021-01-01 | End: 2021-01-01 | Stop reason: SDUPTHER

## 2021-01-01 RX ORDER — BUDESONIDE AND FORMOTEROL FUMARATE DIHYDRATE 160; 4.5 UG/1; UG/1
2 AEROSOL RESPIRATORY (INHALATION)
Status: DISCONTINUED | OUTPATIENT
Start: 2021-01-01 | End: 2021-01-01 | Stop reason: HOSPADM

## 2021-01-01 RX ORDER — DULOXETIN HYDROCHLORIDE 20 MG/1
20 CAPSULE, DELAYED RELEASE ORAL DAILY
Status: DISCONTINUED | OUTPATIENT
Start: 2021-01-01 | End: 2021-01-01 | Stop reason: HOSPADM

## 2021-01-01 RX ORDER — KETOROLAC TROMETHAMINE 30 MG/ML
15 INJECTION, SOLUTION INTRAMUSCULAR; INTRAVENOUS EVERY 6 HOURS PRN
Status: DISCONTINUED | OUTPATIENT
Start: 2021-01-01 | End: 2021-01-01 | Stop reason: HOSPADM

## 2021-01-01 RX ORDER — VALSARTAN 80 MG/1
80 TABLET ORAL
Status: DISCONTINUED | OUTPATIENT
Start: 2021-01-01 | End: 2021-01-01 | Stop reason: HOSPADM

## 2021-01-01 RX ORDER — BUDESONIDE AND FORMOTEROL FUMARATE DIHYDRATE 160; 4.5 UG/1; UG/1
AEROSOL RESPIRATORY (INHALATION)
COMMUNITY
Start: 2014-07-08 | End: 2021-01-01

## 2021-01-01 RX ORDER — GABAPENTIN 300 MG/1
300 CAPSULE ORAL 3 TIMES DAILY
Status: DISCONTINUED | OUTPATIENT
Start: 2021-01-01 | End: 2021-01-01 | Stop reason: HOSPADM

## 2021-01-01 RX ORDER — CYCLOBENZAPRINE HCL 10 MG
10 TABLET ORAL 3 TIMES DAILY PRN
Status: DISCONTINUED | OUTPATIENT
Start: 2021-01-01 | End: 2021-01-01 | Stop reason: HOSPADM

## 2021-01-01 RX ORDER — HYDROCODONE BITARTRATE AND ACETAMINOPHEN 5; 325 MG/1; MG/1
1-2 TABLET ORAL EVERY 6 HOURS PRN
Status: DISCONTINUED | OUTPATIENT
Start: 2021-01-01 | End: 2021-01-01

## 2021-01-01 RX ORDER — BUPROPION HYDROCHLORIDE 150 MG/1
TABLET, FILM COATED, EXTENDED RELEASE ORAL
COMMUNITY
Start: 2014-07-08 | End: 2021-01-01

## 2021-01-01 RX ORDER — VITAMIN B COMPLEX
1000 TABLET ORAL DAILY
Status: DISCONTINUED | OUTPATIENT
Start: 2021-01-01 | End: 2021-01-01 | Stop reason: HOSPADM

## 2021-01-01 RX ORDER — POLYETHYLENE GLYCOL 3350 17 G/17G
17 POWDER, FOR SOLUTION ORAL
Qty: 10 EACH | Refills: 3 | Status: SHIPPED | OUTPATIENT
Start: 2021-01-01 | End: 2021-01-01 | Stop reason: SDUPTHER

## 2021-01-01 RX ORDER — MULTIVIT-MIN/IRON/FOLIC ACID/K 18-600-40
CAPSULE ORAL
COMMUNITY
Start: 2014-07-08 | End: 2021-01-01

## 2021-01-01 RX ORDER — ASPIRIN 81 MG/1
81 TABLET, CHEWABLE ORAL DAILY
Qty: 30 TABLET | Refills: 0 | Status: SHIPPED | OUTPATIENT
Start: 2021-01-01 | End: 2021-01-01

## 2021-01-01 RX ORDER — AMOXICILLIN 250 MG
2 CAPSULE ORAL 2 TIMES DAILY
Status: DISCONTINUED | OUTPATIENT
Start: 2021-01-01 | End: 2021-01-01 | Stop reason: HOSPADM

## 2021-01-01 RX ORDER — ROSUVASTATIN CALCIUM 20 MG/1
40 TABLET, COATED ORAL EVERY EVENING
Status: DISCONTINUED | OUTPATIENT
Start: 2021-01-01 | End: 2021-01-01 | Stop reason: HOSPADM

## 2021-01-01 RX ORDER — CARVEDILOL 6.25 MG/1
6.25 TABLET ORAL 2 TIMES DAILY WITH MEALS
Status: DISCONTINUED | OUTPATIENT
Start: 2021-01-01 | End: 2021-01-01 | Stop reason: HOSPADM

## 2021-01-01 RX ORDER — HYDROCHLOROTHIAZIDE 25 MG/1
12.5 TABLET ORAL
Status: DISCONTINUED | OUTPATIENT
Start: 2021-01-01 | End: 2021-01-01 | Stop reason: HOSPADM

## 2021-01-01 RX ORDER — ROSUVASTATIN CALCIUM 40 MG/1
40 TABLET, COATED ORAL EVERY EVENING
Qty: 30 TABLET | Refills: 11 | Status: SHIPPED | OUTPATIENT
Start: 2021-01-01 | End: 2021-01-01 | Stop reason: SDUPTHER

## 2021-01-01 RX ADMIN — GABAPENTIN 300 MG: 300 CAPSULE ORAL at 05:39

## 2021-01-01 RX ADMIN — INSULIN LISPRO 3 UNITS: 100 INJECTION, SOLUTION INTRAVENOUS; SUBCUTANEOUS at 20:28

## 2021-01-01 RX ADMIN — INSULIN LISPRO 2 UNITS: 100 INJECTION, SOLUTION INTRAVENOUS; SUBCUTANEOUS at 22:03

## 2021-01-01 RX ADMIN — CYANOCOBALAMIN TAB 500 MCG 1000 MCG: 500 TAB at 06:21

## 2021-01-01 RX ADMIN — TIOTROPIUM BROMIDE INHALATION SPRAY 5 MCG: 3.12 SPRAY, METERED RESPIRATORY (INHALATION) at 06:37

## 2021-01-01 RX ADMIN — CYANOCOBALAMIN TAB 500 MCG 1000 MCG: 500 TAB at 04:56

## 2021-01-01 RX ADMIN — FUROSEMIDE 20 MG: 20 TABLET ORAL at 06:22

## 2021-01-01 RX ADMIN — Medication 1000 UNITS: at 04:59

## 2021-01-01 RX ADMIN — ENOXAPARIN SODIUM 40 MG: 40 INJECTION SUBCUTANEOUS at 05:41

## 2021-01-01 RX ADMIN — DOCUSATE SODIUM 50 MG AND SENNOSIDES 8.6 MG 2 TABLET: 8.6; 5 TABLET, FILM COATED ORAL at 17:57

## 2021-01-01 RX ADMIN — KETOROLAC TROMETHAMINE 15 MG: 30 INJECTION, SOLUTION INTRAMUSCULAR; INTRAVENOUS at 12:19

## 2021-01-01 RX ADMIN — GABAPENTIN 300 MG: 300 CAPSULE ORAL at 17:38

## 2021-01-01 RX ADMIN — ASPIRIN 81 MG: 81 TABLET, CHEWABLE ORAL at 06:21

## 2021-01-01 RX ADMIN — CARVEDILOL 6.25 MG: 6.25 TABLET, FILM COATED ORAL at 17:38

## 2021-01-01 RX ADMIN — AMIODARONE HYDROCHLORIDE 200 MG: 200 TABLET ORAL at 04:57

## 2021-01-01 RX ADMIN — FUROSEMIDE 20 MG: 20 TABLET ORAL at 05:46

## 2021-01-01 RX ADMIN — CLOPIDOGREL BISULFATE 75 MG: 75 TABLET ORAL at 05:45

## 2021-01-01 RX ADMIN — BUDESONIDE AND FORMOTEROL FUMARATE DIHYDRATE 2 PUFF: 160; 4.5 AEROSOL RESPIRATORY (INHALATION) at 06:37

## 2021-01-01 RX ADMIN — ENOXAPARIN SODIUM 40 MG: 40 INJECTION SUBCUTANEOUS at 05:46

## 2021-01-01 RX ADMIN — OMEPRAZOLE 20 MG: 20 CAPSULE, DELAYED RELEASE ORAL at 05:39

## 2021-01-01 RX ADMIN — INSULIN LISPRO 4 UNITS: 100 INJECTION, SOLUTION INTRAVENOUS; SUBCUTANEOUS at 07:56

## 2021-01-01 RX ADMIN — VALSARTAN 80 MG: 80 TABLET, FILM COATED ORAL at 05:45

## 2021-01-01 RX ADMIN — OMEPRAZOLE 20 MG: 20 CAPSULE, DELAYED RELEASE ORAL at 05:44

## 2021-01-01 RX ADMIN — VALSARTAN 80 MG: 80 TABLET, FILM COATED ORAL at 06:21

## 2021-01-01 RX ADMIN — VALSARTAN 80 MG: 80 TABLET, FILM COATED ORAL at 05:38

## 2021-01-01 RX ADMIN — Medication 1000 UNITS: at 06:22

## 2021-01-01 RX ADMIN — CYCLOBENZAPRINE 10 MG: 10 TABLET, FILM COATED ORAL at 18:13

## 2021-01-01 RX ADMIN — CARVEDILOL 6.25 MG: 6.25 TABLET, FILM COATED ORAL at 17:57

## 2021-01-01 RX ADMIN — AMIODARONE HYDROCHLORIDE 200 MG: 200 TABLET ORAL at 06:22

## 2021-01-01 RX ADMIN — INSULIN LISPRO 4 UNITS: 100 INJECTION, SOLUTION INTRAVENOUS; SUBCUTANEOUS at 21:23

## 2021-01-01 RX ADMIN — ENOXAPARIN SODIUM 40 MG: 40 INJECTION SUBCUTANEOUS at 04:57

## 2021-01-01 RX ADMIN — OMEPRAZOLE 20 MG: 20 CAPSULE, DELAYED RELEASE ORAL at 06:22

## 2021-01-01 RX ADMIN — FUROSEMIDE 20 MG: 20 TABLET ORAL at 04:57

## 2021-01-01 RX ADMIN — GABAPENTIN 300 MG: 300 CAPSULE ORAL at 04:57

## 2021-01-01 RX ADMIN — INSULIN LISPRO 4 UNITS: 100 INJECTION, SOLUTION INTRAVENOUS; SUBCUTANEOUS at 17:54

## 2021-01-01 RX ADMIN — GABAPENTIN 300 MG: 300 CAPSULE ORAL at 05:44

## 2021-01-01 RX ADMIN — DULOXETINE 20 MG: 20 CAPSULE, DELAYED RELEASE ORAL at 06:22

## 2021-01-01 RX ADMIN — CYCLOBENZAPRINE 10 MG: 10 TABLET, FILM COATED ORAL at 07:28

## 2021-01-01 RX ADMIN — DULOXETINE 20 MG: 20 CAPSULE, DELAYED RELEASE ORAL at 05:39

## 2021-01-01 RX ADMIN — INSULIN LISPRO 2 UNITS: 100 INJECTION, SOLUTION INTRAVENOUS; SUBCUTANEOUS at 08:09

## 2021-01-01 RX ADMIN — INSULIN LISPRO 3 UNITS: 100 INJECTION, SOLUTION INTRAVENOUS; SUBCUTANEOUS at 16:43

## 2021-01-01 RX ADMIN — CYANOCOBALAMIN TAB 500 MCG 1000 MCG: 500 TAB at 05:46

## 2021-01-01 RX ADMIN — FUROSEMIDE 20 MG: 20 TABLET ORAL at 05:41

## 2021-01-01 RX ADMIN — INSULIN GLARGINE 10 UNITS: 100 INJECTION, SOLUTION SUBCUTANEOUS at 16:43

## 2021-01-01 RX ADMIN — ROSUVASTATIN CALCIUM 40 MG: 20 TABLET, FILM COATED ORAL at 17:38

## 2021-01-01 RX ADMIN — INSULIN HUMAN 10 UNITS: 100 INJECTION, SOLUTION PARENTERAL at 15:18

## 2021-01-01 RX ADMIN — INSULIN LISPRO 4 UNITS: 100 INJECTION, SOLUTION INTRAVENOUS; SUBCUTANEOUS at 17:32

## 2021-01-01 RX ADMIN — GABAPENTIN 300 MG: 300 CAPSULE ORAL at 17:57

## 2021-01-01 RX ADMIN — GABAPENTIN 300 MG: 300 CAPSULE ORAL at 16:47

## 2021-01-01 RX ADMIN — CARVEDILOL 6.25 MG: 6.25 TABLET, FILM COATED ORAL at 17:48

## 2021-01-01 RX ADMIN — GABAPENTIN 300 MG: 300 CAPSULE ORAL at 17:47

## 2021-01-01 RX ADMIN — ASPIRIN 81 MG: 81 TABLET, CHEWABLE ORAL at 05:39

## 2021-01-01 RX ADMIN — CARVEDILOL 6.25 MG: 6.25 TABLET, FILM COATED ORAL at 08:13

## 2021-01-01 RX ADMIN — KETOROLAC TROMETHAMINE 15 MG: 30 INJECTION, SOLUTION INTRAMUSCULAR; INTRAVENOUS at 18:16

## 2021-01-01 RX ADMIN — GABAPENTIN 300 MG: 300 CAPSULE ORAL at 06:21

## 2021-01-01 RX ADMIN — DULOXETINE 20 MG: 20 CAPSULE, DELAYED RELEASE ORAL at 05:46

## 2021-01-01 RX ADMIN — GABAPENTIN 300 MG: 300 CAPSULE ORAL at 12:12

## 2021-01-01 RX ADMIN — DOCUSATE SODIUM 50 MG AND SENNOSIDES 8.6 MG 2 TABLET: 8.6; 5 TABLET, FILM COATED ORAL at 04:56

## 2021-01-01 RX ADMIN — ROPINIROLE HYDROCHLORIDE 0.25 MG: 0.25 TABLET, FILM COATED ORAL at 20:30

## 2021-01-01 RX ADMIN — HYDROCHLOROTHIAZIDE 12.5 MG: 25 TABLET ORAL at 04:57

## 2021-01-01 RX ADMIN — ACETAMINOPHEN 650 MG: 325 TABLET ORAL at 08:01

## 2021-01-01 RX ADMIN — INSULIN GLARGINE 10 UNITS: 100 INJECTION, SOLUTION SUBCUTANEOUS at 17:31

## 2021-01-01 RX ADMIN — INSULIN GLARGINE 10 UNITS: 100 INJECTION, SOLUTION SUBCUTANEOUS at 17:39

## 2021-01-01 RX ADMIN — BUDESONIDE AND FORMOTEROL FUMARATE DIHYDRATE 2 PUFF: 160; 4.5 AEROSOL RESPIRATORY (INHALATION) at 21:09

## 2021-01-01 RX ADMIN — INSULIN LISPRO 3 UNITS: 100 INJECTION, SOLUTION INTRAVENOUS; SUBCUTANEOUS at 12:31

## 2021-01-01 RX ADMIN — CYCLOBENZAPRINE 10 MG: 10 TABLET, FILM COATED ORAL at 10:15

## 2021-01-01 RX ADMIN — GABAPENTIN 300 MG: 300 CAPSULE ORAL at 12:39

## 2021-01-01 RX ADMIN — ENOXAPARIN SODIUM 40 MG: 40 INJECTION SUBCUTANEOUS at 06:22

## 2021-01-01 RX ADMIN — CARVEDILOL 6.25 MG: 6.25 TABLET, FILM COATED ORAL at 08:02

## 2021-01-01 RX ADMIN — ASPIRIN 81 MG: 81 TABLET, CHEWABLE ORAL at 04:56

## 2021-01-01 RX ADMIN — CARVEDILOL 6.25 MG: 6.25 TABLET, FILM COATED ORAL at 16:47

## 2021-01-01 RX ADMIN — INSULIN LISPRO 4 UNITS: 100 INJECTION, SOLUTION INTRAVENOUS; SUBCUTANEOUS at 09:55

## 2021-01-01 RX ADMIN — Medication 1000 UNITS: at 05:45

## 2021-01-01 RX ADMIN — ROPINIROLE HYDROCHLORIDE 0.25 MG: 0.25 TABLET, FILM COATED ORAL at 22:04

## 2021-01-01 RX ADMIN — CYANOCOBALAMIN TAB 500 MCG 1000 MCG: 500 TAB at 05:39

## 2021-01-01 RX ADMIN — INSULIN LISPRO 3 UNITS: 100 INJECTION, SOLUTION INTRAVENOUS; SUBCUTANEOUS at 12:12

## 2021-01-01 RX ADMIN — Medication 1000 UNITS: at 05:39

## 2021-01-01 RX ADMIN — VALSARTAN 80 MG: 80 TABLET, FILM COATED ORAL at 04:57

## 2021-01-01 RX ADMIN — OMEPRAZOLE 20 MG: 20 CAPSULE, DELAYED RELEASE ORAL at 04:56

## 2021-01-01 RX ADMIN — INSULIN LISPRO 1 UNITS: 100 INJECTION, SOLUTION INTRAVENOUS; SUBCUTANEOUS at 07:24

## 2021-01-01 RX ADMIN — INSULIN GLARGINE 10 UNITS: 100 INJECTION, SOLUTION SUBCUTANEOUS at 17:56

## 2021-01-01 RX ADMIN — ASPIRIN 81 MG: 81 TABLET, CHEWABLE ORAL at 05:44

## 2021-01-01 RX ADMIN — DOCUSATE SODIUM 50 MG AND SENNOSIDES 8.6 MG 2 TABLET: 8.6; 5 TABLET, FILM COATED ORAL at 05:39

## 2021-01-01 RX ADMIN — GABAPENTIN 300 MG: 300 CAPSULE ORAL at 12:36

## 2021-01-01 RX ADMIN — CARVEDILOL 6.25 MG: 6.25 TABLET, FILM COATED ORAL at 07:24

## 2021-01-01 RX ADMIN — AMIODARONE HYDROCHLORIDE 200 MG: 200 TABLET ORAL at 05:44

## 2021-01-01 RX ADMIN — CLOPIDOGREL BISULFATE 75 MG: 75 TABLET ORAL at 04:57

## 2021-01-01 RX ADMIN — ROSUVASTATIN CALCIUM 40 MG: 20 TABLET, FILM COATED ORAL at 17:48

## 2021-01-01 RX ADMIN — HYDROCHLOROTHIAZIDE 12.5 MG: 25 TABLET ORAL at 05:45

## 2021-01-01 RX ADMIN — ROPINIROLE HYDROCHLORIDE 0.25 MG: 0.25 TABLET, FILM COATED ORAL at 21:09

## 2021-01-01 RX ADMIN — CLOPIDOGREL BISULFATE 75 MG: 75 TABLET ORAL at 05:39

## 2021-01-01 RX ADMIN — KETOROLAC TROMETHAMINE 15 MG: 30 INJECTION, SOLUTION INTRAMUSCULAR; INTRAVENOUS at 12:40

## 2021-01-01 RX ADMIN — DULOXETINE 20 MG: 20 CAPSULE, DELAYED RELEASE ORAL at 04:57

## 2021-01-01 RX ADMIN — CLOPIDOGREL BISULFATE 75 MG: 75 TABLET ORAL at 06:22

## 2021-01-01 RX ADMIN — ROSUVASTATIN CALCIUM 40 MG: 20 TABLET, FILM COATED ORAL at 17:57

## 2021-01-01 RX ADMIN — HYDROCHLOROTHIAZIDE 12.5 MG: 25 TABLET ORAL at 05:39

## 2021-01-01 RX ADMIN — GABAPENTIN 300 MG: 300 CAPSULE ORAL at 12:31

## 2021-01-01 RX ADMIN — INSULIN LISPRO 3 UNITS: 100 INJECTION, SOLUTION INTRAVENOUS; SUBCUTANEOUS at 17:39

## 2021-01-01 RX ADMIN — DOCUSATE SODIUM 50 MG AND SENNOSIDES 8.6 MG 2 TABLET: 8.6; 5 TABLET, FILM COATED ORAL at 05:46

## 2021-01-01 RX ADMIN — HYDROCHLOROTHIAZIDE 12.5 MG: 25 TABLET ORAL at 06:21

## 2021-01-01 RX ADMIN — KETOROLAC TROMETHAMINE 15 MG: 30 INJECTION, SOLUTION INTRAMUSCULAR; INTRAVENOUS at 02:35

## 2021-01-01 RX ADMIN — ROSUVASTATIN CALCIUM 40 MG: 20 TABLET, FILM COATED ORAL at 16:47

## 2021-01-01 RX ADMIN — CARVEDILOL 6.25 MG: 6.25 TABLET, FILM COATED ORAL at 08:44

## 2021-01-01 RX ADMIN — AMIODARONE HYDROCHLORIDE 200 MG: 200 TABLET ORAL at 05:41

## 2021-01-01 RX ADMIN — CYCLOBENZAPRINE 10 MG: 10 TABLET, FILM COATED ORAL at 00:20

## 2021-01-01 RX ADMIN — INSULIN LISPRO 4 UNITS: 100 INJECTION, SOLUTION INTRAVENOUS; SUBCUTANEOUS at 12:34

## 2021-01-01 ASSESSMENT — PAIN DESCRIPTION - PAIN TYPE
TYPE: ACUTE PAIN;CHRONIC PAIN
TYPE: ACUTE PAIN
TYPE: ACUTE PAIN
TYPE: ACUTE PAIN;CHRONIC PAIN
TYPE: ACUTE PAIN

## 2021-01-01 ASSESSMENT — GAIT ASSESSMENTS
ASSISTIVE DEVICE: HEMI-WALKER
ASSISTIVE DEVICE: FRONT WHEEL WALKER
DEVIATION: BRADYKINETIC;DECREASED HEEL STRIKE;DECREASED TOE OFF
GAIT LEVEL OF ASSIST: MINIMAL ASSIST
DISTANCE (FEET): 45
GAIT LEVEL OF ASSIST: MINIMAL ASSIST
GAIT LEVEL OF ASSIST: MINIMAL ASSIST
DISTANCE (FEET): 40
DISTANCE (FEET): 40
ASSISTIVE DEVICE: HEMI-WALKER

## 2021-01-01 ASSESSMENT — COGNITIVE AND FUNCTIONAL STATUS - GENERAL
STANDING UP FROM CHAIR USING ARMS: A LITTLE
TURNING FROM BACK TO SIDE WHILE IN FLAT BAD: A LITTLE
CLIMB 3 TO 5 STEPS WITH RAILING: A LOT
TOILETING: A LITTLE
STANDING UP FROM CHAIR USING ARMS: A LITTLE
MOVING FROM LYING ON BACK TO SITTING ON SIDE OF FLAT BED: UNABLE
MOVING TO AND FROM BED TO CHAIR: A LITTLE
DRESSING REGULAR LOWER BODY CLOTHING: A LITTLE
SUGGESTED CMS G CODE MODIFIER DAILY ACTIVITY: CK
HELP NEEDED FOR BATHING: A LITTLE
CLIMB 3 TO 5 STEPS WITH RAILING: A LOT
DRESSING REGULAR UPPER BODY CLOTHING: A LITTLE
DAILY ACTIVITIY SCORE: 18
WALKING IN HOSPITAL ROOM: A LITTLE
MOVING TO AND FROM BED TO CHAIR: A LITTLE
MOBILITY SCORE: 17
SUGGESTED CMS G CODE MODIFIER MOBILITY: CK
STANDING UP FROM CHAIR USING ARMS: A LITTLE
WALKING IN HOSPITAL ROOM: A LITTLE
CLIMB 3 TO 5 STEPS WITH RAILING: A LOT
MOVING TO AND FROM BED TO CHAIR: A LITTLE
TURNING FROM BACK TO SIDE WHILE IN FLAT BAD: A LITTLE
EATING MEALS: A LITTLE
WALKING IN HOSPITAL ROOM: A LITTLE
SUGGESTED CMS G CODE MODIFIER MOBILITY: CK
PERSONAL GROOMING: A LITTLE
SUGGESTED CMS G CODE MODIFIER MOBILITY: CK
TURNING FROM BACK TO SIDE WHILE IN FLAT BAD: A LITTLE
MOBILITY SCORE: 15
MOBILITY SCORE: 15
MOVING FROM LYING ON BACK TO SITTING ON SIDE OF FLAT BED: A LITTLE
MOVING FROM LYING ON BACK TO SITTING ON SIDE OF FLAT BED: UNABLE

## 2021-01-01 ASSESSMENT — FIBROSIS 4 INDEX: FIB4 SCORE: 1.17

## 2021-01-01 ASSESSMENT — ACTIVITIES OF DAILY LIVING (ADL): TOILETING: INDEPENDENT

## 2021-01-01 ASSESSMENT — LIFESTYLE VARIABLES: PACK_YEARS: 6

## 2021-01-15 DIAGNOSIS — Z23 NEED FOR VACCINATION: ICD-10-CM

## 2021-07-25 NOTE — PROGRESS NOTES
Pt is sleeping on floor in triage. He is awoken with loud verbal stimuli and asked to sit in chair. Pt refuses and falls back asleep.   Pt up on the floor via gurney, measured wt on scale.

## 2021-11-03 PROBLEM — C76.0: Status: ACTIVE | Noted: 2020-04-22

## 2021-11-03 PROBLEM — M06.4 UNDIFFERENTIATED INFLAMMATORY POLYARTHRITIS (HCC): Status: ACTIVE | Noted: 2020-04-22

## 2021-11-03 PROBLEM — L60.3 KOILONYCHIA: Status: ACTIVE | Noted: 2020-04-22

## 2021-11-03 PROBLEM — M54.50 ACUTE LOW BACK PAIN: Status: ACTIVE | Noted: 2021-04-19

## 2021-11-03 NOTE — ASSESSMENT & PLAN NOTE
Discussed with patient that given her elevation in blood pressure and her current symptoms this will follow a category of hypertensive emergency and she needs to be seen in the ER.  Would recommend slow titration of blood pressure not to draw more than 160 systolic in the next 24 hours.  Concerns about neurologic exam discussed under CVA.

## 2021-11-03 NOTE — ED NOTES
Pharmacy Medication Reconciliation    ~Medication Reconciliation partially completed per pt at bedside & pt home pharmacy  ~Allergies reviewed and updated  ~Ptient home pharmacy:[facility]    ~Patient reports she has NOT taken any medications in 4 months.     ~Per patient home pharmacy patient should be on the following medications:  *Amiodarone 200 mg Daily  *Pantoprazole 40 mg Daily  *Lantus Solostar 100u/ml 20 Units at bedtime  *Ropinirole 0.25 mg at bedtime  *Carvedilol 6.25 mg BID  *Flonase 2 sprays in each nostril BID PRN  *Furosemide 20 mg Every Other Day (May take extra tablet at Noon time if there is a weight gain of 2 pounds)  *Metformin 1000 mg BID  *Gabapentin 300 mg TID  *Nitroglycerin 0.4 mg PRN  *Clopidogrel 75 mg Daily  *Duloxetine 20 mg Daily  *Alendronate 70 mg every 7 days  *Rosuvastatin 40 mg Daily

## 2021-11-03 NOTE — ED PROVIDER NOTES
ED Provider Note    CHIEF COMPLAINT  Chief Complaint   Patient presents with   • Sent by MD     PMD sent due to L UE weakness and flaccidity x 1 month.  Pt states the arm becoming weak was a sudden onset about one month ago.  Pt reports worsening weakness to L LE as well, and has used a cane for 2 years due to back injury.   • Hypertension     Pt states BP has been elevated to 195/90 at MD office   • Neck Pain     Pt reports unable to tilt head back due to neck pain x 1 month.       HPI  Leena Bella is a 71 y.o. female who presents with a chief complaint of left upper and lower extremity weakness that started 2 months ago.  Patient has a history of stroke approximately 2 to 3 years ago.  She notes that she had left lower extremity weakness following a stroke.  She has no one here to help her in 2 months ago she ran out of her medications.  She was unable to get herself to her physician so has been off all of her medications for the past 2 months.  She has been unable to use her left upper extremity and is now unable to walk due to left lower extremity weakness.  She has some associated numbness in her left arm and left foot.  Her son came into town today and was able to take her to her primary care physician's office but she was sent to the ER due to her symptoms.  She denies any fevers or chills, chest pain, abdominal pain, nausea, or vomiting.  She has had some shortness of breath and is unable to lie flat to sleep.  She is supposed to be on furosemide but ran out of this medication 2 months ago.  She notes that her lower extremities are also swollen.    REVIEW OF SYSTEMS  See HPI for further details.  Extremity weakness and numbness.  Hypertension.  All other systems are negative.     PAST MEDICAL HISTORY   has a past medical history of Abscess (6/4/2016), Allergic rhinitis (6/4/2016), Angina, Anxiety disorder (6/4/2016), Arrhythmia, Arthritis, ASTHMA, Automatic implantable cardiac defibrillator in  "situ, Automatic implantable cardioverter-defibrillator in situ (6/4/2016), Breath shortness, Bronchitis, Cardiomyopathy, ischemic (6/4/2016), CATARACT, Cold, Congestive heart failure (Former[facility]), COPD (chronic obstructive pulmonary disease) ([facility]) (6/4/2016), Coronary bypass, CVA (cerebral vascular accident) ([facility]) (6/4/2016), Diabetes, Diabetes ([facility]), Diabetic neuropathy ([facility]) (6/4/2016), Dyslipidemia (6/4/2016), GERD (gastroesophageal reflux disease) (6/4/2016), Heart burn, Hiatus hernia syndrome, HTN (hypertension) (6/4/2016), coronary artery bypass graft (6/4/2016), Hypertension, Indigestion, Infectious disease, Labial abscess (6/4/2016), Myocardial infarct ([facility]), Nicotine dependence (6/4/2016), On home oxygen therapy, PEYTON (obstructive sleep apnea) (6/4/2016), Osteoarthritis (6/4/2016), Other acute pain, Pacemaker, RLS (restless legs syndrome) (6/4/2016), and Ventricular tachycardia ([facility]) (6/4/2016).    SOCIAL HISTORY  Social History     Tobacco Use   • Smoking status: Light Tobacco Smoker     Packs/day: 0.25     Years: 25.00     Pack years: 6.25     Types: Cigarettes     Last attempt to quit: 3/6/2018     Years since quitting: 3.6   • Smokeless tobacco: Never Used   • Tobacco comment: per pt every now and then   Substance and Sexual Activity   • Alcohol use: No     Alcohol/week: 0.0 oz   • Drug use: No   • Sexual activity: Not on file       SURGICAL HISTORY   has a past surgical history that includes other cardiac surgery; other orthopedic surgery; gyn surgery; recovery (10/21/2011); nam rectal abscess (10/8/2013); and nam rectal abscess incision and drainage (5/29/2014).    CURRENT MEDICATIONS  Home Medications    **Home medications have not yet been reviewed for this encounter**         ALLERGIES  Allergies   Allergen Reactions   • Ciprofloxacin    • Cozaar [Losartan]    • Darvocet [Propoxyphene N-Apap]    • Lexapro    • Lisinopril    • Penicillins    • Sulfa Drugs Rash   • Cephalexin      Rash - \"looks like I have " "AIDS\"   • Morphine      Heart stopped?   • Other Environmental Rash     tape   • Penicillin G Potassium Rash     Has tolerated Unasyn in May 2014 and received amoxicillin on 12/29/15 without reaction   • Potassium      Headache        PHYSICAL EXAM  VITAL SIGNS: /82   Pulse 82   Temp 36.3 °C (97.3 °F) (Temporal)   Resp 16   Ht 1.575 m (5' 2\")   Wt 54.4 kg (119 lb 14.9 oz)   LMP 06/15/1996   SpO2 94%   BMI 21.94 kg/m²    Pulse ox interpretation: I interpret this pulse ox as normal.  Constitutional: Alert in no apparent distress.  HENT: No signs of trauma, Bilateral external ears normal, Nose normal.  Dry mucous membranes.  Eyes: Pupils are equal and reactive, Conjunctiva normal, Non-icteric.   Neck: Normal range of motion, No tenderness, Supple, No stridor.   Lymphatic: No lymphadenopathy noted.   Cardiovascular: Regular rate and rhythm, no murmurs. Pulses symmetrical.  Thorax & Lungs: Normal breath sounds, No respiratory distress, No wheezing, No chest tenderness.   Abdomen: Bowel sounds normal, Soft, No tenderness, No masses, No pulsatile masses. No peritoneal signs.  Skin: Warm, Dry, No erythema, No rash.   Back: No bony tenderness.  Extremities: Intact distal pulses, bilateral lower extremity edema (right worse than left), No tenderness, No cyanosis.  Musculoskeletal: No major deformities noted.   Neurologic: Alert, normal vision, normal speech, slight asymmetry on the right side of the smile with activation but otherwise cranial nerves II through XII are grossly intact, unable to shrug the left shoulder, able to squeeze with both hands but unable to range the left arm at all, normal finger-to-nose with the right arm and unable to complete with the left, there is decreased sensation in the left hand, left forearm, and left upper arm, patient has decreased strength in the left lower extremity and decreased sensation in the left foot.  Psychiatric: Affect normal, Judgment normal, Mood normal. "     DIAGNOSTIC STUDIES / PROCEDURES    LABS  Results for orders placed or performed during the hospital encounter of 11/03/21   CBC with Differential   Result Value Ref Range    WBC 9.3 4.8 - 10.8 K/uL    RBC 5.38 4.20 - 5.40 M/uL    Hemoglobin 15.6 12.0 - 16.0 g/dL    Hematocrit 49.2 (H) 37.0 - 47.0 %    MCV 91.4 81.4 - 97.8 fL    MCH 29.0 27.0 - 33.0 pg    MCHC 31.7 (L) 33.6 - 35.0 g/dL    RDW 44.6 35.9 - 50.0 fL    Platelet Count 176 164 - 446 K/uL    MPV 11.4 9.0 - 12.9 fL    Neutrophils-Polys 69.20 44.00 - 72.00 %    Lymphocytes 21.30 (L) 22.00 - 41.00 %    Monocytes 5.00 0.00 - 13.40 %    Eosinophils 3.10 0.00 - 6.90 %    Basophils 1.00 0.00 - 1.80 %    Immature Granulocytes 0.40 0.00 - 0.90 %    Nucleated RBC 0.00 /100 WBC    Neutrophils (Absolute) 6.43 2.00 - 7.15 K/uL    Lymphs (Absolute) 1.98 1.00 - 4.80 K/uL    Monos (Absolute) 0.46 0.00 - 0.85 K/uL    Eos (Absolute) 0.29 0.00 - 0.51 K/uL    Baso (Absolute) 0.09 0.00 - 0.12 K/uL    Immature Granulocytes (abs) 0.04 0.00 - 0.11 K/uL    NRBC (Absolute) 0.00 K/uL   Complete Metabolic Panel (CMP)   Result Value Ref Range    Sodium 136 135 - 145 mmol/L    Potassium 4.3 3.6 - 5.5 mmol/L    Chloride 98 96 - 112 mmol/L    Co2 24 20 - 33 mmol/L    Anion Gap 14.0 7.0 - 16.0    Glucose 426 (H) 65 - 99 mg/dL    Bun 26 (H) 8 - 22 mg/dL    Creatinine 1.03 0.50 - 1.40 mg/dL    Calcium 10.6 (H) 8.5 - 10.5 mg/dL    AST(SGOT) 7 (L) 12 - 45 U/L    ALT(SGPT) 6 2 - 50 U/L    Alkaline Phosphatase 111 (H) 30 - 99 U/L    Total Bilirubin 0.2 0.1 - 1.5 mg/dL    Albumin 4.4 3.2 - 4.9 g/dL    Total Protein 7.7 6.0 - 8.2 g/dL    Globulin 3.3 1.9 - 3.5 g/dL    A-G Ratio 1.3 g/dL   Troponin   Result Value Ref Range    Troponin T 17 6 - 19 ng/L   ESTIMATED GFR   Result Value Ref Range    GFR If African American >60 >60 mL/min/1.73 m 2    GFR If Non  53 (A) >60 mL/min/1.73 m 2   EKG   Result Value Ref Range    Report       [facility] Emergency  Dept.    Test Date:  2021  Pt Name:    ANEESH KAUR                Department: ER  MRN:        [id number]                      Room:  Gender:     Female                       Technician: [id number]  :        [id number]                   Requested By:ER TRIAGE PROTOCOL  Order #:    [id number]                    Reading MD:    Measurements  Intervals                                Axis  Rate:       84                           P:          -73  AR:         188                          QRS:        23  QRSD:       124                          T:          216  QT:         404  QTc:        478    Interpretive Statements  SINUS OR ECTOPIC ATRIAL RHYTHM  NONSPECIFIC INTRAVENTRICULAR CONDUCTION DELAY  ABNRM R PROG, CONSIDER ASMI OR LEAD PLACEMENT  BORDERLINE ST DEPRESSION, DIFFUSE LEADS  Compared to ECG 2017 16:45:49  Ectopic atrial rhythm now present  Intraventricular conduction delay now present  Myocardial infarct finding now present  ST (T wave ) deviation now present  Sinus rhythm no longer present  First degree AV block no longer present  Left bundle-branch block no longer present       RADIOLOGY  CT-HEAD W/O   Final Result      1. Cerebral atrophy.   2. White matter lucencies most consistent with small vessel ischemic change versus demyelination or gliosis.   3. Otherwise, Head CT without contrast with no acute findings. No evidence of acute cerebral infarction, hemorrhage or mass lesion.      DX-CHEST-PORTABLE (1 VIEW)   Final Result      No radiographic evidence of acute cardiopulmonary process.      US-EXTREMITY VENOUS LOWER UNILAT LEFT    (Results Pending)   EC-ECHOCARDIOGRAM COMPLETE W/O CONT    (Results Pending)   US-CAROTID DOPPLER BILAT    (Results Pending)     COURSE & MEDICAL DECISION MAKING  Pertinent Labs & Imaging studies reviewed. (See chart for details)  Records obtained and reviewed: Patient was seen earlier today by her primary care physician. She apparently has a history of prior stroke but  noted that 2 months ago she became unable to use her left hand as well as normal and had increased weakness in her left lower extremity. She also admitted to difficulty talking and swallowing but notes that her symptoms had not been evaluated since onset. Patient was noted to be hypertensive and ultimately transferred to the ER for higher level of care.    This is a 71 year old female with a history of stroke who is here with worsening left sided weakness and now unable to use her left hand at all. This has been going on for the past 2-3 months. Also has been out of all her medications for the same time period as she has been unable to get to her physician's office.    She is slightly hypertensive to the 150s but otherwise has normal vital signs. She has dry mucous membranes but lower extremity swelling so IV fluids were initially held due to concern for potential fluid overload. She is almost completely unable to use her left upper extremity. She has very poor strength in her left lower extremity. She is well outside any time window for intervention such as tPA or thrombectomy.    Patient sent for CT head which did not reveal any acute findings. CXR was also unremarkable.    CBC was reassuring. No leukocytosis or left shift. Metabolic panel did reveal significant hyperglycemia to 426 with a BUN/Cr of 26/1.03. Calcium slightly elevated to 10.6. Troponin is negative and BNP very slightly elevated to 1066.    Given a dose of IV insulin. She will need to be started on SSI once admitted.    Patient will benefit from hospitalization for medical management and optimization. I discussed the patient with the hospitalist and she was admitted in guarded condition.    FINAL IMPRESSION  1. Weakness  2. Hyperglycemia  3. Hypertension    Electronically signed by: Chucho Moss M.D., 11/3/2021 2:09 PM

## 2021-11-03 NOTE — ED TRIAGE NOTES
"Chief Complaint   Patient presents with   • Sent by MD     PMD sent due to L UE weakness and flaccidity x 1 month.  Pt states the arm becoming weak was a sudden onset about one month ago.  Pt reports worsening weakness to L LE as well, and has used a cane for 2 years due to back injury.   • Hypertension     Pt states BP has been elevated to 195/90 at MD office   • Neck Pain     Pt reports unable to tilt head back due to neck pain x 1 month.     Pt reports she ran out of plavix and all other medications \"weeks\" ago (more than a month).  She arrives to triage in , but is not able to walk without assistance from another person.      ANSHU of 2.  Chest pain protocol initiated due to hypertension.  "

## 2021-11-03 NOTE — ASSESSMENT & PLAN NOTE
Concern for subacute stroke potentially 2 months ago.  This is complicated by ongoing hypertensive emergency with neurologic symptoms.  Neurologic exam with left-sided weakness of the upper extremity and left-sided weakness of the lower extremity.  Cranial nerves appear intact with minimal if any facial droop on the left side.  Recommended immediate transfer to ER and discussed with [facility] as well as [facility] hospital team the patient will be expected there in the coming minutes.  Difficult to discern if this is all subacute stroke with hypertensive emergency or potential new stroke on top of known issues and changed 2 months ago.

## 2021-11-03 NOTE — PROGRESS NOTES
Subjective:     CC: L sided weakness, HTN, med refill    HPI:   Leena presents today with     Problem   Cva (Cerebral Vascular Accident) (Hcc)    Patient apparently has history of previous stroke however, 2 months ago states that she was unable to use her left hand as well as normal and had increasing weakness in the left lower extremity.  She otherwise notes it is more difficult to talk and swallow than normal but she has not been evaluated in the last 2 months.  She does not note any rapid changes in that presentation today.     Htn (Hypertension)    Patient with known hypertension.  She was previously on valsartan hydrochlorothiazide 160/12.5 mg along with carvedilol 6.25 mg and furosemide 20 mg daily.  She reports she has not been on this medication for the last 4 to 5 months as she ran out and did not come to the doctor or call.  She states she is having a headache as well as vision changes other fairly new.  She does have a history of migraines in the past.  She does note some peripheral numbness and tingling in her fingers and toes.         Current Outpatient Medications Ordered in Epic   Medication Sig Dispense Refill   • Cholecalciferol (VITAMIN D) 50 MCG (2000 UT) Cap VITAMIN D 50 MCG (2000 UT) CAPS     • ferrous sulfate (CVS IRON) 325 (65 Fe) MG tablet CVS IRON 325 (65 Fe) MG TABS     • HYDROcodone-Acetaminophen (VICODIN ES) 7.5-300 MG Tab VICODIN ES 7.5-300 MG ORAL TABLET     • EASY COMFORT LANCETS Misc AS DIRECTED 200 Each 4   • EASY TALK BLOOD GLUCOSE TEST strip AS DIRECTED 200 Strip 4   • gabapentin (NEURONTIN) 600 MG tablet TAKE 1 TAB BY MOUTH TWO TIMES A DAY. 180 Tab 0   • Alcohol Swabs (ALCOHOL PADS) 70 % Pads AS DIRECTED 200 Each 2   • nitroglycerin (NITROSTAT) 0.4 MG SL Tab PLACE 1 TAB UNDER TONGUE AS NEEDED FOR CHEST PAIN. 90 Tab 0   • metformin (GLUCOPHAGE) 1000 MG tablet TAKE 1 TAB BY MOUTH TWO TIMES A DAY, WITH MEALS. 240 Tab 2   • furosemide (LASIX) 20 MG Tab TAKE 1 TAB BY MOUTH EVERY DAY.  90 Tab 1   • EASY COMFORT PEN NEEDLES 31G X 6 MM Misc AS DIRECTED 600 Each 0   •  MG Cap TAKE 1 CAPSULE BY MOUTH TWICE DAILY 180 Cap 0   • EASY COMFORT LANCETS Misc AS DIRECTED 600 Each 2   • rosuvastatin (CRESTOR) 40 MG tablet TAKE 1 TAB BY MOUTH EVERY DAY. 90 Tab 3   • pantoprazole (PROTONIX) 40 MG Tablet Delayed Response TAKE 1 TAB BY MOUTH EVERY DAY. 90 Tab 3   • ROPINIRole (REQUIP) 1 MG Tab TAKE 1 TABLET BY MOUTH TWICE DAILY 180 Tab 3   • Insulin Glargine (BASAGLAR KWIKPEN) 100 UNIT/ML Solution Pen-injector INJECT 40 UNITS SUBCUTANEOUSLY DAILY AT BEDTIME AS DIRECTED 15 PEN 3   • Cyanocobalamin (VITAMIN B-12) 1000 MCG Tab TAKE 1 TAB BY MOUTH EVERY DAY. 90 Tab 3   • EASY COMFORT PEN NEEDLES 31G X 6 MM Misc      • ondansetron (ZOFRAN ODT) 4 MG TABLET DISPERSIBLE TAKE 1 TAB BY MOUTH EVERY EIGHT HOURS AS NEEDED FOR NAUSEA. TAKE 1 TAB BY MOUTH EVERY EIGHT HOURS AS NEEDED. 20 Tab 0   • aspirin 81 MG tablet Take 1 Tab by mouth every day. 100 Tab 3   • mometasone (NASONEX) 50 MCG/ACT nasal spray Spray 2 Sprays in nose every day. 1 Inhaler 3   • calcium-vitamin D (OSCAL 500 +D) 500-200 MG-UNIT Tab Take 1 Tab by mouth 2 times a day, with meals. 100 Tab 3   • albuterol (PROVENTIL) 2.5mg/3ml Nebu Soln solution for nebulization ALBUTEROL SULFATE (2.5 MG/3ML) 0.083% NEBU (Patient not taking: Reported on 11/3/2021)     • budesonide-formoterol (SYMBICORT) 160-4.5 MCG/ACT Aerosol SYMBICORT 160-4.5 MCG/ACT AERO     • buPROPion SR (ZYBAN) 150 MG SR tablet BUPROPION HCL ER (SMOKING DET) 150 MG XR12H-TAB     • fluticasone-salmeterol (ADVAIR DISKUS) 250-50 MCG/DOSE AEROSOL POWDER, BREATH ACTIVATED ADVAIR DISKUS 250-50 MCG/DOSE AEPB     • valsartan-hydrochlorothiazide (DIOVAN HCT) 320-25 MG per tablet DIOVAN -25 MG TABS     • topiramate (TOPAMAX) 100 MG Tab Take 1 Tab by mouth every 12 hours. 180 Tab 2   • amiodarone (CORDARONE) 200 MG Tab TAKE 1 TAB BY MOUTH EVERY DAY. 30 Tab 1   • potassium chloride (MICRO-K) 10 MEQ  "capsule TAKE ONE CAPSULE BY MOUTH TWICE DAILY 180 Cap 0   • buPROPion SR (WELLBUTRIN-SR) 150 MG TABLET SR 12 HR sustained-release tablet TAKE 1 TAB BY MOUTH TWO TIMES A DAY. 180 Tab 0   • carvedilol (COREG) 12.5 MG Tab TAKE 1 TAB BY MOUTH TWO TIMES A DAY, WITH MEALS. 180 Tab 0   • metformin (GLUCOPHAGE) 1000 MG tablet TAKE 1 TAB BY MOUTH TWO TIMES A DAY. 180 Tab 0   • alendronate (FOSAMAX) 70 MG Tab TAKE 1 TAB BY MOUTH EVERY SEVEN DAYS. 12 Tab 0   • potassium chloride (MICRO-K) 10 MEQ capsule TAKE 1 CAPSULE BY MOUTH TWICE A DAY (Patient not taking: Reported on 11/3/2021) 180 Cap 2   • cetirizine (ZYRTEC) 10 MG Tab TAKE 1 TAB BY MOUTH 1 TIME DAILY AS NEEDED FOR ALLERGIES. 90 Tab 2   • fluticasone (FLONASE) 50 MCG/ACT nasal spray INHALE 1 SPRAY INTO EACH NOSTRIL DAILY 3 Bottle 3   • clopidogrel (PLAVIX) 75 MG Tab TAKE 1 TAB BY MOUTH EVERY DAY. 90 Tab 3   • aspirin EC (ECOTRIN) 325 MG Tablet Delayed Response TAKE 1 TAB BY MOUTH EVERY DAY. (Patient not taking: Reported on 12/18/2019) 100 Tab 0   • carvedilol (COREG) 12.5 MG Tab TAKE 1 TAB BY MOUTH TWO TIMES A DAY, WITH MEALS. 360 Tab 0   • DULoxetine (CYMBALTA) 20 MG Cap DR Particles      • alendronate (FOSAMAX) 70 MG Tab Take 1 Tab by mouth every 7 days. 4 Tab 4     No current Epic-ordered facility-administered medications on file.       ROS:  Gen: no fevers/chills, no changes in weight  Eyes: no changes in vision  ENT: no sore throat, no hearing loss, no bloody nose  Pulm: no SOB, no cough  CV: no chest pain, no palpitations  GI: no nausea/vomiting, no diarrhea  : no dysuria  MSk: no myalgias  Skin: no rash  Neuro: positive headaches, numbness and tingling with increased weakness on L side from 2 months ago  Heme/Lymph: no easy bruising      Objective:     Exam:  BP (!) 191/137 (BP Location: Right arm, Patient Position: Sitting, BP Cuff Size: Adult)   Pulse 86   Temp 36.7 °C (98 °F) (Temporal)   Resp 12   Ht 1.575 m (5' 2\")   LMP 06/15/1996   SpO2 96%   BMI " 21.58 kg/m²  Body mass index is 21.58 kg/m².    Gen: Alert and oriented, No apparent distress.  HEENT: PERRL, EOMI, external ears normal bilat, nose roughly midline, atraumatic, normocephalic  Neck: Neck is supple without lymphadenopathy.  Lungs: CTA bilaterally, no wheezes, rhonchi, or rales, symmetric chest rise  CV: Regular rate and rhythm. No murmurs, rubs, or gallops.  GI:       No rebound, no guarding, normal bowel sounds  :      No CVA tenderness, bladder non-tender to palp  Ext: No clubbing, cyanosis, edema.  Neuro: In wheelchair, noted weakness on LUE and LLE compared to R, minimal if any facial droop on L side, CN III-XII intact though weakness with shoulder raise CN XI  Psych: Affect and mood congruent without abnormality  Skin:    No rashes, no jaundice      Labs: none    Assessment & Plan:     71 y.o. female with the following -     Problem List Items Addressed This Visit     CVA (cerebral vascular accident) (HCC)     Concern for subacute stroke potentially 2 months ago.  This is complicated by ongoing hypertensive emergency with neurologic symptoms.  Neurologic exam with left-sided weakness of the upper extremity and left-sided weakness of the lower extremity.  Cranial nerves appear intact with minimal if any facial droop on the left side.  Recommended immediate transfer to ER and discussed with [facility] as well as [facility] hospital team the patient will be expected there in the coming minutes.  Difficult to discern if this is all subacute stroke with hypertensive emergency or potential new stroke on top of known issues and changed 2 months ago.         Relevant Medications    valsartan-hydrochlorothiazide (DIOVAN HCT) 320-25 MG per tablet    HTN (hypertension)     Discussed with patient that given her elevation in blood pressure and her current symptoms this will follow a category of hypertensive emergency and she needs to be seen in the ER.  Would recommend slow titration of blood pressure not to draw  more than 160 systolic in the next 24 hours.  Concerns about neurologic exam discussed under CVA.         Relevant Medications    valsartan-hydrochlorothiazide (DIOVAN HCT) 320-25 MG per tablet          I spent a total of 40 minutes with record review, exam, communication with the patient, communication with other providers, and documentation of this encounter.      Return in about 2 weeks (around 11/17/2021).    Please note that this dictation was created using voice recognition software. I have made every reasonable attempt to correct obvious errors, but I expect that there are errors of grammar and possibly content that I did not discover before finalizing the note.

## 2021-11-04 PROBLEM — I16.0 HYPERTENSIVE URGENCY: Status: ACTIVE | Noted: 2021-01-01

## 2021-11-04 PROBLEM — R53.81 DEBILITY: Status: ACTIVE | Noted: 2021-01-01

## 2021-11-04 PROBLEM — I48.91 ATRIAL FIBRILLATION (HCC): Status: ACTIVE | Noted: 2021-01-01

## 2021-11-04 NOTE — ASSESSMENT & PLAN NOTE
Patient has history of increasing debility secondary to residual left-sided weakness from her multiple CVAs.  Patient will likely need placement at a SNF after discharge and this has been discussed with her.  She agrees to placement.  Plan:  -PT/OT consultation  -Recommendations appreciated  -SLP consult for CVA and swallow study.  Also for cognition.  -Continue vitamin D and vitamin B12 supplementation.

## 2021-11-04 NOTE — ASSESSMENT & PLAN NOTE
Patient's pulse on examination was irregularly irregular.  Not currently on anticoagulation.  [id number] of 8.  Patient mentions frequent falls.  Plan:  -Continue dual antiplatelet therapy  -Continue home amiodarone  -Day team to consider adding anticoagulation versus continuing RAFAEL

## 2021-11-04 NOTE — PROGRESS NOTES
4 Eyes Skin Assessment Completed by CHERELLE Zamora and CHERELLE Morton.    Head WDL  Ears WDL  Nose WDL  Mouth WDL  Neck WDL  Breast/Chest WDL  Shoulder Blades WDL  Spine WDL  (R) Arm/Elbow/Hand WDL  (L) Arm/Elbow/Hand WDL  Abdomen WDL  Groin WDL  Scrotum/Coccyx/Buttocks WDL  (R) Leg Redness, Blanching and Rash  (L) Leg Redness, Blanching and Rash  (R) Heel/Foot/Toe Rash  (L) Heel/Foot/Toe Rash          Devices In Places Tele Box and Pulse Ox      Interventions In Place NC W/Ear Foams    Possible Skin Injury No    Pictures Uploaded Into Epic N/A  Wound Consult Placed N/A  RN Wound Prevention Protocol Ordered No

## 2021-11-04 NOTE — THERAPY
"Physical Therapy   Initial Evaluation     Patient Name: Leena Bella  Age:  71 y.o., Sex:  female  Medical Record #: [id number]  Today's Date: 11/4/2021     Precautions  Precautions: Fall Risk  Comments: L hemiparesis    Assessment  Ms. Bella is a 72 y/o female who presents to acute secondary to hypertensive urgency and L sided weakness. She does have L sided weakness at baseline due to prior CVA but reports it has progressed. Noted LE weakness that results in dynamic balance deficits and gross motor coordination impairments that negatively impact her ability to perform gait and transfers. First ambulated with FWW, pt requires therapist to hold her L hand on walker and trunk stability. Then trialed her quad cane, significant lateral LOB. Described a swati walker which patient was agreeable to try. Improved stability, however still did require min assist. Recommend post acute placement for intensive therapies, patient agreeable to this. Acute PT to continue to follow.     Plan    Recommend Physical Therapy 5 times per week until therapy goals are met for the following treatments:  Bed Mobility, Gait Training, Neuro Re-Education / Balance, Stair Training, Therapeutic Activities and Therapeutic Exercises    DC Equipment Recommendations: Unable to determine at this time  Discharge Recommendations: Recommend post-acute placement for additional physical therapy services prior to discharge home       Subjective    \"I'm ready to work hard\"     Objective       11/04/21 1129   Cognition    Level of Consciousness Alert   Comments very pleasant and motivated, good insight into situation   Passive ROM Lower Body   Passive ROM Lower Body WDL   Active ROM Lower Body    Active ROM Lower Body  WDL   Strength Lower Body   Lower Body Strength  X   Comments R LE WFL, L LE grossly 3-/5, absent DF   Sensation Lower Body   Lower Extremity Sensation   X   Comments L LE numb in all dermatomes compared to R, however able to discern " light touch   Balance Assessment   Sitting Balance (Static) Fair +   Sitting Balance (Dynamic) Fair   Standing Balance (Static) Fair   Standing Balance (Dynamic) Fair -   Weight Shift Sitting Fair   Weight Shift Standing Fair   Comments trial quad cane, beny walker, and FWW   Gait Analysis   Gait Level Of Assist Minimal Assist   Assistive Device Beny-Walker   Distance (Feet) 45   # of Times Distance was Traveled 3   Weight Bearing Status no restrictions   Comments 1st bout with FWW, second bout with quad cane, 3rd with beny   Bed Mobility    Supine to Sit Supervised   Sit to Supine   (up in chair)   Scooting Supervised   Functional Mobility   Sit to Stand Minimal Assist   Bed, Chair, Wheelchair Transfer Minimal Assist   How much difficulty does the patient currently have...   Turning over in bed (including adjusting bedclothes, sheets and blankets)? 3   Sitting down on and standing up from a chair with arms (e.g., wheelchair, bedside commode, etc.) 1   Moving from lying on back to sitting on the side of the bed? 3   How much help from another person does the patient currently need...   Moving to and from a bed to a chair (including a wheelchair)? 3   Need to walk in a hospital room? 3   Climbing 3-5 steps with a railing? 2   6 clicks Mobility Score 15   Short Term Goals    Short Term Goal # 1 Pt will perform functional transfers with SPV in 6 visits to increase independence   Short Term Goal # 2 Pt will ambulate 150ft with beny walker and SPV in 6 visits to increase independence

## 2021-11-04 NOTE — ASSESSMENT & PLAN NOTE
Patient has known history of carotid artery disease status post CABG.  No acute chest pain or changes on EKG.  Troponin completed in ER negative.  Continue home medications.

## 2021-11-04 NOTE — ASSESSMENT & PLAN NOTE
Patient was sent by MD for hypertension.  On my exam patient had new headaches, worsening neurological sequela sounded more chronic than acute.  No other symptoms of hypertensive emergency. Patient also have not been taking her meds for months.   Plan:  -Continue home blood pressure medications including valsartan and hydrochlorothiazide  -Hypertensive protocol systolic blood pressures greater than 180
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
## 2021-11-04 NOTE — PROGRESS NOTES
Pt arrived to S176-2 with this RN. Bedside report completed with CHERELLE Thomas. Pt A&O4, VSS, 2L O2 NC, denies pain. Pt oriented to the room. Bed locked and in lowest position. Call light within reach.

## 2021-11-04 NOTE — THERAPY
Speech Language Pathology   Clinical Swallow Evaluation     Patient Name: Leena Bella  AGE:  71 y.o., SEX:  female  Medical Record #: [id number]  Today's Date: 11/4/2021     Precautions  Precautions: Fall Risk  Comments: L hemiparesis    Assessment    71 y.o. female who presents with a chief complaint of left upper and lower extremity weakness that started 2 months ago. Hx of CVA with LLE weakness, HTN and smoking.  Pt has not had help in 2 months ago, so she ran out of her medications and was unable to get herself to her physician.  Head CT negative for acute infarct.  CXR negative for acute cardiopulmonary process.    Pt was AAOx4, and pt reports no history of dysphagia.  No gross deficits noted in oral exam and voice was strong and clear.  Pt was seen with a regular/thins breakfast tray.  She fed herself without difficulty, however needed some assistance with tray set up.  Pt consumed items on her tray (soft bite sized solids, easy to chew solids, dry solids and thins without any overt s/sx of aspiration.  Mastication with regular solids was minimally prolonged, but effective, however no oral residue was appreciated.  Pt's swallow trigger was timely with all consistencies and laryngeal elevation palpated as complete.  Recommend a diet of Regular Solids (RG7) with Thin Liquids (TN0).  Pt does not need any further SLP services to address dysphagia at this time.  Please re-consult SLP if there is a change in pt's status.    Plan    Recommend Speech Therapy for Evaluation only for the following treatments:  Dysphagia Training.    Discharge Recommendations: (P) Anticipate that the patient will have no further speech therapy needs after discharge from the hospital      Objective       11/04/21 0855   Prior Living Situation   Housing / Facility 1 [facility]   Lives with - Patient's Self Care Capacity Alone and Unable to Care For Self   Prior Level Of Function   Communication Within Functional Limits   Swallow  Within Functional Limits   Dentition Edentulous   Dentures Uppers;Lowers   Hearing Within Functional Limits for Evaluation   Vision Wears Corrective Lenses   Patient's Primary Language English   Occupation (Pre-Hospital Vocational) Retired Due To Age   Oral Motor Eval    Is Patient Able to Complete Oral Motor Eval Yes, Within Normal Limits   Laryngeal Function   Voice Quality Within Functional Limits   Volutional Cough Within Functional Limits   Excursion Upon Swallow Complete   Oral Food Presentation   Single Swallow Thin (0) Within Functional Limits   Serial Swallow Thin (0) Within Functional Limits   Soft & Bite-Sized (6) - (Dysphagia III) Within Functional Limits   Regular (7) Within Functional Limits   Regular-Easy to Chew (7) Within Functional Limits   Self Feeding Independent   Tracheostomy   Tracheostomy  No   Dysphagia Strategies / Recommendations   Diet / Liquid Recommendation Thin (0);Regular (7)   Medication Administration  Whole with Liquid Wash   Dysphagia Rating   Nutritional Liquid Intake Rating Scale Non thickened beverages   Nutritional Food Intake Rating Scale Total oral diet with no restrictions

## 2021-11-04 NOTE — ASSESSMENT & PLAN NOTE
Patient has history of CHF with preserved ejection fraction.  Last echo was in 2017.  Plan:  -Repeat echo  -Restart home Lasix  -Restart home carvedilol  -Restart home valsartan  -Restart home rosuvastatin

## 2021-11-04 NOTE — THERAPY
Speech Language Pathology   Cognitive-Linguistic Evaluation     Patient Name: Leena Bella  AGE:  71 y.o., SEX:  female  Medical Record #: [id number]  Today's Date: 11/4/2021     Precautions  Precautions: Fall Risk  Comments: L hemiparesis    Assessment    71 y.o. female who presents with a chief complaint of left upper and lower extremity weakness that started 2 months ago. Hx of CVA with LLE weakness, HTN and smoking.  Pt has not had help in 2 months ago, so she ran out of her medications and was unable to get herself to her physician.  Head CT negative for acute infarct.  CXR negative for acute cardiopulmonary process.     The Cognistat was administered in part, which assesses functioning in the following cognitive domains: LOC, Orientation, Attention, Language (Comprehension, Repetition and Naming), Visual Construction, Memory, Calculations and Reasoning (Similarities and Judgement).  Visual construction was not completed as pt did not have her glasses and could not see adequately.  Pt demonstrated mild deficits with short term memory, however she was able to independently use compensatory strategies (ie. Writing information down, use of calendar) to compensate.  Mild deficits for pt's age range, according to test protocols, is WNL.  Pt was WNL for all other domains tested.      In addition to Cognistat, pt was given non-standardized assessments of safety awareness and insight.  She had mild deficits with safety awareness, however again was able to provide appropriate solutions with min cueing.  Assessment of reading and writing not done as pt does not have her glasses at bedside.  Suspect she is close to her baseline level of functioning in terms of cognition. SLP will follow to assess reading and writing, and to address higher level cognitive functioning.  Please defer to PT/OT notes for further recommendations for d/c planning.   Plan    Recommend Speech Therapy 2 times per week until therapy goals are  met for the following treatments:  Cognitive-Linguistic Training and Patient / Family / Caregiver Education.    Discharge Recommendations: Anticipate that the patient will have no further speech therapy needs after discharge from the hospital       Objective     11/04/21 1227   Prior Living Situation   Prior Services None   Housing / Facility 1 [address] House   Equipment Owned Quad Cane   Lives with - Patient's Self Care Capacity Alone and Able to Care For Self   Prior Level Of Function   Communication Within Functional Limits   Swallow Within Functional Limits   Dentition Edentulous   Dentures Uppers;Lowers   Hearing Unknown   Vision Wears Corrective Lenses   Patient's Primary Language English   Occupation (Pre-Hospital Vocational) Retired Due To Age   Verbal Expression   Verbal Expression / Aphasia Eval (WDL) WDL   Verbal Output Automatic Within Functional Limits (6-7)   Verbal Output Conversation Within Functional Limits (6-7)   Verbal Output Functional Within Functional Limits (6-7)   Repetition: Sentences Within Functional Limits (6-7)   Naming Within Functional Limits (6-7)   Dysarthria Within Functional Limits (6-7)   Word Finding Deficits Within Functional Limits (6-7)   Auditory Comprehension   Auditory Comprehension (WDL) WDL   Yes / No Questions: Personal Information Within Functional Limits (6-7)   Yes / No Questions: General Information Within Functional Limits (6-7)   Yes / No Questions: Abstract Within Functional Limits (6-7)   Identifies Objects Within Functional Limits (6-7)   Follows Three Unit Commands Within Functional Limits (6-7)   Understands Simple, Structured Conversation  Within Functional Limits (6-7)   Understands Complex Conversation Within Functional Limits (6-7)   Reading Comprehension   Comments unable to test as pt does not have glasses at bedside   Written Expression   Comments unable to test as pt does not have glasses at bedside   Cognitive-Linguistic   Cognitive-Linguistic (WDL) X    Level of Consciousness Alert   Orientation Level Oriented x 4   Sustained Attention Within Functional Limits (6-7)   Short Term Memory Mild (4)   Immediate Memory Supervision (5)   Simple Reasoning / Problem Solving Within Functional Limits (6-7)   Complex Reasoning  / Problem Solving Within Functional Limits (6-7)   Safety Awareness Minimal (4)   Insight into Deficits Supervision (5)   Written Arithmetic Within Functional Limits (6-7)   Social / Pragmatic Communication   Social / Pragmatic Communication WDL

## 2021-11-04 NOTE — ASSESSMENT & PLAN NOTE
Patient has history of multiple CVAs and residual left-sided weakness.  Per patient this weakness has gotten worse over the past 2 months.  The patient is finding it more difficult to move around her house and use her left arm.  She has not been evaluated by a physician since she notices deterioration.  She is currently on dual antiplatelet therapy, but has not taken her medications in the last several months.  On exam, the patient had discrepancies in sensation and motor of the left upper and lower limb.  Was also hyperreflexic in the left limb without clonus.  No carotid bruits were appreciated on exam, but patient has had a CTA in the past that showed plaques at the bifurcations of the carotid artery bilaterally.  Patient also does have a history of atrial fibrillation.  CT of head completed in the ER was negative for acute pathology.  Plan:  -Continue dual antiplatelet therapy  -Neurology not consulted at this time given the chronic nature of the CVA  -Repeat echocardiogram  -Repeat carotid Doppler/ultrasound

## 2021-11-04 NOTE — THERAPY
"Occupational Therapy   Initial Evaluation     Patient Name: Leena Bella  Age:  71 y.o., Sex:  female  Medical Record #: [id number]  Today's Date: 11/4/2021     Precautions  Precautions: Fall Risk  Comments: L hemiparesis    Assessment  Patient is 71 y.o. female admitted for acute secondary to hypertensive urgency and L sided weakness. She does have L sided weakness at baseline due to prior CVA but reports it has progressed. Pt normally independent with basic ADLs and functional mobility at baseline using a quad cane. Pt required Delfino for mobility and Delfino for lower body ADLs. Pt would benefit from continued skilled therapy while admitted as well as recommend post-acute placement.    Plan    Recommend Occupational Therapy 4 times per week until therapy goals are met for the following treatments:  Manual Therapy Techniques, Neuro Re-Education / Balance, Self Care/Activities of Daily Living, Therapeutic Activities and Therapeutic Exercises.    DC Equipment Recommendations: (P) Unable to determine at this time  Discharge Recommendations: (P) Recommend post-acute placement for additional occupational therapy services prior to discharge home     Subjective    \"My arm is weaker than its been\"     Objective       11/04/21 1131   Prior Living Situation   Prior Services None   Housing / Facility [facility]   Steps Into Home 0   Bathroom Set up Walk In Shower;Grab Bars;Shower Chair   Equipment Owned Quad Cane   Lives with - Patient's Self Care Capacity Alone and Able to Care For Self   Comments States friend intermittently assists with laundry   Prior Level of ADL Function   Self Feeding Independent   Grooming / Hygiene Independent   Bathing Independent   Dressing Independent   Toileting Independent   Prior Level of IADL Function   Medication Management Independent   Laundry Requires Assist   Kitchen Mobility Requires Assist   Finances Requires Assist   Home Management Requires Assist   Shopping Requires Assist "   Prior Level Of Mobility Independent With Device in Home   Driving / Transportation Relatives / Others Provide Transportation   Occupation (Pre-Hospital Vocational) Retired Due To Age   Precautions   Precautions Fall Risk   Comments left hemiparesis   Pain 0 - 10 Group   Therapist Pain Assessment Post Activity Pain Same as Prior to Activity;Nurse Notified   Cognition    Cognition / Consciousness WDL   Orientation Level Oriented x 4   Level of Consciousness Alert   Comments very pleasant, motivated to participate   Active ROM Upper Body   Active ROM Upper Body  WDL   Dominant Hand Right   Strength Upper Body   Upper Body Strength  X   Comments RUE WFL, LUE 3+/5   Sensation Upper Body   Upper Extremity Sensation  WDL   Upper Body Muscle Tone   Upper Body Muscle Tone  X   Lt Upper Extremity Muscle Tone Fluctuating   Coordination Upper Body   Coordination X   Gross Motor Coordination LUE impaired   Balance Assessment   Sitting Balance (Static) Fair +   Sitting Balance (Dynamic) Fair   Standing Balance (Static) Fair   Standing Balance (Dynamic) Fair -   Weight Shift Sitting Fair   Weight Shift Standing Fair   Bed Mobility    Supine to Sit Supervised   Scooting Supervised   ADL Assessment   Upper Body Dressing Supervision   Lower Body Dressing Minimal Assist   Toileting   (NT-refused)   How much help from another person does the patient currently need...   Putting on and taking off regular lower body clothing? 3   Bathing (including washing, rinsing, and drying)? 3   Toileting, which includes using a toilet, bedpan, or urinal? 3   Putting on and taking off regular upper body clothing? 3   Taking care of personal grooming such as brushing teeth? 3   Eating meals? 3   6 Clicks Daily Activity Score 18   Modified [place] (mRS)   Modified [place] Score 3   Functional Mobility   Sit to Stand Minimal Assist   Bed, Chair, Wheelchair Transfer Minimal Assist   Transfer Method Stand Step   Mobility bed mobility, mobility in room, up  to chair   Activity Tolerance   Sitting in Chair left seated in chair   Sitting Edge of Bed 10 min   Standing 5 min   Patient / Family Goals   Patient / Family Goal #1 To get better   Short Term Goals   Short Term Goal # 1 Pt will complete all ADL transfers with supervision   Short Term Goal # 2 Pt will complete LB dressing with supervision   Short Term Goal # 3 Pt will complete toileting with supervision   Education Group   Education Provided Role of Occupational Therapist   Role of Occupational Therapist Patient Response Patient;Acceptance;Explanation   Problem List   Problem List Decreased Active Daily Living Skills;Decreased Homemaking Skills;Decreased Upper Extremity Strength Left;Decreased Upper Extremity AROM Left;Decreased Functional Mobility;Decreased Activity Tolerance;Impaired Postural Control / Balance   Interdisciplinary Plan of Care Collaboration   IDT Collaboration with  Nursing;Physical Therapist   Patient Position at End of Therapy Seated;Chair Alarm On;Call Light within Reach;Tray Table within Reach;Phone within Reach   Collaboration Comments RN updated

## 2021-11-04 NOTE — RESPIRATORY CARE
"COPD EDUCATION by COPD CLINICAL EDUCATOR  11/4/2021  at  4:20 PM by Nicolasa Perla, RRT     Patient interviewed by COPD education team.  Patient unable to participate in full program.A comprehensive packet including information about COPD, types of treatments to manage their disease and safe home Oxygen usage was provided and reviewed with patient at the bedside.  Went over medications with patient, explained to her when to use maintenance and rescue medication.      Smoking Cessation Intervention and education completed, 5 minutes spent on smoking cessation education with patient.  Provided smoking cessation packet with \"Tips to Quit\" and brochure for \"Free Smoking Cessation Classes\".            COPD Assessment  COPD Clinical Specialists ONLY  COPD Education Initiated: Yes--Short Intervention  DME Company: [employer] Equipment Type: oxygen  Physician Follow Up Appointment:  (pt will schedule)  Physician Name: LASHAWN HAILE M.D.  Pulmonologist Name: Established with [facility]  Referrals Initiated: Yes  Pulmonary Rehab: N/A  Smoking Cessation: Yes  $ Smoking Cessation 3-10 Minutes: Symptomatic  Smoking Pack Years: 6  Hospice: N/A  Home Health Care: N/A  [facility] Outreach: N/A (not available)  [facility]: N/A (doesnt apply)  [facility]: Yes (referral sent)  Is this a COPD exacerbation patient?: No  $ Demo/Eval of SVN's, MDI's and Aerosols: Yes (spacer training)  (OP) Pulmonary Function Testing: Yes (FEV1 72% 07/10/2018)    Meds to Beds  Would the patient like to opt in for Bedside Medication Delivery at Discharge?: Yes, interested     MY COPD ACTION PLAN     It is recommended that patients and physicians /healthcare providers complete this action plan together. This plan should be discussed at each physician visit and updated as needed.    The green, yellow and red zones show groups of symptoms of COPD. This list of symptoms is not comprehensive, and you may experience other " "symptoms. In the \"Actions\" column, your healthcare provider has recommended actions for you to take based on your symptoms.    Patient Name: Leena Bella   YOB: 1950   Last Updated on: 11/4/2021  4:20 PM   Green Zone:  I am doing well today Actions   •  Usual activitiy and exercise level •  Take daily medications   •  Usual amounts of cough and phlegm/mucus •  Use oxygen as prescribed   •  Sleep well at night •  Continue regular exercise/diet plan   •  Appetite is good •  At all times avoid cigarette smoke, inhaled irritants     Daily Medications (these medications are taken every day):   Budesonide-Formoterol Fumarate (Symbicort)  Tiotropium Bromide Monohydrate (Spiriva) 2 Puffs  2 Puffs Twice daily  Once daily     Additional Information:  Rinse mouth after using Symbicort     Yellow Zone:  I am having a bad day or a COPD flare Actions   •  More breathless than usual •  Continue daily medications   •  I have less energy for my daily activities •  Use quick relief inhaler as ordered   •  Increased or thicker phlegm/mucus •  Use oxygen as prescribed   •  Using quick relief inhaler/nebulizer more often •  Get plenty of rest   •  Swelling of ankles more than usual •  Use pursed lip breathing   •  More coughing than usual •  At all times avoid cigarette smoke, inhaled irritants   •  I feel like I have a \"chest cold\"   •  Poor sleep and my symptoms woke me up   •  My appetite is not good   •  My medicine is not helping    •  Call provider immediately if symptoms don’t improve     Continue daily medications, add rescue medications:               Medications to be used during a flare up, (as Discussed with Provider):           Additional Information:  Use spacer with rescue inhaler     Red Zone:  I need urgent medical care Actions   •  Severe shortness of breath even at rest •  Call 911 or seek medical care immediately   •  Not able to do any activity because of breathing    •  Fever or shaking " chills    •  Feeling confused or very drowsy     •  Chest pains    •  Coughing up blood

## 2021-11-04 NOTE — ASSESSMENT & PLAN NOTE
Unclear on whether patient was on home puffers for COPD.  Patient states that before medications ran out, she used them.  Patient is on 2 L of O2 at home.  Plan:  -Restart Symbicort and Spiriva  -Titrate oxygen as needed to keep oxygen saturations above 88%

## 2021-11-04 NOTE — ASSESSMENT & PLAN NOTE
Patient has history of insulin-dependent diabetes.  Patient has not been taking medications for last several months.  Plan:  -From chart review, appears patient's home Lantus dose is 20 units  -Start 10 units of Lantus every night with hypoglycemic protocol and low-dose sliding scale.  -Titrate as needed.  -Follow-up on A1c.

## 2021-11-05 NOTE — HEART FAILURE PROGRAM
"Per discussion with Dr. Hobbs no acute heart failure at this time. Therefore, a seven day HF f/u appt is not currently indicated. Nor does the HF discharge checklist need to be used.    Patient is being primarily treated for hypertensive urgency and CVA..    Should clinical status change to acute HF, patient will require a seven calendar day f/u appt which can be achieved by placing a \"schedule heart failure follow up appointment\" order per protocol or by calling [phone] (line answered 7 days per week, [phone]).      Thank you, Natty, Cardio RN Navigator [id number]          "

## 2021-11-05 NOTE — FACE TO FACE
Face to Face Note  -  Durable Medical Equipment    Chelsea Becker M.D. - NPI: [id number]  I certify that this patient is under my care and that they have had a durable medical equipment(DME)face to face encounter by myself that meets the physician DME face-to-face encounter requirements with this patient on:    Date of encounter:   Patient:                    MRN:                       YOB: 2022  Leena Bella  [id number]  1950     The encounter with the patient was in whole, or in part, for the following medical condition, which is the primary reason for durable medical equipment:  CVA    I certify that, based on my findings, the following durable medical equipment is medically necessary:  Walkers.    HOME O2 Saturation Measurements:(Values must be present for Home Oxygen orders)         ,     ,         My Clinical findings support the need for the above equipment due to:  Abnormal Gait    Supporting Symptoms: Ataxia, debility     ------------------------------------------------------------------------------------------------------------------    Face to Face Supporting Documentation - Home Health    The encounter with this patient was in whole or in part the primary reason for home health admission.    Date of encounter:   Patient:                    MRN:                       YOB: 2022  Leena Bella  [id number]  1950     Home health to see patient for:  Home health aide, Physical Therapy evaluation and treatment and Occupational therapy evaluation and treatment    Skilled need for:  Exacerbation of Chronic Disease State CVA    Skilled nursing interventions to include:  Comment: Medication management and help around home    Homebound evidenced status by:  Need the aid of supportive devices such as crutches, canes, wheelchairs or walkers. Leaving home must require a considerable and taxing effort. There must exist a normal inability to  leave the home.    Community Physician to provide follow up care: Tr Villareal M.D.     Optional Interventions    Wound information & treatment:    Home Infusion Therapy orders:    Line/Drain/Airway:    I certify the face to face encounter for this home care referral meets the CMS requirements and the encounter/clinical assessment with the patient was, in whole, or in part, for the medical condition(s) listed above, which is the primary reason for home health care. Based on my clinical findings: the service(s) are medically necessary, support the need for home health care, and the homebound criteria are met.  I certify that this patient has had a face to face encounter by myself.  Chelsea Becker M.D. - NPI: [id number]    *Debility, frailty and advanced age in the absence of an acute deterioration or exacerbation of a condition do not qualify a patient for home health.

## 2021-11-05 NOTE — THERAPY
Physical Therapy   Daily Treatment     Patient Name: Leena Bella  Age:  71 y.o., Sex:  female  Medical Record #: [id number]  Today's Date: 11/5/2021     Precautions  Precautions: Fall Risk  Comments: left weak     Assessment    Pt was pleasant and highly motivated to participate in therapy session. She reached EOB with Delfino and completed STS with Delfino and use of beny walker. She required vc for proper sequencing and technique with beny walker requiring Delfino for balance. Performed three bouts with seated rest breaks in between. Performed Delfino for transfers and step by step sequencing for turning.     Plan    Continue current treatment plan.    DC Equipment Recommendations: Unable to determine at this time  Discharge Recommendations: Recommend post-acute placement for additional physical therapy services prior to discharge home         11/05/21 1101   Other Treatments   Other Treatments Provided Standing exercises for improving LLE coordination and balance    Balance   Sitting Balance (Static) Fair +   Sitting Balance (Dynamic) Fair   Standing Balance (Static) Fair   Standing Balance (Dynamic) Fair -   Weight Shift Sitting Fair   Weight Shift Standing Fair   Gait Analysis   Gait Level Of Assist Minimal Assist   Assistive Device Beny-Walker   Distance (Feet) 40   # of Times Distance was Traveled 3   Skilled Intervention Verbal Cuing;Sequencing   Comments educated on proper sequencing with    Bed Mobility    Supine to Sit Supervised   Sit to Supine   (left up in chair )   Scooting Supervised   Skilled Intervention Verbal Cuing;Sequencing   Functional Mobility   Sit to Stand Minimal Assist   Bed, Chair, Wheelchair Transfer Minimal Assist   Skilled Intervention Verbal Cuing;Sequencing   Comments with beny walker    Short Term Goals    Short Term Goal # 1 Pt will perform functional transfers with SPV in 6 visits to increase independence   Goal Outcome # 1 goal not met   Short Term Goal # 2 Pt will ambulate 150ft  with swati walker and SPV in 6 visits to increase independence   Goal Outcome # 2 Goal not met

## 2021-11-05 NOTE — PROGRESS NOTES
"covid 19 surge in effect    Late entry: pt c/o pain 10/10, tylenol given per MAR, per pt \"tylenol doesn't help\". nonpharm methods offered. Pt refused. Notified dr. Huizar regarding pain med via voalt.  Acknowledged. Will cont to monitor  "

## 2021-11-05 NOTE — PROGRESS NOTES
Monitor summary: SR 71-78, WI .24, QRS .09, QT .48 with rare PVCs per strip from monitor room.

## 2021-11-05 NOTE — PROGRESS NOTES
[facility] [place] [facility] [place]     Attending: Dr. Sagar Huizar    Resident: Dr. Chelsea Hobbs    PATIENT: Leena Bella; [id number]; 1950 Hospital Day: 3    71 y.o. female with PMHx of TIA and CVA with residual L sided weakness, CAD s/p 4 v CABG, s/p AICD, T2DM, HTN, Migraine, COPD (2L O2 at home), afib on amiodarone, GERD, depression and polyarthralgia admitted for HTN.     SUBJECTIVE: No acute events overnight, yesterday Leena was evaluated by PT, OT, and SLP who collectively recommended ongoing therapies and post-acute placement. Her blood pressures have responded well to her home regimen and she reports no symptoms today.     Denies CP/palitiations/HA/vision changes/SOB    Reports exacerbation of chronic back pain and would like pain medication other than tylenol.     OBJECTIVE:     Vitals:    11/04/21 1600 11/04/21 2000 11/05/21 0000 11/05/21 0400   BP: 133/69 109/56 115/64 118/70   Pulse: 74 80 74 71   Resp: 17 18 16 16   Temp: 37.1 °C (98.8 °F) 36.7 °C (98.1 °F) 36.8 °C (98.3 °F) 36.3 °C (97.4 °F)   TempSrc: Temporal Temporal Temporal Temporal   SpO2: 93% 95% 94% 95%   Weight:       Height:         No intake or output data in the 24 hours ending 11/05/21 0633    PE:  General: No acute distress, resting comfortably in bed.  HEENT: NC/AT. PERRLA. EOMI. MMM  Cardiovascular: RRR with no murmurs, rubs, or gallops  Respiratory: Symmetric expansion. CTAB with no crackles or wheezing  Abdomen: Soft, non tender, non distended, no masses, bowel sounds present  EXT:  Moves all extremities, no cyanosis, clubbing, or edema. Pulses 2+ and present in all 4  Neuro: unchanged from admission, alert and oriented to person, place situation, and time.   Skin: In tact without lesions, rashes or appreciable jaundice  Psych: Appropriate affect      LABS:  Recent Labs     11/03/21  1236   WBC 9.3   RBC 5.38   HEMOGLOBIN 15.6   HEMATOCRIT 49.2*   MCV 91.4   MCH 29.0   RDW 44.6   PLATELETCT 176   MPV 11.4   NEUTSPOLYS 69.20    LYMPHOCYTES 21.30*   MONOCYTES 5.00   EOSINOPHILS 3.10   BASOPHILS 1.00     Recent Labs     11/03/21  1236   SODIUM 136   POTASSIUM 4.3   CHLORIDE 98   CO2 24   BUN 26*   CREATININE 1.03   CALCIUM 10.6*   ALBUMIN 4.4     Estimated GFR/CRCL = Estimated Creatinine Clearance: 39 mL/min (by C-G formula based on SCr of 1.03 mg/dL).  Recent Labs     11/03/21  1236 11/03/21  2232 11/04/21  0946 11/04/21  1749 11/04/21  2108   GLUCOSE 426*  --   --   --   --    POCGLUCOSE  --    < > 347* 306* 302*    < > = values in this interval not displayed.     Recent Labs     11/03/21  1236   ASTSGOT 7*   ALTSGPT 6   TBILIRUBIN 0.2   ALKPHOSPHAT 111*   GLOBULIN 3.3             No results for input(s): INR, APTT, FIBRINOGEN in the last 72 hours.    Invalid input(s): DIMER    MICROBIOLOGY: none    IMAGING:   US-CAROTID DOPPLER BILAT   Final Result      US-EXTREMITY VENOUS LOWER UNILAT LEFT   Final Result      CT-HEAD W/O   Final Result      1. Cerebral atrophy.   2. White matter lucencies most consistent with small vessel ischemic change versus demyelination or gliosis.   3. Otherwise, Head CT without contrast with no acute findings. No evidence of acute cerebral infarction, hemorrhage or mass lesion.      DX-CHEST-PORTABLE (1 VIEW)   Final Result      No radiographic evidence of acute cardiopulmonary process.      EC-ECHOCARDIOGRAM COMPLETE W/O CONT    (Results Pending)         MEDS:  Current Facility-Administered Medications   Medication Last Admin   • amiodarone (Cordarone) tablet 200 mg 200 mg at 11/05/21 0457   • omeprazole (PRILOSEC) capsule 20 mg 20 mg at 11/05/21 0456   • insulin glargine (Semglee) injection 10 Units at 11/04/21 1756    And   • insulin lispro (AdmeLOG) injection 4 Units at 11/04/21 2123    And   • glucose 4 g chewable tablet 16 g      And   • dextrose 50% (D50W) injection 50 mL     • ROPINIRole (REQUIP) tablet 0.25 mg 0.25 mg at 11/04/21 2109   • carvedilol (COREG) tablet 6.25 mg 6.25 mg at 11/04/21 1757   •  furosemide (LASIX) tablet 20 mg 20 mg at 11/05/21 0457   • gabapentin (NEURONTIN) capsule 300 mg 300 mg at 11/05/21 0457   • aspirin (ASA) chewable tab 81 mg 81 mg at 11/05/21 0456   • clopidogrel (PLAVIX) tablet 75 mg 75 mg at 11/05/21 0457   • DULoxetine (CYMBALTA) capsule 20 mg 20 mg at 11/05/21 0457   • rosuvastatin (CRESTOR) tablet 40 mg 40 mg at 11/04/21 1757   • vitamin D3 (cholecalciferol) tablet 1,000 Units 1,000 Units at 11/05/21 0459   • cyanocobalamin (VITAMIN B-12) tablet 1,000 mcg 1,000 mcg at 11/05/21 0456   • valsartan (DIOVAN) tablet 80 mg 80 mg at 11/05/21 0457   • hydroCHLOROthiazide (HYDRODIURIL) tablet 12.5 mg 12.5 mg at 11/05/21 0457   • budesonide-formoterol (SYMBICORT) 160-4.5 MCG/ACT inhaler 2 Puff 2 Puff at 11/04/21 2109   • tiotropium (Spiriva Respimat) 2.5 mcg/Act inhalation spray 5 mcg 5 mcg at 11/04/21 0637   • senna-docusate (PERICOLACE or SENOKOT S) 8.6-50 MG per tablet 2 Tablet 2 Tablet at 11/05/21 0456    And   • polyethylene glycol/lytes (MIRALAX) PACKET 1 Packet      And   • magnesium hydroxide (MILK OF MAGNESIA) suspension 30 mL      And   • bisacodyl (DULCOLAX) suppository 10 mg     • enoxaparin (LOVENOX) inj 40 mg 40 mg at 11/05/21 0457   • acetaminophen (Tylenol) tablet 650 mg     • labetalol (NORMODYNE/TRANDATE) injection 10 mg         PROBLEM LIST:  No problems updated.    ASSESSMENT/PLAN: 71 y.o. female with PMHx of TIA and likely CVA with residual L sided weakness, CAD s/p 4 v CABG, s/p AICD, T2DM, HTN, Migraine, COPD (2L O2 at home), afib on amiodarone, GERD, depression and polyarthralgia admitted for HTN.       # HTN  Well controlled on home regimen. Carotid doppler WNL. No neurologic deficits. No concern for acute CVA .     - Continue valsartan   - Contiue coreg    # Debility  Residual deficits from prior strokes and generalized deconditioning. PT/OT consulted and recommending post-acute placement. Patient not amenable despite medical recommendations by myself.    -  Home health with PT/OT outpatient     # A fib  Rate controlled with amiodarone. Consider anticoagulation, recommend ongoing discussion with PCP. Currently on DAPT for prior 4v CABG. May be able to discontinue this therapy and start anticoagulation.     # COPD  No acute exacerbation.   Continue home meds    # CHF  No acute exacerbation      Lines: PIV  IVF: none  Abx: none  GI PPx:Multimodal   DVT PPx: SCD, lovenox  Code: DNR/DNI  Diet: Cardiac     Dispo: Medically clear for discharge pending established home health    Chelsea Hobbs MD  PGY-2 Family Medicine Resident  [facility]

## 2021-11-05 NOTE — CARE PLAN
The patient is Stable - Low risk of patient condition declining or worsening    Shift Goals  Clinical Goals: stable blood sugars, stable neuro checks  Patient Goals: sleep  Family Goals: medication compliance    Progress made toward(s) clinical / shift goals:  updated pt on POC, blood sugar monitoring, bed locked and in lowest position    Patient is not progressing towards the following goals:

## 2021-11-05 NOTE — DISCHARGE PLANNING
Anticipated Discharge Disposition:   Home  Home Health  DME:  Beny walker    Action:    Pt admitted with hypertensive emergency, hx CVA, left-sided weakness, a fib, debility, hx CHF, COPD, AICD, type 2 DM, CAD.    RN CM spoke with patient at bedside.  She stated she lives alone in a single story house in [place].  She' been using a cane at home.  Also oxygen as needed at 2 LNC which is supplied by [facility].  She stated she does not drive and has not been able to  her medications because her Access ride services was not renewed and .  Her pharmacy is [facility] in [place] and she stated they can deliver to home.  Pt stated she does not want to go to SNF and wants to go home.  She had home health about a year ago and would like to try this again.  Consent obtained to send referrals to [facility].  Pt used a beny walker with PT.  Pt agreeable for home use.  Choice form faxed to [facility] and [facility].  Provided information to patient on [facility] for home based primary care.  She provided consent to send referral for eligibility.  Pt stated her son, Narendra lives in a 5th wheel behind her house.  He is frequently away from home for work.  Her sister moved and is not available. Pt thinks she will need transportation to get home.    Barriers to Discharge:    Home health acceptance  Delivery of beny walker to bedside    Plan:    F/U on referrals.    Care Transition Team Assessment    Information Source  Orientation Level: Oriented X4  Information Given By: Patient  Who is responsible for making decisions for patient? : Patient    Readmission Evaluation  Is this a readmission?: No    Elopement Risk  Legal Hold: (Pended) No  Ambulatory or Self Mobile in Wheelchair: (Pended) Yes  Disoriented: (Pended) No  Psychiatric Symptoms: (Pended) None  History of Wandering: (Pended) No  Elopement this Admit: (Pended) No  Vocalizing Wanting to Leave:  (Pended) No  Displays Behaviors, Body Language Wanting to Leave: (Pended) No-Not at Risk for Elopement  Elopement Risk: (Pended) Not at Risk for Elopement    Interdisciplinary Discharge Planning  Lives with - Patient's Self Care Capacity: Alone and Able to Care For Self  Housing / Facility: 1 hospitals  Prior Services: None    Discharge Preparedness  What is your plan after discharge?: Home with help, Home health care  What are your discharge supports?: Child  Prior Functional Level: Ambulatory, Independent with Activities of Daily Living, Independent with Medication Management, Uses Cane  Difficulity with ADLs: Walking  Difficulity with IADLs: Driving    Functional Assesment  Prior Functional Level: Ambulatory, Independent with Activities of Daily Living, Independent with Medication Management, Uses Cane    Finances  Financial Barriers to Discharge: No  Prescription Coverage: Yes              Advance Directive  Advance Directive?: None    Domestic Abuse  Have you ever been the victim of abuse or violence?: No         Discharge Risks or Barriers  Discharge risks or barriers?: Transportation, Non-adherence to medication or treatment  Patient risk factors: Cognitive / sensory / physical deficit, Lack of outside supports    Anticipated Discharge Information  Discharge Disposition: D/T to home under A care in anticipation of covered skilled care (06)  Discharge Address: [address] [address]  Discharge Contact Phone Number: [phone]

## 2021-11-05 NOTE — DISCHARGE PLANNING
Received Choice form at 9916  Agency/Facility Name: [facility]  Referral sent per Choice form @ [phone]

## 2021-11-05 NOTE — DISCHARGE PLANNING
Received Choice form at 1344  Agency/Facility Name: [facility]  Referral sent per Choice form @ [phone]  Agency/Facility Name: [facility]  Spoke To: Velma  Outcome: Per Velma they do not carry Beny walker

## 2021-11-06 NOTE — PROGRESS NOTES
MS: rhythm: SR/1st degree HB, HR 60s-70s, NV 0.23/QRS0.08/QT0.45 with rare pvc bi/trigem per strip from monitor room

## 2021-11-06 NOTE — PROGRESS NOTES
Monitor summary: SR 60-70, VT .24, QRS .09, QT .45 with frequent/rare PVCs per strip from monitor room.

## 2021-11-06 NOTE — PROGRESS NOTES
[facility] [place] [facility]ILY MEDICINE PROGRESS NOTE     Attending: Dr. Melonie Perla    Resident: Dr. Chelsea Hobbs    PATIENT: Leena Bella; [id number]; 1950 Hospital Day: 4    71 y.o. female with PMHx of TIA and CVA with residual L sided weakness, CAD s/p 4 v CABG, s/p AICD, T2DM, HTN, Migraine, COPD (2L O2 at home), afib on amiodarone, GERD, depression and polyarthralgia admitted for HTN.        SUBJECTIVE: No acute events overnight, worsening back pain from neck and low back with radiating pain from low back down RLE to toes. Describes an incident where she fell in 02/21 during which she injured her back and she has felt progressively worse since this episode. No loss of bowel or bladder function. Tolerating PO well no HA, vision changes, N, V. No new weakness or numbness.      OBJECTIVE:     Vitals:    11/06/21 0544 11/06/21 0800 11/06/21 1126 11/06/21 1200   BP: 120/56 113/53  127/55   Pulse: 69 65  60   Resp:  16  16   Temp:  36.6 °C (97.9 °F)  36.8 °C (98.3 °F)   TempSrc:  Temporal  Temporal   SpO2:  94%  97%   Weight:   53.8 kg (118 lb 9.7 oz)    Height:           Intake/Output Summary (Last 24 hours) at 11/6/2021 1653  Last data filed at 11/5/2021 1800  Gross per 24 hour   Intake 240 ml   Output --   Net 240 ml       PE:  General: No acute distress, resting comfortably in bed.  HEENT: NC/AT. PERRLA. EOMI. MMM  Cardiovascular: RRR with no murmurs, rubs, or gallops  Respiratory: Symmetric expansion. CTAB with no crackles or wheezing  Abdomen: Soft, non tender, non distended, no masses, bowel sounds present  EXT:  Moves all extremities, no cyanosis, clubbing, or edema. Pulses 2+ and present in all 4, tenderness over paraspinous muscles C spine, pain with spinal movement   Neuro: unchanged from admission, alert and oriented to person, place situation, and time.   Skin: In tact without lesions, rashes or appreciable jaundice  Psych: Appropriate affect    LABS:  No results for input(s): WBC, RBC, HEMOGLOBIN, HEMATOCRIT,  MCV, MCH, RDW, PLATELETCT, MPV, NEUTSPOLYS, LYMPHOCYTES, MONOCYTES, EOSINOPHILS, BASOPHILS, RBCMORPHOLO in the last 72 hours.  No results for input(s): SODIUM, POTASSIUM, CHLORIDE, CO2, BUN, CREATININE, CALCIUM, MAGNESIUM, PHOSPHORUS, ALBUMIN in the last 72 hours.  Estimated GFR/CRCL = Estimated Creatinine Clearance: 39 mL/min (by C-G formula based on SCr of 1.03 mg/dL).  Recent Labs     11/05/21 2202 11/06/21 0807 11/06/21 1211   POCGLUCOSE 218* 203* 296*                 No results for input(s): INR, APTT, FIBRINOGEN in the last 72 hours.    Invalid input(s): DIMER    MICROBIOLOGY: none    IMAGING:     C spine X ray pending  L spine X ray pending    MEDS:  Current Facility-Administered Medications   Medication Last Admin   • cyclobenzaprine (Flexeril) tablet 10 mg 10 mg at 11/06/21 1015   • ketorolac (TORADOL) injection 15 mg 15 mg at 11/06/21 1219   • amiodarone (Cordarone) tablet 200 mg 200 mg at 11/06/21 0544   • omeprazole (PRILOSEC) capsule 20 mg 20 mg at 11/06/21 0544   • insulin glargine (Semglee) injection 10 Units at 11/05/21 1643    And   • insulin lispro (AdmeLOG) injection 3 Units at 11/06/21 1212    And   • glucose 4 g chewable tablet 16 g      And   • dextrose 50% (D50W) injection 50 mL     • ROPINIRole (REQUIP) tablet 0.25 mg 0.25 mg at 11/05/21 2204   • carvedilol (COREG) tablet 6.25 mg 6.25 mg at 11/06/21 0813   • furosemide (LASIX) tablet 20 mg 20 mg at 11/06/21 0546   • gabapentin (NEURONTIN) capsule 300 mg 300 mg at 11/06/21 1212   • aspirin (ASA) chewable tab 81 mg 81 mg at 11/06/21 0544   • clopidogrel (PLAVIX) tablet 75 mg 75 mg at 11/06/21 0545   • DULoxetine (CYMBALTA) capsule 20 mg 20 mg at 11/06/21 0546   • rosuvastatin (CRESTOR) tablet 40 mg 40 mg at 11/05/21 1647   • vitamin D3 (cholecalciferol) tablet 1,000 Units 1,000 Units at 11/06/21 0545   • cyanocobalamin (VITAMIN B-12) tablet 1,000 mcg 1,000 mcg at 11/06/21 0546   • valsartan (DIOVAN) tablet 80 mg 80 mg at 11/06/21 0545   •  hydroCHLOROthiazide (HYDRODIURIL) tablet 12.5 mg 12.5 mg at 11/06/21 0545   • budesonide-formoterol (SYMBICORT) 160-4.5 MCG/ACT inhaler 2 Puff 2 Puff at 11/04/21 2109   • tiotropium (Spiriva Respimat) 2.5 mcg/Act inhalation spray 5 mcg 5 mcg at 11/04/21 0637   • senna-docusate (PERICOLACE or SENOKOT S) 8.6-50 MG per tablet 2 Tablet 2 Tablet at 11/06/21 0546    And   • polyethylene glycol/lytes (MIRALAX) PACKET 1 Packet      And   • magnesium hydroxide (MILK OF MAGNESIA) suspension 30 mL      And   • bisacodyl (DULCOLAX) suppository 10 mg     • enoxaparin (LOVENOX) inj 40 mg 40 mg at 11/06/21 0546   • acetaminophen (Tylenol) tablet 650 mg 650 mg at 11/05/21 0801   • labetalol (NORMODYNE/TRANDATE) injection 10 mg           ASSESSMENT/PLAN: 71 y.o. female with PMHx of TIA and likely CVA with residual L sided weakness, CAD s/p 4 v CABG, s/p AICD, T2DM, HTN, Migraine, COPD (2L O2 at home), afib on amiodarone, GERD, depression and polyarthralgia admitted for HTN.       # HTN  Well controlled on home regimen. Carotid doppler WNL. No neurologic deficits. No concern for acute CVA .                - Continue valsartan              - Contiue coreg     # Debility  # Back pain  Residual deficits from prior strokes and generalized deconditioning. PT/OT consulted and recommending post-acute placement. Patient not amenable despite medical recommendations by myself. Back pain limits patient's mobility and worsening injury may preclude mobility at home.   - L spine and C spine X rays to evaluate    - Home health with PT/OT outpatient      # A fib  Rate controlled with amiodarone. Consider anticoagulation, recommend ongoing discussion with PCP. Currently on DAPT for prior 4v CABG. May be able to discontinue this therapy and start anticoagulation.      # COPD  No acute exacerbation.   Continue home meds     # CHF  No acute exacerbation        Lines: PIV  IVF: none  Abx: none  GI PPx:Multimodal   DVT PPx: SCD, lovenox  Code:  DNR/DNI  Diet: Cardiac      Dispo: Medically clear for discharge pending established home health     Chelsea Hobbs MD  PGY-2 Family Medicine Resident  [facility]

## 2021-11-06 NOTE — CARE PLAN
The patient is Stable - Low risk of patient condition declining or worsening    Shift Goals  Clinical Goals: (P) blood sugar management   Patient Goals: (P) pain management  Family Goals: (P) rhonda    Progress made toward(s) clinical / shift goals:      Patient is not progressing towards the following goals:

## 2021-11-06 NOTE — CARE PLAN
The patient is Stable - Low risk of patient condition declining or worsening    Shift Goals  Clinical Goals: Neuro checks   Patient Goals: sleep  Family Goals: GUANAKITO    Progress made toward(s) clinical / shift goals: assess neuro status every 4 hrs. Cluster care to allow pt to rest.

## 2021-11-06 NOTE — CARE PLAN
The patient is Stable - Low risk of patient condition declining or worsening    Shift Goals  Clinical Goals: Decrease in pain levels, ambulate  Patient Goals: Pain control  Family Goals: No family present at this time    Progress made toward(s) clinical / shift goals:  Pt requiring one person assist with swati walker, pain has been better controlled throughout the shift.    Patient is not progressing towards the following goals:      Problem: Pain - Standard  Goal: Alleviation of pain or a reduction in pain to the patient’s comfort goal  Outcome: Progressing  Note: Instruct pt to notify staff when pain levels are rising or not resolving so that treatment may be given. Current pain regimen is helping throughout the day per pt.     Problem: Fall Risk  Goal: Patient will remain free from falls  Outcome: Progressing  Note: Instruct pt to notify staff when needing to ambulate to reduce fall risk. Pt is compliant and has called before getting out of bed. Pt using swati walker during ambulation with 1 person assist.

## 2021-11-07 NOTE — PROGRESS NOTES
"Assumed care of pt at 0700. Pt alert and oriented x4. She remains with generalized weakness and baseline left weakness. C/o chronic low back pain; PRN medications and relaxation techniques provided with good effect. Discussed importance of rehab (that PT/OT were recommending); pt continues to refuse despite education. HH is in the process of being set up. Bed low and locked, alarm set and call bell within reach.  Hourly rounding continues.    1215: SW called this RN; [facility] accepted pt. Call placed to R family; associate who answered the phone said they do not do pages for R any longer but transferred me to \"[facility].\" [facility] did not answer. Voalte message sent to Dr. Hobbs, who wrote a note yesterday on this patient. Awaiting reply at this time.    1245: DC order in; pt attempting to get a hold of son to pick her up.    1420: Pt unable to get a hold of her son Narendra; this RN called him and left a voicemail for him to return my call.    1430: Pt does not have a FWW at bedside, PT was recommending one upon eval this morning. Message sent to [facility] about ordering a DME for this.  "

## 2021-11-07 NOTE — DISCHARGE PLANNING
Anticipated Discharge Disposition: Home with HH    Action: Notified by Alexandra CAMPBELL that the patient has been accepted by [facility] .    Barriers to Discharge: Medical clearance    Plan: Will need to follow up with HH

## 2021-11-07 NOTE — CARE PLAN
The patient is Watcher - Medium risk of patient condition declining or worsening    Shift Goals  Clinical Goals: decrease pain level  Patient Goals: pain control,sleep  Family Goals: No family present at this time    Progress made toward(s) clinical / shift goals:  pain controlled,able to sleep  Problem: Knowledge Deficit - Standard  Goal: Patient and family/care givers will demonstrate understanding of plan of care, disease process/condition, diagnostic tests and medications  Outcome: Progressing     Problem: Pain - Standard  Goal: Alleviation of pain or a reduction in pain to the patient’s comfort goal  Outcome: Progressing     Problem: Fall Risk  Goal: Patient will remain free from falls  Outcome: Progressing       Patient is not progressing towards the following goals:

## 2021-11-07 NOTE — DISCHARGE SUMMARY
[facility] MEDICINE DISCHARGE SUMMARY     PATIENT ID:  Name:             Leena Bella   YOB: 1950  Age:                 71 y.o.  female   MRN:               [id number]  Address:         [address] [place] [address] [place] [address]  Phone:            [phone] (home)    ADMISSION DATE:   11/3/2021    DISCHARGE DATE:   11/7/2021    DISCHARGE DIAGNOSES:   HTN Urgency  Back pain  Debility  Atrial Fibrillation    ATTENDING PHYSICIAN:   Dr. Perla    RESIDENT:   Niles Goodson M.D.    CONSULTANTS:    None    PROCEDURES:    None    IMAGING:   DX-CERVICAL SPINE-2 OR 3 VIEWS   Final Result      Moderate spondylosis and carotid atherosclerosis      DX-LUMBAR SPINE-2 OR 3 VIEWS   Final Result      Minimal worsening moderate spondylosis especially at L3/4/5 with grade 1 L4/5 anterolisthesis and vacuum disc phenomenon      EC-ECHOCARDIOGRAM COMPLETE W/O CONT   Final Result      US-CAROTID DOPPLER BILAT   Final Result      US-EXTREMITY VENOUS LOWER UNILAT LEFT   Final Result      CT-HEAD W/O   Final Result      1. Cerebral atrophy.   2. White matter lucencies most consistent with small vessel ischemic change versus demyelination or gliosis.   3. Otherwise, Head CT without contrast with no acute findings. No evidence of acute cerebral infarction, hemorrhage or mass lesion.      DX-CHEST-PORTABLE (1 VIEW)   Final Result      No radiographic evidence of acute cardiopulmonary process.          PHYSICAL EXAM:   General: No acute distress, afebrile, resting comfortably  HEENT: NC/AT. EOMI.   Cardiovascular: RRR without murmurs. Normal capillary refill   Respiratory: CTAB, no tachypnea or retractions  Abdomen: soft, nontender, nondistended, no masses  EXT:  DOOLEY, no edema  Skin: No erythema/lesions   Neuro: Non-focal    LABS:  No results for input(s): WBC, RBC, HEMOGLOBIN, HEMATOCRIT, MCV, MCH, RDW, PLATELETCT, MPV, NEUTSPOLYS, LYMPHOCYTES, MONOCYTES, EOSINOPHILS, BASOPHILS, RBCMORPHOLO in the last 72 hours.  No  "results for input(s): SODIUM, POTASSIUM, CHLORIDE, CO2, GLUCOSE, BUN, CPKTOTAL in the last 72 hours.  Lab Results   Component Value Date/Time    CHOLSTRLTOT 194 06/25/2018 10:03 AM     (H) 06/25/2018 10:03 AM    HDL 66 06/25/2018 10:03 AM    TRIGLYCERIDE 57 06/25/2018 10:03 AM       Lab Results   Component Value Date/Time    TROPONINI <0.01 04/30/2017 04:28 PM    CKMB 0.6 05/29/2006 05:00 AM       Lab Results   Component Value Date/Time    TROPONINI <0.01 04/30/2017 04:28 PM    CKMB 0.6 05/29/2006 05:00 AM            HOSPITAL COURSE:   H&P per Dr. Rg:  \"HPI: Leena Bella is a 71 y.o. very pleasant female with a past medical history notable for TIA (2/2016), stroke with left side residual weakness in December 2016 (CTA showed plaques bilaterally at bifurcation of carotid from [place]), CAD s/p CABG 4v in 1998 and AICD, T2DM, HTN, Migraine, COPD (2L O2 at home), afib on amiodarone, GERD, depression and polyarthralgia who was sent from PCP for hypertension (/137) in the context of worsening left-sided weakness starting 2 months prior and frequent falls at home.  History was provided by patient who is currently alert and oriented x4 and very sharp.  Patient stated that she was not quite sure why she came in as she was feeling close to her baseline, but was concerned about the 2 months of worsening left-sided weakness which was making mobility at home difficult.  She notes that her legs have \"appeared puffier than normal\" over the past week. Patient stated that she is feeling some occasional right-sided dull/achy chest pain which is new for her.  She also stated that she is had headaches associated with his high blood pressure that feel like a tension around her head and different from her chronic migraines.  She denies any vision changes or crushing chest pain.  She also states that in the past 2 months she has been experiencing daily vertigo that lasts for minutes and bilateral tinnitus.  She " "states that she is never experienced these symptoms before.  She denies any fevers, chills, shortness of breath, cough, crushing chest pain, abdominal pain, changes in bowel habit, diarrhea, nausea, vomiting, urinary symptoms, or night sweats.  She denies any new pain other than her chronic neck pain which is been \"acting up lately\".  When asked about her functional status at home, she commented that she currently lives alone and mobilizes with a walker.  She did state that she falls about 3-4 times a month which has been ongoing, but she noticed worsening after this incident around 2 months prior.  She states that she gets lightheaded when walking across her house.  The topic of going to a SNF was breached, and patient was hesitant at first, but stated \"if it is in my best interest I will go\".  She also stated that she would like to be DNR/DNI.     Of note: Patient has limited transportation and due to this was not able to get to the doctor to refill any of her medications.  She states the last time she took any medications at all was around 2 months prior.  She is not taking any of her home medications for around 2 months.        ERCourse:  In the ER the patient was hypertensive with a maximum blood pressure of 178/84.  The rest of her vitals were WNL.  Labs: CBC WNL, CMP notable for glucose of 426, BUN of 26, calcium of 10.6, and ALP of 111.  Troponin was 17 then 19.  proBNP was 1066.  Urinalysis was notable for small leukocyte esterase, pyuria, hematuria, and moderate bacteria (patient has no urinary symptoms), CT head showed no acute pathology.  Chest x-ray was read as no acute pathology, but did have increased vascular markings which could indicate pulmonary edema.  The only medication received in the ER was 10 units of Lantus.\"    Hospital Course per Dr. Goodson:  # HTN  Well controlled on home regimen. Carotid doppler WNL. No neurologic deficits. No concern for acute CVA .                - Continued " valsartan and coreg outpatient doses     # Debility  # Back pain  Residual deficits from prior strokes and generalized deconditioning. PT/OT consulted and recommending post-acute placement. Patient not amenable despite medical recommendations by myself. Back pain limits patient's mobility and worsening injury may preclude mobility at home.              - L spine and C spine X rays to evaluate showed moderate spondylosis in L3/4/5 vertebrae with grade 1 L4/5 anterolisthesis, as well as degenerative C4/5/6 trace retrolisthesis and moderate disc loss; there were no acute processes occurring in cervical or lumbar x-rays              - Home health with PT/OT outpatient      # A fib  Rate controlled with amiodarone. [id number] score of 8Currently on DAPT for prior 4v CABG. May be able to discontinue this therapy and start anticoagulation; please consider    DISCHARGE CONDITION:    Stable    DISPOSITION:   Home     DISCHARGE MEDICATIONS:      Medication List      START taking these medications      Instructions   acetaminophen 325 MG Tabs  Commonly known as: Tylenol   Take 2 Tablets by mouth every 6 hours as needed.  Dose: 650 mg     amiodarone 200 MG Tabs  Commonly known as: Cordarone   Take 1 Tablet by mouth every day for 30 days.  Dose: 200 mg     aspirin 81 MG Chew chewable tablet  Commonly known as: ASA   Chew 1 Tablet every day for 30 days.  Dose: 81 mg     budesonide-formoterol 160-4.5 MCG/ACT Aero  Commonly known as: SYMBICORT   Inhale 2 Puffs 2 times a day for 30 days.  Dose: 2 Puff     carvedilol 6.25 MG Tabs  Commonly known as: COREG   Take 1 Tablet by mouth 2 times a day with meals for 30 days.  Dose: 6.25 mg     clopidogrel 75 MG Tabs  Commonly known as: PLAVIX   Take 1 Tablet by mouth every day for 30 days.  Dose: 75 mg     cyanocobalamin 1000 MCG Tabs  Commonly known as: VITAMIN B12   Take 1 Tablet by mouth every day.  Dose: 1,000 mcg     cyclobenzaprine 10 mg Tabs  Commonly known as: Flexeril   Take 1  Tablet by mouth 3 times a day as needed for Muscle Spasms (Back pain).  Dose: 10 mg     DULoxetine 20 MG Cpep  Commonly known as: CYMBALTA   Take 1 Capsule by mouth every day for 30 days.  Dose: 20 mg     furosemide 20 MG Tabs  Commonly known as: LASIX   Take 1 Tablet by mouth every day for 30 days.  Dose: 20 mg     gabapentin 300 MG Caps  Commonly known as: NEURONTIN   Take 1 Capsule by mouth 3 times a day for 30 days.  Dose: 300 mg     hydroCHLOROthiazide 12.5 MG tablet  Commonly known as: HYDRODIURIL   Take 1 Tablet by mouth every day for 30 days.  Dose: 12.5 mg     Lantus SoloStar 100 UNIT/ML Sopn injection  Generic drug: insulin glargine   Inject 10 Units under the skin every evening for 30 days.  Dose: 10 Units     omeprazole 20 MG delayed-release capsule  Commonly known as: PRILOSEC   Take 1 Capsule by mouth every day for 30 days.  Dose: 20 mg     polyethylene glycol/lytes 17 g Pack  Commonly known as: MIRALAX   Take 1 Packet by mouth 1 time a day as needed (if sennosides and docusate ineffective after 24 hours) for up to 30 days.  Dose: 17 g     ROPINIRole 0.25 MG Tabs  Commonly known as: REQUIP   Take 1 Tablet by mouth at bedtime.  Dose: 0.25 mg     rosuvastatin 40 MG tablet  Commonly known as: CRESTOR   Take 1 Tablet by mouth every evening.  Dose: 40 mg     tiotropium 2.5 mcg/Act Aers  Commonly known as: Spiriva Respimat   Inhale 2 Inhalation every day for 30 days.  Dose: 5 mcg     valsartan 80 MG Tabs  Commonly known as: DIOVAN   Take 1 Tablet by mouth every day.  Dose: 80 mg     vitamin D 1000 UNIT Tabs  Commonly known as: VITAMIND D3   Take 1 Tablet by mouth every day for 30 days.  Dose: 1,000 Units            ACTIVITY:   Normal Activity as Tolerated.    DIET:   Healthy    DISCHARGE INSTRUCTIONS AND FOLLOW UP:  Patient is medically stable for discharge and will be discharged to home    Follow Up: PCP in one week    Discharge Instructions:   Patient was instructed to return the ER in the event of  worsening symptoms including but not limited to headache, vision changes, chest pain, SOB or any other major concerns. Patient understands that failure to do so may indicate worsening of her medical condition(s) and result in adverse clinical outcomes including fatality. We have counseled the patient on the importance of compliance and the patient has agreed to proceed with all medical recommendations and follow up plan indicated above. The patient understands that the failure to do so may result in result in adverse clinical outcomes including fatality.       CC:   Tr Villareal M.D.

## 2021-11-07 NOTE — DISCHARGE INSTRUCTIONS
Discharge Instructions    Discharged to home by car with relative. Discharged via wheelchair, hospital escort: Yes.  Special equipment needed: Not Applicable    Be sure to schedule a follow-up appointment with your primary care doctor or any specialists as instructed.     Discharge Plan:   Diet Plan: Discussed  Activity Level: Discussed  Confirmed Follow up Appointment: Patient to Call and Schedule Appointment  Confirmed Symptoms Management: Discussed  Medication Reconciliation Updated: Yes  Influenza Vaccine Indication: Patient Refuses    I understand that a diet low in cholesterol, fat, and sodium is recommended for good health. Unless I have been given specific instructions below for another diet, I accept this instruction as my diet prescription.   Other diet: Diabetic    Special Instructions: None    · Is patient discharged on Warfarin / Coumadin?   No     Depression / Suicide Risk    As you are discharged from this [facility] facility, it is important to learn how to keep safe from harming yourself.    Recognize the warning signs:  · Abrupt changes in personality, positive or negative- including increase in energy   · Giving away possessions  · Change in eating patterns- significant weight changes-  positive or negative  · Change in sleeping patterns- unable to sleep or sleeping all the time   · Unwillingness or inability to communicate  · Depression  · Unusual sadness, discouragement and loneliness  · Talk of wanting to die  · Neglect of personal appearance   · Rebelliousness- reckless behavior  · Withdrawal from people/activities they love  · Confusion- inability to concentrate     If you or a loved one observes any of these behaviors or has concerns about self-harm, here's what you can do:  · Talk about it- your feelings and reasons for harming yourself  · Remove any means that you might use to hurt yourself (examples: pills, rope, extension cords, firearm)  · Get professional help from the community  (Mental Health, Substance Abuse, psychological counseling)  · Do not be alone:Call your Safe Contact- someone whom you trust who will be there for you.  · Call your local CRISIS HOTLINE 523-5956 or 826-629-0303  · Call your local Children's Mobile Crisis Response Team [place] (517) 310-9273 or www.HelloFresh  · Call the toll free National Suicide Prevention Hotlines   · National Suicide Prevention Lifeline 769-841-OVSV (3434)  · [employer] Hope Line Network 800-SUICIDE (889-5387)      Chronic Back Pain  When back pain lasts longer than 3 months, it is called chronic back pain. Pain may get worse at certain times (flare-ups). There are things you can do at home to manage your pain.  Follow these instructions at home:  Activity         · Avoid bending and other activities that make pain worse.  · When standing:  ? Keep your upper back and neck straight.  ? Keep your shoulders pulled back.  ? Avoid slouching.  · When sitting:  ? Keep your back straight.  ? Relax your shoulders. Do not round your shoulders or pull them backward.  · Do not sit or  one place for long periods of time.  · Take short rest breaks during the day. Lying down or standing is usually better than sitting. Resting can help relieve pain.  · When sitting or lying down for a long time, do some mild activity or stretching. This will help to prevent stiffness and pain.  · Get regular exercise. Ask your doctor what activities are safe for you.  · Do not lift anything that is heavier than 10 lb (4.5 kg). To prevent injury when you lift things:  ? Bend your knees.  ? Keep the weight close to your body.  ? Avoid twisting.  Managing pain  · If told, put ice on the painful area. Your doctor may tell you to use ice for 24-48 hours after a flare-up starts.  ? Put ice in a plastic bag.  ? Place a towel between your skin and the bag.  ? Leave the ice on for 20 minutes, 2-3 times a day.  · If told, put heat on the painful area as often as told by  your doctor. Use the heat source that your doctor recommends, such as a moist heat pack or a heating pad.  ? Place a towel between your skin and the heat source.  ? Leave the heat on for 20-30 minutes.  ? Remove the heat if your skin turns bright red. This is especially important if you are unable to feel pain, heat, or cold. You may have a greater risk of getting burned.  · Soak in a warm bath. This can help relieve pain.  · Take over-the-counter and prescription medicines only as told by your doctor.  General instructions  · Sleep on a firm mattress. Try lying on your side with your knees slightly bent. If you lie on your back, put a pillow under your knees.  · Keep all follow-up visits as told by your doctor. This is important.  Contact a doctor if:  · You have pain that does not get better with rest or medicine.  Get help right away if:  · One or both of your arms or legs feel weak.  · One or both of your arms or legs lose feeling (numbness).  · You have trouble controlling when you poop (bowel movement) or pee (urinate).  · You feel sick to your stomach (nauseous).  · You throw up (vomit).  · You have belly (abdominal) pain.  · You have shortness of breath.  · You pass out (faint).  Summary  · When back pain lasts longer than 3 months, it is called chronic back pain.  · Pain may get worse at certain times (flare-ups).  · Use ice and heat as told by your doctor. Your doctor may tell you to use ice after flare-ups.  This information is not intended to replace advice given to you by your health care provider. Make sure you discuss any questions you have with your health care provider.  Document Released: 06/05/2009 Document Revised: 04/09/2020 Document Reviewed: 08/02/2018  Elsevier Patient Education © 2020 [employer]

## 2021-11-07 NOTE — CARE PLAN
The patient is Stable - Low risk of patient condition declining or worsening    Shift Goals  Clinical Goals: Decrease pain; maintain WDL BP and BG  Patient Goals: Pain control, Go home  Family Goals: GUANAKITO    Progress made toward(s) clinical / shift goals:    Problem: Pain - Standard  Goal: Alleviation of pain or a reduction in pain to the patient’s comfort goal  Outcome: Progressing  Pt c/o chronic low back pain; PRN Flexeril given with good effect as well as relaxation techniques.     Problem: Fall Risk  Goal: Patient will remain free from falls  Outcome: Progressing  Bed low and locked, alarm set, call bell within reach.       Patient is not progressing towards the following goals: N/A

## 2021-11-07 NOTE — DISCHARGE PLANNING
Agency/Facility Name: [facility]   Spoke To: fax  Outcome: Pt accepted. Cleared for DC      RN CM informed

## 2021-11-07 NOTE — THERAPY
Physical Therapy   Daily Treatment     Patient Name: Leena Bella  Age:  71 y.o., Sex:  female  Medical Record #: [id number]  Today's Date: 11/7/2021     Precautions: Fall Risk  Comments: L hemiparesis    Assessment  Pt seen for PT treatment session. Pt at Min A-SPV for bed mobility tasks, however pt states she will be sleeping in a recliner initially at home and will have no problem getting in/out of it. SPV for STS with FWW, VC's for sequencing during ascent. Min A for amb with HW, pt had several LOB and appeared unsteady. Transitioned to FWW and pt was able to amb initially with Min A but quickly progressed to SPV with no LOB; pt agreed with therapist that FWW would be safest AD for mobility at this time and for when she is at home. Ambulation distance limited by fatigue. Pt will benefit from continued acute care therapy services to increase ambulation distance and endurance. Pt would also benefit from HHPT following DC to address higher level functional deficits. Pt appears to be functionally capable to move around her home at her current level, though would require an O2 concentrator vs a portable tank to manage her supplemental O2 with her FWW. Will follow.     Plan  Continue current treatment plan.  DC Equipment Recommendations: Front-Wheel Walker  Discharge Recommendations: Recommend home health for continued physical therapy services         11/07/21 1057   Vitals   O2 (LPM) 2   O2 Delivery Device Silicone Nasal Cannula   Pain 0 - 10 Group   Therapist Pain Assessment no c/o pain   Cognition    Cognition / Consciousness WDL   Level of Consciousness Alert   Comments pleasant and participatory; receptive to edu on appropriate AD's and management   Balance   Sitting Balance (Static) Fair +   Sitting Balance (Dynamic) Fair   Standing Balance (Static) Fair   Standing Balance (Dynamic) Fair -   Weight Shift Sitting Fair   Weight Shift Standing Fair   Skilled Intervention Sequencing;Tactile Cuing;Verbal  Cuing;Postural Facilitation   Comments FWW in standing   Gait Analysis   Gait Level Of Assist Supervised   Assistive Device Front Wheel Walker   Distance (Feet) 40   # of Times Distance was Traveled 3   Deviation Bradykinetic;Decreased Heel Strike;Decreased Toe Off  (decr step length)   Weight Bearing Status no restrictions   Skilled Intervention Facilitation;Sequencing;Tactile Cuing;Verbal Cuing   Comments pt initially amb with HW and had several LOB requiring assist from therapist to remain upright. pt switched to FWW and became Min A-SPV with no LOB.   Bed Mobility    Supine to Sit Supervised   Sit to Supine Minimal Assist  (assist with LE's)   Scooting Supervised   Skilled Intervention Facilitation;Tactile Cuing;Sequencing;Verbal Cuing   Comments HOB slightly elevated, used rails; pt states she will be sleeping in a recliner at home initially   Functional Mobility   Sit to Stand Supervised   Skilled Intervention Tactile Cuing;Sequencing;Verbal Cuing   Comments FWW. incr time/effort   Short Term Goals    Short Term Goal # 1 Pt will perform functional transfers with SPV in 6 visits to increase independence   Goal Outcome # 1 Goal met   Short Term Goal # 2 Pt will ambulate 150ft with swati walker and SPV in 6 visits to increase independence  (amb with FWW for incr safety/independence)   Goal Outcome # 2 Progressing as expected

## 2021-11-08 NOTE — PROGRESS NOTES
Monitor Summary: SR 65-72, CO -0.14, QRS -0.10, QT -0.44, with rare/frequent PVC and trigeminey per strip from the monitor room.

## 2021-11-08 NOTE — PROGRESS NOTES
Pt discharged per MD orders. A friend was able to pick her up. IV removed. Belongings gathered/returned to patient. Discharge instructions, medications and follow up appointments reviewed with patient. She denies any further questions.

## 2021-11-08 NOTE — PROGRESS NOTES
"Pt's son Narendra finally called this RN back. He was circumstantial with reasons why he has not returned our calls or has been able to  his mom (the patient). He asked that the patient call him back. When I assisted her with doing this, she said he \"has a tendency of being flakey.\" SW has left for the day; will pass along that pt may need taxi voucher home for tomorrow morning.  "

## 2021-11-28 PROBLEM — I69.954 SPASTIC HEMIPLEGIA OF LEFT NONDOMINANT SIDE AS LATE EFFECT OF CEREBROVASCULAR DISEASE (HCC): Chronic | Status: ACTIVE | Noted: 2021-01-01

## 2021-11-28 PROBLEM — I69.954 SPASTIC HEMIPLEGIA OF LEFT NONDOMINANT SIDE AS LATE EFFECT OF CEREBROVASCULAR DISEASE (HCC): Status: ACTIVE | Noted: 2021-01-01

## 2021-11-28 PROBLEM — I48.91 ATRIAL FIBRILLATION (HCC): Chronic | Status: ACTIVE | Noted: 2021-01-01

## 2021-12-08 PROBLEM — N18.31 STAGE 3A CHRONIC KIDNEY DISEASE: Status: ACTIVE | Noted: 2021-01-01

## 2021-12-16 PROBLEM — Z71.89 COUNSELING REGARDING ADVANCED CARE PLANNING AND GOALS OF CARE: Chronic | Status: ACTIVE | Noted: 2021-01-01

## 2021-12-16 PROBLEM — L97.329: Chronic | Status: ACTIVE | Noted: 2021-01-01

## 2021-12-16 PROBLEM — N39.0 URINARY TRACT INFECTION WITHOUT HEMATURIA: Status: ACTIVE | Noted: 2021-01-01

## 2021-12-16 PROBLEM — N18.31 STAGE 3A CHRONIC KIDNEY DISEASE (HCC): Chronic | Status: ACTIVE | Noted: 2021-01-01

## 2021-12-16 PROBLEM — M54.50 ACUTE LOW BACK PAIN: Chronic | Status: ACTIVE | Noted: 2021-04-19

## 2021-12-16 PROBLEM — L97.329: Status: ACTIVE | Noted: 2021-01-01

## 2021-12-16 PROBLEM — Z71.89 COUNSELING REGARDING ADVANCED CARE PLANNING AND GOALS OF CARE: Status: ACTIVE | Noted: 2021-01-01

## 2021-12-16 PROBLEM — N39.0 URINARY TRACT INFECTION WITHOUT HEMATURIA: Chronic | Status: ACTIVE | Noted: 2021-01-01

## 2022-01-01 ENCOUNTER — APPOINTMENT (OUTPATIENT)
Dept: RADIOLOGY | Facility: MEDICAL CENTER | Age: 72
DRG: 871 | End: 2022-01-01
Attending: STUDENT IN AN ORGANIZED HEALTH CARE EDUCATION/TRAINING PROGRAM
Payer: MEDICARE

## 2022-01-01 ENCOUNTER — APPOINTMENT (OUTPATIENT)
Dept: RADIOLOGY | Facility: MEDICAL CENTER | Age: 72
DRG: 871 | End: 2022-01-01
Attending: EMERGENCY MEDICINE
Payer: MEDICARE

## 2022-01-01 ENCOUNTER — HOSPITAL ENCOUNTER (INPATIENT)
Facility: MEDICAL CENTER | Age: 72
LOS: 1 days | DRG: 871 | End: 2022-05-14
Attending: EMERGENCY MEDICINE | Admitting: STUDENT IN AN ORGANIZED HEALTH CARE EDUCATION/TRAINING PROGRAM
Payer: MEDICARE

## 2022-01-01 ENCOUNTER — PATIENT OUTREACH (OUTPATIENT)
Dept: HEALTH INFORMATION MANAGEMENT | Facility: OTHER | Age: 72
End: 2022-01-01

## 2022-01-01 ENCOUNTER — APPOINTMENT (OUTPATIENT)
Dept: RADIOLOGY | Facility: MEDICAL CENTER | Age: 72
DRG: 299 | End: 2022-01-01
Attending: STUDENT IN AN ORGANIZED HEALTH CARE EDUCATION/TRAINING PROGRAM
Payer: MEDICARE

## 2022-01-01 ENCOUNTER — APPOINTMENT (OUTPATIENT)
Dept: RADIOLOGY | Facility: MEDICAL CENTER | Age: 72
DRG: 299 | End: 2022-01-01
Attending: EMERGENCY MEDICINE
Payer: MEDICARE

## 2022-01-01 ENCOUNTER — TELEPHONE (OUTPATIENT)
Dept: RESPIRATORY THERAPY | Facility: MEDICAL CENTER | Age: 72
End: 2022-01-01
Payer: MEDICARE

## 2022-01-01 ENCOUNTER — HOSPITAL ENCOUNTER (INPATIENT)
Facility: MEDICAL CENTER | Age: 72
LOS: 4 days | DRG: 299 | End: 2022-01-21
Attending: EMERGENCY MEDICINE | Admitting: FAMILY MEDICINE
Payer: MEDICARE

## 2022-01-01 VITALS
RESPIRATION RATE: 16 BRPM | OXYGEN SATURATION: 98 % | SYSTOLIC BLOOD PRESSURE: 113 MMHG | BODY MASS INDEX: 21.52 KG/M2 | HEIGHT: 61 IN | HEART RATE: 57 BPM | TEMPERATURE: 97.6 F | WEIGHT: 114 LBS | DIASTOLIC BLOOD PRESSURE: 62 MMHG

## 2022-01-01 VITALS
OXYGEN SATURATION: 94 % | BODY MASS INDEX: 19.65 KG/M2 | WEIGHT: 110.89 LBS | SYSTOLIC BLOOD PRESSURE: 72 MMHG | RESPIRATION RATE: 23 BRPM | DIASTOLIC BLOOD PRESSURE: 38 MMHG | HEIGHT: 63 IN | TEMPERATURE: 99.5 F | HEART RATE: 103 BPM

## 2022-01-01 DIAGNOSIS — R53.1 GENERALIZED WEAKNESS: ICD-10-CM

## 2022-01-01 DIAGNOSIS — E08.621 DIABETIC FOOT ULCER ASSOCIATED WITH DIABETES MELLITUS DUE TO UNDERLYING CONDITION, UNSPECIFIED LATERALITY, UNSPECIFIED PART OF FOOT, UNSPECIFIED ULCER STAGE (HCC): ICD-10-CM

## 2022-01-01 DIAGNOSIS — R73.9 HYPERGLYCEMIA: ICD-10-CM

## 2022-01-01 DIAGNOSIS — L97.509 DIABETIC FOOT ULCER ASSOCIATED WITH DIABETES MELLITUS DUE TO UNDERLYING CONDITION, UNSPECIFIED LATERALITY, UNSPECIFIED PART OF FOOT, UNSPECIFIED ULCER STAGE (HCC): ICD-10-CM

## 2022-01-01 DIAGNOSIS — R79.89 TROPONIN LEVEL ELEVATED: ICD-10-CM

## 2022-01-01 DIAGNOSIS — I99.8 LOWER LIMB ISCHEMIA: ICD-10-CM

## 2022-01-01 DIAGNOSIS — R79.89 ELEVATED TROPONIN: ICD-10-CM

## 2022-01-01 DIAGNOSIS — N17.9 AKI (ACUTE KIDNEY INJURY) (HCC): ICD-10-CM

## 2022-01-01 DIAGNOSIS — R62.7 FTT (FAILURE TO THRIVE) IN ADULT: ICD-10-CM

## 2022-01-01 DIAGNOSIS — S12.390A OTHER CLOSED DISPLACED FRACTURE OF FOURTH CERVICAL VERTEBRA, INITIAL ENCOUNTER (HCC): ICD-10-CM

## 2022-01-01 DIAGNOSIS — E13.10 DIABETIC KETOACIDOSIS WITHOUT COMA ASSOCIATED WITH OTHER SPECIFIED DIABETES MELLITUS (HCC): ICD-10-CM

## 2022-01-01 DIAGNOSIS — S15.101A: ICD-10-CM

## 2022-01-01 LAB
ALBUMIN SERPL BCP-MCNC: 2 G/DL (ref 3.2–4.9)
ALBUMIN SERPL BCP-MCNC: 2.3 G/DL (ref 3.2–4.9)
ALBUMIN SERPL BCP-MCNC: 2.9 G/DL (ref 3.2–4.9)
ALBUMIN SERPL BCP-MCNC: 3 G/DL (ref 3.2–4.9)
ALBUMIN SERPL BCP-MCNC: 3.1 G/DL (ref 3.2–4.9)
ALBUMIN SERPL BCP-MCNC: 3.4 G/DL (ref 3.2–4.9)
ALBUMIN SERPL BCP-MCNC: 3.4 G/DL (ref 3.2–4.9)
ALBUMIN/GLOB SERPL: 0.6 G/DL
ALBUMIN/GLOB SERPL: 0.8 G/DL
ALBUMIN/GLOB SERPL: 0.9 G/DL
ALBUMIN/GLOB SERPL: 0.9 G/DL
ALBUMIN/GLOB SERPL: 1.2 G/DL
ALBUMIN/GLOB SERPL: 1.2 G/DL
ALBUMIN/GLOB SERPL: 1.3 G/DL
ALP SERPL-CCNC: 139 U/L (ref 30–99)
ALP SERPL-CCNC: 71 U/L (ref 30–99)
ALP SERPL-CCNC: 71 U/L (ref 30–99)
ALP SERPL-CCNC: 72 U/L (ref 30–99)
ALP SERPL-CCNC: 78 U/L (ref 30–99)
ALP SERPL-CCNC: 80 U/L (ref 30–99)
ALP SERPL-CCNC: 82 U/L (ref 30–99)
ALT SERPL-CCNC: 31 U/L (ref 2–50)
ALT SERPL-CCNC: 6 U/L (ref 2–50)
ALT SERPL-CCNC: 65 U/L (ref 2–50)
ALT SERPL-CCNC: 67 U/L (ref 2–50)
ALT SERPL-CCNC: 7 U/L (ref 2–50)
ALT SERPL-CCNC: 8 U/L (ref 2–50)
ALT SERPL-CCNC: <5 U/L (ref 2–50)
ANION GAP SERPL CALC-SCNC: 10 MMOL/L (ref 7–16)
ANION GAP SERPL CALC-SCNC: 11 MMOL/L (ref 7–16)
ANION GAP SERPL CALC-SCNC: 11 MMOL/L (ref 7–16)
ANION GAP SERPL CALC-SCNC: 15 MMOL/L (ref 7–16)
ANION GAP SERPL CALC-SCNC: 15 MMOL/L (ref 7–16)
ANION GAP SERPL CALC-SCNC: 23 MMOL/L (ref 7–16)
ANION GAP SERPL CALC-SCNC: 8 MMOL/L (ref 7–16)
ANISOCYTOSIS BLD QL SMEAR: ABNORMAL
APPEARANCE UR: CLEAR
APPEARANCE UR: CLEAR
APTT PPP: 29.4 SEC (ref 24.7–36)
APTT PPP: 35.7 SEC (ref 24.7–36)
APTT PPP: 37.4 SEC (ref 24.7–36)
AST SERPL-CCNC: 10 U/L (ref 12–45)
AST SERPL-CCNC: 11 U/L (ref 12–45)
AST SERPL-CCNC: 11 U/L (ref 12–45)
AST SERPL-CCNC: 118 U/L (ref 12–45)
AST SERPL-CCNC: 121 U/L (ref 12–45)
AST SERPL-CCNC: 45 U/L (ref 12–45)
AST SERPL-CCNC: 8 U/L (ref 12–45)
BACTERIA #/AREA URNS HPF: ABNORMAL /HPF
BACTERIA #/AREA URNS HPF: ABNORMAL /HPF
BASE EXCESS BLDA CALC-SCNC: -5 MMOL/L (ref -4–3)
BASOPHILS # BLD AUTO: 0 % (ref 0–1.8)
BASOPHILS # BLD AUTO: 0 % (ref 0–1.8)
BASOPHILS # BLD AUTO: 0.3 % (ref 0–1.8)
BASOPHILS # BLD AUTO: 0.3 % (ref 0–1.8)
BASOPHILS # BLD AUTO: 0.6 % (ref 0–1.8)
BASOPHILS # BLD AUTO: 0.6 % (ref 0–1.8)
BASOPHILS # BLD: 0 K/UL (ref 0–0.12)
BASOPHILS # BLD: 0 K/UL (ref 0–0.12)
BASOPHILS # BLD: 0.03 K/UL (ref 0–0.12)
BASOPHILS # BLD: 0.03 K/UL (ref 0–0.12)
BASOPHILS # BLD: 0.05 K/UL (ref 0–0.12)
BASOPHILS # BLD: 0.06 K/UL (ref 0–0.12)
BILIRUB SERPL-MCNC: 0.2 MG/DL (ref 0.1–1.5)
BILIRUB SERPL-MCNC: 0.3 MG/DL (ref 0.1–1.5)
BILIRUB SERPL-MCNC: <0.2 MG/DL (ref 0.1–1.5)
BILIRUB SERPL-MCNC: <0.2 MG/DL (ref 0.1–1.5)
BILIRUB UR QL STRIP.AUTO: NEGATIVE
BILIRUB UR QL STRIP.AUTO: NEGATIVE
BODY TEMPERATURE: 38 CENTIGRADE
BUN SERPL-MCNC: 14 MG/DL (ref 8–22)
BUN SERPL-MCNC: 18 MG/DL (ref 8–22)
BUN SERPL-MCNC: 22 MG/DL (ref 8–22)
BUN SERPL-MCNC: 31 MG/DL (ref 8–22)
BUN SERPL-MCNC: 55 MG/DL (ref 8–22)
BUN SERPL-MCNC: 58 MG/DL (ref 8–22)
BUN SERPL-MCNC: 60 MG/DL (ref 8–22)
BURR CELLS BLD QL SMEAR: NORMAL
CALCIUM SERPL-MCNC: 10.2 MG/DL (ref 8.5–10.5)
CALCIUM SERPL-MCNC: 9.1 MG/DL (ref 8.5–10.5)
CALCIUM SERPL-MCNC: 9.3 MG/DL (ref 8.5–10.5)
CALCIUM SERPL-MCNC: 9.3 MG/DL (ref 8.5–10.5)
CALCIUM SERPL-MCNC: 9.6 MG/DL (ref 8.5–10.5)
CALCIUM SERPL-MCNC: 9.6 MG/DL (ref 8.5–10.5)
CALCIUM SERPL-MCNC: 9.9 MG/DL (ref 8.5–10.5)
CHLORIDE SERPL-SCNC: 100 MMOL/L (ref 96–112)
CHLORIDE SERPL-SCNC: 101 MMOL/L (ref 96–112)
CHLORIDE SERPL-SCNC: 102 MMOL/L (ref 96–112)
CHLORIDE SERPL-SCNC: 103 MMOL/L (ref 96–112)
CHLORIDE SERPL-SCNC: 108 MMOL/L (ref 96–112)
CHLORIDE SERPL-SCNC: 110 MMOL/L (ref 96–112)
CHLORIDE SERPL-SCNC: 91 MMOL/L (ref 96–112)
CK SERPL-CCNC: 2756 U/L (ref 0–154)
CK SERPL-CCNC: 3061 U/L (ref 0–154)
CO2 SERPL-SCNC: 14 MMOL/L (ref 20–33)
CO2 SERPL-SCNC: 17 MMOL/L (ref 20–33)
CO2 SERPL-SCNC: 19 MMOL/L (ref 20–33)
CO2 SERPL-SCNC: 19 MMOL/L (ref 20–33)
CO2 SERPL-SCNC: 22 MMOL/L (ref 20–33)
CO2 SERPL-SCNC: 23 MMOL/L (ref 20–33)
CO2 SERPL-SCNC: 24 MMOL/L (ref 20–33)
COLOR UR: YELLOW
COLOR UR: YELLOW
COMMENT 1642: NORMAL
CREAT SERPL-MCNC: 0.82 MG/DL (ref 0.5–1.4)
CREAT SERPL-MCNC: 0.87 MG/DL (ref 0.5–1.4)
CREAT SERPL-MCNC: 1.18 MG/DL (ref 0.5–1.4)
CREAT SERPL-MCNC: 1.19 MG/DL (ref 0.5–1.4)
CREAT SERPL-MCNC: 1.92 MG/DL (ref 0.5–1.4)
CREAT SERPL-MCNC: 1.92 MG/DL (ref 0.5–1.4)
CREAT SERPL-MCNC: 1.93 MG/DL (ref 0.5–1.4)
EKG IMPRESSION: NORMAL
EKG IMPRESSION: NORMAL
EOSINOPHIL # BLD AUTO: 0 K/UL (ref 0–0.51)
EOSINOPHIL # BLD AUTO: 0 K/UL (ref 0–0.51)
EOSINOPHIL # BLD AUTO: 0.09 K/UL (ref 0–0.51)
EOSINOPHIL # BLD AUTO: 0.25 K/UL (ref 0–0.51)
EOSINOPHIL # BLD AUTO: 0.26 K/UL (ref 0–0.51)
EOSINOPHIL # BLD AUTO: 0.3 K/UL (ref 0–0.51)
EOSINOPHIL NFR BLD: 0 % (ref 0–6.9)
EOSINOPHIL NFR BLD: 0 % (ref 0–6.9)
EOSINOPHIL NFR BLD: 0.9 % (ref 0–6.9)
EOSINOPHIL NFR BLD: 2.5 % (ref 0–6.9)
EOSINOPHIL NFR BLD: 2.9 % (ref 0–6.9)
EOSINOPHIL NFR BLD: 3.9 % (ref 0–6.9)
EPI CELLS #/AREA URNS HPF: NEGATIVE /HPF
ERYTHROCYTE [DISTWIDTH] IN BLOOD BY AUTOMATED COUNT: 46.2 FL (ref 35.9–50)
ERYTHROCYTE [DISTWIDTH] IN BLOOD BY AUTOMATED COUNT: 46.4 FL (ref 35.9–50)
ERYTHROCYTE [DISTWIDTH] IN BLOOD BY AUTOMATED COUNT: 47.3 FL (ref 35.9–50)
ERYTHROCYTE [DISTWIDTH] IN BLOOD BY AUTOMATED COUNT: 47.5 FL (ref 35.9–50)
ERYTHROCYTE [DISTWIDTH] IN BLOOD BY AUTOMATED COUNT: 47.5 FL (ref 35.9–50)
ERYTHROCYTE [DISTWIDTH] IN BLOOD BY AUTOMATED COUNT: 49.5 FL (ref 35.9–50)
ERYTHROCYTE [DISTWIDTH] IN BLOOD BY AUTOMATED COUNT: 49.6 FL (ref 35.9–50)
EST. AVERAGE GLUCOSE BLD GHB EST-MCNC: 214 MG/DL
EST. AVERAGE GLUCOSE BLD GHB EST-MCNC: 395 MG/DL
GAMMA INTERFERON BACKGROUND BLD IA-ACNC: 0.02 IU/ML
GFR SERPLBLD CREATININE-BSD FMLA CKD-EPI: 27 ML/MIN/1.73 M 2
GLOBULIN SER CALC-MCNC: 2.5 G/DL (ref 1.9–3.5)
GLOBULIN SER CALC-MCNC: 2.6 G/DL (ref 1.9–3.5)
GLOBULIN SER CALC-MCNC: 2.7 G/DL (ref 1.9–3.5)
GLOBULIN SER CALC-MCNC: 3 G/DL (ref 1.9–3.5)
GLOBULIN SER CALC-MCNC: 3.1 G/DL (ref 1.9–3.5)
GLOBULIN SER CALC-MCNC: 3.5 G/DL (ref 1.9–3.5)
GLOBULIN SER CALC-MCNC: 3.7 G/DL (ref 1.9–3.5)
GLUCOSE BLD STRIP.AUTO-MCNC: 203 MG/DL (ref 65–99)
GLUCOSE BLD STRIP.AUTO-MCNC: 292 MG/DL (ref 65–99)
GLUCOSE BLD STRIP.AUTO-MCNC: 321 MG/DL (ref 65–99)
GLUCOSE BLD-MCNC: 125 MG/DL (ref 65–99)
GLUCOSE BLD-MCNC: 126 MG/DL (ref 65–99)
GLUCOSE BLD-MCNC: 133 MG/DL (ref 65–99)
GLUCOSE BLD-MCNC: 138 MG/DL (ref 65–99)
GLUCOSE BLD-MCNC: 154 MG/DL (ref 65–99)
GLUCOSE BLD-MCNC: 154 MG/DL (ref 65–99)
GLUCOSE BLD-MCNC: 162 MG/DL (ref 65–99)
GLUCOSE BLD-MCNC: 169 MG/DL (ref 65–99)
GLUCOSE BLD-MCNC: 225 MG/DL (ref 65–99)
GLUCOSE BLD-MCNC: 258 MG/DL (ref 65–99)
GLUCOSE BLD-MCNC: 263 MG/DL (ref 65–99)
GLUCOSE BLD-MCNC: 288 MG/DL (ref 65–99)
GLUCOSE BLD-MCNC: 289 MG/DL (ref 65–99)
GLUCOSE BLD-MCNC: 307 MG/DL (ref 65–99)
GLUCOSE BLD-MCNC: 44 MG/DL (ref 65–99)
GLUCOSE BLD-MCNC: 55 MG/DL (ref 65–99)
GLUCOSE BLD-MCNC: 59 MG/DL (ref 65–99)
GLUCOSE BLD-MCNC: 67 MG/DL (ref 65–99)
GLUCOSE BLD-MCNC: 78 MG/DL (ref 65–99)
GLUCOSE BLD-MCNC: 80 MG/DL (ref 65–99)
GLUCOSE SERPL-MCNC: 165 MG/DL (ref 65–99)
GLUCOSE SERPL-MCNC: 222 MG/DL (ref 65–99)
GLUCOSE SERPL-MCNC: 222 MG/DL (ref 65–99)
GLUCOSE SERPL-MCNC: 250 MG/DL (ref 65–99)
GLUCOSE SERPL-MCNC: 389 MG/DL (ref 65–99)
GLUCOSE SERPL-MCNC: 51 MG/DL (ref 65–99)
GLUCOSE SERPL-MCNC: 98 MG/DL (ref 65–99)
GLUCOSE UR STRIP.AUTO-MCNC: >=1000 MG/DL
GLUCOSE UR STRIP.AUTO-MCNC: >=1000 MG/DL
HBA1C MFR BLD: 15.4 % (ref 4–5.6)
HBA1C MFR BLD: 9.1 % (ref 4–5.6)
HCO3 BLDA-SCNC: 19 MMOL/L (ref 17–25)
HCT VFR BLD AUTO: 35.7 % (ref 37–47)
HCT VFR BLD AUTO: 35.7 % (ref 37–47)
HCT VFR BLD AUTO: 41 % (ref 37–47)
HCT VFR BLD AUTO: 41.1 % (ref 37–47)
HCT VFR BLD AUTO: 43.3 % (ref 37–47)
HCT VFR BLD AUTO: 43.6 % (ref 37–47)
HCT VFR BLD AUTO: 44 % (ref 37–47)
HGB BLD-MCNC: 11.5 G/DL (ref 12–16)
HGB BLD-MCNC: 11.5 G/DL (ref 12–16)
HGB BLD-MCNC: 13.1 G/DL (ref 12–16)
HGB BLD-MCNC: 13.4 G/DL (ref 12–16)
HGB BLD-MCNC: 13.7 G/DL (ref 12–16)
HGB BLD-MCNC: 13.7 G/DL (ref 12–16)
HGB BLD-MCNC: 13.8 G/DL (ref 12–16)
HYALINE CASTS #/AREA URNS LPF: ABNORMAL /LPF
IMM GRANULOCYTES # BLD AUTO: 0.03 K/UL (ref 0–0.11)
IMM GRANULOCYTES # BLD AUTO: 0.03 K/UL (ref 0–0.11)
IMM GRANULOCYTES # BLD AUTO: 0.04 K/UL (ref 0–0.11)
IMM GRANULOCYTES # BLD AUTO: 0.04 K/UL (ref 0–0.11)
IMM GRANULOCYTES NFR BLD AUTO: 0.3 % (ref 0–0.9)
IMM GRANULOCYTES NFR BLD AUTO: 0.4 % (ref 0–0.9)
INHALED O2 FLOW RATE: 4 L/MIN (ref 2–10)
INR PPP: 1.03 (ref 0.87–1.13)
INR PPP: 1.12 (ref 0.87–1.13)
INR PPP: 1.16 (ref 0.87–1.13)
KETONES UR STRIP.AUTO-MCNC: ABNORMAL MG/DL
KETONES UR STRIP.AUTO-MCNC: NEGATIVE MG/DL
LACTATE BLD-SCNC: 0.9 MMOL/L (ref 0.5–2)
LACTATE BLD-SCNC: 2.7 MMOL/L (ref 0.5–2)
LACTATE BLD-SCNC: 2.9 MMOL/L (ref 0.5–2)
LACTATE BLD-SCNC: 3.4 MMOL/L (ref 0.5–2)
LACTATE BLD-SCNC: 3.4 MMOL/L (ref 0.5–2)
LEUKOCYTE ESTERASE UR QL STRIP.AUTO: ABNORMAL
LEUKOCYTE ESTERASE UR QL STRIP.AUTO: NEGATIVE
LYMPHOCYTES # BLD AUTO: 0.11 K/UL (ref 1–4.8)
LYMPHOCYTES # BLD AUTO: 0.18 K/UL (ref 1–4.8)
LYMPHOCYTES # BLD AUTO: 1.38 K/UL (ref 1–4.8)
LYMPHOCYTES # BLD AUTO: 1.99 K/UL (ref 1–4.8)
LYMPHOCYTES # BLD AUTO: 2.06 K/UL (ref 1–4.8)
LYMPHOCYTES # BLD AUTO: 2.29 K/UL (ref 1–4.8)
LYMPHOCYTES NFR BLD: 0.9 % (ref 22–41)
LYMPHOCYTES NFR BLD: 1.7 % (ref 22–41)
LYMPHOCYTES NFR BLD: 13.2 % (ref 22–41)
LYMPHOCYTES NFR BLD: 20 % (ref 22–41)
LYMPHOCYTES NFR BLD: 22.7 % (ref 22–41)
LYMPHOCYTES NFR BLD: 29.5 % (ref 22–41)
M TB IFN-G BLD-IMP: NEGATIVE
M TB IFN-G CD4+ BCKGRND COR BLD-ACNC: 0.01 IU/ML
MAGNESIUM SERPL-MCNC: 1.7 MG/DL (ref 1.5–2.5)
MAGNESIUM SERPL-MCNC: 1.8 MG/DL (ref 1.5–2.5)
MAGNESIUM SERPL-MCNC: 2 MG/DL (ref 1.5–2.5)
MANUAL DIFF BLD: NORMAL
MANUAL DIFF BLD: NORMAL
MCH RBC QN AUTO: 28.8 PG (ref 27–33)
MCH RBC QN AUTO: 29 PG (ref 27–33)
MCH RBC QN AUTO: 29.2 PG (ref 27–33)
MCH RBC QN AUTO: 29.5 PG (ref 27–33)
MCH RBC QN AUTO: 29.7 PG (ref 27–33)
MCH RBC QN AUTO: 29.8 PG (ref 27–33)
MCH RBC QN AUTO: 29.9 PG (ref 27–33)
MCHC RBC AUTO-ENTMCNC: 31.1 G/DL (ref 33.6–35)
MCHC RBC AUTO-ENTMCNC: 31.6 G/DL (ref 33.6–35)
MCHC RBC AUTO-ENTMCNC: 31.7 G/DL (ref 33.6–35)
MCHC RBC AUTO-ENTMCNC: 32 G/DL (ref 33.6–35)
MCHC RBC AUTO-ENTMCNC: 32.2 G/DL (ref 33.6–35)
MCHC RBC AUTO-ENTMCNC: 32.2 G/DL (ref 33.6–35)
MCHC RBC AUTO-ENTMCNC: 32.6 G/DL (ref 33.6–35)
MCV RBC AUTO: 90.3 FL (ref 81.4–97.8)
MCV RBC AUTO: 90.7 FL (ref 81.4–97.8)
MCV RBC AUTO: 92.2 FL (ref 81.4–97.8)
MCV RBC AUTO: 92.3 FL (ref 81.4–97.8)
MCV RBC AUTO: 92.4 FL (ref 81.4–97.8)
MCV RBC AUTO: 92.5 FL (ref 81.4–97.8)
MCV RBC AUTO: 94.6 FL (ref 81.4–97.8)
MICRO URNS: ABNORMAL
MICRO URNS: ABNORMAL
MICROCYTES BLD QL SMEAR: ABNORMAL
MITOGEN IGNF BCKGRD COR BLD-ACNC: >10 IU/ML
MONOCYTES # BLD AUTO: 0.33 K/UL (ref 0–0.85)
MONOCYTES # BLD AUTO: 0.46 K/UL (ref 0–0.85)
MONOCYTES # BLD AUTO: 0.65 K/UL (ref 0–0.85)
MONOCYTES # BLD AUTO: 0.75 K/UL (ref 0–0.85)
MONOCYTES # BLD AUTO: 0.78 K/UL (ref 0–0.85)
MONOCYTES # BLD AUTO: 0.85 K/UL (ref 0–0.85)
MONOCYTES NFR BLD AUTO: 2.6 % (ref 0–13.4)
MONOCYTES NFR BLD AUTO: 4.4 % (ref 0–13.4)
MONOCYTES NFR BLD AUTO: 7.3 % (ref 0–13.4)
MONOCYTES NFR BLD AUTO: 7.5 % (ref 0–13.4)
MONOCYTES NFR BLD AUTO: 8.4 % (ref 0–13.4)
MONOCYTES NFR BLD AUTO: 9.7 % (ref 0–13.4)
MORPHOLOGY BLD-IMP: NORMAL
MYELOCYTES NFR BLD MANUAL: 0.9 %
NEUTROPHILS # BLD AUTO: 12.05 K/UL (ref 2–7.15)
NEUTROPHILS # BLD AUTO: 4.44 K/UL (ref 2–7.15)
NEUTROPHILS # BLD AUTO: 5.62 K/UL (ref 2–7.15)
NEUTROPHILS # BLD AUTO: 7.11 K/UL (ref 2–7.15)
NEUTROPHILS # BLD AUTO: 8.1 K/UL (ref 2–7.15)
NEUTROPHILS # BLD AUTO: 9.77 K/UL (ref 2–7.15)
NEUTROPHILS NFR BLD: 57.2 % (ref 44–72)
NEUTROPHILS NFR BLD: 64.1 % (ref 44–72)
NEUTROPHILS NFR BLD: 69.2 % (ref 44–72)
NEUTROPHILS NFR BLD: 77.7 % (ref 44–72)
NEUTROPHILS NFR BLD: 93 % (ref 44–72)
NEUTROPHILS NFR BLD: 93.9 % (ref 44–72)
NEUTS BAND NFR BLD MANUAL: 2.6 % (ref 0–10)
NITRITE UR QL STRIP.AUTO: POSITIVE
NITRITE UR QL STRIP.AUTO: POSITIVE
NRBC # BLD AUTO: 0 K/UL
NRBC # BLD AUTO: 0.06 K/UL
NRBC BLD-RTO: 0 /100 WBC
NRBC BLD-RTO: 0.6 /100 WBC
NT-PROBNP SERPL IA-MCNC: 1882 PG/ML (ref 0–125)
OVALOCYTES BLD QL SMEAR: NORMAL
PCO2 BLDA: 33.9 MMHG (ref 26–37)
PCO2 TEMP ADJ BLDA: 35.4 MMHG (ref 26–37)
PH BLDA: 7.38 [PH] (ref 7.4–7.5)
PH TEMP ADJ BLDA: 7.36 [PH] (ref 7.4–7.5)
PH UR STRIP.AUTO: 5 [PH] (ref 5–8)
PH UR STRIP.AUTO: 5.5 [PH] (ref 5–8)
PHOSPHATE SERPL-MCNC: 2.8 MG/DL (ref 2.5–4.5)
PHOSPHATE SERPL-MCNC: 3.2 MG/DL (ref 2.5–4.5)
PLATELET # BLD AUTO: 101 K/UL (ref 164–446)
PLATELET # BLD AUTO: 119 K/UL (ref 164–446)
PLATELET # BLD AUTO: 130 K/UL (ref 164–446)
PLATELET # BLD AUTO: 145 K/UL (ref 164–446)
PLATELET # BLD AUTO: 157 K/UL (ref 164–446)
PLATELET # BLD AUTO: 169 K/UL (ref 164–446)
PLATELET # BLD AUTO: 97 K/UL (ref 164–446)
PLATELET BLD QL SMEAR: NORMAL
PMV BLD AUTO: 11 FL (ref 9–12.9)
PMV BLD AUTO: 11.3 FL (ref 9–12.9)
PMV BLD AUTO: 11.3 FL (ref 9–12.9)
PMV BLD AUTO: 11.5 FL (ref 9–12.9)
PMV BLD AUTO: 11.8 FL (ref 9–12.9)
PMV BLD AUTO: 12.8 FL (ref 9–12.9)
PMV BLD AUTO: 12.8 FL (ref 9–12.9)
PO2 BLDA: 64.2 MMHG (ref 64–87)
PO2 TEMP ADJ BLDA: 68.8 MMHG (ref 64–87)
POIKILOCYTOSIS BLD QL SMEAR: NORMAL
POTASSIUM SERPL-SCNC: 3.8 MMOL/L (ref 3.6–5.5)
POTASSIUM SERPL-SCNC: 3.8 MMOL/L (ref 3.6–5.5)
POTASSIUM SERPL-SCNC: 4 MMOL/L (ref 3.6–5.5)
POTASSIUM SERPL-SCNC: 4 MMOL/L (ref 3.6–5.5)
POTASSIUM SERPL-SCNC: 4.2 MMOL/L (ref 3.6–5.5)
POTASSIUM SERPL-SCNC: 4.3 MMOL/L (ref 3.6–5.5)
POTASSIUM SERPL-SCNC: 4.7 MMOL/L (ref 3.6–5.5)
PROT SERPL-MCNC: 5.3 G/DL (ref 6–8.2)
PROT SERPL-MCNC: 5.5 G/DL (ref 6–8.2)
PROT SERPL-MCNC: 5.6 G/DL (ref 6–8.2)
PROT SERPL-MCNC: 5.6 G/DL (ref 6–8.2)
PROT SERPL-MCNC: 6 G/DL (ref 6–8.2)
PROT SERPL-MCNC: 6.1 G/DL (ref 6–8.2)
PROT SERPL-MCNC: 7.1 G/DL (ref 6–8.2)
PROT UR QL STRIP: 30 MG/DL
PROT UR QL STRIP: NEGATIVE MG/DL
PROTHROMBIN TIME: 13.2 SEC (ref 12–14.6)
PROTHROMBIN TIME: 14.1 SEC (ref 12–14.6)
PROTHROMBIN TIME: 14.5 SEC (ref 12–14.6)
QFT TB2 - NIL TBQ2: 0 IU/ML
RBC # BLD AUTO: 3.86 M/UL (ref 4.2–5.4)
RBC # BLD AUTO: 3.87 M/UL (ref 4.2–5.4)
RBC # BLD AUTO: 4.52 M/UL (ref 4.2–5.4)
RBC # BLD AUTO: 4.55 M/UL (ref 4.2–5.4)
RBC # BLD AUTO: 4.61 M/UL (ref 4.2–5.4)
RBC # BLD AUTO: 4.69 M/UL (ref 4.2–5.4)
RBC # BLD AUTO: 4.76 M/UL (ref 4.2–5.4)
RBC # URNS HPF: ABNORMAL /HPF
RBC BLD AUTO: NORMAL
RBC BLD AUTO: PRESENT
RBC BLD AUTO: PRESENT
RBC UR QL AUTO: ABNORMAL
RBC UR QL AUTO: NEGATIVE
SAO2 % BLDA: 90.7 % (ref 93–99)
SARS-COV+SARS-COV-2 AG RESP QL IA.RAPID: NOTDETECTED
SARS-COV-2 RNA RESP QL NAA+PROBE: NOTDETECTED
SODIUM SERPL-SCNC: 128 MMOL/L (ref 135–145)
SODIUM SERPL-SCNC: 132 MMOL/L (ref 135–145)
SODIUM SERPL-SCNC: 135 MMOL/L (ref 135–145)
SODIUM SERPL-SCNC: 135 MMOL/L (ref 135–145)
SODIUM SERPL-SCNC: 136 MMOL/L (ref 135–145)
SODIUM SERPL-SCNC: 139 MMOL/L (ref 135–145)
SODIUM SERPL-SCNC: 141 MMOL/L (ref 135–145)
SP GR UR STRIP.AUTO: 1.02
SP GR UR STRIP.AUTO: 1.02
SPECIMEN SOURCE: NORMAL
SPECIMEN SOURCE: NORMAL
SPHEROCYTES BLD QL SMEAR: NORMAL
TROPONIN T SERPL-MCNC: 23 NG/L (ref 6–19)
TROPONIN T SERPL-MCNC: 25 NG/L (ref 6–19)
TROPONIN T SERPL-MCNC: 290 NG/L (ref 6–19)
TROPONIN T SERPL-MCNC: 339 NG/L (ref 6–19)
UFH PPP CHRO-ACNC: 0.36 IU/ML (ref 0–9999)
UFH PPP CHRO-ACNC: <0.1 IU/ML (ref 0–9999)
UROBILINOGEN UR STRIP.AUTO-MCNC: 0.2 MG/DL
UROBILINOGEN UR STRIP.AUTO-MCNC: 0.2 MG/DL
WBC # BLD AUTO: 10.3 K/UL (ref 4.8–10.8)
WBC # BLD AUTO: 10.4 K/UL (ref 4.8–10.8)
WBC # BLD AUTO: 10.4 K/UL (ref 4.8–10.8)
WBC # BLD AUTO: 10.5 K/UL (ref 4.8–10.8)
WBC # BLD AUTO: 12.6 K/UL (ref 4.8–10.8)
WBC # BLD AUTO: 7.8 K/UL (ref 4.8–10.8)
WBC # BLD AUTO: 8.8 K/UL (ref 4.8–10.8)
WBC #/AREA URNS HPF: ABNORMAL /HPF
WBC #/AREA URNS HPF: ABNORMAL /HPF
YEAST BUDDING URNS QL: PRESENT /HPF
YEAST BUDDING URNS QL: PRESENT /HPF

## 2022-01-01 PROCEDURE — 700102 HCHG RX REV CODE 250 W/ 637 OVERRIDE(OP): Performed by: STUDENT IN AN ORGANIZED HEALTH CARE EDUCATION/TRAINING PROGRAM

## 2022-01-01 PROCEDURE — 93925 LOWER EXTREMITY STUDY: CPT | Mod: 26 | Performed by: INTERNAL MEDICINE

## 2022-01-01 PROCEDURE — 80053 COMPREHEN METABOLIC PANEL: CPT

## 2022-01-01 PROCEDURE — 99285 EMERGENCY DEPT VISIT HI MDM: CPT

## 2022-01-01 PROCEDURE — A9270 NON-COVERED ITEM OR SERVICE: HCPCS | Performed by: STUDENT IN AN ORGANIZED HEALTH CARE EDUCATION/TRAINING PROGRAM

## 2022-01-01 PROCEDURE — 700111 HCHG RX REV CODE 636 W/ 250 OVERRIDE (IP): Performed by: STUDENT IN AN ORGANIZED HEALTH CARE EDUCATION/TRAINING PROGRAM

## 2022-01-01 PROCEDURE — 72125 CT NECK SPINE W/O DYE: CPT

## 2022-01-01 PROCEDURE — 85520 HEPARIN ASSAY: CPT | Mod: 91

## 2022-01-01 PROCEDURE — 73030 X-RAY EXAM OF SHOULDER: CPT | Mod: RT

## 2022-01-01 PROCEDURE — 96375 TX/PRO/DX INJ NEW DRUG ADDON: CPT

## 2022-01-01 PROCEDURE — 93005 ELECTROCARDIOGRAM TRACING: CPT | Performed by: EMERGENCY MEDICINE

## 2022-01-01 PROCEDURE — 770001 HCHG ROOM/CARE - MED/SURG/GYN PRIV*

## 2022-01-01 PROCEDURE — 84484 ASSAY OF TROPONIN QUANT: CPT

## 2022-01-01 PROCEDURE — 73590 X-RAY EXAM OF LOWER LEG: CPT | Mod: RT

## 2022-01-01 PROCEDURE — A9270 NON-COVERED ITEM OR SERVICE: HCPCS | Performed by: HOSPITALIST

## 2022-01-01 PROCEDURE — A9270 NON-COVERED ITEM OR SERVICE: HCPCS

## 2022-01-01 PROCEDURE — 97166 OT EVAL MOD COMPLEX 45 MIN: CPT

## 2022-01-01 PROCEDURE — 90662 IIV NO PRSV INCREASED AG IM: CPT | Performed by: FAMILY MEDICINE

## 2022-01-01 PROCEDURE — 3E02340 INTRODUCTION OF INFLUENZA VACCINE INTO MUSCLE, PERCUTANEOUS APPROACH: ICD-10-PCS | Performed by: FAMILY MEDICINE

## 2022-01-01 PROCEDURE — 72125 CT NECK SPINE W/O DYE: CPT | Mod: ME

## 2022-01-01 PROCEDURE — 85730 THROMBOPLASTIN TIME PARTIAL: CPT

## 2022-01-01 PROCEDURE — 85610 PROTHROMBIN TIME: CPT

## 2022-01-01 PROCEDURE — 82962 GLUCOSE BLOOD TEST: CPT | Mod: 91

## 2022-01-01 PROCEDURE — 83605 ASSAY OF LACTIC ACID: CPT

## 2022-01-01 PROCEDURE — 87181 SC STD AGAR DILUTION PER AGT: CPT

## 2022-01-01 PROCEDURE — 99232 SBSQ HOSP IP/OBS MODERATE 35: CPT | Mod: GC | Performed by: FAMILY MEDICINE

## 2022-01-01 PROCEDURE — 87040 BLOOD CULTURE FOR BACTERIA: CPT

## 2022-01-01 PROCEDURE — 99222 1ST HOSP IP/OBS MODERATE 55: CPT | Performed by: SURGERY

## 2022-01-01 PROCEDURE — 83735 ASSAY OF MAGNESIUM: CPT

## 2022-01-01 PROCEDURE — 700105 HCHG RX REV CODE 258: Performed by: EMERGENCY MEDICINE

## 2022-01-01 PROCEDURE — 96372 THER/PROPH/DIAG INJ SC/IM: CPT

## 2022-01-01 PROCEDURE — 85025 COMPLETE CBC W/AUTO DIFF WBC: CPT

## 2022-01-01 PROCEDURE — 36415 COLL VENOUS BLD VENIPUNCTURE: CPT

## 2022-01-01 PROCEDURE — 90471 IMMUNIZATION ADMIN: CPT

## 2022-01-01 PROCEDURE — 86480 TB TEST CELL IMMUN MEASURE: CPT

## 2022-01-01 PROCEDURE — 700117 HCHG RX CONTRAST REV CODE 255: Performed by: EMERGENCY MEDICINE

## 2022-01-01 PROCEDURE — 770020 HCHG ROOM/CARE - TELE (206)

## 2022-01-01 PROCEDURE — U0005 INFEC AGEN DETEC AMPLI PROBE: HCPCS

## 2022-01-01 PROCEDURE — 99233 SBSQ HOSP IP/OBS HIGH 50: CPT | Performed by: STUDENT IN AN ORGANIZED HEALTH CARE EDUCATION/TRAINING PROGRAM

## 2022-01-01 PROCEDURE — 80053 COMPREHEN METABOLIC PANEL: CPT | Mod: 91

## 2022-01-01 PROCEDURE — 85025 COMPLETE CBC W/AUTO DIFF WBC: CPT | Mod: 91

## 2022-01-01 PROCEDURE — 700102 HCHG RX REV CODE 250 W/ 637 OVERRIDE(OP)

## 2022-01-01 PROCEDURE — 70450 CT HEAD/BRAIN W/O DYE: CPT

## 2022-01-01 PROCEDURE — 85027 COMPLETE CBC AUTOMATED: CPT

## 2022-01-01 PROCEDURE — 99406 BEHAV CHNG SMOKING 3-10 MIN: CPT

## 2022-01-01 PROCEDURE — 99223 1ST HOSP IP/OBS HIGH 75: CPT | Mod: AI | Performed by: STUDENT IN AN ORGANIZED HEALTH CARE EDUCATION/TRAINING PROGRAM

## 2022-01-01 PROCEDURE — 99221 1ST HOSP IP/OBS SF/LOW 40: CPT | Performed by: SURGERY

## 2022-01-01 PROCEDURE — 82962 GLUCOSE BLOOD TEST: CPT

## 2022-01-01 PROCEDURE — 84100 ASSAY OF PHOSPHORUS: CPT

## 2022-01-01 PROCEDURE — 83880 ASSAY OF NATRIURETIC PEPTIDE: CPT

## 2022-01-01 PROCEDURE — 73030 X-RAY EXAM OF SHOULDER: CPT | Mod: LT

## 2022-01-01 PROCEDURE — 700111 HCHG RX REV CODE 636 W/ 250 OVERRIDE (IP): Performed by: FAMILY MEDICINE

## 2022-01-01 PROCEDURE — 93971 EXTREMITY STUDY: CPT | Mod: 26,RT | Performed by: INTERNAL MEDICINE

## 2022-01-01 PROCEDURE — 700111 HCHG RX REV CODE 636 W/ 250 OVERRIDE (IP): Performed by: HOSPITALIST

## 2022-01-01 PROCEDURE — 93971 EXTREMITY STUDY: CPT | Mod: RT

## 2022-01-01 PROCEDURE — 99222 1ST HOSP IP/OBS MODERATE 55: CPT | Mod: AI,GC | Performed by: FAMILY MEDICINE

## 2022-01-01 PROCEDURE — 70450 CT HEAD/BRAIN W/O DYE: CPT | Mod: ME

## 2022-01-01 PROCEDURE — 96365 THER/PROPH/DIAG IV INF INIT: CPT

## 2022-01-01 PROCEDURE — 82550 ASSAY OF CK (CPK): CPT | Mod: 91

## 2022-01-01 PROCEDURE — 71045 X-RAY EXAM CHEST 1 VIEW: CPT

## 2022-01-01 PROCEDURE — 97162 PT EVAL MOD COMPLEX 30 MIN: CPT

## 2022-01-01 PROCEDURE — 82803 BLOOD GASES ANY COMBINATION: CPT

## 2022-01-01 PROCEDURE — 87077 CULTURE AEROBIC IDENTIFY: CPT | Mod: 91

## 2022-01-01 PROCEDURE — 85007 BL SMEAR W/DIFF WBC COUNT: CPT | Mod: 91

## 2022-01-01 PROCEDURE — 70498 CT ANGIOGRAPHY NECK: CPT | Mod: MG

## 2022-01-01 PROCEDURE — 93925 LOWER EXTREMITY STUDY: CPT

## 2022-01-01 PROCEDURE — 83036 HEMOGLOBIN GLYCOSYLATED A1C: CPT

## 2022-01-01 PROCEDURE — 96376 TX/PRO/DX INJ SAME DRUG ADON: CPT

## 2022-01-01 PROCEDURE — U0003 INFECTIOUS AGENT DETECTION BY NUCLEIC ACID (DNA OR RNA); SEVERE ACUTE RESPIRATORY SYNDROME CORONAVIRUS 2 (SARS-COV-2) (CORONAVIRUS DISEASE [COVID-19]), AMPLIFIED PROBE TECHNIQUE, MAKING USE OF HIGH THROUGHPUT TECHNOLOGIES AS DESCRIBED BY CMS-2020-01-R: HCPCS

## 2022-01-01 PROCEDURE — 81001 URINALYSIS AUTO W/SCOPE: CPT

## 2022-01-01 PROCEDURE — 99291 CRITICAL CARE FIRST HOUR: CPT | Performed by: STUDENT IN AN ORGANIZED HEALTH CARE EDUCATION/TRAINING PROGRAM

## 2022-01-01 PROCEDURE — 87426 SARSCOV CORONAVIRUS AG IA: CPT

## 2022-01-01 PROCEDURE — 94664 DEMO&/EVAL PT USE INHALER: CPT

## 2022-01-01 PROCEDURE — 700102 HCHG RX REV CODE 250 W/ 637 OVERRIDE(OP): Performed by: HOSPITALIST

## 2022-01-01 PROCEDURE — 73020 X-RAY EXAM OF SHOULDER: CPT | Mod: RT

## 2022-01-01 PROCEDURE — 700111 HCHG RX REV CODE 636 W/ 250 OVERRIDE (IP): Performed by: EMERGENCY MEDICINE

## 2022-01-01 RX ORDER — CLOPIDOGREL BISULFATE 75 MG/1
75 TABLET ORAL DAILY
Status: DISCONTINUED | OUTPATIENT
Start: 2022-01-01 | End: 2022-01-01 | Stop reason: HOSPADM

## 2022-01-01 RX ORDER — ONDANSETRON 2 MG/ML
4 INJECTION INTRAMUSCULAR; INTRAVENOUS ONCE
Status: COMPLETED | OUTPATIENT
Start: 2022-01-01 | End: 2022-01-01

## 2022-01-01 RX ORDER — GABAPENTIN 400 MG/1
400 CAPSULE ORAL 3 TIMES DAILY
Status: DISCONTINUED | OUTPATIENT
Start: 2022-01-01 | End: 2022-01-01

## 2022-01-01 RX ORDER — ONDANSETRON 4 MG/1
8 TABLET, ORALLY DISINTEGRATING ORAL EVERY 8 HOURS PRN
Status: DISCONTINUED | OUTPATIENT
Start: 2022-01-01 | End: 2022-01-01 | Stop reason: HOSPADM

## 2022-01-01 RX ORDER — ACETAMINOPHEN 325 MG/1
650 TABLET ORAL EVERY 6 HOURS PRN
Status: DISCONTINUED | OUTPATIENT
Start: 2022-01-01 | End: 2022-01-01

## 2022-01-01 RX ORDER — CARVEDILOL 6.25 MG/1
12.5 TABLET ORAL 2 TIMES DAILY WITH MEALS
Status: DISCONTINUED | OUTPATIENT
Start: 2022-01-01 | End: 2022-01-01 | Stop reason: HOSPADM

## 2022-01-01 RX ORDER — BISACODYL 10 MG
10 SUPPOSITORY, RECTAL RECTAL
Status: DISCONTINUED | OUTPATIENT
Start: 2022-01-01 | End: 2022-01-01

## 2022-01-01 RX ORDER — HEPARIN SODIUM 1000 [USP'U]/ML
40 INJECTION, SOLUTION INTRAVENOUS; SUBCUTANEOUS PRN
Status: DISCONTINUED | OUTPATIENT
Start: 2022-01-01 | End: 2022-01-01

## 2022-01-01 RX ORDER — HEPARIN SODIUM 5000 [USP'U]/100ML
0-30 INJECTION, SOLUTION INTRAVENOUS CONTINUOUS
Status: DISCONTINUED | OUTPATIENT
Start: 2022-01-01 | End: 2022-01-01

## 2022-01-01 RX ORDER — BUDESONIDE AND FORMOTEROL FUMARATE DIHYDRATE 160; 4.5 UG/1; UG/1
2 AEROSOL RESPIRATORY (INHALATION) 2 TIMES DAILY
Status: DISCONTINUED | OUTPATIENT
Start: 2022-01-01 | End: 2022-01-01

## 2022-01-01 RX ORDER — HEPARIN SODIUM 1000 [USP'U]/ML
80 INJECTION, SOLUTION INTRAVENOUS; SUBCUTANEOUS ONCE
Status: COMPLETED | OUTPATIENT
Start: 2022-01-01 | End: 2022-01-01

## 2022-01-01 RX ORDER — ACETAMINOPHEN 325 MG/1
650 TABLET ORAL EVERY 6 HOURS PRN
Status: DISCONTINUED | OUTPATIENT
Start: 2022-01-01 | End: 2022-01-01 | Stop reason: HOSPADM

## 2022-01-01 RX ORDER — GABAPENTIN 300 MG/1
300 CAPSULE ORAL 3 TIMES DAILY
Status: DISCONTINUED | OUTPATIENT
Start: 2022-01-01 | End: 2022-01-01

## 2022-01-01 RX ORDER — IBUPROFEN 800 MG/1
800 TABLET ORAL EVERY 6 HOURS PRN
Status: DISCONTINUED | OUTPATIENT
Start: 2022-01-01 | End: 2022-01-01 | Stop reason: HOSPADM

## 2022-01-01 RX ORDER — INSULIN LISPRO 100 [IU]/ML
1-6 INJECTION, SOLUTION INTRAVENOUS; SUBCUTANEOUS
Status: DISCONTINUED | OUTPATIENT
Start: 2022-01-01 | End: 2022-01-01

## 2022-01-01 RX ORDER — DEXTROSE MONOHYDRATE 25 G/50ML
50 INJECTION, SOLUTION INTRAVENOUS
Status: DISCONTINUED | OUTPATIENT
Start: 2022-01-01 | End: 2022-01-01

## 2022-01-01 RX ORDER — LABETALOL HYDROCHLORIDE 5 MG/ML
10-20 INJECTION, SOLUTION INTRAVENOUS EVERY 6 HOURS PRN
Status: DISCONTINUED | OUTPATIENT
Start: 2022-01-01 | End: 2022-01-01

## 2022-01-01 RX ORDER — SODIUM CHLORIDE, SODIUM LACTATE, POTASSIUM CHLORIDE, CALCIUM CHLORIDE 600; 310; 30; 20 MG/100ML; MG/100ML; MG/100ML; MG/100ML
INJECTION, SOLUTION INTRAVENOUS CONTINUOUS
Status: DISCONTINUED | OUTPATIENT
Start: 2022-01-01 | End: 2022-01-01

## 2022-01-01 RX ORDER — ROPINIROLE 0.5 MG/1
0.5 TABLET, FILM COATED ORAL
Qty: 60 TABLET | Refills: 0 | Status: SHIPPED | OUTPATIENT
Start: 2022-01-01 | End: 2022-01-01

## 2022-01-01 RX ORDER — AMIODARONE HYDROCHLORIDE 200 MG/1
200 TABLET ORAL DAILY
Status: DISCONTINUED | OUTPATIENT
Start: 2022-01-01 | End: 2022-01-01 | Stop reason: HOSPADM

## 2022-01-01 RX ORDER — LORAZEPAM 2 MG/ML
1 INJECTION INTRAMUSCULAR
Status: DISCONTINUED | OUTPATIENT
Start: 2022-01-01 | End: 2022-01-01 | Stop reason: HOSPADM

## 2022-01-01 RX ORDER — DEXTROSE MONOHYDRATE 25 G/50ML
25 INJECTION, SOLUTION INTRAVENOUS
Status: DISCONTINUED | OUTPATIENT
Start: 2022-01-01 | End: 2022-01-01 | Stop reason: HOSPADM

## 2022-01-01 RX ORDER — SODIUM CHLORIDE, SODIUM LACTATE, POTASSIUM CHLORIDE, AND CALCIUM CHLORIDE .6; .31; .03; .02 G/100ML; G/100ML; G/100ML; G/100ML
1000 INJECTION, SOLUTION INTRAVENOUS ONCE
Status: DISCONTINUED | OUTPATIENT
Start: 2022-01-01 | End: 2022-01-01

## 2022-01-01 RX ORDER — LORAZEPAM 2 MG/ML
1 CONCENTRATE ORAL
Status: DISCONTINUED | OUTPATIENT
Start: 2022-01-01 | End: 2022-01-01 | Stop reason: HOSPADM

## 2022-01-01 RX ORDER — NICOTINE 21 MG/24HR
14 PATCH, TRANSDERMAL 24 HOURS TRANSDERMAL
Status: DISCONTINUED | OUTPATIENT
Start: 2022-01-01 | End: 2022-01-01 | Stop reason: HOSPADM

## 2022-01-01 RX ORDER — ROPINIROLE 0.5 MG/1
0.25 TABLET, FILM COATED ORAL
Status: DISCONTINUED | OUTPATIENT
Start: 2022-01-01 | End: 2022-01-01

## 2022-01-01 RX ORDER — FLUCONAZOLE 100 MG/1
200 TABLET ORAL DAILY
Status: DISCONTINUED | OUTPATIENT
Start: 2022-01-01 | End: 2022-01-01

## 2022-01-01 RX ORDER — CARVEDILOL 12.5 MG/1
12.5 TABLET ORAL 2 TIMES DAILY WITH MEALS
Status: DISCONTINUED | OUTPATIENT
Start: 2022-01-01 | End: 2022-01-01

## 2022-01-01 RX ORDER — ATROPINE SULFATE 10 MG/ML
2 SOLUTION/ DROPS OPHTHALMIC EVERY 4 HOURS PRN
Status: DISCONTINUED | OUTPATIENT
Start: 2022-01-01 | End: 2022-01-01 | Stop reason: HOSPADM

## 2022-01-01 RX ORDER — DULOXETIN HYDROCHLORIDE 20 MG/1
40 CAPSULE, DELAYED RELEASE ORAL DAILY
Status: DISCONTINUED | OUTPATIENT
Start: 2022-01-01 | End: 2022-01-01 | Stop reason: HOSPADM

## 2022-01-01 RX ORDER — ONDANSETRON 2 MG/ML
4 INJECTION INTRAMUSCULAR; INTRAVENOUS EVERY 4 HOURS PRN
Status: DISCONTINUED | OUTPATIENT
Start: 2022-01-01 | End: 2022-01-01

## 2022-01-01 RX ORDER — LABETALOL HYDROCHLORIDE 5 MG/ML
10-20 INJECTION, SOLUTION INTRAVENOUS EVERY 4 HOURS PRN
Status: DISCONTINUED | OUTPATIENT
Start: 2022-01-01 | End: 2022-01-01 | Stop reason: HOSPADM

## 2022-01-01 RX ORDER — POLYETHYLENE GLYCOL 3350 17 G/17G
1 POWDER, FOR SOLUTION ORAL
Status: DISCONTINUED | OUTPATIENT
Start: 2022-01-01 | End: 2022-01-01

## 2022-01-01 RX ORDER — ASPIRIN 81 MG/1
81 TABLET, CHEWABLE ORAL DAILY
Status: DISCONTINUED | OUTPATIENT
Start: 2022-01-01 | End: 2022-01-01 | Stop reason: HOSPADM

## 2022-01-01 RX ORDER — ROSUVASTATIN CALCIUM 20 MG/1
40 TABLET, COATED ORAL EVERY EVENING
Status: DISCONTINUED | OUTPATIENT
Start: 2022-01-01 | End: 2022-01-01

## 2022-01-01 RX ORDER — MAGNESIUM SULFATE 1 G/100ML
1 INJECTION INTRAVENOUS ONCE
Status: COMPLETED | OUTPATIENT
Start: 2022-01-01 | End: 2022-01-01

## 2022-01-01 RX ORDER — DULOXETIN HYDROCHLORIDE 20 MG/1
20 CAPSULE, DELAYED RELEASE ORAL DAILY
Status: DISCONTINUED | OUTPATIENT
Start: 2022-01-01 | End: 2022-01-01

## 2022-01-01 RX ORDER — AMIODARONE HYDROCHLORIDE 200 MG/1
200 TABLET ORAL DAILY
Status: DISCONTINUED | OUTPATIENT
Start: 2022-01-01 | End: 2022-01-01

## 2022-01-01 RX ORDER — DILTIAZEM HYDROCHLORIDE 5 MG/ML
20 INJECTION INTRAVENOUS ONCE
Status: COMPLETED | OUTPATIENT
Start: 2022-01-01 | End: 2022-01-01

## 2022-01-01 RX ORDER — ACETAMINOPHEN 325 MG/1
650 TABLET ORAL EVERY 6 HOURS
Status: DISCONTINUED | OUTPATIENT
Start: 2022-01-01 | End: 2022-01-01 | Stop reason: HOSPADM

## 2022-01-01 RX ORDER — GABAPENTIN 300 MG/1
300 CAPSULE ORAL 3 TIMES DAILY
Status: DISCONTINUED | OUTPATIENT
Start: 2022-01-01 | End: 2022-01-01 | Stop reason: HOSPADM

## 2022-01-01 RX ORDER — HYDROMORPHONE HYDROCHLORIDE 2 MG/ML
4 INJECTION, SOLUTION INTRAMUSCULAR; INTRAVENOUS; SUBCUTANEOUS
Status: DISCONTINUED | OUTPATIENT
Start: 2022-01-01 | End: 2022-01-01 | Stop reason: HOSPADM

## 2022-01-01 RX ORDER — AMOXICILLIN 250 MG
2 CAPSULE ORAL 2 TIMES DAILY
Status: DISCONTINUED | OUTPATIENT
Start: 2022-01-01 | End: 2022-01-01 | Stop reason: HOSPADM

## 2022-01-01 RX ORDER — DEXTROSE MONOHYDRATE 25 G/50ML
50 INJECTION, SOLUTION INTRAVENOUS
Status: DISCONTINUED | OUTPATIENT
Start: 2022-01-01 | End: 2022-01-01 | Stop reason: HOSPADM

## 2022-01-01 RX ORDER — ROPINIROLE 0.5 MG/1
0.5 TABLET, FILM COATED ORAL
Status: DISCONTINUED | OUTPATIENT
Start: 2022-01-01 | End: 2022-01-01 | Stop reason: HOSPADM

## 2022-01-01 RX ORDER — SODIUM CHLORIDE 9 MG/ML
1000 INJECTION, SOLUTION INTRAVENOUS ONCE
Status: COMPLETED | OUTPATIENT
Start: 2022-01-01 | End: 2022-01-01

## 2022-01-01 RX ORDER — ROPINIROLE 0.5 MG/1
0.25 TABLET, FILM COATED ORAL ONCE
Status: COMPLETED | OUTPATIENT
Start: 2022-01-01 | End: 2022-01-01

## 2022-01-01 RX ORDER — AMOXICILLIN 250 MG
2 CAPSULE ORAL 2 TIMES DAILY
Status: DISCONTINUED | OUTPATIENT
Start: 2022-01-01 | End: 2022-01-01

## 2022-01-01 RX ORDER — KETOROLAC TROMETHAMINE 30 MG/ML
15 INJECTION, SOLUTION INTRAMUSCULAR; INTRAVENOUS EVERY 6 HOURS PRN
Status: DISCONTINUED | OUTPATIENT
Start: 2022-01-01 | End: 2022-01-01 | Stop reason: HOSPADM

## 2022-01-01 RX ORDER — CEFUROXIME AXETIL 250 MG/1
250 TABLET ORAL EVERY 12 HOURS
Qty: 6 TABLET | Refills: 0 | Status: SHIPPED | OUTPATIENT
Start: 2022-01-01 | End: 2022-01-01

## 2022-01-01 RX ORDER — POLYVINYL ALCOHOL 14 MG/ML
2 SOLUTION/ DROPS OPHTHALMIC EVERY 6 HOURS PRN
Status: DISCONTINUED | OUTPATIENT
Start: 2022-01-01 | End: 2022-01-01 | Stop reason: HOSPADM

## 2022-01-01 RX ORDER — HYDROMORPHONE HYDROCHLORIDE 2 MG/ML
2 INJECTION, SOLUTION INTRAMUSCULAR; INTRAVENOUS; SUBCUTANEOUS
Status: DISCONTINUED | OUTPATIENT
Start: 2022-01-01 | End: 2022-01-01 | Stop reason: HOSPADM

## 2022-01-01 RX ORDER — BUDESONIDE AND FORMOTEROL FUMARATE DIHYDRATE 160; 4.5 UG/1; UG/1
2 AEROSOL RESPIRATORY (INHALATION) 2 TIMES DAILY
Status: DISCONTINUED | OUTPATIENT
Start: 2022-01-01 | End: 2022-01-01 | Stop reason: HOSPADM

## 2022-01-01 RX ORDER — HYDROCHLOROTHIAZIDE 12.5 MG/1
12.5 TABLET ORAL
Status: DISCONTINUED | OUTPATIENT
Start: 2022-01-01 | End: 2022-01-01 | Stop reason: HOSPADM

## 2022-01-01 RX ORDER — ONDANSETRON 2 MG/ML
8 INJECTION INTRAMUSCULAR; INTRAVENOUS EVERY 8 HOURS PRN
Status: DISCONTINUED | OUTPATIENT
Start: 2022-01-01 | End: 2022-01-01 | Stop reason: HOSPADM

## 2022-01-01 RX ORDER — BISACODYL 10 MG
10 SUPPOSITORY, RECTAL RECTAL
Status: DISCONTINUED | OUTPATIENT
Start: 2022-01-01 | End: 2022-01-01 | Stop reason: HOSPADM

## 2022-01-01 RX ORDER — OMEPRAZOLE 20 MG/1
20 CAPSULE, DELAYED RELEASE ORAL DAILY
Status: DISCONTINUED | OUTPATIENT
Start: 2022-01-01 | End: 2022-01-01

## 2022-01-01 RX ORDER — FUROSEMIDE 20 MG/1
20 TABLET ORAL DAILY
Status: DISCONTINUED | OUTPATIENT
Start: 2022-01-01 | End: 2022-01-01 | Stop reason: HOSPADM

## 2022-01-01 RX ORDER — ONDANSETRON 4 MG/1
4 TABLET, ORALLY DISINTEGRATING ORAL EVERY 4 HOURS PRN
Status: DISCONTINUED | OUTPATIENT
Start: 2022-01-01 | End: 2022-01-01

## 2022-01-01 RX ORDER — DULOXETINE 40 MG/1
40 CAPSULE, DELAYED RELEASE ORAL DAILY
Qty: 60 CAPSULE | Refills: 0 | Status: SHIPPED | OUTPATIENT
Start: 2022-01-01 | End: 2022-01-01

## 2022-01-01 RX ORDER — CEFUROXIME AXETIL 250 MG/1
250 TABLET ORAL EVERY 12 HOURS
Status: DISCONTINUED | OUTPATIENT
Start: 2022-01-01 | End: 2022-01-01 | Stop reason: HOSPADM

## 2022-01-01 RX ORDER — SCOLOPAMINE TRANSDERMAL SYSTEM 1 MG/1
1 PATCH, EXTENDED RELEASE TRANSDERMAL
Status: DISCONTINUED | OUTPATIENT
Start: 2022-01-01 | End: 2022-01-01 | Stop reason: HOSPADM

## 2022-01-01 RX ORDER — ROSUVASTATIN CALCIUM 20 MG/1
40 TABLET, COATED ORAL EVERY EVENING
Status: DISCONTINUED | OUTPATIENT
Start: 2022-01-01 | End: 2022-01-01 | Stop reason: HOSPADM

## 2022-01-01 RX ORDER — ASPIRIN 81 MG/1
81 TABLET, CHEWABLE ORAL DAILY
Qty: 60 TABLET | Refills: 0 | Status: SHIPPED | OUTPATIENT
Start: 2022-01-01 | End: 2022-01-01

## 2022-01-01 RX ORDER — GABAPENTIN 300 MG/1
600 CAPSULE ORAL 3 TIMES DAILY
Status: DISCONTINUED | OUTPATIENT
Start: 2022-01-01 | End: 2022-01-01

## 2022-01-01 RX ORDER — POLYETHYLENE GLYCOL 3350 17 G/17G
1 POWDER, FOR SOLUTION ORAL
Status: DISCONTINUED | OUTPATIENT
Start: 2022-01-01 | End: 2022-01-01 | Stop reason: HOSPADM

## 2022-01-01 RX ADMIN — GABAPENTIN 400 MG: 400 CAPSULE ORAL at 11:33

## 2022-01-01 RX ADMIN — CEFUROXIME AXETIL 250 MG: 250 TABLET, FILM COATED ORAL at 12:26

## 2022-01-01 RX ADMIN — DULOXETINE 40 MG: 20 CAPSULE, DELAYED RELEASE ORAL at 05:13

## 2022-01-01 RX ADMIN — TIOTROPIUM BROMIDE INHALATION SPRAY 5 MCG: 3.12 SPRAY, METERED RESPIRATORY (INHALATION) at 05:14

## 2022-01-01 RX ADMIN — GABAPENTIN 300 MG: 300 CAPSULE ORAL at 12:00

## 2022-01-01 RX ADMIN — IBUPROFEN 800 MG: 800 TABLET, FILM COATED ORAL at 17:45

## 2022-01-01 RX ADMIN — ACETAMINOPHEN 650 MG: 325 TABLET, FILM COATED ORAL at 00:14

## 2022-01-01 RX ADMIN — HYDROCHLOROTHIAZIDE 12.5 MG: 12.5 TABLET ORAL at 05:13

## 2022-01-01 RX ADMIN — SODIUM CHLORIDE 1000 ML: 9 INJECTION, SOLUTION INTRAVENOUS at 16:08

## 2022-01-01 RX ADMIN — METFORMIN HYDROCHLORIDE 1000 MG: 500 TABLET ORAL at 17:36

## 2022-01-01 RX ADMIN — BUDESONIDE AND FORMOTEROL FUMARATE DIHYDRATE 2 PUFF: 160; 4.5 AEROSOL RESPIRATORY (INHALATION) at 06:06

## 2022-01-01 RX ADMIN — CLOPIDOGREL BISULFATE 75 MG: 75 TABLET ORAL at 06:05

## 2022-01-01 RX ADMIN — TIOTROPIUM BROMIDE INHALATION SPRAY 5 MCG: 3.12 SPRAY, METERED RESPIRATORY (INHALATION) at 06:09

## 2022-01-01 RX ADMIN — GABAPENTIN 300 MG: 300 CAPSULE ORAL at 06:05

## 2022-01-01 RX ADMIN — CARVEDILOL 12.5 MG: 12.5 TABLET, FILM COATED ORAL at 17:36

## 2022-01-01 RX ADMIN — CLOPIDOGREL BISULFATE 75 MG: 75 TABLET ORAL at 05:13

## 2022-01-01 RX ADMIN — DOCUSATE SODIUM 50 MG AND SENNOSIDES 8.6 MG 2 TABLET: 8.6; 5 TABLET, FILM COATED ORAL at 05:14

## 2022-01-01 RX ADMIN — GABAPENTIN 400 MG: 400 CAPSULE ORAL at 04:55

## 2022-01-01 RX ADMIN — ENOXAPARIN SODIUM 40 MG: 40 INJECTION SUBCUTANEOUS at 05:14

## 2022-01-01 RX ADMIN — GABAPENTIN 300 MG: 300 CAPSULE ORAL at 12:25

## 2022-01-01 RX ADMIN — GABAPENTIN 300 MG: 300 CAPSULE ORAL at 17:40

## 2022-01-01 RX ADMIN — TIOTROPIUM BROMIDE INHALATION SPRAY 5 MCG: 3.12 SPRAY, METERED RESPIRATORY (INHALATION) at 04:57

## 2022-01-01 RX ADMIN — ROPINIROLE HYDROCHLORIDE 0.25 MG: 0.5 TABLET, FILM COATED ORAL at 02:15

## 2022-01-01 RX ADMIN — CARVEDILOL 12.5 MG: 12.5 TABLET, FILM COATED ORAL at 20:01

## 2022-01-01 RX ADMIN — CARVEDILOL 12.5 MG: 12.5 TABLET, FILM COATED ORAL at 18:03

## 2022-01-01 RX ADMIN — GABAPENTIN 300 MG: 300 CAPSULE ORAL at 18:03

## 2022-01-01 RX ADMIN — ACETAMINOPHEN 650 MG: 325 TABLET, FILM COATED ORAL at 04:55

## 2022-01-01 RX ADMIN — INFLUENZA A VIRUS A/VICTORIA/2570/2019 IVR-215 (H1N1) ANTIGEN (FORMALDEHYDE INACTIVATED), INFLUENZA A VIRUS A/TASMANIA/503/2020 IVR-221 (H3N2) ANTIGEN (FORMALDEHYDE INACTIVATED), INFLUENZA B VIRUS B/PHUKET/3073/2013 ANTIGEN (FORMALDEHYDE INACTIVATED), AND INFLUENZA B VIRUS B/WASHINGTON/02/2019 ANTIGEN (FORMALDEHYDE INACTIVATED) 0.7 ML: 60; 60; 60; 60 INJECTION, SUSPENSION INTRAMUSCULAR at 15:19

## 2022-01-01 RX ADMIN — FENTANYL CITRATE 50 MCG: 50 INJECTION, SOLUTION INTRAMUSCULAR; INTRAVENOUS at 15:29

## 2022-01-01 RX ADMIN — ONDANSETRON 4 MG: 2 INJECTION INTRAMUSCULAR; INTRAVENOUS at 13:40

## 2022-01-01 RX ADMIN — ASPIRIN 81 MG: 81 TABLET, CHEWABLE ORAL at 10:27

## 2022-01-01 RX ADMIN — TIOTROPIUM BROMIDE INHALATION SPRAY 5 MCG: 3.12 SPRAY, METERED RESPIRATORY (INHALATION) at 04:20

## 2022-01-01 RX ADMIN — ACETAMINOPHEN 650 MG: 325 TABLET, FILM COATED ORAL at 13:11

## 2022-01-01 RX ADMIN — CEFUROXIME AXETIL 250 MG: 250 TABLET, FILM COATED ORAL at 11:24

## 2022-01-01 RX ADMIN — INSULIN HUMAN 5 UNITS: 100 INJECTION, SOLUTION PARENTERAL at 20:06

## 2022-01-01 RX ADMIN — KETOROLAC TROMETHAMINE 15 MG: 30 INJECTION, SOLUTION INTRAMUSCULAR at 20:27

## 2022-01-01 RX ADMIN — GABAPENTIN 300 MG: 300 CAPSULE ORAL at 18:24

## 2022-01-01 RX ADMIN — GABAPENTIN 300 MG: 300 CAPSULE ORAL at 13:11

## 2022-01-01 RX ADMIN — ACETAMINOPHEN 650 MG: 325 TABLET, FILM COATED ORAL at 23:16

## 2022-01-01 RX ADMIN — SODIUM CHLORIDE, POTASSIUM CHLORIDE, SODIUM LACTATE AND CALCIUM CHLORIDE: 600; 310; 30; 20 INJECTION, SOLUTION INTRAVENOUS at 14:23

## 2022-01-01 RX ADMIN — METFORMIN HYDROCHLORIDE 1000 MG: 500 TABLET ORAL at 08:39

## 2022-01-01 RX ADMIN — ACETAMINOPHEN 650 MG: 325 TABLET, FILM COATED ORAL at 04:20

## 2022-01-01 RX ADMIN — ENOXAPARIN SODIUM 40 MG: 40 INJECTION SUBCUTANEOUS at 05:43

## 2022-01-01 RX ADMIN — HYDROMORPHONE HYDROCHLORIDE 2 MG: 2 INJECTION INTRAMUSCULAR; INTRAVENOUS; SUBCUTANEOUS at 11:57

## 2022-01-01 RX ADMIN — GABAPENTIN 300 MG: 300 CAPSULE ORAL at 04:20

## 2022-01-01 RX ADMIN — HYDROCHLOROTHIAZIDE 12.5 MG: 12.5 TABLET ORAL at 02:15

## 2022-01-01 RX ADMIN — INSULIN GLARGINE 25 UNITS: 100 INJECTION, SOLUTION SUBCUTANEOUS at 17:46

## 2022-01-01 RX ADMIN — CARVEDILOL 12.5 MG: 12.5 TABLET, FILM COATED ORAL at 08:22

## 2022-01-01 RX ADMIN — MAGNESIUM SULFATE HEPTAHYDRATE 1 G: 1 INJECTION, SOLUTION INTRAVENOUS at 08:25

## 2022-01-01 RX ADMIN — CARVEDILOL 12.5 MG: 12.5 TABLET, FILM COATED ORAL at 16:27

## 2022-01-01 RX ADMIN — ACETAMINOPHEN 650 MG: 325 TABLET, FILM COATED ORAL at 11:59

## 2022-01-01 RX ADMIN — AMIODARONE HYDROCHLORIDE 200 MG: 200 TABLET ORAL at 06:09

## 2022-01-01 RX ADMIN — INSULIN LISPRO 3 UNITS: 100 INJECTION, SOLUTION INTRAVENOUS; SUBCUTANEOUS at 11:32

## 2022-01-01 RX ADMIN — INSULIN LISPRO 3 UNITS: 100 INJECTION, SOLUTION INTRAVENOUS; SUBCUTANEOUS at 15:33

## 2022-01-01 RX ADMIN — METFORMIN HYDROCHLORIDE 1000 MG: 500 TABLET ORAL at 16:27

## 2022-01-01 RX ADMIN — CARVEDILOL 12.5 MG: 12.5 TABLET, FILM COATED ORAL at 08:54

## 2022-01-01 RX ADMIN — ACETAMINOPHEN 650 MG: 325 TABLET, FILM COATED ORAL at 17:36

## 2022-01-01 RX ADMIN — CARVEDILOL 12.5 MG: 12.5 TABLET, FILM COATED ORAL at 08:38

## 2022-01-01 RX ADMIN — ROPINIROLE HYDROCHLORIDE 0.5 MG: 0.5 TABLET, FILM COATED ORAL at 22:15

## 2022-01-01 RX ADMIN — CEFUROXIME AXETIL 250 MG: 250 TABLET, FILM COATED ORAL at 22:50

## 2022-01-01 RX ADMIN — ENOXAPARIN SODIUM 40 MG: 40 INJECTION SUBCUTANEOUS at 04:21

## 2022-01-01 RX ADMIN — FUROSEMIDE 20 MG: 20 TABLET ORAL at 04:54

## 2022-01-01 RX ADMIN — ROSUVASTATIN CALCIUM 40 MG: 20 TABLET, FILM COATED ORAL at 17:36

## 2022-01-01 RX ADMIN — KETOROLAC TROMETHAMINE 15 MG: 30 INJECTION, SOLUTION INTRAMUSCULAR at 22:29

## 2022-01-01 RX ADMIN — CARVEDILOL 12.5 MG: 12.5 TABLET, FILM COATED ORAL at 08:27

## 2022-01-01 RX ADMIN — FUROSEMIDE 20 MG: 20 TABLET ORAL at 05:14

## 2022-01-01 RX ADMIN — DULOXETINE 20 MG: 20 CAPSULE, DELAYED RELEASE ORAL at 04:55

## 2022-01-01 RX ADMIN — HYDROCHLOROTHIAZIDE 12.5 MG: 12.5 TABLET ORAL at 04:54

## 2022-01-01 RX ADMIN — CEFUROXIME AXETIL 250 MG: 250 TABLET, FILM COATED ORAL at 14:18

## 2022-01-01 RX ADMIN — INSULIN LISPRO 1 UNITS: 100 INJECTION, SOLUTION INTRAVENOUS; SUBCUTANEOUS at 18:34

## 2022-01-01 RX ADMIN — NICOTINE 14 MG: 14 PATCH TRANSDERMAL at 06:01

## 2022-01-01 RX ADMIN — CEFUROXIME AXETIL 250 MG: 250 TABLET, FILM COATED ORAL at 00:14

## 2022-01-01 RX ADMIN — CLOPIDOGREL BISULFATE 75 MG: 75 TABLET ORAL at 04:55

## 2022-01-01 RX ADMIN — HYDROMORPHONE HYDROCHLORIDE 2 MG: 2 INJECTION INTRAMUSCULAR; INTRAVENOUS; SUBCUTANEOUS at 05:23

## 2022-01-01 RX ADMIN — IBUPROFEN 800 MG: 800 TABLET, FILM COATED ORAL at 03:06

## 2022-01-01 RX ADMIN — METFORMIN HYDROCHLORIDE 1000 MG: 500 TABLET ORAL at 18:04

## 2022-01-01 RX ADMIN — ACETAMINOPHEN 650 MG: 325 TABLET, FILM COATED ORAL at 05:13

## 2022-01-01 RX ADMIN — INSULIN LISPRO 1 UNITS: 100 INJECTION, SOLUTION INTRAVENOUS; SUBCUTANEOUS at 17:42

## 2022-01-01 RX ADMIN — DULOXETINE 20 MG: 20 CAPSULE, DELAYED RELEASE ORAL at 06:03

## 2022-01-01 RX ADMIN — DULOXETINE 40 MG: 20 CAPSULE, DELAYED RELEASE ORAL at 04:20

## 2022-01-01 RX ADMIN — BUDESONIDE AND FORMOTEROL FUMARATE DIHYDRATE 2 PUFF: 160; 4.5 AEROSOL RESPIRATORY (INHALATION) at 04:56

## 2022-01-01 RX ADMIN — INSULIN HUMAN 6 UNITS: 100 INJECTION, SOLUTION PARENTERAL at 18:52

## 2022-01-01 RX ADMIN — ROSUVASTATIN CALCIUM 40 MG: 20 TABLET, FILM COATED ORAL at 18:03

## 2022-01-01 RX ADMIN — CLOPIDOGREL BISULFATE 75 MG: 75 TABLET ORAL at 04:21

## 2022-01-01 RX ADMIN — INSULIN GLARGINE 25 UNITS: 100 INJECTION, SOLUTION SUBCUTANEOUS at 17:44

## 2022-01-01 RX ADMIN — CEFUROXIME AXETIL 250 MG: 250 TABLET, FILM COATED ORAL at 11:59

## 2022-01-01 RX ADMIN — ROSUVASTATIN CALCIUM 40 MG: 20 TABLET, FILM COATED ORAL at 17:45

## 2022-01-01 RX ADMIN — KETOROLAC TROMETHAMINE 15 MG: 30 INJECTION, SOLUTION INTRAMUSCULAR at 06:01

## 2022-01-01 RX ADMIN — NICOTINE 14 MG: 14 PATCH TRANSDERMAL at 04:20

## 2022-01-01 RX ADMIN — KETOROLAC TROMETHAMINE 15 MG: 30 INJECTION, SOLUTION INTRAMUSCULAR at 20:57

## 2022-01-01 RX ADMIN — NICOTINE 14 MG: 14 PATCH TRANSDERMAL at 05:14

## 2022-01-01 RX ADMIN — SODIUM CHLORIDE 3 G: 9 INJECTION, SOLUTION INTRAVENOUS at 15:23

## 2022-01-01 RX ADMIN — ACETAMINOPHEN 650 MG: 325 TABLET, FILM COATED ORAL at 15:33

## 2022-01-01 RX ADMIN — FENTANYL CITRATE 50 MCG: 50 INJECTION, SOLUTION INTRAMUSCULAR; INTRAVENOUS at 13:40

## 2022-01-01 RX ADMIN — SCOPALAMINE 1 PATCH: 1 PATCH, EXTENDED RELEASE TRANSDERMAL at 00:57

## 2022-01-01 RX ADMIN — NICOTINE 14 MG: 14 PATCH TRANSDERMAL at 04:53

## 2022-01-01 RX ADMIN — AMIODARONE HYDROCHLORIDE 200 MG: 200 TABLET ORAL at 04:21

## 2022-01-01 RX ADMIN — AMIODARONE HYDROCHLORIDE 200 MG: 200 TABLET ORAL at 05:13

## 2022-01-01 RX ADMIN — DILTIAZEM HYDROCHLORIDE 20 MG: 5 INJECTION INTRAVENOUS at 20:21

## 2022-01-01 RX ADMIN — ACETAMINOPHEN 650 MG: 325 TABLET, FILM COATED ORAL at 12:25

## 2022-01-01 RX ADMIN — INSULIN LISPRO 3 UNITS: 100 INJECTION, SOLUTION INTRAVENOUS; SUBCUTANEOUS at 17:45

## 2022-01-01 RX ADMIN — INSULIN GLARGINE-YFGN 5 UNITS: 100 INJECTION, SOLUTION SUBCUTANEOUS at 20:06

## 2022-01-01 RX ADMIN — KETOROLAC TROMETHAMINE 15 MG: 30 INJECTION, SOLUTION INTRAMUSCULAR at 08:54

## 2022-01-01 RX ADMIN — BUDESONIDE AND FORMOTEROL FUMARATE DIHYDRATE 2 PUFF: 160; 4.5 AEROSOL RESPIRATORY (INHALATION) at 18:10

## 2022-01-01 RX ADMIN — INSULIN LISPRO 1 UNITS: 100 INJECTION, SOLUTION INTRAVENOUS; SUBCUTANEOUS at 21:07

## 2022-01-01 RX ADMIN — HEPARIN SODIUM 4000 UNITS: 1000 INJECTION, SOLUTION INTRAVENOUS; SUBCUTANEOUS at 17:40

## 2022-01-01 RX ADMIN — FUROSEMIDE 20 MG: 20 TABLET ORAL at 06:04

## 2022-01-01 RX ADMIN — ACETAMINOPHEN 650 MG: 325 TABLET, FILM COATED ORAL at 22:50

## 2022-01-01 RX ADMIN — ROPINIROLE HYDROCHLORIDE 0.25 MG: 0.5 TABLET, FILM COATED ORAL at 20:56

## 2022-01-01 RX ADMIN — CEFUROXIME AXETIL 250 MG: 250 TABLET, FILM COATED ORAL at 23:17

## 2022-01-01 RX ADMIN — HEPARIN SODIUM 18 UNITS/KG/HR: 5000 INJECTION, SOLUTION INTRAVENOUS at 17:45

## 2022-01-01 RX ADMIN — DOCUSATE SODIUM 50 MG AND SENNOSIDES 8.6 MG 2 TABLET: 8.6; 5 TABLET, FILM COATED ORAL at 04:54

## 2022-01-01 RX ADMIN — BUDESONIDE AND FORMOTEROL FUMARATE DIHYDRATE 2 PUFF: 160; 4.5 AEROSOL RESPIRATORY (INHALATION) at 17:37

## 2022-01-01 RX ADMIN — ASPIRIN 81 MG: 81 TABLET, CHEWABLE ORAL at 04:20

## 2022-01-01 RX ADMIN — ASPIRIN 81 MG: 81 TABLET, CHEWABLE ORAL at 05:13

## 2022-01-01 RX ADMIN — KETOROLAC TROMETHAMINE 15 MG: 30 INJECTION, SOLUTION INTRAMUSCULAR at 08:39

## 2022-01-01 RX ADMIN — AMIODARONE HYDROCHLORIDE 200 MG: 200 TABLET ORAL at 04:56

## 2022-01-01 RX ADMIN — ROPINIROLE HYDROCHLORIDE 0.25 MG: 0.5 TABLET, FILM COATED ORAL at 16:28

## 2022-01-01 RX ADMIN — ROSUVASTATIN CALCIUM 40 MG: 20 TABLET, FILM COATED ORAL at 18:25

## 2022-01-01 RX ADMIN — ACETAMINOPHEN 650 MG: 325 TABLET, FILM COATED ORAL at 18:03

## 2022-01-01 RX ADMIN — METFORMIN HYDROCHLORIDE 1000 MG: 500 TABLET ORAL at 11:25

## 2022-01-01 RX ADMIN — GABAPENTIN 300 MG: 300 CAPSULE ORAL at 05:14

## 2022-01-01 RX ADMIN — LORAZEPAM 1 MG: 2 INJECTION INTRAMUSCULAR; INTRAVENOUS at 04:42

## 2022-01-01 RX ADMIN — BUDESONIDE AND FORMOTEROL FUMARATE DIHYDRATE 2 PUFF: 160; 4.5 AEROSOL RESPIRATORY (INHALATION) at 05:14

## 2022-01-01 RX ADMIN — DOCUSATE SODIUM 50 MG AND SENNOSIDES 8.6 MG 2 TABLET: 8.6; 5 TABLET, FILM COATED ORAL at 17:36

## 2022-01-01 RX ADMIN — ROPINIROLE HYDROCHLORIDE 0.5 MG: 0.5 TABLET, FILM COATED ORAL at 20:27

## 2022-01-01 RX ADMIN — GABAPENTIN 400 MG: 400 CAPSULE ORAL at 16:28

## 2022-01-01 RX ADMIN — BUDESONIDE AND FORMOTEROL FUMARATE DIHYDRATE 2 PUFF: 160; 4.5 AEROSOL RESPIRATORY (INHALATION) at 05:16

## 2022-01-01 RX ADMIN — IOHEXOL 70 ML: 350 INJECTION, SOLUTION INTRAVENOUS at 22:29

## 2022-01-01 RX ADMIN — INSULIN LISPRO 3 UNITS: 100 INJECTION, SOLUTION INTRAVENOUS; SUBCUTANEOUS at 13:14

## 2022-01-01 RX ADMIN — HYDROCHLOROTHIAZIDE 12.5 MG: 12.5 TABLET ORAL at 04:20

## 2022-01-01 RX ADMIN — FUROSEMIDE 20 MG: 20 TABLET ORAL at 04:23

## 2022-01-01 RX ADMIN — DOCUSATE SODIUM 50 MG AND SENNOSIDES 8.6 MG 2 TABLET: 8.6; 5 TABLET, FILM COATED ORAL at 16:27

## 2022-01-01 ASSESSMENT — ENCOUNTER SYMPTOMS
NERVOUS/ANXIOUS: 0
NECK PAIN: 1
DOUBLE VISION: 0
DIZZINESS: 0
VOMITING: 1
BACK PAIN: 1
SENSORY CHANGE: 1
BLURRED VISION: 0
WEAKNESS: 1
SHORTNESS OF BREATH: 1
ORTHOPNEA: 0
COUGH: 0
FALLS: 1
SPEECH CHANGE: 0
MEMORY LOSS: 0
PALPITATIONS: 0
WEIGHT LOSS: 1
MYALGIAS: 1
ABDOMINAL PAIN: 0
NAUSEA: 1
HEADACHES: 1
CHILLS: 1
SORE THROAT: 0
FEVER: 0
DEPRESSION: 1
DIARRHEA: 1

## 2022-01-01 ASSESSMENT — PAIN DESCRIPTION - PAIN TYPE
TYPE: ACUTE PAIN
TYPE: ACUTE PAIN;CHRONIC PAIN
TYPE: ACUTE PAIN
TYPE: ACUTE PAIN
TYPE: ACUTE PAIN;CHRONIC PAIN
TYPE: ACUTE PAIN
TYPE: ACUTE PAIN;CHRONIC PAIN
TYPE: ACUTE PAIN;CHRONIC PAIN
TYPE: ACUTE PAIN

## 2022-01-01 ASSESSMENT — COGNITIVE AND FUNCTIONAL STATUS - GENERAL
DRESSING REGULAR LOWER BODY CLOTHING: TOTAL
HELP NEEDED FOR BATHING: A LOT
MOVING TO AND FROM BED TO CHAIR: UNABLE
STANDING UP FROM CHAIR USING ARMS: A LOT
SUGGESTED CMS G CODE MODIFIER MOBILITY: CM
PERSONAL GROOMING: A LOT
TOILETING: TOTAL
MOBILITY SCORE: 9
CLIMB 3 TO 5 STEPS WITH RAILING: TOTAL
WALKING IN HOSPITAL ROOM: A LOT
DRESSING REGULAR UPPER BODY CLOTHING: A LITTLE
DRESSING REGULAR LOWER BODY CLOTHING: A LOT
MOBILITY SCORE: 10
MOVING TO AND FROM BED TO CHAIR: A LOT
DRESSING REGULAR UPPER BODY CLOTHING: TOTAL
MOVING FROM LYING ON BACK TO SITTING ON SIDE OF FLAT BED: UNABLE
DAILY ACTIVITIY SCORE: 8
STANDING UP FROM CHAIR USING ARMS: A LOT
EATING MEALS: A LOT
SUGGESTED CMS G CODE MODIFIER DAILY ACTIVITY: CM
MOVING FROM LYING ON BACK TO SITTING ON SIDE OF FLAT BED: A LOT
HELP NEEDED FOR BATHING: TOTAL
TURNING FROM BACK TO SIDE WHILE IN FLAT BAD: A LOT
SUGGESTED CMS G CODE MODIFIER DAILY ACTIVITY: CK
WALKING IN HOSPITAL ROOM: TOTAL
CLIMB 3 TO 5 STEPS WITH RAILING: TOTAL
SUGGESTED CMS G CODE MODIFIER MOBILITY: CL
DAILY ACTIVITIY SCORE: 17
TURNING FROM BACK TO SIDE WHILE IN FLAT BAD: A LOT
TOILETING: A LOT

## 2022-01-01 ASSESSMENT — LIFESTYLE VARIABLES
HAVE PEOPLE ANNOYED YOU BY CRITICIZING YOUR DRINKING: NO
AVERAGE NUMBER OF DAYS PER WEEK YOU HAVE A DRINK CONTAINING ALCOHOL: 0
TOTAL SCORE: 0
HAVE PEOPLE ANNOYED YOU BY CRITICIZING YOUR DRINKING: NO
EVER FELT BAD OR GUILTY ABOUT YOUR DRINKING: NO
DOES PATIENT WANT TO STOP DRINKING: NO
TOTAL SCORE: 0
ON A TYPICAL DAY WHEN YOU DRINK ALCOHOL HOW MANY DRINKS DO YOU HAVE: 0
HAVE YOU EVER FELT YOU SHOULD CUT DOWN ON YOUR DRINKING: NO
ON A TYPICAL DAY WHEN YOU DRINK ALCOHOL HOW MANY DRINKS DO YOU HAVE: 0
EVER HAD A DRINK FIRST THING IN THE MORNING TO STEADY YOUR NERVES TO GET RID OF A HANGOVER: NO
HOW MANY TIMES IN THE PAST YEAR HAVE YOU HAD 5 OR MORE DRINKS IN A DAY: 0
DO YOU DRINK ALCOHOL: NO
HAVE YOU EVER FELT YOU SHOULD CUT DOWN ON YOUR DRINKING: NO
ALCOHOL_USE: NO
SUBSTANCE_ABUSE: 0
EVER FELT BAD OR GUILTY ABOUT YOUR DRINKING: NO
TOTAL SCORE: 0
CONSUMPTION TOTAL: NEGATIVE
DOES PATIENT WANT TO STOP DRINKING: NO
DO YOU DRINK ALCOHOL: NO
AVERAGE NUMBER OF DAYS PER WEEK YOU HAVE A DRINK CONTAINING ALCOHOL: 0
DOES PATIENT WANT TO STOP DRINKING: NO
EVER HAD A DRINK FIRST THING IN THE MORNING TO STEADY YOUR NERVES TO GET RID OF A HANGOVER: NO
TOTAL SCORE: 0
PACK_YEARS: 6.25
TOTAL SCORE: 0
HOW MANY TIMES IN THE PAST YEAR HAVE YOU HAD 5 OR MORE DRINKS IN A DAY: 0
ALCOHOL_USE: NO
TOTAL SCORE: 0
CONSUMPTION TOTAL: NEGATIVE

## 2022-01-01 ASSESSMENT — PATIENT HEALTH QUESTIONNAIRE - PHQ9
1. LITTLE INTEREST OR PLEASURE IN DOING THINGS: NOT AT ALL
SUM OF ALL RESPONSES TO PHQ9 QUESTIONS 1 AND 2: 0
1. LITTLE INTEREST OR PLEASURE IN DOING THINGS: NOT AT ALL
SUM OF ALL RESPONSES TO PHQ9 QUESTIONS 1 AND 2: 0
2. FEELING DOWN, DEPRESSED, IRRITABLE, OR HOPELESS: NOT AT ALL
2. FEELING DOWN, DEPRESSED, IRRITABLE, OR HOPELESS: NOT AT ALL

## 2022-01-01 ASSESSMENT — GAIT ASSESSMENTS
ASSISTIVE DEVICE: FRONT WHEEL WALKER
DEVIATION: SHUFFLED GAIT;DECREASED HEEL STRIKE;DECREASED TOE OFF
DISTANCE (FEET): 3
GAIT LEVEL OF ASSIST: MODERATE ASSIST

## 2022-01-01 ASSESSMENT — ACTIVITIES OF DAILY LIVING (ADL): TOILETING: REQUIRES ASSIST

## 2022-01-01 ASSESSMENT — FIBROSIS 4 INDEX
FIB4 SCORE: 2.24
FIB4 SCORE: 1.42
FIB4 SCORE: 6.08

## 2022-01-01 ASSESSMENT — COPD QUESTIONNAIRES
DO YOU EVER COUGH UP ANY MUCUS OR PHLEGM?: NO/ONLY WITH OCCASIONAL COLDS OR INFECTIONS
COPD SCREENING SCORE: 5
HAVE YOU SMOKED AT LEAST 100 CIGARETTES IN YOUR ENTIRE LIFE: YES
DURING THE PAST 4 WEEKS HOW MUCH DID YOU FEEL SHORT OF BREATH: NONE/LITTLE OF THE TIME

## 2022-01-17 PROBLEM — S12.9XXA CLOSED FRACTURE DISLOCATION OF CERVICAL SPINE (HCC): Status: ACTIVE | Noted: 2022-01-01

## 2022-01-17 NOTE — ED NOTES
Pt arrived via EMS after welfare check by [facility]'s office.  Pt has [facility] but they were unable to contact pt at her home so they called for a welfare check.  EMS states pt c/o weakness but nothing else.

## 2022-01-17 NOTE — ED TRIAGE NOTES
Chief Complaint   Patient presents with   • Weakness     generalized weakness for 1 month     Pt denies any specific onset.  States she has been like this since before sana.

## 2022-01-18 PROBLEM — I77.74 VERTEBRAL ARTERY DISSECTION (HCC): Status: ACTIVE | Noted: 2022-01-01

## 2022-01-18 PROBLEM — J96.20 ACUTE ON CHRONIC RESPIRATORY FAILURE (HCC): Status: ACTIVE | Noted: 2022-01-01

## 2022-01-18 NOTE — ASSESSMENT & PLAN NOTE
Patient with generalized weakness and several falls over the past week.  Lives at home and is likely not in the state where she can care for herself.  Discussed at length with patient and she understands that she likely should not be home alone at this present time and should probably rehab at another facility.  Patient unable to care for self including not taking meds at home, difficulty ambulating to the toilet, ulcers on her right foot and small ulcer on her buttocks as well  -Wound care consulted  -PT and OT to evaluate patient  -Social work to help assist with discharge planning  - There are pending referrals to advanced, life care, and all to at this time

## 2022-01-18 NOTE — PROGRESS NOTES
4 Eyes Skin Assessment Completed by CHERELLE Lucero and CHERELLE Reynoso.    Head Redness, blanching,  Ears WDL  Nose WDL  Mouth WDL  Neck Redness and Blanching  Breast/Chest WDL  Shoulder Blades WDL  Spine Redness and Blanching, discolored area in the back  (R) Arm/Elbow/Hand Bruising  (L) Arm/Elbow/Hand Bruising  Abdomen WDL  Groin Redness, Blanching and Rash  Scrotum/Coccyx/Buttocks Redness and Non-Blanching, pressure injury noted, picture uploaded. Wound care already following  (R) Leg Redness, Blanching and Bruising  (L) Leg Redness, Blanching and Scar, left heel pressure injury, wound care already following  (R) Heel/Foot/Toe Redness and Blanching  (L) Heel/Foot/Toe Redness, Blanching, Ulcer(s) and Bruising, wound care following           Devices In Places Blood Pressure Cuff, Pulse Ox and Nasal Cannula      Interventions In Place Pressure Redistribution Mattress    Possible Skin Injury Yes    Pictures Uploaded Into Epic Yes  Wound Consult Placed Yes  RN Wound Prevention Protocol Ordered Yes

## 2022-01-18 NOTE — ED PROVIDER NOTES
ED Provider Note    CHIEF COMPLAINT  Chief Complaint   Patient presents with   • Weakness     generalized weakness for 1 month       HPI  Leena Bella is a 71 y.o. female with a past medical history of CAD s/p CABG 4v in 1998 and AICD, T2DM, HTN, Migraine, COPD (2L O2 at home), afib on amiodarone, GERD, depression, polyarthralgia, frequent falls, visual impairment and physical debilitywho presents for evaluation of generalized weakness and inability to function at home.  EMS was called as a result of a welfare check by home health who were unable to get a hold of the patient for the last 2 days.  Patient states she was sitting on the toilet entire time too weak to get up.  She notes she has fallen several times in the past few weeks and has right-sided posterior neck pain but no numbness, tingling, or focal motor weakness.  Patient notes she has chronic debility and left-sided weakness from a previous CVA.  She notes she has not been able to take any of her medications for the past 2 days.    REVIEW OF SYSTEMS  Constitutional: No fevers or chills. Fatigue and malaise  Skin: No rashes, abrasions, lacerations.  Pressure ulcer left heel  HEENT: No sore throat, runny nose, sores, trouble swallowing, trouble speaking.  Neck: Right posterior neck pain.  Chest: No pain or rashes  Pulm: No shortness of breath, cough, wheezing, stridor, or pain with inspiration/expiration  Gastrointestinal: No nausea, vomiting, diarrhea, constipation, bloating, melena, hematochezia or abdominal pain.  Genitourinary: No dysuria or hematuria  Musculoskeletal: Pain in the area of a pressure ulcer on left heel.  No deformities.  Right lower extremity mild edema noted.  Neurologic: Generalized weakness however no focal motor changes or sensory changes. No confusion or disorientation.  Heme: Bruises and bleeds easily  Immuno: No hx of recurrent infections    PAST FAM HISTORY  Family History   Problem Relation Age of Onset   • Arthritis  Mother    • Heart Disease Mother    • Other Mother         migraines   • Heart Disease Father    • Arthritis Sister    • Other Sister         migraines   • Other Brother         migraines       PAST MEDICAL HISTORY   has a past medical history of Abscess (6/4/2016), Allergic rhinitis (6/4/2016), Angina, Anxiety disorder (6/4/2016), Arrhythmia, Arthritis, ASTHMA, Automatic implantable cardiac defibrillator in situ, Automatic implantable cardioverter-defibrillator in situ (6/4/2016), Breath shortness, Bronchitis, Cardiomyopathy, ischemic (6/4/2016), CATARACT, Cold, Congestive heart failure ([facility]), COPD (chronic obstructive pulmonary disease) ([facility]) (6/4/2016), Coronary bypass, CVA (cerebral vascular accident) ([facility]) (6/4/2016), Diabetes, Diabetes ([facility]), Diabetic neuropathy ([facility]) (6/4/2016), Dyslipidemia (6/4/2016), GERD (gastroesophageal reflux disease) (6/4/2016), Heart burn, Hiatus hernia syndrome, HTN (hypertension) (6/4/2016), coronary artery bypass graft (6/4/2016), Hypertension, Indigestion, Infectious disease, Labial abscess (6/4/2016), Myocardial infarct ([facility]), Nicotine dependence (6/4/2016), On home oxygen therapy, PEYTON (obstructive sleep apnea) (6/4/2016), Osteoarthritis (6/4/2016), Other acute pain, Pacemaker, RLS (restless legs syndrome) (6/4/2016), and Ventricular tachycardia ([facility]) (6/4/2016).    SOCIAL HISTORY  Social History     Tobacco Use   • Smoking status: Light Tobacco Smoker     Packs/day: 0.25     Years: 25.00     Pack years: 6.25     Types: Cigarettes     Last attempt to quit: 3/6/2018     Years since quitting: 3.8   • Smokeless tobacco: Never Used   • Tobacco comment: per pt every now and then   Substance and Sexual Activity   • Alcohol use: No     Alcohol/week: 0.0 oz   • Drug use: No   • Sexual activity: Not on file       SURGICAL HISTORY   has a past surgical history that includes other cardiac surgery; other orthopedic surgery; gyn surgery; recovery (10/21/2011); nam rectal abscess  "(10/8/2013); and nam rectal abscess incision and drainage (5/29/2014).    CURRENT MEDICATIONS  Home Medications    **Home medications have not yet been reviewed for this encounter**         ALLERGIES  Allergies   Allergen Reactions   • Morphine Unspecified     Heart stopped?   • Cephalexin Rash     Rash - \"looks like I have AIDS\"   • Other Environmental Rash     tape   • Potassium Unspecified     Headache    • Ciprofloxacin Unspecified     Headache     • Escitalopram Unspecified     Unknown reaction     • Lisinopril Unspecified     Unknown reaction   • Losartan Unspecified     Unknown reaction     • Sulfa Drugs Rash     .       PHYSICAL EXAM  VITAL SIGNS: BP (!) 181/72   Pulse 80   Temp 36.8 °C (98.2 °F) (Temporal)   Resp 20   Wt 51.7 kg (114 lb)   LMP 06/15/1996   SpO2 90%   BMI 20.85 kg/m²    Gen: Appears tired, otherwise no apparent distress  HEENT: No signs of trauma, Bilateral external ears normal, Nose normal. Conjunctiva normal, Non-icteric.   Neck: No significant spinous process tenderness from base of occiput to sacrum.  Neck is supple, no masses, no JVD, no tracheal deviation.  Lymphatic: No cervical lymphadenopathy noted.   Cardiovascular: Regular rate and rhythm.  Capillary refill less than 3 seconds to all extremities, 2+ distal pulses.  Thorax & Lungs: Poor inspiratory effort but no apparent respiratory distress otherwise.  Abdomen: Bowel sounds normal, Soft, No tenderness, No masses, No pulsatile masses. No Guarding or rebound  Skin: Warm, Dry.  Back: No bony tenderness, No CVA tenderness.   Extremities: Pressure ulcer left heel  Neurologic: Alert , no facial droop, diffuse generalized weakness, greater on the left.  Psychiatric: Affect pleasant    LABS  Results for orders placed or performed during the hospital encounter of 01/17/22   CBC WITH DIFFERENTIAL   Result Value Ref Range    WBC 7.8 4.8 - 10.8 K/uL    RBC 4.52 4.20 - 5.40 M/uL    Hemoglobin 13.1 12.0 - 16.0 g/dL    Hematocrit 41.0 " 37.0 - 47.0 %    MCV 90.7 81.4 - 97.8 fL    MCH 29.0 27.0 - 33.0 pg    MCHC 32.0 (L) 33.6 - 35.0 g/dL    RDW 47.5 35.9 - 50.0 fL    Platelet Count 145 (L) 164 - 446 K/uL    MPV 11.3 9.0 - 12.9 fL    Neutrophils-Polys 57.20 44.00 - 72.00 %    Lymphocytes 29.50 22.00 - 41.00 %    Monocytes 8.40 0.00 - 13.40 %    Eosinophils 3.90 0.00 - 6.90 %    Basophils 0.60 0.00 - 1.80 %    Immature Granulocytes 0.40 0.00 - 0.90 %    Nucleated RBC 0.00 /100 WBC    Neutrophils (Absolute) 4.44 2.00 - 7.15 K/uL    Lymphs (Absolute) 2.29 1.00 - 4.80 K/uL    Monos (Absolute) 0.65 0.00 - 0.85 K/uL    Eos (Absolute) 0.30 0.00 - 0.51 K/uL    Baso (Absolute) 0.05 0.00 - 0.12 K/uL    Immature Granulocytes (abs) 0.03 0.00 - 0.11 K/uL    NRBC (Absolute) 0.00 K/uL   COMP METABOLIC PANEL   Result Value Ref Range    Sodium 139 135 - 145 mmol/L    Potassium 4.2 3.6 - 5.5 mmol/L    Chloride 110 96 - 112 mmol/L    Co2 19 (L) 20 - 33 mmol/L    Anion Gap 10.0 7.0 - 16.0    Glucose 250 (H) 65 - 99 mg/dL    Bun 18 8 - 22 mg/dL    Creatinine 0.87 0.50 - 1.40 mg/dL    Calcium 9.1 8.5 - 10.5 mg/dL    AST(SGOT) 8 (L) 12 - 45 U/L    ALT(SGPT) 6 2 - 50 U/L    Alkaline Phosphatase 71 30 - 99 U/L    Total Bilirubin <0.2 0.1 - 1.5 mg/dL    Albumin 3.1 (L) 3.2 - 4.9 g/dL    Total Protein 5.6 (L) 6.0 - 8.2 g/dL    Globulin 2.5 1.9 - 3.5 g/dL    A-G Ratio 1.2 g/dL   TROPONIN   Result Value Ref Range    Troponin T 25 (H) 6 - 19 ng/L   LACTIC ACID   Result Value Ref Range    Lactic Acid 0.9 0.5 - 2.0 mmol/L   PROTHROMBIN TIME (INR)   Result Value Ref Range    PT 13.2 12.0 - 14.6 sec    INR 1.03 0.87 - 1.13   APTT   Result Value Ref Range    APTT 29.4 24.7 - 36.0 sec   ESTIMATED GFR   Result Value Ref Range    GFR If African American >60 >60 mL/min/1.73 m 2    GFR If Non African American >60 >60 mL/min/1.73 m 2   Magnesium   Result Value Ref Range    Magnesium 1.8 1.5 - 2.5 mg/dL   EKG (NOW)   Result Value Ref Range    [facility]  Emergency Dept.    Test Date:  2022  Pt Name:    ANEESH KAUR                Department: ER  MRN:        [id number]                      Room:        24  Gender:     Female                       Technician: [id number]  :        [id number]                   Requested By:GRACE CRUZ  Order #:    [id number]                    Reading MD:    Measurements  Intervals                                Axis  Rate:       70                           P:          -36  MA:         228                          QRS:        25  QRSD:       112                          T:          214  QT:         404  QTc:        436    Interpretive Statements  SINUS RHYTHM  VENTRICULAR PREMATURE COMPLEX  FIRST DEGREE AV BLOCK  NONSPECIFIC INTRAVENTRICULAR CONDUCTION DELAY  ABNRM R PROG, CONSIDER ASMI OR LEAD PLACEMENT  ST DEPRESSION, CONSIDER ISCHEMIA, ANT-LAT LDS  Compared to ECG 2021 12:21:33  Ventricular premature complex(es) now present  First degree AV block now present   Possible ischemia now present  Ectopic atrial rhythm no longer present  Myocardial infarct finding still present  ST (T wave) deviation still present         RADIOLOGY  CT-CTA NECK WITH & W/O-POST PROCESSING   Final Result      1.  Biffl grade 2 right vertebral artery injury at the level of the C4 transverse process fracture.   2.  Severe atherosclerotic narrowing of the proximal bilateral carotid arteries.   3.  Emphysematous changes with pulmonary nodules measuring up to 5 mm. Follow-up recommendations below.      Low Risk: No routine follow-up      High Risk: Optional CT at 12 months      Comments: Use most suspicious nodule as guide to management. Follow-up intervals may vary according to size and risk.      Low Risk - Minimal or absent history of smoking and of other known risk factors.      High Risk - History of smoking or of other known risk factors.      Note: These recommendations do not apply to lung cancer screening, patients with immunosuppression, or  patients with known primary cancer.      Fleischner Society 2017 Guidelines for Management of Incidentally Detected Pulmonary Nodules in Adults         Dr. Mullins discussed these findings with Dr. Moreno at 11:20 PM by telephone on 1/17/2022      CT-HEAD W/O   Final Result      1.  There is no acute intracranial hemorrhage or infarct.      2.  White matter lucencies most consistent with small vessel ischemic change versus demyelination or gliosis.      3.  There is cerebral atrophy.         CT-CSPINE WITHOUT PLUS RECONS   Final Result   Addendum 1 of 1   Dr. Perez Moreno aware of findings at 7:40 PM      Final      1.  Apparent subtle nondisplaced fractures involving the anterior and posterior lateral walls of the right C4 transverse foramen.      2.  Multilevel degenerative change.      DX-CHEST-PORTABLE (1 VIEW)   Final Result      No acute cardiac or pulmonary abnormalities are identified.          HYDRATION: Based on the patient's presentation of Dehydration the patient was given IV fluids. IV Hydration was used because oral hydration was not adequate alone. Upon recheck following hydration, the patient was stable.    COURSE & MEDICAL DECISION MAKING  Patient arrives for evaluation of vague symptoms which are overall concerning for an inability to attend to her ADLs at home.  She has had recent falls and will need CT imaging of the head and neck as well as plain film imaging of the chest.  Broad screening laboratory evaluation will also be performed due to the patient's background issues.  She does not appear septic or toxic and does not have any focal neurologic deficits that are new.  8:45 PM  Discussed findings of the CT with patient at bedside.  She does have mild to moderate right-sided posterior neck pain but no yet  new numbness, tingling, or focal motor weakness.     Case was discussed with Dr. Manzo, neurosurgery, who noted this is not a structural issue and, provided CTA did not show any vertebral  artery dissection or other vascular injury, patient was safe for symptomatic treatment and, based on her age, will likely benefit from a soft collar as she will not develop pressure ulcers and intolerance of the collar.  She clearly has no new or focal neurologic symptoms but does have generalized weakness which argues for placement.  Patient also was noted to have an elevated glucose and troponin consistent with her inability to take medications for the past 2 days.  She has not been able to eat or drink adequately and is also likely dehydrated.  Patient was discussed with the family medicine resident who will evaluate the patient in the emergency department for admission.    11:40 PM  Case discussed with the radiologist who noted the patient has a luminal narrowing at the level of C4 which is suggestive of a Biffle grade 2 vascular injury to the right vertebral artery.  This was discussed with Dr. Chow, vascular surgeon, who noted that the patient can be kept on her regular dose of Plavix and she does not need any additional anticoagulation at this point.  Given that she has no neurologic symptoms, and it appears that there is blood flow beyond the narrowing, it is unlikely that she will need any immediate or urgent intervention.  He will plan on formally consulting on the patient in the morning.    FINAL IMPRESSION  1. Other closed displaced fracture of fourth cervical vertebra, initial encounter ([facility])    2. Hyperglycemia    3. Troponin level elevated    4. Generalized weakness    5. Injury of vertebral artery, right, initial encounter        Electronically signed by: Steve Moreno M.D., 1/17/2022 4:17 PM

## 2022-01-18 NOTE — ASSESSMENT & PLAN NOTE
#Vertebral artery dissection, BIFFL grade II  #Bilateral carotid artery stenosis  Patient states that she has had generalized weakness ongoing for the past 2 weeks has been worse.  Had a fall last week but does not remember the exact events.  CTA performed in the ED showed a BIFFL grade 2 right vertebral artery injury at the level of the C4 transverse process fracture.  -Dr. Chow was consulted in the ED, where the patient did not have any new focal neurodeficits, and recommended dual antiplatelet therapy with Plavix and aspirin; recommendations appreciated

## 2022-01-18 NOTE — ASSESSMENT & PLAN NOTE
Patient at home and had a fall approximately 1 week ago but does not remember injuring her neck or head but notes she has had neck pain for approximately 1 week.  -In the ED apparent subtle nondisplaced fracture of the anterior and posterior lateral walls of the right C4 transverse foramen.  Neurosurgery consulted in the ED and recommended soft collar only at this time  -Soft collar ordered for comfort  -PT and OT continue to see patient

## 2022-01-18 NOTE — PROGRESS NOTES
ACUTE CARE VASCULAR SERVICE  PROGRESS NOTE      Fall with C4 fracture and focal right vertebral dissection causing moderate stenosis.    No focal neurologic deficits  Recommend antiplatelet therapy and reportedly she is already on Plavix 75mg daily which should be sufficient treatment for this vertebral dissection.    Formal consult in AM  Please notify me if there are any concerning neurologic changes    Troy Chow MD  [facility]  [place] preferred. Otherwise text to cell [phone] or call my office [phone]  __________________________________________________________________  Patient:Leena Bella   MRN:[id number]   CSN:[id number]

## 2022-01-18 NOTE — DIETARY
Nutrition Services: Update   Consult for DM ed - pt remains in the ED @ this time.  Pending new A1C and noted per progress notes pt has not been compliant w/ DM meds.  Pt presented w/ weakness and hx of falls.    RD(s) to f/u regarding education closer to d/c.

## 2022-01-18 NOTE — DISCHARGE PLANNING
Almaz from [facility] called and stated they provide care to Pt in her home and feel at this time she is not safe in her home with home health services.   [facility] had to call police on 01- for a welfare check as Pt would not open the door. Per Alma[facility] their RN has placed and APS report due to safety issues at home.     Pt will be admitted to the Hospital.   DC Planning will be determined during Pt admission.
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
## 2022-01-18 NOTE — ED NOTES
Pts Purewick is malfunctioning and pts sheets are wet. Clean linens provided and Purewick readjusted.

## 2022-01-18 NOTE — CONSULTS
Chief complaint C4 transverse process fracture  HPI this 71-year-old woman who is poor medical health condition lives alone and has had frequent falls no longer capable of having complete independence at home and needs to be placed in assisted living as part of her work-up for her falls she had a CT of the cervical spine which demonstrated a small linear crack through the transverse process at C4 and then subsequently CTA was performed to rule out any injury to theVertebral artery on the right side the CTA was read as Biffle grade 2 right vertebral artery injury at the level of the C4 transverse process.  At this time she is unclear when she fell however she has not suffered any consequences from the injury at this time.    12 point review of systems as per HPI she denies any gait instability although she is having falls bowel or bladder incontinence focal deficit.    Past medical history please see EMR  For surgical history please see EMR  Medications please see EMR.    Physical examination she is alert she is oriented x3 stable answer question appropriately she has no signs of dysarthria aphasia  Cranial nerves grossly intact  Motor examination nonfocal.    CT scan of the cervical spine shows a right C4 transverse process fracture  CTA of the neck demonstrates a right Biffle grade 2 dissection of the vert.    Assessment and plan  This time the patient can be fitted for either [place] or likely can be downgraded to a soft collar as the injury is as nonstructural  She is okay from our standpoint to be started on aspirin therapy or other antiplatelet medications for her carotid injury and we would defer to either stroke neurology or vascular for follow-up on that.    We will have her follow-up in clinic in 6 weeks time to make sure she is doing okay with our nurse practitioner aside from that she has no further neurosurgical needs she does not require a MRI or other imaging modality.    A total of 50 minutes was  spent direct patient care coordination consultation and dictation

## 2022-01-18 NOTE — ED NOTES
Med rec completed per patient  Allergies reviewed  No PO Antibiotics in the last 30 days     Patient states she thinks she is taking the ergocalciferol 47574 unit capsules daily. She then stated she is unsure when she last took it, and might be taking it only once a week.

## 2022-01-18 NOTE — DISCHARGE PLANNING
note:  Received a message from Jada Lomeli RN at [facility] because of their concerns that pt may not be safe to return home. Jada requested a call back. [facility] for Jada at [phone].

## 2022-01-18 NOTE — ED NOTES
MD rounded at bedside. Medicated per MAR for BLE pain. FSBS 289, coverage given. Meal tray with patient. Patient to floor via transport. Patient in stable condition with no acute changes. Chart, medications, wound care supplies, and all belongings including 1 personal belongings bag with patient.

## 2022-01-18 NOTE — ED NOTES
Medicated per MAR with scheduled meds. Venous B, coverage given with meal. Purwick placed. Hydrolloid, barrier paste, heel float boot applied per wound RN instruction. COVID swab collected and sent to lab. Pt eating breakfast.

## 2022-01-18 NOTE — ED NOTES
Difficulty cathing pt for urine sample, pt placed on a bed pan however unable to urinate at this time.  Report to Lucinda VILLARREAL.

## 2022-01-18 NOTE — PROGRESS NOTES
[facility] [place] [facility] [place]GRESS [place]        Attending:   Dr Jada Matthew    Resident:   Milton Onofre M.D.    PATIENT:   Leena Bella; [id number]; 1950    ID:   71 y.o. femaleScolette Bella is a 71 y.o. female with a history of CAD s/p CABG in 1998, CVA w/ left-sided weakness, type 2 diabetes, hypertension, COPD (2 L O2 at baseline and), A. fib, depression who admitted for weakness, C4 fracture, vertebral artery dissection.    SUBJECTIVE:   No acute events overnight having some cramping of her feet this morning.  Still with weakness and neck pain denies any fever, chills nausea or vomiting.    OBJECTIVE:  Vitals:    01/18/22 0300 01/18/22 0400 01/18/22 0500 01/18/22 0600   BP: (!) 180/81 156/102 (!) 174/103 (!) 180/109   Pulse: 91 82 87 92   Resp: (!) 27 (!) 26 (!) 35 (!) 27   Temp:       TempSrc:       SpO2: 93% 91% 93% 93%   Weight:           Intake/Output Summary (Last 24 hours) at 1/18/2022 0644  Last data filed at 1/18/2022 0632  Gross per 24 hour   Intake --   Output 800 ml   Net -800 ml       PHYSICAL EXAM:  General: No acute distress but appears disheveled both  Skin:  Sloughing skin bilateral feet. Small 2x2cm ulcer left heel w/o surrounding erythema.  HEENT: NC/AT. PERRL. EOMI. MMM. No nasal discharge. Oropharynx nonerythematous without exudate/plaques  Neck:  Supple without lymphadenopathy or rigidity.   Lungs:  Decreased air movement throughout  Cardiovascular: sinus rhythm, 2/6 systolic murmur  Abdomen:  Abdomen is soft, nontender, nondistended.   Extremities: 4-5 strength BL UE and LE,  Spine:  Straight without vertebral anomalies.  CNS:  Muscle tone is normal.  Cranial nerves II through XII grossly intact    LABS:  Recent Labs     01/17/22  1726 01/18/22  0226   WBC 7.8 10.3   RBC 4.52 4.76   HEMOGLOBIN 13.1 13.7   HEMATOCRIT 41.0 44.0   MCV 90.7 92.4   MCH 29.0 28.8   RDW 47.5 49.6   PLATELETCT 145* 119*   MPV 11.3 11.3   NEUTSPOLYS 57.20 69.20   LYMPHOCYTES 29.50 20.00*   MONOCYTES 8.40 7.30    EOSINOPHILS 3.90 2.50   BASOPHILS 0.60 0.60     Recent Labs     01/17/22  1726 01/18/22 0226   SODIUM 139 141   POTASSIUM 4.2 4.0   CHLORIDE 110 108   CO2 19* 22   BUN 18 14   CREATININE 0.87 0.82   CALCIUM 9.1 9.6   MAGNESIUM 1.8  --    ALBUMIN 3.1* 3.4     Estimated GFR/CRCL = CrCl cannot be calculated (Unknown ideal weight.).  Recent Labs     01/17/22 1726 01/18/22 0226   GLUCOSE 250* 165*     Recent Labs     01/17/22  1726 01/18/22 0226   ASTSGOT 8* 11*   ALTSGPT 6 7   TBILIRUBIN <0.2 0.2   ALKPHOSPHAT 71 78   GLOBULIN 2.5 2.7   INR 1.03  --              Recent Labs     01/17/22 1726   INR 1.03   APTT 29.4         IMAGING:  DX-SHOULDER 1 VIEW RIGHT   Final Result      Notching at the inferior right humeral head is new from prior exam. This could represent a nondisplaced fracture.      CT-CTA NECK WITH & W/O-POST PROCESSING   Final Result      1.  Biffl grade 2 right vertebral artery injury at the level of the C4 transverse process fracture.   2.  Severe atherosclerotic narrowing of the proximal bilateral carotid arteries.   3.  Emphysematous changes with pulmonary nodules measuring up to 5 mm. Follow-up recommendations below.      Low Risk: No routine follow-up      High Risk: Optional CT at 12 months      Comments: Use most suspicious nodule as guide to management. Follow-up intervals may vary according to size and risk.      Low Risk - Minimal or absent history of smoking and of other known risk factors.      High Risk - History of smoking or of other known risk factors.      Note: These recommendations do not apply to lung cancer screening, patients with immunosuppression, or patients with known primary cancer.      Fleischner Society 2017 Guidelines for Management of Incidentally Detected Pulmonary Nodules in Adults         Dr. Mullins discussed these findings with Dr. Moreno at 11:20 PM by telephone on 1/17/2022      CT-HEAD W/O   Final Result      1.  There is no acute intracranial hemorrhage or  infarct.      2.  White matter lucencies most consistent with small vessel ischemic change versus demyelination or gliosis.      3.  There is cerebral atrophy.         CT-CSPINE WITHOUT PLUS RECONS   Final Result   Addendum 1 of 1   Dr. Perez Moreno aware of findings at 7:40 PM      Final      1.  Apparent subtle nondisplaced fractures involving the anterior and posterior lateral walls of the right C4 transverse foramen.      2.  Multilevel degenerative change.      DX-CHEST-PORTABLE (1 VIEW)   Final Result      No acute cardiac or pulmonary abnormalities are identified.          MEDS:  Current Facility-Administered Medications   Medication Last Admin   • diclofenac sodium (Voltaren) 1 % gel 2 g     • hydroCHLOROthiazide (HYDRODIURIL) tablet 12.5 mg 12.5 mg at 01/18/22 0215   • amiodarone (Cordarone) tablet 200 mg 200 mg at 01/18/22 0609   • budesonide-formoterol (SYMBICORT) 160-4.5 MCG/ACT inhaler 2 Puff 2 Puff at 01/18/22 0606   • carvedilol (COREG) tablet 12.5 mg     • clopidogrel (PLAVIX) tablet 75 mg 75 mg at 01/18/22 0605   • DULoxetine (CYMBALTA) capsule 20 mg 20 mg at 01/18/22 0603   • furosemide (LASIX) tablet 20 mg 20 mg at 01/18/22 0604   • gabapentin (NEURONTIN) capsule 300 mg 300 mg at 01/18/22 0605   • ROPINIRole (REQUIP) tablet 0.25 mg 0.25 mg at 01/18/22 0215   • rosuvastatin (CRESTOR) tablet 40 mg     • tiotropium (Spiriva Respimat) 2.5 mcg/Act inhalation spray 5 mcg 5 mcg at 01/18/22 0609   • senna-docusate (PERICOLACE or SENOKOT S) 8.6-50 MG per tablet 2 Tablet      And   • polyethylene glycol/lytes (MIRALAX) PACKET 1 Packet      And   • magnesium hydroxide (MILK OF MAGNESIA) suspension 30 mL      And   • bisacodyl (DULCOLAX) suppository 10 mg     • Respiratory Therapy Consult     • [START ON 1/19/2022] enoxaparin (LOVENOX) inj 40 mg     • acetaminophen (Tylenol) tablet 650 mg     • ketorolac (TORADOL) injection 15 mg 15 mg at 01/18/22 0601   • nicotine (NICODERM) 14 MG/24HR 14 mg 14 mg at  01/18/22 0601    And   • nicotine polacrilex (NICORETTE) 2 MG piece 2 mg     • insulin GLARGINE (Lantus,Semglee) injection      And   • insulin LISPRO (AdmeLOG,HumaLOG) injection      And   • dextrose 50% (D50W) injection 50 mL       Current Outpatient Medications   Medication   • amiodarone (CORDARONE) 200 MG Tab   • budesonide-formoterol (SYMBICORT) 160-4.5 MCG/ACT Aerosol   • tiotropium (SPIRIVA RESPIMAT) 2.5 mcg/Act Aero Soln   • carvedilol (COREG) 12.5 MG Tab   • clopidogrel (PLAVIX) 75 MG Tab   • DULoxetine (CYMBALTA) 20 MG Cap DR Particles   • ergocalciferol (DRISDOL) 30058 UNIT capsule   • furosemide (LASIX) 20 MG Tab   • gabapentin (NEURONTIN) 300 MG Cap   • metFORMIN (GLUCOPHAGE) 850 MG Tab   • omeprazole (PRILOSEC) 20 MG delayed-release capsule   • rosuvastatin (CRESTOR) 40 MG tablet   • glucose blood strip   • EASY COMFORT PEN NEEDLES   • ROPINIRole (REQUIP) 0.25 MG Tab   • polyethylene glycol/lytes (MIRALAX) 17 g Pack   • acetaminophen (TYLENOL) 325 MG Tab       ASSESSMENT/PLAN:  71 y.o. female with a history of CAD s/p CABG in 1998, CVA w/ left-sided weakness, type 2 diabetes, hypertension, COPD (2 L O2 at baseline and), A. fib, depression who presented to the ED for generalized weakness    Problem   Vertebral Artery Dissection (Hcc)   Closed Fracture Dislocation of Cervical Spine (Hcc)   Debility       Vertebral artery dissection (HCC)  #Vertebral artery dissection, BIFFL grade II  #Bilateral carotid artery stenosis  Patient states that she has had generalized weakness ongoing for the past 2 weeks has been worse.  Had a fall last week but does not remember the exact events.  CTA performed in the ED showed a BIFFL grade 2 right vertebral artery injury at the level of the C4 transverse process fracture.    -In the ED: Patient without any new focal neurodeficits, she does have left-sided weakness from a prior CVA  -Dr. Chow of vascular surgery was consulted in the ED this is a remote              -Appreciate formal recommendations today   -Nonstructural, with no deficits found.  Can be downgraded to [place] or soft collar   -Continue on antiplatelets for carotid injury and stroke prevention.    Closed fracture dislocation of cervical spine (HCC)  Patient at home and had a fall approximately 1 week ago but does not remember injuring her neck or head but notes she has had neck pain for approximately 1 week.  -In the ED apparent subtle nondisplaced fracture of the anterior and posterior lateral walls of the right C4 transverse foramen.  Neurosurgery consulted in the ED and recommended soft collar only at this time  -neurosurgery has seen patient will follow-up in 6 weeks at this time can downgrade to soft collar.      Debility  Patient with generalized weakness and several falls over the past week.  Lives at home and is likely not in the state where she can care for herself.  Discussed at length with patient and she understands that she likely should not be home alone at this present time and should probably rehab at another facility.  Patient unable to care for self including not taking meds at home, difficulty ambulating to the toilet, ulcers on her right foot and small ulcer on her buttocks as well  -Wound care consulted  -PT and OT to evaluate patient  -Social work to help assist with discharge planning          #Afib  -Patient with history of atrial fibrillation followed by [facility].  Rate currently controlled in the 70s.  Patient has not taken her amiodarone or Coreg for approximately 1 week.  -Continue home Coreg and amiodarone  -[id number] score of 7, but not anticoagulated  -Patient on Plavix     #Elevated troponin  #Hx of CABG 4v in 1998   Patient with history of coronary disease and CABG in 1998.  Notes that she has not had any chest pain or shortness of breath is worsening recently; ever, she has a mildly elevated troponin of 25.  EKG showed sinus rhythm, first-degree AV block, no  ST elevation  -Trend troponin for now, likely not ACS based on history Repeat Trop 23. Stable.  -Continue outpatient Plavix and Crestor  - Repeat trop and EKG if chest pain.      #Acute on chronic hypoxic respiratory failure  #COPD  Patient with long history of smoking and history of COPD.  CT shows emphysematous changes as well as 5 mm pulmonary nodules..  Patient's baseline O2 is 2 L; ever, she is now requiring 4 to 5 L to maintain sats above 90%  -Titrate O2 as needed  -Continue home Symbicort  -RT consulted         #Right  And Left shoulder pain  -Right shoulder pain ongoing for approximately 1 week.  Unknown if she had any specific injuries  -X-ray- Notching at the inferior right humeral head is new from prior exam. This could represent a nondisplaced fracture. Repeat Xray without signs or evidence of fracture.   - Degenerative changes.   -Voltaren cream as needed     #Dysuria  -Patient reports dysuria and darker urine for several days  -Hx of UTI 12/2021- Klebsiella avery sensitive, Treated with cefuroxime without complication.  -Urinalysis with yeast and Bacteria  - Will hold off on Fluconazole due to interaction with Plavix. Will start cefuroxime for 7 days.         Chronic Problems:    #DM2  #Diabetic neuropathy  Patient with history of type 2 diabetes.  Only takes metformin at home.  Last A1c on 12/3/2021 was 13.5.  Patient admits she has not been compliant with her medicines recently  -Recheck A1c tomorrow a.m.  -10 units of Semglee every evening plus sliding scale  -Hypoglycemic protocol on board  Add Metformin 1000 BID as she was on 850 outpt.   -Continue home gabapentin for neuropathy increased to 400 3 times daily    #Hx of CVA in 2016  -Patient with history of CVA in 2016.  She does have residual left-sided weakness which is mild; however, she is unable to ambulate over the last few weeks  -Continue outpatient Plavix and Crestor    #HFpEF  Patient with history of congestive heart failure.  Takes 20  p.o. Lasix daily.  Has not been compliant with medicines over the past week.  Typically follows up with Dr. Quezada at [place].   -Last echo in November 2021 showed an EF of 60%  -No significant leg swelling, mild increase in O2 demands  -Continue on Lasix at this time and spironolactone  -BNP elevated at 1300.    #Pulmonary lung nodules   #Tobacco use  Patient with long history of smoking and still smokes currently.  -CT in the ED showed an incidental 5 mm right upper lobe pulmonary nodule  -Recommend outpatient follow-up  -Nicotine replacement as needed    #HLD  -Continue outpatient Plavix and Crestor    #Restless leg syndrome   -Continue outpatient Requip    Core measures:  Lines: PIV  Diet: Diabetic  Fluids: None  DVT: Lovenox  CODE STATUS: DNR/DNI    Dispo: Inpatient for further work-up of vertebral artery dissection and debility PT and OT eval pending for safe discharge planning.    Milton Onofre M.D.  [employer]  PGY 1

## 2022-01-18 NOTE — NON-PROVIDER
[facility] Family Medicine Progress Note    PATIENT ID:    NAME:             Leena Bella  MRN:               [id number]  YOB: 1950     Date of Admission: 1/17/2022   Attending: Dr. Jada Matthew  Resident: Dr. Onofre and Dr. Goodson  Primary Care Physician:  LAURA Rosas  CONSULTS:   Vascular surgery, Dr. Neuamnn   Neurosurgery, Dr. Dallas Manzo     CC:  Generalized weakness, falls     ID: Leena Bella is a 71 y.o. female with a history of CAD s/p CABG in 1998, CVA w/ left-sided weakness, type 2 diabetes, hypertension, COPD (2 L O2 at baseline), A. fib, depression who presented to the ED for generalized weakness, found on admission to have C4 transverse foraminal nondisplaced fracture and Biffl grade 2 right vertebral artery dissection.     Subjective: Patient reports dysuria this morning. Patient also reports severe bilateral foot cramping this a.m. and states that her gabapentin dose is not sufficient (she reports 600mg TID dosing at home). She slept poorly d/t restless leg. She reports pain in her left shoulder which began after striking the wall with her left shoulder several weeks ago. She does c/o migraine today (treated outpatient with neurology). She denies any chest pain, numbness, tingling, dyspnea, or new onset weakness. She is on 5L O2 NC (2L baseline). She is currently on Plavix 75mg and vascular surgery is to consult her this a.m. for treatment of vertebral artery dissection.      Objective:     Vitals:     01/17 0700   01/18 0659 01/18 0700   01/18 0709  Most Recent     Temperature (°C) 36.8        Pulse 72-92    92    Respiration 15-35 Important     27 Important     Blood Pressure  130//85 Important     180/109 Important     Pulse Oximetry (%) 90-96    93      Physical Exam:  HEENT: PERRL, atraumatic, no bruising  Pulm: CTAB, lungs sound strained  CV: RRR, no m/r/g  MSK: point tenderness to anterolateral shoulder/upper arm  Skin: pressure ulcer L heel, with scaling,  erythema, and flaking of bilateral feet and legs below the knee  Neuro: significant lower extremity weakness bilaterally, 0/5 strength L arm, 3/5 strength R arm, CN 2-12 intact, sensation present bilaterally upper and lower extremity with decreased sensation on L side, no new focal deficits    Labs:     Recent Labs     01/17/22  1726 01/18/22  0226   WBC 7.8 10.3   RBC 4.52 4.76   HEMOGLOBIN 13.1 13.7   HEMATOCRIT 41.0 44.0   MCV 90.7 92.4   MCH 29.0 28.8   RDW 47.5 49.6   PLATELETCT 145* 119*   MPV 11.3 11.3   NEUTSPOLYS 57.20 69.20   LYMPHOCYTES 29.50 20.00*   MONOCYTES 8.40 7.30   EOSINOPHILS 3.90 2.50   BASOPHILS 0.60 0.60     Recent Labs     01/17/22  1726 01/18/22  0226   SODIUM 139 141   POTASSIUM 4.2 4.0   CHLORIDE 110 108   CO2 19* 22   GLUCOSE 250* 165*   BUN 18 14     Recent Labs     01/17/22  1726 01/18/22 0226   ALBUMIN 3.1* 3.4   TBILIRUBIN <0.2 0.2   ALKPHOSPHAT 71 78   TOTPROTEIN 5.6* 6.1   ALTSGPT 6 7   ASTSGOT 8* 11*   CREATININE 0.87 0.82     UA: glucose >1000, nitrite positive, trace leuk esterase, many bacteria, budding yeast present    R Shoulder xray 1/17: notching at inferior R humeral head new from prior exam, which could represent nondisplaced fracture    Repeat R Shoulder xray 1/18: No evidence of fracture or dislocation, degenerative changes acromioclavicular joint, mild glenohumeral joint space narrowing.     L shoulder xray: No evidence of fracture or dislocation. Degenerative changes acromioclavicular joint with mild joint space narrowing and spurring glenohumeral joint, atherosclerotic plaque    CXR: no abnormalities    Troponin trend 25->23    NT-proBNP 1882    EKG on 1/17:   Interpretive Statements   SINUS RHYTHM   VENTRICULAR PREMATURE COMPLEX   FIRST DEGREE AV BLOCK   NONSPECIFIC INTRAVENTRICULAR CONDUCTION DELAY   ABNRM R PROG, CONSIDER ASMI OR LEAD PLACEMENT   ST DEPRESSION, CONSIDER ISCHEMIA, ANT-LAT LDS   Compared to ECG 11/03/2021 12:21:33   Ventricular premature complex(es)  now present   First degree AV block now present   Possible ischemia now present   Ectopic atrial rhythm no longer present   Myocardial infarct finding still present   ST (T wave) deviation still present      ASSESSMENT/PLAN:     71 y.o. female with a history of CAD s/p CABG in 1998, CVA w/ left-sided weakness, type 2 diabetes, hypertension, COPD (2 L O2 at baseline and), A. fib, depression who presented to the ED for generalized weakness found on admission to have C4 transverse foraminal nondisplaced fracture and Biffl grade 2 right vertebral artery dissection.     #Vertebral artery dissection, BIFFL grade II  #Bilateral carotid artery stenosis  Patient reports falling about 4 weeks ago. She also was unable to stand up from the toilet for 2 days 1 week ago but denies recent falls. Patient has residual left-sided weakness from prior CVA (2016) without any new focal neurodeficits.   -Vascular surgery (Dr. Chow) was consulted -- recommended continuing outpatient Plavix 75mg and will formally see patient this a.m.    #C-spine fracture  Patient at home and had a fall approximately 4 weeks ago but does not remember injuring her neck or head but notes she has had neck pain recently.  -In the ED apparent subtle nondisplaced fracture of the anterior and posterior lateral walls of the right C4 transverse foramen.  Neurosurgery consulted in the ED and recommended soft collar only at this time.  -Soft collar ordered for comfort  -PT and OT to see patient     #Debility  #Generalized weakness   #Ground level falls  Patient with generalized weakness and several falls over recent months.  Lives at home and is likely not in the state where she can care for herself. Patient admits that she is not able to care for herself alone, including not taking meds at home, difficulty ambulating to toilet, self-bathing, and ulcers on L heel and buttocks. Patient agreeable to post-acute placement but would like long-term goal of HomeHealth.  "  -Wound Care consulted  -Consult Social Work today to discuss placement options   -request PT/OT today    #Afib  -Patient with history of atrial fibrillation followed by [facility]ology.  Rate currently controlled in the 70s.  Patient has not taken her amiodarone or Coreg for approximately 1 week.  -Continue home Coreg and amiodarone  -[id number] score of 8, but not anticoagulated. Score>2 indicates \"moderate-high\" risk for stroke indicating strong candidate for anticoagulation  -Given history of frequent falls, recommend discussing risk/benefits of anticoagulation      #DM2  #Diabetic neuropathy  Patient with history of type 2 diabetes.  Only takes metformin at home.  Last A1c on 12/3/2021 was 13.5.  Patient admits she has not been compliant with her medicines recently  - re-check A1C  -10 units of Semglee every evening plus sliding scale  - Hypoglycemic protocol on board  - Continue home gabapentin for neuropathy, increase to 400mg TID and consider increasing to 600mg TID  - patient will need additional home diabetes medication before d/c -- consider home insulin regimen or GLP-1 inhibitors at discharge    #Dysuria  -Patient reports dysuria and darker urine for several days  -Hx of Klebsiella UTI 12/2021  -UA shows evidence of bacterial and yeast infection.   -Start fluconazole 200mg and Cefuroxime 250mg    #Acute on chronic hypoxic respiratory failure  #COPD  Patient with long history of smoking and history of COPD.  CT shows emphysematous changes as well as 5 mm pulmonary nodules..  Patient's baseline O2 is 2 L; she is now requiring 4 to 5 L to maintain sats above 90%  -Titrate O2 as needed  -Continue home Symbicort  -RT consulted     #Hx of CVA in 2016  -Patient with history of CVA in 2016.  She does have residual left-sided weakness which is mild; however, she is unable to ambulate over the last few weeks  -Continue outpatient Plavix and Crestor    #Elevated troponin  #Hx of CABG 4v in 1998   Patient " with history of coronary disease and CABG in 1998.  Notes that she has not had any chest pain or shortness of breath is worsening recently; ever, she has a mildly elevated troponin of 25.  EKG showed sinus rhythm, first-degree AV block, no ST elevation  -Troponin trending down 25 to 23, likely not ACS based on history  -Continue outpatient Plavix and Crestor     #HFpEF  Patient with history of congestive heart failure.  Takes 20 p.o. Lasix daily.  Has not been compliant with medicines over the past week.  Typically follows up with Dr. Quezada at [place].   -Last echo in November 2021 showed an EF of 60%  -No significant leg swelling, mild increase in O2 demands  -Do not think patient is having an acute exacerbation  -Continue on Lasix at this time and spironolactone  -CTM     #Pulmonary lung nodules   #Tobacco use  Patient with long history of smoking and still smokes currently.  -CT in the ED showed an incidental 5 mm right upper lobe pulmonary nodule  -Recommend outpatient follow-up  -Nicotine replacement as needed     #HLD  -Continue outpatient Plavix and Crestor     #Right shoulder pain  #L shoulder pain  -Right shoulder pain ongoing for approximately 1 week.  Unknown if she had any specific injuries  -Voltaren cream as needed  -repeat xray show no evidence of fracture bilaterally, although there is evidence of degenerative changes and joint stenosis  -PT/OT    #Restless leg syndrome   -Continue outpatient Requip     CODE STATUS: DNR/DNI. Discuss with patient goals of care.     Seen by: Yusra White, MS3        Current Outpatient Medications:   •  amiodarone (CORDARONE) 200 MG Tab, Take 1 Tablet by mouth every day., Disp: 30 Tablet, Rfl: 11  •  budesonide-formoterol (SYMBICORT) 160-4.5 MCG/ACT Aerosol, Inhale 2 Puffs 2 times a day., Disp: 10.2 g, Rfl: 11  •  tiotropium (SPIRIVA RESPIMAT) 2.5 mcg/Act Aero Soln, Inhale 2 Inhalation every day., Disp: 4 g, Rfl: 5  •  carvedilol (COREG) 12.5 MG Tab, Take 1  Tablet by mouth 2 times a day with meals., Disp: 30 Tablet, Rfl: 11  •  clopidogrel (PLAVIX) 75 MG Tab, Take 1 Tablet by mouth every day., Disp: 30 Tablet, Rfl: 11  •  DULoxetine (CYMBALTA) 20 MG Cap DR Particles, Take 1 Capsule by mouth every day., Disp: 30 Capsule, Rfl: 11  •  ergocalciferol (DRISDOL) 41409 UNIT capsule, Take 1 Capsule by mouth every 7 days. For 6 months then DC, Disp: 5 Capsule, Rfl: 5  •  furosemide (LASIX) 20 MG Tab, Take 1 Tablet by mouth every day., Disp: 30 Tablet, Rfl: 11  •  gabapentin (NEURONTIN) 300 MG Cap, Take 1 Capsule by mouth 3 times a day., Disp: 90 Capsule, Rfl: 11  •  metFORMIN (GLUCOPHAGE) 850 MG Tab, Take 1 Tablet by mouth 2 times a day with meals., Disp: 60 Tablet, Rfl: 11  •  omeprazole (PRILOSEC) 20 MG delayed-release capsule, Take 1 Capsule by mouth every day., Disp: 30 Capsule, Rfl: 11  •  rosuvastatin (CRESTOR) 40 MG tablet, Take 1 Tablet by mouth every evening., Disp: 30 Tablet, Rfl: 11  •  glucose blood strip, 1 Strip by Other route 3 times a day., Disp: 100 Strip, Rfl: 11  •  EASY COMFORT PEN NEEDLES, NULL, Disp: 100 Each, Rfl: 1  •  ROPINIRole (REQUIP) 0.25 MG Tab, Take 1 Tablet by mouth at bedtime., Disp: 90 Tablet, Rfl: 5  •  polyethylene glycol/lytes (MIRALAX) 17 g Pack, Take 1 Packet by mouth 1 time a day as needed (if sennosides and docusate ineffective after 24 hours)., Disp: 10 Each, Rfl: 3  •  acetaminophen (TYLENOL) 325 MG Tab, Take 2 Tablets by mouth every 6 hours as needed., Disp: 30 Tablet, Rfl: 0
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
## 2022-01-19 NOTE — PROGRESS NOTES
[facility] [place] [facility] [place]        Attending:   Dr Jada Matthew    Resident:   Milton Onofre M.D.    PATIENT:   Leena Bella; [id number]; 1950    ID:   71 y.o. femaleScolette Bella is a 71 y.o. female with a history of CAD s/p CABG in 1998, CVA w/ left-sided weakness, type 2 diabetes, hypertension, COPD (2 L O2 at baseline and), A. fib, depression who admitted for weakness, C4 fracture, vertebral artery dissection.    SUBJECTIVE:   No acute events overnight.  Cramping and pain in feet improved with increased gabapentin dose as well was increase in Requip.  Glucose better overall.  Still no shortness of breath or chest pain.  Wound care seen patient and patient placed in boot.    OBJECTIVE:  Vitals:    01/18/22 1553 01/18/22 1944 01/18/22 2341 01/19/22 0400   BP: 124/68 (!) 98/57 (!) 97/59 108/57   Pulse: 68 66 67 67   Resp: 18 18 17 17   Temp: 36.4 °C (97.5 °F) 36.2 °C (97.2 °F) 36.6 °C (97.9 °F) 36.2 °C (97.1 °F)   TempSrc: Temporal Temporal Temporal Temporal   SpO2: 97% 97% 96% 94%   Weight:       Height:           Intake/Output Summary (Last 24 hours) at 1/18/2022 0644  Last data filed at 1/18/2022 0632  Gross per 24 hour   Intake --   Output 800 ml   Net -800 ml       PHYSICAL EXAM:  General: No acute distress but appears disheveled both  Skin:  Sloughing skin bilateral feet.  Mediplex bandage in place on left foot, low pressure boot on leg.  HEENT: NC/AT. PERRL. EOMI. MMM. No nasal discharge. Oropharynx nonerythematous without exudate/plaques  Neck:  Supple without lymphadenopathy or rigidity.   Lungs:  Decreased air movement throughout  Cardiovascular:  Regular rate and rhythm  Abdomen:  Abdomen is soft, nontender, nondistended.   Extremities: 4-5 strength BL UE and LE,  CNS:  Muscle tone is normal.  Cranial nerves II through XII grossly intact    LABS:  Recent Labs     01/17/22  1726 01/18/22  0226   WBC 7.8 10.3   RBC 4.52 4.76   HEMOGLOBIN 13.1 13.7   HEMATOCRIT 41.0 44.0   MCV 90.7 92.4    MCH 29.0 28.8   RDW 47.5 49.6   PLATELETCT 145* 119*   MPV 11.3 11.3   NEUTSPOLYS 57.20 69.20   LYMPHOCYTES 29.50 20.00*   MONOCYTES 8.40 7.30   EOSINOPHILS 3.90 2.50   BASOPHILS 0.60 0.60   RBCMORPHOLO  --  Present     Recent Labs     01/17/22  1726 01/18/22  0226   SODIUM 139 141   POTASSIUM 4.2 4.0   CHLORIDE 110 108   CO2 19* 22   BUN 18 14   CREATININE 0.87 0.82   CALCIUM 9.1 9.6   MAGNESIUM 1.8  --    ALBUMIN 3.1* 3.4     Estimated GFR/CRCL = Estimated Creatinine Clearance: 46.8 mL/min (by C-G formula based on SCr of 0.82 mg/dL).  Recent Labs     01/17/22  1726 01/18/22 0226 01/18/22  1022 01/18/22  1528 01/18/22  1632 01/18/22  2103   GLUCOSE 250* 165*  --   --   --   --    POCGLUCOSE  --   --    < > 288* 258* 154*    < > = values in this interval not displayed.     Recent Labs     01/17/22 1726 01/18/22 0226   ASTSGOT 8* 11*   ALTSGPT 6 7   TBILIRUBIN <0.2 0.2   ALKPHOSPHAT 71 78   GLOBULIN 2.5 2.7   INR 1.03  --              Recent Labs     01/17/22 1726   INR 1.03   APTT 29.4         IMAGING:  DX-SHOULDER 2+ LEFT   Final Result      1.  No evidence of acute fracture or dislocation.   2.  Acromioclavicular and glenohumeral degenerative changes.   3.  Atherosclerotic plaque.         DX-SHOULDER 2+ RIGHT   Final Result      1.  No evidence of acute fracture or dislocation.   2.  Degenerative changes of the acromioclavicular joint.   3.  Mild glenohumeral degenerative changes.      DX-SHOULDER 1 VIEW RIGHT   Final Result      Notching at the inferior right humeral head is new from prior exam. This could represent a nondisplaced fracture.      CT-CTA NECK WITH & W/O-POST PROCESSING   Final Result      1.  Biffl grade 2 right vertebral artery injury at the level of the C4 transverse process fracture.   2.  Severe atherosclerotic narrowing of the proximal bilateral carotid arteries.   3.  Emphysematous changes with pulmonary nodules measuring up to 5 mm. Follow-up recommendations below.      Low Risk: No  routine follow-up      High Risk: Optional CT at 12 months      Comments: Use most suspicious nodule as guide to management. Follow-up intervals may vary according to size and risk.      Low Risk - Minimal or absent history of smoking and of other known risk factors.      High Risk - History of smoking or of other known risk factors.      Note: These recommendations do not apply to lung cancer screening, patients with immunosuppression, or patients with known primary cancer.      Fleischner Society 2017 Guidelines for Management of Incidentally Detected Pulmonary Nodules in Adults         Dr. Mullins discussed these findings with Dr. Moreno at 11:20 PM by telephone on 1/17/2022      CT-HEAD W/O   Final Result      1.  There is no acute intracranial hemorrhage or infarct.      2.  White matter lucencies most consistent with small vessel ischemic change versus demyelination or gliosis.      3.  There is cerebral atrophy.         CT-CSPINE WITHOUT PLUS RECONS   Final Result   Addendum 1 of 1   Dr. Perez Moreno aware of findings at 7:40 PM      Final      1.  Apparent subtle nondisplaced fractures involving the anterior and posterior lateral walls of the right C4 transverse foramen.      2.  Multilevel degenerative change.      DX-CHEST-PORTABLE (1 VIEW)   Final Result      No acute cardiac or pulmonary abnormalities are identified.          MEDS:  Current Facility-Administered Medications   Medication Last Admin   • diclofenac sodium (Voltaren) 1 % gel 2 g     • hydroCHLOROthiazide (HYDRODIURIL) tablet 12.5 mg 12.5 mg at 01/19/22 0454   • labetalol (NORMODYNE/TRANDATE) injection 10-20 mg     • cefUROXime (CEFTIN) tablet 250 mg 250 mg at 01/18/22 2317   • metFORMIN (GLUCOPHAGE) tablet 1,000 mg 1,000 mg at 01/18/22 1627   • gabapentin (NEURONTIN) capsule 400 mg 400 mg at 01/19/22 0455   • insulin GLARGINE (Lantus,Semglee) injection 25 Units at 01/18/22 1746    And   • insulin LISPRO (AdmeLOG,HumaLOG) injection 1 Units at  01/18/22 2107    And   • dextrose 50% (D50W) injection 50 mL     • ibuprofen (MOTRIN) tablet 800 mg 800 mg at 01/18/22 1745   • acetaminophen (Tylenol) tablet 650 mg 650 mg at 01/19/22 0455   • amiodarone (Cordarone) tablet 200 mg 200 mg at 01/19/22 0456   • budesonide-formoterol (SYMBICORT) 160-4.5 MCG/ACT inhaler 2 Puff 2 Puff at 01/19/22 0456   • carvedilol (COREG) tablet 12.5 mg 12.5 mg at 01/18/22 1627   • clopidogrel (PLAVIX) tablet 75 mg 75 mg at 01/19/22 0455   • DULoxetine (CYMBALTA) capsule 20 mg 20 mg at 01/19/22 0455   • furosemide (LASIX) tablet 20 mg 20 mg at 01/19/22 0454   • ROPINIRole (REQUIP) tablet 0.25 mg 0.25 mg at 01/18/22 2056   • rosuvastatin (CRESTOR) tablet 40 mg 40 mg at 01/18/22 1745   • tiotropium (Spiriva Respimat) 2.5 mcg/Act inhalation spray 5 mcg 5 mcg at 01/19/22 0457   • senna-docusate (PERICOLACE or SENOKOT S) 8.6-50 MG per tablet 2 Tablet 2 Tablet at 01/19/22 0454    And   • polyethylene glycol/lytes (MIRALAX) PACKET 1 Packet      And   • magnesium hydroxide (MILK OF MAGNESIA) suspension 30 mL      And   • bisacodyl (DULCOLAX) suppository 10 mg     • Respiratory Therapy Consult     • enoxaparin (LOVENOX) inj 40 mg 40 mg at 01/19/22 0543   • ketorolac (TORADOL) injection 15 mg 15 mg at 01/18/22 2057   • nicotine (NICODERM) 14 MG/24HR 14 mg 14 mg at 01/19/22 0453    And   • nicotine polacrilex (NICORETTE) 2 MG piece 2 mg         ASSESSMENT/PLAN:  71 y.o. female with a history of CAD s/p CABG in 1998, CVA w/ left-sided weakness, type 2 diabetes, hypertension, COPD (2 L O2 at baseline and), A. fib, depression who presented to the ED for generalized weakness    Problem   Vertebral Artery Dissection (Hcc)   Debility   Closed Fracture Dislocation of Cervical Spine (Hcc)       Vertebral artery dissection (HCC)  #Vertebral artery dissection, BIFFL grade II  #Bilateral carotid artery stenosis  Patient states that she has had generalized weakness ongoing for the past 2 weeks has been worse.   Had a fall last week but does not remember the exact events.  CTA performed in the ED showed a BIFFL grade 2 right vertebral artery injury at the level of the C4 transverse process fracture.    -In the ED: Patient without any new focal neurodeficits, she does have left-sided weakness from a prior CVA  -Dr. Chow of vascular surgery was consulted in the ED this is a remote             -Appreciate formal recommendations today   -Nonstructural, with no deficits found.  Can be downgraded to Fleming Aneta or soft collar   -Continue on antiplatelets for carotid injury and stroke prevention.    Closed fracture dislocation of cervical spine (HCC)  Patient at home and had a fall approximately 1 week ago but does not remember injuring her neck or head but notes she has had neck pain for approximately 1 week.  -In the ED apparent subtle nondisplaced fracture of the anterior and posterior lateral walls of the right C4 transverse foramen.  Neurosurgery consulted in the ED and recommended soft collar only at this time  -neurosurgery has seen patient will follow-up in 6 weeks at this time can downgrade to soft collar.      Debility  Patient with generalized weakness and several falls over the past week.  Lives at home and is likely not in the state where she can care for herself.  Discussed at length with patient and she understands that she likely should not be home alone at this present time and should probably rehab at another facility.  Patient unable to care for self including not taking meds at home, difficulty ambulating to the toilet, ulcers on her right foot and small ulcer on her buttocks as well  -Wound care consulted  -PT and OT to evaluate patient  -Social work to help assist with discharge planning, patient not a safe discharge home will need placement.          #Afib  -Patient with history of atrial fibrillation followed by [facility].  Rate currently controlled in the 70s.  Patient has not taken her  amiodarone or Coreg for approximately 1 week.  -Continue home Coreg and amiodarone  -[id number] score of 7, but not anticoagulated  -Patient on Plavix     #Elevated troponin  #Hx of CABG 4v in 1998   Patient with history of coronary disease and CABG in 1998.  Notes that she has not had any chest pain or shortness of breath is worsening recently; ever, she has a mildly elevated troponin of 25.  EKG showed sinus rhythm, first-degree AV block, no ST elevation  -Trend troponin for now, likely not ACS based on history Repeat Trop 23. Stable.  -Continue outpatient Plavix and Crestor  - Repeat trop and EKG if chest pain.      #Acute on chronic hypoxic respiratory failure  #COPD  Patient with long history of smoking and history of COPD.  CT shows emphysematous changes as well as 5 mm pulmonary nodules..  Patient's baseline O2 is 2 L; ever, she is now requiring 4 to 5 L to maintain sats above 90%  -Titrate O2 as needed  -Continue home Symbicort  -RT consulted         #Right  And Left shoulder pain  -Right shoulder pain ongoing for approximately 1 week.  Unknown if she had any specific injuries  -X-ray- Notching at the inferior right humeral head is new from prior exam. This could represent a nondisplaced fracture. Repeat Xray without signs or evidence of fracture.   - Degenerative changes.   -Voltaren cream as needed     #Dysuria  -Patient reports dysuria and darker urine for several days  -Hx of UTI 12/2021- Klebsiella avery sensitive, Treated with cefuroxime without complication.  -Urinalysis with yeast and Bacteria  - Will hold off on Fluconazole due to interaction with Plavix. Will start cefuroxime for 7 days.         Chronic Problems:    #DM2  #Diabetic neuropathy  Patient with history of type 2 diabetes.  Only takes metformin at home.  Last A1c on 12/3/2021 was 15.  Patient admits she has not been compliant with her medicines recently  -A1c 15.4  - 25 units of Semglee every evening plus sliding scale  -Hypoglycemic  protocol on board  -Add Metformin 1000 BID as she was on 850 outpt.   -Due to worsening creatinine clearance we will decrease gabapentin to 300mg 3 times daily and increase duloxetine to 40 mg daily.    #Hx of CVA in 2016  -Patient with history of CVA in 2016.  She does have residual left-sided weakness which is mild; however, she is unable to ambulate over the last few weeks  -Continue outpatient Plavix and Crestor    #HFpEF  Patient with history of congestive heart failure.  Takes 20 p.o. Lasix daily.  Has not been compliant with medicines over the past week.  Typically follows up with Dr. Quezada at [facility].   -Last echo in November 2021 showed an EF of 60%  -No significant leg swelling, mild increase in O2 demands  -Continue on Lasix at this time and spironolactone  -BNP elevated at 1300.    #Pulmonary lung nodules   #Tobacco use  Patient with long history of smoking and still smokes currently.  -CT in the ED showed an incidental 5 mm right upper lobe pulmonary nodule  -Recommend outpatient follow-up  -Nicotine replacement as needed    #HLD  -Continue outpatient Plavix and Crestor    #Restless leg syndrome   -Continue outpatient Requip    Core measures:  Lines: PIV  Diet: Diabetic  Fluids: None  DVT: Lovenox  CODE STATUS: DNR/DNI    Dispo: Inpatient for further work-up of vertebral artery dissection and debility PT and OT eval pending for safe discharge planning.    Milton Onofre M.D.  [employer]  PGY 1

## 2022-01-19 NOTE — DISCHARGE PLANNING
Received Choice form at 1310  Agency/Facility Name: [facility](1), [facility](2), [facility](3)  Referral sent per Choice form @ 1310      CM informed

## 2022-01-19 NOTE — RESPIRATORY CARE
"  COPD EDUCATION by COPD CLINICAL EDUCATOR  (Phone: [phone])  1/19/2022 at 10:22 AM by Nicolasa Perla, RRT    Patient seen by Respiratory Education team to complete the final block of education.  This session discussed signs and symptoms of an exacerbation (flare-up), triggers that can create flare-ups, reiteration of the \"Action Plan\" to refer to daily which will help categorize their symptoms in order to utilize the appropriate therapy, breathing techniques used to treat acute symptoms, and oxygen safety. Smoking Cessation was discussed as appropriate to this patient. Question and answer session followed.    COPD Screen  COPD Risk Screening  Do you have a history of COPD?: Yes (pt says she does not have COPD, normal breathing tests)  Do you have a Pulmonologist?: No  COPD Population Screener  During the past 4 weeks, how much did you feel short of breath?: None/Little of the time  Do you ever cough up any mucus or phlegm?: No/only with occasional colds or infections  In the past 12 months, you do less than you used to because of your breathing problems: Agree  Have you smoked at least 100 cigarettes in your entire life?: Yes  How old are you?: 60+  COPD Screening Score: 5  COPD Coordinator Recommended: Yes (possible rule in or out COPD?  States normal PFT's)    COPD Assessment  COPD Clinical Specialists ONLY  COPD Education Initiated: Yes--Full Intervention  COPD Education Session 1:  (01/18/2022)  COPD Education Session 2: Yes (01/19/2022)  DME Company: [employer] Equipment Type: Oxygen  Physician Follow Up Appointment:  (no appts scheduled at this time)  Physician Name: LAURA STOLL  Pulmonologist Name: Gave patient list of pulmonary clinics  Palliative Care:  (TBD)  Referrals Initiated: Yes  Pulmonary Rehab: Yes (will send request)  Smoking Cessation: Yes  $ Smoking Cessation 3-10 Minutes: Symptomatic  Smoking Pack Years: 6.25  Palliative Care:  (TBD)  Hospice: N/A  Home Health Care:  (TBD)  REMS[facility] " "Community Outreach: N/A  [facility]: Yes (already part of them)  [facility]: Yes (already have)  Private In-Home Care Agency: N/A  Is this a COPD exacerbation patient?: No  $ Demo/Eval of SVN's, MDI's and Aerosols: Yes (spacer given)  (OP) Pulmonary Function Testing: Yes (07/10/18 FEV1=72%, Mild obstruction an airtrapping with severe reduction in diffusing capacity isSuggestive of chronic obstructive pulmonary disease / emphysema.  Correlate clinically.)    PFT Results    No results found for: PFT    Meds to Beds  Would the patient like to opt in for Bedside Medication Delivery at Discharge?: Yes, interested     MY COPD ACTION PLAN     It is recommended that patients and physicians /healthcare providers complete this action plan together. This plan should be discussed at each physician visit and updated as needed.    The green, yellow and red zones show groups of symptoms of COPD. This list of symptoms is not comprehensive, and you may experience other symptoms. In the \"Actions\" column, your healthcare provider has recommended actions for you to take based on your symptoms.    Patient Name: Leena Bella   YOB: 1950   Last Updated on: 1/19/2022 10:22 AM   Green Zone:  I am doing well today Actions   •  Usual activitiy and exercise level •  Take daily medications   •  Usual amounts of cough and phlegm/mucus •  Use oxygen as prescribed   •  Sleep well at night •  Continue regular exercise/diet plan   •  Appetite is good •  At all times avoid cigarette smoke, inhaled irritants     Daily Medications (these medications are taken every day):   Budesonide-Formoterol Fumarate (Symbicort)  Tiotropium Bromide Monohydrate (Spiriva) 2 Puffs  2 Puffs Twice daily  Once daily     Additional Information:  Rinse mouth after using Symbicort     Yellow Zone:  I am having a bad day or a COPD flare Actions   •  More breathless than usual •  Continue daily medications   •  I have less energy for " "my daily activities •  Use quick relief inhaler as ordered   •  Increased or thicker phlegm/mucus •  Use oxygen as prescribed   •  Using quick relief inhaler/nebulizer more often •  Get plenty of rest   •  Swelling of ankles more than usual •  Use pursed lip breathing   •  More coughing than usual •  At all times avoid cigarette smoke, inhaled irritants   •  I feel like I have a \"chest cold\"   •  Poor sleep and my symptoms woke me up   •  My appetite is not good   •  My medicine is not helping    •  Call provider immediately if symptoms don’t improve     Continue daily medications, add rescue medications:               Medications to be used during a flare up, (as Discussed with Provider):           Additional Information:  Use spacer with rescue inhaler     Red Zone:  I need urgent medical care Actions   •  Severe shortness of breath even at rest •  Call 911 or seek medical care immediately   •  Not able to do any activity because of breathing    •  Fever or shaking chills    •  Feeling confused or very drowsy     •  Chest pains    •  Coughing up blood              "

## 2022-01-19 NOTE — HEART FAILURE PROGRAM
Messaged Dr. Matthew inquiring about the acuity of patient's heart failure. Per our discussion, patient is not in acute heart failure at this time. Appointment and checklist not indicated.    Patient apparently follows with [facility] per notes.    Thank you, Natty Cardio RN Navigator [phone]

## 2022-01-19 NOTE — NON-PROVIDER
[facility] Family Medicine Progress Note     PATIENT ID:     NAME:             Leena Bella  MRN:               [id number]  Date of birth:   1950     Date of Admission: 1/17/2022   Attending: Dr. Jada Matthew  Resident: Dr. Onofre and Dr. Goodson  Primary Care Physician:  LAURA Rosas  CONSULTS:   Vascular surgery, Dr. Neumann   Neurosurgery, Dr. Dallas Manzo     CC:  Generalized weakness, falls     ID: Leena Bella is a 71 y.o. female with a history of CAD s/p CABG in 1998, CVA w/ left-sided weakness, type 2 diabetes, hypertension, COPD (2 L O2 at baseline), A. fib, depression who presented to the ED for generalized weakness, found on admission to have C4 transverse foraminal nondisplaced fracture and Biffl grade 2 right vertebral artery dissection.      Subjective: 3L O2 NC overnight. Patient continues to report shooting foot pains with sharp cramping. Patient also c/o of continued migraine. Otherwise, she feels improved overall. No adverse events overnight.    Objective:      Vitals:            01/18 0700   01/19 0631  Most Recent    Temperature (°C) 36.2-36.6  36.2 (97.1)    Pulse 66-86  67    Respiration 17-24 Important   17    Blood Pressure  95/55 Important -180/79 Important   108/57    Pulse Oximetry (%) 88-97  94        Physical Exam:  HEENT: PERRL, atraumatic, no bruising  Pulm: CTAB, lungs sound strained  CV: RRR, no m/r/g  MSK: point tenderness to anterolateral shoulder/upper arm  Skin: pressure ulcer L heel, with scaling, erythema, and flaking of bilateral feet and legs below the knee  Neuro: significant lower extremity weakness bilaterally, 0/5 strength L arm, 3/5 strength R arm, CN 2-12 intact, sensation present bilaterally upper and lower extremity with decreased sensation on L side, no new focal deficits     Labs:      A1C -- 15.4  POC glucose 307, 289, 288, 258, 154     R Shoulder xray 1/17: notching at inferior R humeral head new from prior exam, which could represent nondisplaced  fracture     Repeat R Shoulder xray 1/18: No evidence of fracture or dislocation, degenerative changes acromioclavicular joint, mild glenohumeral joint space narrowing.      L shoulder xray: No evidence of fracture or dislocation. Degenerative changes acromioclavicular joint with mild joint space narrowing and spurring glenohumeral joint, atherosclerotic plaque     Troponin trend 25->23     EKG on 1/17:   Interpretive Statements   SINUS RHYTHM   VENTRICULAR PREMATURE COMPLEX   FIRST DEGREE AV BLOCK   NONSPECIFIC INTRAVENTRICULAR CONDUCTION DELAY   ABNRM R PROG, CONSIDER ASMI OR LEAD PLACEMENT   ST DEPRESSION, CONSIDER ISCHEMIA, ANT-LAT LDS   Compared to ECG 11/03/2021 12:21:33   Ventricular premature complex(es) now present   First degree AV block now present   Possible ischemia now present   Ectopic atrial rhythm no longer present   Myocardial infarct finding still present   ST (T wave) deviation still present      ASSESSMENT/PLAN:      71 y.o. female with a history of CAD s/p CABG in 1998, CVA w/ left-sided weakness, type 2 diabetes, hypertension, COPD (2 L O2 at baseline and), A. fib, depression who presented to the ED for generalized weakness found on admission to have C4 transverse foraminal nondisplaced fracture and Biffl grade 2 right vertebral artery dissection.      #Vertebral artery dissection, BIFFL grade II  #Bilateral carotid artery stenosis  Patient reports falling about 4 weeks ago. She also was unable to stand up from the toilet for 2 days 1 week ago but denies recent falls. Patient has residual left-sided weakness from prior CVA (2016) without any new focal neurodeficits.   -Dr. Chow Vascular surgery recommends continuing dual antiplatelet therapy which she is prescribed at home. He says he would not initiate full anticoagulation.  No procedural intervention. Outpatient follow-up for surveillance imaging.  He gave patient his clinic information and instructions to call to set up a follow-up visit  in 4-6 weeks to discuss surveillance.  -Start Aspirin 81mg today     #C-spine fracture  Patient at home and had a fall approximately 4 weeks ago but does not remember injuring her neck or head but notes she has had neck pain recently.  -In the ED apparent subtle nondisplaced fracture of the anterior and posterior lateral walls of the right C4 transverse foramen.    -PT and OT to see patient  -Neurosurgery: Fitted for [facility] or likely downgraded to soft collar bc injury is nonstructural. F/u in clinic in 6 weeks. Patient is now in soft collar.       #Debility  #Generalized weakness   #Ground level falls  Patient with generalized weakness and several falls over recent months.  Lives at home and is likely not in the state where she can care for herself. Patient admits that she is not able to care for herself alone, including not taking meds at home, difficulty ambulating to toilet, self-bathing, and ulcers on L heel and buttocks. Patient agreeable to post-acute placement but would like long-term goal of HomeHealth.   -Wound Care consulted -- pressure wounds on L heel, ischium, and coccyx -- soft boot applied  -Social Work working on placement options   -PT/OT to see patient today     #DM2  #Diabetic neuropathy  Patient with history of type 2 diabetes.  Only takes metformin at home.  Last A1c on 12/3/2021 was 13.5.  Patient admits she has not been compliant with her medicines recently  - re-check A1C -- 15.4  - POC glucose 307, 289, 288, 258, 154  - Hypoglycemic protocol on board  - Continue home gabapentin for neuropathy at 300mg TID because of increased Cr. Increase duloxetine to 40mg for improved pain management.   - patient will need insulin home regimen  -25 units glargine nightly, lispro 1-6 units 4x/day (given 1 unit mr), metformin 1000 BID     #Dysuria  -Patient reports improved dysuria today  -Hx of Klebsiella UTI 12/2021  -UA shows evidence of bacterial and yeast infection.   -Continue Cefuroxime  "250mg  -Fluconazole not started d/t adverse interaction with Plavix    #Acute on chronic hypoxic respiratory failure  #COPD  Patient with long history of smoking and history of COPD.  CT shows emphysematous changes as well as 5 mm pulmonary nodules..  Patient's baseline O2 is 2 L; she is now requiring 4 to 5 L to maintain sats above 90%  -Titrate O2 as needed  -Continue home Symbicort  -RT consulted      #Afib  -Patient with history of atrial fibrillation followed by [facility].  Rate currently controlled in the 70s.  Patient has not taken her amiodarone or Coreg for approximately 1 week.  -Continue home Coreg and amiodarone  -[id number]-SJBh score of 8, but not anticoagulated. Score>2 indicates \"moderate-high\" risk for stroke indicating strong candidate for anticoagulation  -Given history of frequent falls, recommend discussing risk/benefits of anticoagulation    #Hx of CVA in 2016  -Patient with history of CVA in 2016.  She does have residual left-sided weakness which is mild; however, she is unable to ambulate over the last few weeks  -Continue outpatient Plavix and Crestor  -patient will likely benefit from anticoagulation given her high risk of repeat stroke       #Elevated troponin  #Hx of CABG 4v in 1998   Patient with history of coronary disease and CABG in 1998.  Notes that she has not had any chest pain or shortness of breath is worsening recently; ever, she has a mildly elevated troponin of 25.  EKG showed sinus rhythm, first-degree AV block, no ST elevation  -Troponin trending down 25 to 23, likely not ACS based on history  -Continue outpatient Plavix and Crestor     #HFpEF  Patient with history of congestive heart failure.  Takes 20 p.o. Lasix daily.  Has not been compliant with medicines over the past week.  Typically follows up with Dr. Quezada at [facility].   -Last echo in November 2021 showed an EF of 60%  -No significant leg swelling, mild increase in O2 demands  -Do not think patient " is having an acute exacerbation  -Continue on Lasix at this time and spironolactone  -CTM     #Pulmonary lung nodules   #Tobacco use  Patient with long history of smoking and still smokes currently.  -CT in the ED showed an incidental 5 mm right upper lobe pulmonary nodule  -Recommend outpatient follow-up  -Nicotine replacement as needed     #HLD  -Continue outpatient Plavix and Crestor     #Right shoulder pain  #L shoulder pain  -Right shoulder pain ongoing for approximately 1 week.  Unknown if she had any specific injuries  -Voltaren cream as needed  -repeat xray show no evidence of fracture bilaterally, although there is evidence of degenerative changes and joint stenosis  -PT/OT     #Restless leg syndrome   -Continue outpatient Requip     CODE STATUS: DNR/DNI. Discuss with patient goals of care.      Seen by: Yusra White, MS3      Scheduled    Medication Ordered Dose/Rate, Route, Frequency Last Action   acetaminophen (Tylenol) tablet 650 mg 650 mg, PO, Q6HRS Given, 650 mg at 01/19 0455   amiodarone (Cordarone) tablet 200 mg 200 mg, PO, DAILY Given, 200 mg at 01/19 0456   budesonide-formoterol (SYMBICORT) 160-4.5 MCG/ACT inhaler 2 Puff 2 Puff, INH, BID Given, 2 Puff at 01/19 0456   carvedilol (COREG) tablet 12.5 mg 12.5 mg, PO, BID WITH MEALS Given, 12.5 mg at 01/18 1627   cefUROXime (CEFTIN) tablet 250 mg 250 mg, PO, Q12HRS Given, 250 mg at 01/18 2317   clopidogrel (PLAVIX) tablet 75 mg 75 mg, PO, DAILY Given, 75 mg at 01/19 0455   DULoxetine (CYMBALTA) capsule 20 mg 20 mg, PO, DAILY Given, 20 mg at 01/19 0455   enoxaparin (LOVENOX) inj 40 mg 40 mg, SC, DAILY Given, 40 mg at 01/19 0543   furosemide (LASIX) tablet 20 mg 20 mg, PO, DAILY Given, 20 mg at 01/19 0454   gabapentin (NEURONTIN) capsule 400 mg 400 mg, PO, TID Given, 400 mg at 01/19 0455   hydroCHLOROthiazide (HYDRODIURIL) tablet 12.5 mg 12.5 mg, PO, Q DAY Given, 12.5 mg at 01/19 0454   insulin GLARGINE (Lantus,Semglee) injection (And Linked Group #1)  25 Units, SC, Q EVENING Given, 25 Units at 01/18 1746   insulin LISPRO (AdmeLOG,HumaLOG) injection (And Linked Group #1) 1-6 Units, SC, 4X/DAY ACHS Given, 1 Units at 01/18 2107   metFORMIN (GLUCOPHAGE) tablet 1,000 mg 1,000 mg, PO, BID WITH MEALS Given, 1,000 mg at 01/18 1627   nicotine (NICODERM) 14 MG/24HR 14 mg (And Linked Group #2) 14 mg, TD, Daily-0600 Patch Applied, 14 mg at 01/19 0453   ROPINIRole (REQUIP) tablet 0.25 mg 0.25 mg, PO, QHS Given, 0.25 mg at 01/18 2056   rosuvastatin (CRESTOR) tablet 40 mg 40 mg, PO, Q EVENING Given, 40 mg at 01/18 1745   senna-docusate (PERICOLACE or SENOKOT S) 8.6-50 MG per tablet 2 Tablet (And Linked Group #3) 2 Tablet, PO, BID Given, 2 Tablet at 01/19 0454   tiotropium (Spiriva Respimat) 2.5 mcg/Act inhalation spray 5 mcg 5 mcg, INH, DAILY Given, 5 mcg at 01/19 0457     PRN    Medication Ordered Dose/Rate, Route, Frequency Last Action   bisacodyl (DULCOLAX) suppository 10 mg (And Linked Group #3) 10 mg, RI, QDAY PRN Ordered   dextrose 50% (D50W) injection 50 mL (And Linked Group #1) 50 mL, IV, Q15 MIN PRN Ordered   diclofenac sodium (Voltaren) 1 % gel 2 g 2 g, TOP, 4X/DAY PRN Ordered   ibuprofen (MOTRIN) tablet 800 mg 800 mg, PO, Q6HRS PRN Given, 800 mg at 01/18 1745   ketorolac (TORADOL) injection 15 mg 15 mg, IV, Q6HRS PRN Given, 15 mg at 01/18 2057   labetalol (NORMODYNE/TRANDATE) injection 10-20 mg 10-20 mg, IV, Q4HRS PRN Ordered   magnesium hydroxide (MILK OF MAGNESIA) suspension 30 mL (And Linked Group #3) 30 mL, PO, QDAY PRN Ordered   nicotine polacrilex (NICORETTE) 2 MG piece 2 mg (And Linked Group #2) 2 mg, PO, Q HOUR PRN Ordered   polyethylene glycol/lytes (MIRALAX) PACKET 1 Packet (And Linked Group #3) 1 Packet, PO, QDAY PRN Ordered   Respiratory Therapy Consult No Dose/Rate, NEBULIZATION, Continuous RT Ordered     Current Outpatient Medications:   •  amiodarone (CORDARONE) 200 MG Tab, Take 1 Tablet by mouth every day., Disp: 30 Tablet, Rfl:  11  •  budesonide-formoterol (SYMBICORT) 160-4.5 MCG/ACT Aerosol, Inhale 2 Puffs 2 times a day., Disp: 10.2 g, Rfl: 11  •  tiotropium (SPIRIVA RESPIMAT) 2.5 mcg/Act Aero Soln, Inhale 2 Inhalation every day., Disp: 4 g, Rfl: 5  •  carvedilol (COREG) 12.5 MG Tab, Take 1 Tablet by mouth 2 times a day with meals., Disp: 30 Tablet, Rfl: 11  •  clopidogrel (PLAVIX) 75 MG Tab, Take 1 Tablet by mouth every day., Disp: 30 Tablet, Rfl: 11  •  DULoxetine (CYMBALTA) 20 MG Cap DR Particles, Take 1 Capsule by mouth every day., Disp: 30 Capsule, Rfl: 11  •  ergocalciferol (DRISDOL) 16122 UNIT capsule, Take 1 Capsule by mouth every 7 days. For 6 months then DC, Disp: 5 Capsule, Rfl: 5  •  furosemide (LASIX) 20 MG Tab, Take 1 Tablet by mouth every day., Disp: 30 Tablet, Rfl: 11  •  gabapentin (NEURONTIN) 300 MG Cap, Take 1 Capsule by mouth 3 times a day., Disp: 90 Capsule, Rfl: 11  •  metFORMIN (GLUCOPHAGE) 850 MG Tab, Take 1 Tablet by mouth 2 times a day with meals., Disp: 60 Tablet, Rfl: 11  •  omeprazole (PRILOSEC) 20 MG delayed-release capsule, Take 1 Capsule by mouth every day., Disp: 30 Capsule, Rfl: 11  •  rosuvastatin (CRESTOR) 40 MG tablet, Take 1 Tablet by mouth every evening., Disp: 30 Tablet, Rfl: 11  •  glucose blood strip, 1 Strip by Other route 3 times a day., Disp: 100 Strip, Rfl: 11  •  EASY COMFORT PEN NEEDLES, NULL, Disp: 100 Each, Rfl: 1  •  ROPINIRole (REQUIP) 0.25 MG Tab, Take 1 Tablet by mouth at bedtime., Disp: 90 Tablet, Rfl: 5  •  polyethylene glycol/lytes (MIRALAX) 17 g Pack, Take 1 Packet by mouth 1 time a day as needed (if sennosides and docusate ineffective after 24 hours)., Disp: 10 Each, Rfl: 3  •  acetaminophen (TYLENOL) 325 MG Tab, Take 2 Tablets by mouth every 6 hours as needed., Disp: 30 Tablet, Rfl: 0

## 2022-01-19 NOTE — RESPIRATORY CARE
"COPD EDUCATION by COPD CLINICAL EDUCATOR  (Phone: [phone])  1/18/2022 at 5:01 PM by Nicolasa Perla, RRT     Patient seen by Respiratory Education team to complete Block 1 of a 2 part series. Reference material about the program was given to patient along with our contact information.  Part #1 includes: What is COPD (how the lungs work), common treatments for COPD, the difference between \"rescue\" medications and \"daily\" medications, bronchial hygiene  with an explanation of COPD Action Plan. We reviewed their appropriate medication techniques -including a demonstration. We discussed the correct way to care for and clean respiratory equipment and when applicable, Advance Care Planning (has one filled out needs to be notarized ) was discussed.  Question and answer session followed.      COPD Screen  COPD Risk Screening  Do you have a history of COPD?: Yes (pt says she does not have COPD, normal breathing tests)  Do you have a Pulmonologist?: No  COPD Population Screener  During the past 4 weeks, how much did you feel short of breath?: None/Little of the time  Do you ever cough up any mucus or phlegm?: No/only with occasional colds or infections  In the past 12 months, you do less than you used to because of your breathing problems: Agree  Have you smoked at least 100 cigarettes in your entire life?: Yes  How old are you?: 60+  COPD Screening Score: 5  COPD Coordinator Recommended: Yes (possible rule in or out COPD?  States normal PFT's)    COPD Assessment  COPD Clinical Specialists ONLY  COPD Education Initiated: Yes--Full Intervention  COPD Education Session 1: Yes  DME Company: [employer] Equipment Type: Oxygen  Physician Follow Up Appointment:  (no appts scheduled at this time)  Physician Name: LAURA STOLL  Pulmonologist Name: Gave patient list of pulmonary clinics  Palliative Care:  (TBD)  Referrals Initiated: Yes  Pulmonary Rehab: Yes (will send request)  Smoking Cessation: Yes  $ Smoking Cessation " "3-10 Minutes: Symptomatic  Smoking Pack Years: 6.25  Palliative Care:  (TBD)  Hospice: N/A  Home Health Care:  (TBD)  [facility] Outreach: N/A  [facility]: Yes (already part of them)  [facility]: Yes (already have)  Private In-Home Care Agency: N/A  Is this a COPD exacerbation patient?: No  $ Demo/Eval of SVN's, MDI's and Aerosols: Yes (spacer given)  (OP) Pulmonary Function Testing: Yes (07/10/18 FEV1=72%, Mild obstruction an airtrapping with severe reduction in diffusing capacity isSuggestive of chronic obstructive pulmonary disease / emphysema.  Correlate clinically.)    PFT Results    No results found for: PFT    Meds to Beds  Would the patient like to opt in for Bedside Medication Delivery at Discharge?: Yes, interested     MY COPD ACTION PLAN     It is recommended that patients and physicians /healthcare providers complete this action plan together. This plan should be discussed at each physician visit and updated as needed.    The green, yellow and red zones show groups of symptoms of COPD. This list of symptoms is not comprehensive, and you may experience other symptoms. In the \"Actions\" column, your healthcare provider has recommended actions for you to take based on your symptoms.    Patient Name: Leena Bella   YOB: 1950   Last Updated on: 1/18/2022  5:00 PM   Green Zone:  I am doing well today Actions   •  Usual activitiy and exercise level •  Take daily medications   •  Usual amounts of cough and phlegm/mucus •  Use oxygen as prescribed   •  Sleep well at night •  Continue regular exercise/diet plan   •  Appetite is good •  At all times avoid cigarette smoke, inhaled irritants     Daily Medications (these medications are taken every day):   Budesonide-Formoterol Fumarate (Symbicort)  Tiotropium Bromide Monohydrate (Spiriva) 2 Puffs  2 Puffs Twice daily  Once daily     Additional Information:  Rinse mouth after using Symbicort     Yellow Zone:  I am having " "a bad day or a COPD flare Actions   •  More breathless than usual •  Continue daily medications   •  I have less energy for my daily activities •  Use quick relief inhaler as ordered   •  Increased or thicker phlegm/mucus •  Use oxygen as prescribed   •  Using quick relief inhaler/nebulizer more often •  Get plenty of rest   •  Swelling of ankles more than usual •  Use pursed lip breathing   •  More coughing than usual •  At all times avoid cigarette smoke, inhaled irritants   •  I feel like I have a \"chest cold\"   •  Poor sleep and my symptoms woke me up   •  My appetite is not good   •  My medicine is not helping    •  Call provider immediately if symptoms don’t improve     Continue daily medications, add rescue medications:               Medications to be used during a flare up, (as Discussed with Provider):           Additional Information:  Use spacer with rescue inhaler     Red Zone:  I need urgent medical care Actions   •  Severe shortness of breath even at rest •  Call 911 or seek medical care immediately   •  Not able to do any activity because of breathing    •  Fever or shaking chills    •  Feeling confused or very drowsy     •  Chest pains    •  Coughing up blood              "

## 2022-01-19 NOTE — DOCUMENTATION QUERY
"                                                                         [facility]                                                                       Query Response Note      PATIENT:               ANEESH KAUR  ACCT #:                  [id number]  MRN:                     [id number]  :                      1950  ADMIT DATE:       2022 3:36 PM  DISCH DATE:          RESPONDING  PROVIDER #:        [id number]           QUERY TEXT:    Documentation in the medical record indicates that this patient has been identified as having and/or is being treated for PRESSURE ULCER(s) of the following sites:    Unstageable Pressure Injury L-Heel   Stage 2 Pressure Injury L-Ischium  DTI R-Coccyx    Based on your medical judgment, can you please confirm the location/sites and the present  on admission status of the ulcer(s) in the progress notes.  The pressure ulcer type and location, and then present on admission status  Unable to Determine      The patient's Clinical Indicators include:  \"Pressure Injury Heel Posterior Left POA unstageable,\" \"Pressure Injury Ischium Left POA St 2,\" \"Pressure Injury Coccyx Right POA DTI\" documented by the Wound Team on . Heel ulcer noted in H&P.     Treatments include: 3% Betadine, Hydrocolloid, Mepilex, RD Consult, and Wound Team Consult.    Risk factors include: dx Vertebral Artery, dx C4 Vertebral Fx, dx Lamine-Weakness 2/2 Prior CVA, hx DM II, and Advanced Age.    Thank you,  Tesfaye Waller RN, BSN  Clinical   Connect via [employer]  Options provided:   -- Unstageable Pressure Injury L-Heel, Stage 2 Pressure Injury L-Ischium, DTI R-Coccyx are ruled in and were present on admission   -- Other Explanation      Query created by: Tesfaye Waller on 2022 10:19 AM    RESPONSE TEXT:    Unstageable Pressure Injury L-Heel, Stage 2 Pressure Injury L-Ischium, DTI R-Coccyx are ruled in and were present on admission          Electronically signed by:  VAIBHAV CORTEZ" JF DUMONT 1/19/2022 11:13 AM

## 2022-01-19 NOTE — CARE PLAN
The patient is Stable - Low risk of patient condition declining or worsening    Shift Goals  Clinical Goals: pain control  Patient Goals: pain control  Family Goals: not present    Progress made toward(s) clinical / shift goals:  yes  Pain well controlled.   Problem: Pain - Standard  Goal: Alleviation of pain or a reduction in pain to the patient’s comfort goal  Outcome: Progressing     Problem: Skin Integrity  Goal: Skin integrity is maintained or improved  Outcome: Progressing   Turning side to side, waffle overlay put in place.     Patient is not progressing towards the following goals:
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
## 2022-01-19 NOTE — THERAPY
Physical Therapy   Initial Evaluation     Patient Name: Leena Bella  Age:  71 y.o., Sex:  female  Medical Record #: [id number]  Today's Date: 1/19/2022     Precautions  Precautions: Fall Risk;Cervical Collar  ;Spinal / Back Precautions   Comments: Soft collar; inattention to LUE    Assessment  Patient is 71 y.o. female admitted with c/o of progressive weakness over the past few weeks, C4 transverse process fx, and vertebral artery dissection. PMH includes CAD, CVA with left sided weakness, type 2 diabetes, HTN, COPD (2L O2 baseline), and afib. Pt has a soft c/s collar. She reports multiple falls with FWW d/t weakness in the L hand and B LEs. During evaluation she demonstrated inattention to LUE, requiring several cues to reposition hand. She required Delfino for supine<>sit with HOB elevated and modA for sit<>stand. ModA to take several steps with FWW and needed assist to keep L hand on walker and push L side of walker. PT will continue to follow while in acute care to address strength, balance, ROM, transfers, gait and activity tolerance.     Plan    Recommend Physical Therapy 4 times per week until therapy goals are met for the following treatments:  Bed Mobility, Gait Training, Neuro Re-Education / Balance, Self Care/Home Evaluation, Stair Training, Therapeutic Activities and Therapeutic Exercises    DC Equipment Recommendations: Unable to determine at this time  Discharge Recommendations: Recommend post-acute placement for additional physical therapy services prior to discharge home        01/19/22 1007   Vitals   O2 (LPM) 1.5   O2 Delivery Device Nasal Cannula   Pain 0 - 10 Group   Therapist Pain Assessment During Activity  (Pain throughout spine and LEs with movement)   Prior Living Situation   Prior Services Intermittent Physical Support for ADL Per Family   Housing / Facility 1 Story House   Steps Into Home 0   Equipment Owned Front-Wheel Walker;Wheelchair   Lives with - Patient's Self Care Capacity  Alone and Unable to Care For Self   Comments Son lives in his own home on the same property. Has been providing meals for patient as she reports getting progressivley weaker and very limited in mobility since Dec. Son recently got a job in [employer] but is looking into becoming a full time caretaker   Prior Level of Functional Mobility   Bed Mobility Required Assist   Transfer Status Required Assist   Ambulation Required Assist   Assistive Devices Used Front-Wheel Walker   Wheelchair Unable To Determine At This Time   Stairs Required Assist   Comments Pt reports multiple falls with FWW   History of Falls   History of Falls Yes   Active ROM Lower Body    Active ROM Lower Body  WDL   Strength Lower Body   Lower Body Strength  X   Comments Gross B LE at least 3/5 strength   Sensation Lower Body   Lower Extremity Sensation   WDL   Balance Assessment   Sitting Balance (Static) Fair   Sitting Balance (Dynamic) Fair -   Standing Balance (Static) Poor   Standing Balance (Dynamic) Poor -   Weight Shift Sitting Fair   Weight Shift Standing Fair   Comments FWW with assist to maintain LUE on walker   Gait Analysis   Gait Level Of Assist Moderate Assist   Assistive Device Front Wheel Walker   Distance (Feet) 3   # of Times Distance was Traveled 1   Deviation Shuffled Gait;Decreased Heel Strike;Decreased Toe Off   Weight Bearing Status No restrictions   Comments Assist needed to keep LUE on FWW and moving FWW on L side.   Bed Mobility    Supine to Sit Minimal Assist   Sit to Supine Minimal Assist   Scooting Standby Assist   Rolling Standby Assist   Comments HOB elevated   Functional Mobility   Sit to Stand Moderate Assist   Bed, Chair, Wheelchair Transfer Moderate Assist   Transfer Method Stand Step   Mobility In room   Comments FWW   Patient / Family Goals    Patient / Family Goal #1 get stronger   Short Term Goals    Short Term Goal # 1 Pt will demonstrate log roll for supine<>sit with HOB flat and SPV in 6tx sessions to improve  independent mobility.   Short Term Goal # 2 Pt will demonstrate sit<>stand with FWW and SPV in 6tx sessions to increase independence.   Short Term Goal # 3 Pt will demonstrate 20' of gait with FWW and SPV in 6tx sessions to ambulate around her home.    Education Group   Education Provided Cervical Precautions;Role of Physical Therapist  (Log roll)   Cervical Precautions Patient Response Patient;Acceptance;Explanation;Verbal Demonstration;Action Demonstration   Role of Physical Therapist Patient Response Patient;Acceptance;Explanation;Demonstration;Verbal Demonstration   Problem List    Problems Pain;Impaired Bed Mobility;Impaired Transfers;Impaired Ambulation;Functional ROM Deficit;Functional Strength Deficit;Impaired Balance;Decreased Activity Tolerance

## 2022-01-19 NOTE — THERAPY
Occupational Therapy   Initial Evaluation     Patient Name: Leena Bella  Age:  71 y.o., Sex:  female  Medical Record #: [id number]  Today's Date: 1/19/2022     Precautions  Precautions: Fall Risk,Cervical Collar  ,Spinal / Back Precautions   Comments: Soft collar; inattention to LUE    Assessment  Patient is 71 y.o. female with a diagnosis of C4 transverse Fx.  Pt currently limited by decreased functional mobility, activity tolerance, sensation, strength, AROM, coordination, balance, and pain which are affecting pt's ability to complete ADLs/IADLs at baseline. Pt would benefit from OT services in the acute care setting to maximize functional recovery.       Plan    Recommend Occupational Therapy 4 times per week until therapy goals are met for the following treatments:  Neuro Re-Education / Balance, Self Care/Activities of Daily Living and Therapeutic Activities.       Discharge Recommendations: (P) Recommend post-acute placement for additional occupational therapy services prior to discharge home        01/19/22 0958   Prior Living Situation   Prior Services Intermittent Physical Support for ADL Per Family   Housing / Facility 1 Story House   Steps Into Home 0   Equipment Owned Front-Wheel Walker;Wheelchair   Lives with - Patient's Self Care Capacity Alone and Unable to Care For Self   Comments son lives in a trailor on pts property.  Pt states that she has been bed bound for the past month.    ADL Assessment   Grooming Minimal Assist   Upper Body Dressing Minimal Assist   Lower Body Dressing Moderate Assist   Functional Mobility   Sit to Stand Moderate Assist   Bed, Chair, Wheelchair Transfer Moderate Assist   Short Term Goals   Short Term Goal # 1 supervised with UB dressing   Short Term Goal # 2 supervised with LB dressing   Short Term Goal # 3 supervised with ADL txfs

## 2022-01-19 NOTE — PROGRESS NOTES
"Soft cervical collar size 3\" inch has been delivered to pt's staff to apply and fit to pt when ready.  "

## 2022-01-19 NOTE — DISCHARGE PLANNING
Care Transition Team Assessment    Met with pt at bedside to complete assessment and discuss discharge plan. Pt reports she lives alone at address listed on facesheet. Pt states her son lives in a trailer on her property. Pt states her son is a source of support, however he recently accepted a new job in [place] and is limited w/ amount of assistance he can provide. Pt reports being independent with ADL's and IADL's. Son provides assistance with transportation and grocery shopping. Pt uses a walker at baseline and uses home O2 (2L) through [employer]. Pt was also on service with [facility].     Discussed recommendation for SNF placement. Pt agreeable to SNF. States she went to [place] before and would not like to go there again. Went over SNF options pt pt. Pt made choice for 1. C 2. C and 3 [facility].     Choice form completed and faxed to SHANNAN HOLLOWAY on file #[id number]      Information Source  Orientation Level: Oriented X4  Information Given By: Patient    Elopement Risk  Legal Hold: No  Ambulatory or Self Mobile in Wheelchair: No-Not an Elopement Risk  Elopement Risk: Not at Risk for Elopement    Interdisciplinary Discharge Planning  Lives with - Patient's Self Care Capacity: Alone and Unable to Care For Self  Housing / Facility: [address] [facility]  Prior Services: Intermittent Physical Support for ADL Per Family    Discharge Preparedness  What is your plan after discharge?: Skilled nursing facility  What are your discharge supports?: Child  Prior Functional Level: Independent with Activities of Daily Living,Independent with Medication Management,Uses Walker  Difficulity with ADLs: Walking,None  Difficulity with IADLs: None    Functional Assesment  Prior Functional Level: Independent with Activities of Daily Living,Independent with Medication Management,Uses Walker    Finances  Financial Barriers to Discharge: No  Prescription Coverage: Yes    Domestic Abuse  Have you ever been the victim of abuse or  violence?: No    Psychological Assessment  History of Substance Abuse: None  History of Psychiatric Problems: No  Non-compliant with Treatment: No  Newly Diagnosed Illness: Yes    Discharge Risks or Barriers  Discharge risks or barriers?: Post-acute placement / services,Lives alone, no community support    Anticipated Discharge Information  Discharge Disposition: D/T to SNF with Medicare cert in anticipation of skilled care (03)

## 2022-01-20 NOTE — PROGRESS NOTES
[facility] [place] [facility] MED[facility] PROGRESS NOTE        Attending:   Dr. Matthew    Resident:   Niles Goodson M.D.    PATIENT:   Leena Bella; [id number]; 1950    ID:   71 y.o. femaleScolette Bella is a 71 y.o. female with a history of CAD s/p CABG in 1998, CVA w/ left-sided weakness, type 2 diabetes, hypertension, COPD (2 L O2 at baseline and), A. fib, depression who admitted for weakness, C4 fracture, vertebral artery dissection.    SUBJECTIVE:   No acute events overnight.  The patient says that she has no concerns at this time and she is looking forward to finding a facility that will accept her for skilled nursing, she does not feel that going home and being alone would be a good idea at this time.    OBJECTIVE:  Vitals:    01/19/22 1510 01/19/22 1947 01/20/22 0324 01/20/22 0742   BP:  111/70 137/74 136/73   Pulse: 69 72 80 63   Resp: 18 17 17 18   Temp:  36.6 °C (97.9 °F) 36.3 °C (97.3 °F) 36.1 °C (96.9 °F)   TempSrc:  Temporal Temporal Temporal   SpO2: 94% 94% 93% 90%   Weight:       Height:           Intake/Output Summary (Last 24 hours) at 1/20/2022 0629  Last data filed at 1/20/2022 0500  Gross per 24 hour   Intake 640 ml   Output 900 ml   Net -260 ml       PHYSICAL EXAM:  General: No acute distress, afebrile, resting comfortably  HEENT: NC/AT. EOMI.   Cardiovascular: RRR without murmurs. Normal capillary refill   Respiratory: CTAB, but poor inspiratory and expiratory effort  Abdomen: soft, nontender, nondistended, no masses  EXT:  DOOLEY, no edema  Skin: Venous stasis changes present in the bilateral lower extremities with dry patches of skin, as well  Neuro: Non-focal    LABS:  Recent Labs     01/18/22  0226 01/19/22  0637 01/20/22  0725   WBC 10.3 8.8 10.4   RBC 4.76 4.55 4.69   HEMOGLOBIN 13.7 13.4 13.7   HEMATOCRIT 44.0 41.1 43.3   MCV 92.4 90.3 92.3   MCH 28.8 29.5 29.2   RDW 49.6 47.5 49.5   PLATELETCT 119* 157* 169   MPV 11.3 11.5 11.0   NEUTSPOLYS 69.20 64.10 77.70*   LYMPHOCYTES 20.00* 22.70 13.20*    MONOCYTES 7.30 9.70 7.50   EOSINOPHILS 2.50 2.90 0.90   BASOPHILS 0.60 0.30 0.30   RBCMORPHOLO Present  --   --      Recent Labs     01/17/22  1726 01/17/22  1726 01/18/22 0226 01/19/22  0637 01/20/22  0725   SODIUM 139   < > 141 135 136   POTASSIUM 4.2   < > 4.0 3.8 4.7   CHLORIDE 110   < > 108 103 102   CO2 19*   < > 22 24 23   BUN 18   < > 14 22 31*   CREATININE 0.87   < > 0.82 1.18 1.19   CALCIUM 9.1   < > 9.6 9.6 9.9   MAGNESIUM 1.8  --   --  2.0  --    PHOSPHORUS  --   --   --  3.2  --    ALBUMIN 3.1*   < > 3.4 3.0* 2.9*    < > = values in this interval not displayed.     Estimated GFR/CRCL = Estimated Creatinine Clearance: 32.2 mL/min (by C-G formula based on SCr of 1.19 mg/dL).  Recent Labs     01/18/22 0226 01/18/22  1022 01/19/22  0637 01/19/22  0837 01/19/22 2026 01/20/22  0725 01/20/22  0838 01/20/22  0906 01/20/22  0948   GLUCOSE 165*  --  98  --   --  51*  --   --   --    POCGLUCOSE  --    < >  --    < >   < >  --  55* 67 133*    < > = values in this interval not displayed.     Recent Labs     01/17/22 1726 01/17/22 1726 01/18/22 0226 01/19/22  0637 01/20/22  0725   ASTSGOT 8*   < > 11* 10* 11*   ALTSGPT 6   < > 7 8 <5   TBILIRUBIN <0.2   < > 0.2 <0.2 0.2   ALKPHOSPHAT 71   < > 78 72 71   GLOBULIN 2.5   < > 2.7 2.6 3.1   INR 1.03  --   --   --   --     < > = values in this interval not displayed.             Recent Labs     01/17/22 1726   INR 1.03   APTT 29.4         IMAGING:  DX-SHOULDER 2+ LEFT   Final Result      1.  No evidence of acute fracture or dislocation.   2.  Acromioclavicular and glenohumeral degenerative changes.   3.  Atherosclerotic plaque.         DX-SHOULDER 2+ RIGHT   Final Result      1.  No evidence of acute fracture or dislocation.   2.  Degenerative changes of the acromioclavicular joint.   3.  Mild glenohumeral degenerative changes.      DX-SHOULDER 1 VIEW RIGHT   Final Result      Notching at the inferior right humeral head is new from prior exam. This could represent  a nondisplaced fracture.      CT-CTA NECK WITH & W/O-POST PROCESSING   Final Result      1.  Biffl grade 2 right vertebral artery injury at the level of the C4 transverse process fracture.   2.  Severe atherosclerotic narrowing of the proximal bilateral carotid arteries.   3.  Emphysematous changes with pulmonary nodules measuring up to 5 mm. Follow-up recommendations below.      Low Risk: No routine follow-up      High Risk: Optional CT at 12 months      Comments: Use most suspicious nodule as guide to management. Follow-up intervals may vary according to size and risk.      Low Risk - Minimal or absent history of smoking and of other known risk factors.      High Risk - History of smoking or of other known risk factors.      Note: These recommendations do not apply to lung cancer screening, patients with immunosuppression, or patients with known primary cancer.      Fleischner Society 2017 Guidelines for Management of Incidentally Detected Pulmonary Nodules in Adults         Dr. Mullins discussed these findings with Dr. Moreno at 11:20 PM by telephone on 1/17/2022      CT-HEAD W/O   Final Result      1.  There is no acute intracranial hemorrhage or infarct.      2.  White matter lucencies most consistent with small vessel ischemic change versus demyelination or gliosis.      3.  There is cerebral atrophy.         CT-CSPINE WITHOUT PLUS RECONS   Final Result   Addendum 1 of 1   Dr. Perez Moreno aware of findings at 7:40 PM      Final      1.  Apparent subtle nondisplaced fractures involving the anterior and posterior lateral walls of the right C4 transverse foramen.      2.  Multilevel degenerative change.      DX-CHEST-PORTABLE (1 VIEW)   Final Result      No acute cardiac or pulmonary abnormalities are identified.          MEDS:  Current Facility-Administered Medications   Medication Last Admin   • influenza Vac High-Dose Quad (Fluzone) injection 0.7 mL     • aspirin (ASA) chewable tab 81 mg 81 mg at 01/20/22 3400    • ROPINIRole (REQUIP) tablet 0.5 mg 0.5 mg at 01/19/22 2027   • DULoxetine (CYMBALTA) capsule 40 mg 40 mg at 01/20/22 0513   • gabapentin (NEURONTIN) capsule 300 mg 300 mg at 01/20/22 0514   • diclofenac sodium (Voltaren) 1 % gel 2 g     • hydroCHLOROthiazide (HYDRODIURIL) tablet 12.5 mg 12.5 mg at 01/20/22 0513   • labetalol (NORMODYNE/TRANDATE) injection 10-20 mg     • cefUROXime (CEFTIN) tablet 250 mg 250 mg at 01/20/22 0014   • metFORMIN (GLUCOPHAGE) tablet 1,000 mg 1,000 mg at 01/19/22 1736   • insulin GLARGINE (Lantus,Semglee) injection 25 Units at 01/19/22 1744    And   • insulin LISPRO (AdmeLOG,HumaLOG) injection 1 Units at 01/19/22 1742    And   • dextrose 50% (D50W) injection 50 mL     • ibuprofen (MOTRIN) tablet 800 mg 800 mg at 01/18/22 1745   • acetaminophen (Tylenol) tablet 650 mg 650 mg at 01/20/22 0513   • amiodarone (Cordarone) tablet 200 mg 200 mg at 01/20/22 0513   • budesonide-formoterol (SYMBICORT) 160-4.5 MCG/ACT inhaler 2 Puff 2 Puff at 01/20/22 0514   • carvedilol (COREG) tablet 12.5 mg 12.5 mg at 01/20/22 0854   • clopidogrel (PLAVIX) tablet 75 mg 75 mg at 01/20/22 0513   • furosemide (LASIX) tablet 20 mg 20 mg at 01/20/22 0514   • rosuvastatin (CRESTOR) tablet 40 mg 40 mg at 01/19/22 1736   • tiotropium (Spiriva Respimat) 2.5 mcg/Act inhalation spray 5 mcg 5 mcg at 01/20/22 0514   • senna-docusate (PERICOLACE or SENOKOT S) 8.6-50 MG per tablet 2 Tablet 2 Tablet at 01/20/22 0514    And   • polyethylene glycol/lytes (MIRALAX) PACKET 1 Packet      And   • magnesium hydroxide (MILK OF MAGNESIA) suspension 30 mL      And   • bisacodyl (DULCOLAX) suppository 10 mg     • Respiratory Therapy Consult     • enoxaparin (LOVENOX) inj 40 mg 40 mg at 01/20/22 0514   • ketorolac (TORADOL) injection 15 mg 15 mg at 01/20/22 0854   • nicotine (NICODERM) 14 MG/24HR 14 mg 14 mg at 01/20/22 0514    And   • nicotine polacrilex (NICORETTE) 2 MG piece 2 mg         ASSESSMENT/PLAN:    Problem   Vertebral Artery  Dissection (Hcc)   Closed Fracture Dislocation of Cervical Spine (Hcc)   Debility   Uncontrolled Type 2 Diabetes Mellitus With Polyneuropathy (Hcc)       Vertebral artery dissection (HCC)  #Vertebral artery dissection, BIFFL grade II  #Bilateral carotid artery stenosis  Patient states that she has had generalized weakness ongoing for the past 2 weeks has been worse.  Had a fall last week but does not remember the exact events.  CTA performed in the ED showed a BIFFL grade 2 right vertebral artery injury at the level of the C4 transverse process fracture.  -Dr. Chow was consulted in the ED, where the patient did not have any new focal neurodeficits, and recommended dual antiplatelet therapy with Plavix and aspirin; recommendations appreciated    Closed fracture dislocation of cervical spine (HCC)  Patient at home and had a fall approximately 1 week ago but does not remember injuring her neck or head but notes she has had neck pain for approximately 1 week.  -In the ED apparent subtle nondisplaced fracture of the anterior and posterior lateral walls of the right C4 transverse foramen.  Neurosurgery consulted in the ED and recommended soft collar only at this time  -Soft collar ordered for comfort  -PT and OT continue to see patient    Debility  Patient with generalized weakness and several falls over the past week.  Lives at home and is likely not in the state where she can care for herself.  Discussed at length with patient and she understands that she likely should not be home alone at this present time and should probably rehab at another facility.  Patient unable to care for self including not taking meds at home, difficulty ambulating to the toilet, ulcers on her right foot and small ulcer on her buttocks as well  -Wound care consulted  -PT and OT to evaluate patient  -Social work to help assist with discharge planning  - There are pending referrals to advanced, life care, and all to at this  time       Uncontrolled type 2 diabetes mellitus with polyneuropathy (HCC)  -Patient got 4 units of SSI in the last 24 hours and glucose ranged from  in the last 24 hours  - Continuing 25 units of glargine nightly  - Continue SSI  - Continue hypoglycemia protocol    UTI  - Patient did present initially with dysuria  - Patient has had a Klebsiella UTI in the past  - Patient was started on cefuroxime ER on 1/18/2022, and the last dose will occur in 1/24/2022    Core Measures:   Fluids: None  Lines: PIV  Abx: Cefuroxime  DVT prophylaxis: Lovenox  Code Status: Full    Disposition: Inpatient; pending placement    Niles Goodson MD   PGY-2 Family Medicine Resident   [facility]

## 2022-01-20 NOTE — NON-PROVIDER
[facility] Family Medicine Progress Note     PATIENT ID:     NAME:             Leena Bella  MRN:               [id number]  Date of birth:   1950     Date of Admission: 1/17/2022   Attending: Dr. Jada Matthew  Resident: Dr. Onofre and Dr. Goodson  Primary Care Physician:  LAURA Rosas  CONSULTS:   Vascular surgery, Dr. Neumann   Neurosurgery, Dr. Dallas Manzo     CC:  Generalized weakness, falls     ID: Leena Bella is a 71 y.o. female with a history of CAD s/p CABG in 1998, CVA w/ left-sided weakness, type 2 diabetes, hypertension, COPD (2 L O2 at baseline), A. fib, depression who presented to the ED for generalized weakness, found on admission to have C4 transverse foraminal nondisplaced fracture and Biffl grade 2 right vertebral artery dissection.      Subjective: 1.5 L O2 NC overnight. Patient continues to report shooting foot pains with sharp cramping. Patient also c/o of continued but slightly improved migraine. Otherwise, she reports feeling improved overall. No adverse events overnight.      Objective:      Vitals:       01/19 0700   01/20 0659 01/20 0700   01/20 0719  Most Recent    Temperature (°C) 36.2-36.6    36.3 (97.3)    Pulse 69-80    80    Respiration 17-18    17    Blood Pressure  101//74    137/74    Pulse Oximetry (%) 92-94    93      BP systolic 110s, increase to 137 around 0000     Physical Exam:  HEENT: PERRL, atraumatic, no bruising  Pulm: CTAB, lungs sound strained  CV: RRR, no m/r/g  MSK: point tenderness to anterolateral shoulder/upper arm  Skin: pressure ulcer L heel, with scaling, erythema, and flaking of bilateral feet and legs below the knee  Neuro: significant lower extremity weakness bilaterally, 0/5 strength L arm, 3/5 strength R arm, CN 2-12 intact, sensation present bilaterally upper and lower extremity with decreased sensation on L side, no new focal deficits     Labs:     Phosphorus 3.2 and Mg 2.0 yesterday with K+ 3.8 and sodium 135    ASSESSMENT/PLAN:      71  y.o. female with a history of CAD s/p CABG in 1998, CVA w/ left-sided weakness, type 2 diabetes, hypertension, COPD (2 L O2 at baseline and), A. fib, depression who presented to the ED for generalized weakness found on admission to have C4 transverse foraminal nondisplaced fracture and Biffl grade 2 right vertebral artery dissection. PT/OT saw patient yesterday and recommends continued PT/OT 4x/week with recommended post-acute placement. Social Work had extended discussion with patient yesterday -- reached mutual agreement that post-acute placement to SNF was appropriate and chose 3 facilities to contact (see SW note). Social work currently working on placement options.     #DM2  #Diabetic neuropathy  Patient with history of type 2 diabetes.  Only takes metformin at home.  Last A1c on 12/3/2021 was 13.5.  Patient admits she has not been compliant with her medicines recently  - re-check A1C -- 15.4  - POC glucose 307, 289, 288, 258, 154 yesterday   - Hypoglycemic protocol on board  - Continue home gabapentin for neuropathy at 300mg TID because of increased Cr. Increased duloxetine to 40mg yesterday for improved pain management.   - patient will need insulin home regimen  - 25 units glargine nightly, lispro 1-6 units 4x/day (given 4 units on 1/19), metformin 1000 BID  - Electrolytes yesterday were within normal range; foot pain most likely d/t longstanding poorly controlled diabetes     #Vertebral artery dissection, BIFFL grade II  #Bilateral carotid artery stenosis  Patient reports falling about 4 weeks ago. She also was unable to stand up from the toilet for 2 days 1 week ago but denies recent falls. Patient has residual left-sided weakness from prior CVA (2016) without any new focal neurodeficits.   -Dr. Chow Vascular surgery recommends continuing dual antiplatelet therapy which she is prescribed at home. He says he would not initiate full anticoagulation.  No procedural intervention. Outpatient follow-up for  surveillance imaging.  He gave patient his clinic information and instructions to call to set up a follow-up visit in 4-6 weeks to discuss surveillance.  -Started Aspirin 81mg     #C-spine fracture  Patient at home and had a fall approximately 4 weeks ago but does not remember injuring her neck or head but notes she has had neck pain recently.  -In the ED apparent subtle nondisplaced fracture of the anterior and posterior lateral walls of the right C4 transverse foramen.    -PT and OT to see patient  -Neurosurgery: Fitted for [facility] or likely downgraded to soft collar bc injury is nonstructural. F/u in clinic in 6 weeks. Patient is now in soft collar.       #Debility  #Generalized weakness   #Ground level falls  Patient with generalized weakness and several falls over recent months.  Lives at home and is likely not in the state where she can care for herself. Patient admits that she is not able to care for herself alone, including not taking meds at home, difficulty ambulating to toilet, self-bathing, and ulcers on L heel and buttocks. Patient agreeable to post-acute placement but would like long-term goal of HomeHealth.   -Wound Care consulted -- pressure wounds on L heel, ischium, and coccyx -- soft boot applied. Wound Care will continue f/u   -PT/OT saw patient yesterday and recommends continued PT/OT 4x/week with recommended post-acute placement  - Social Work had extended discussion with patient yesterday -- reached mutual agreement that post-acute placement to SNF was appropriate and chose 3 facilities to contact. Social work currently working on placement options.     #Dysuria  -Patient reports improved dysuria today  -Hx of Klebsiella UTI 12/2021  -UA shows evidence of bacterial and yeast infection.   -Continue Cefuroxime 250mg  -Fluconazole not started d/t adverse interaction with Plavix      #Acute on chronic hypoxic respiratory failure  #COPD  Patient with long history of smoking and history of COPD.  " CT shows emphysematous changes as well as 5 mm pulmonary nodules..  Patient's baseline O2 is 2 L; she is now requiring only 1.5L to maintain sats above 90%  -Titrate O2 as needed  -Continue home Symbicort  -RT consulted      #Afib  -Patient with history of atrial fibrillation followed by [facility] cardiology.  Rate currently controlled in the 70s.  Patient has not taken her amiodarone or Coreg for approximately 1 week.  -Continue home Coreg and amiodarone  -[id number] score of 8, but not anticoagulated. Score>2 indicates \"moderate-high\" risk for stroke indicating strong candidate for anticoagulation  -Given history of frequent falls, recommend discussing risk/benefits of anticoagulation     #Hx of CVA in 2016  -Patient with history of CVA in 2016.  She does have residual left-sided weakness which is mild; however, she is unable to ambulate over the last few weeks  -Continue outpatient Plavix and Crestor  -patient will likely benefit from anticoagulation given her high risk of repeat stroke       #Elevated troponin  #Hx of CABG 4v in 1998   Patient with history of coronary disease and CABG in 1998.  Notes that she has not had any chest pain or shortness of breath is worsening recently; ever, she has a mildly elevated troponin of 25.  EKG showed sinus rhythm, first-degree AV block, no ST elevation  -Troponin trending down 25 to 23, likely not ACS based on history  -Continue outpatient Plavix and Crestor     #HFpEF  Patient with history of congestive heart failure.  Takes 20 p.o. Lasix daily.  Has not been compliant with medicines over the past week.  Typically follows up with Dr. Quezada at [facility].   -Last echo in November 2021 showed an EF of 60%  -No significant leg swelling, mild increase in O2 demands  -Do not think patient is having an acute exacerbation  -Continue on Lasix at this time and spironolactone  -CTM     #Pulmonary lung nodules   #Tobacco use  Patient with long history of smoking and still " smokes currently.  -CT in the ED showed an incidental 5 mm right upper lobe pulmonary nodule  -Recommend outpatient follow-up  -Nicotine replacement as needed     #HLD  -Continue outpatient Plavix and Crestor     #Right shoulder pain  #L shoulder pain  -Right shoulder pain ongoing for approximately 1 week.  Unknown if she had any specific injuries  -Voltaren cream as needed  -repeat xray show no evidence of fracture bilaterally, although there is evidence of degenerative changes and joint stenosis  -PT/OT     #Restless leg syndrome   -Continue outpatient Requip     CODE STATUS: DNR/DNI. Discuss with patient goals of care.      Seen by: Yusra White, MS3

## 2022-01-20 NOTE — PROGRESS NOTES
Bedside report received.  Assessment complete.  A&O x 4. Patient calls appropriately.  Patient high fall risk. Bed alarm on.   Patient has 8/10 pain. Medicated per MAR.  Denies N&V. Tolerating diet.  Hydrocolloid dressing to buttock sore changed.   + BM.  Patient denies SOB.  Review plan with of care with patient. Call light and personal belongings with in reach. Hourly rounding in place. All needs met at this time.

## 2022-01-20 NOTE — ASSESSMENT & PLAN NOTE
-Patient got 4 units of SSI in the last 24 hours and glucose ranged from  in the last 24 hours  - Continuing 25 units of glargine nightly  - Continue SSI  - Continue hypoglycemia protocol

## 2022-01-20 NOTE — DISCHARGE PLANNING
Hospital Care Management- Medical Social Work      Update: THOMAS met with pt at bedside to discuss expanding SNF referrals, as she has been declined by top 3 choices. Pt agreeable to sending referrals to remain SNFs in area and choosing from who is able to accept her. SW faxed choice form to [facility].

## 2022-01-20 NOTE — CARE PLAN
The patient is Stable - Low risk of patient condition declining or worsening    Shift Goals  Clinical Goals: up with therapies  Patient Goals: pain control  Family Goals: not present    Progress made toward(s) clinical / shift goals:  Pain appears to be under control    Patient is not progressing towards the following goals:NA    Problem: Pain - Standard  Goal: Alleviation of pain or a reduction in pain to the patient’s comfort goal  Outcome: Progressing         Problem: Skin Integrity  Goal: Skin integrity is maintained or improved  Outcome: Progressing

## 2022-01-20 NOTE — PROGRESS NOTES
Patient continues to call appropriately throughout day. Turns easily with assistance and uses call light appropriately.

## 2022-01-20 NOTE — DISCHARGE PLANNING
Received Choice form at 0767  Agency/Facility Name: [facility]   Referral sent per Choice form @ [phone]

## 2022-01-21 NOTE — CARE PLAN
The patient is Stable - Low risk of patient condition declining or worsening    Shift Goals  Clinical Goals: improved activity tolerance, pain control, discharge planning  Patient Goals: rest and comfort  Family Goals: not present    Progress made toward(s) clinical / shift goals:      Problem: Knowledge Deficit - Standard  Goal: Patient and family/care givers will demonstrate understanding of plan of care, disease process/condition, diagnostic tests and medications  Outcome: Progressing  Note: Pt updated on plan of care. Pt encouraged to ask questions and questions were answered. Call light within reach. Pt reminded to use call light whenever needing assistance.        Problem: Pain - Standard  Goal: Alleviation of pain or a reduction in pain to the patient’s comfort goal  Outcome: Progressing     Problem: Fall Risk  Goal: Patient will remain free from falls  Outcome: Progressing  Note: Non-slip socks in use. Bed locked and in lowest position. Call light is within reach.  Pt reminded to call before getting out of bed.         Patient is not progressing towards the following goals:    N/a

## 2022-01-21 NOTE — DISCHARGE INSTRUCTIONS
Discharge Instructions    Discharged to other by medical transportation with escort. Discharged via wheelchair, hospital escort: Yes.  Special equipment needed: C-Collar    Be sure to schedule a follow-up appointment with your primary care doctor or any specialists as instructed.     Discharge Plan:   Influenza Vaccine Indication: Indicated: 65 years and older  Influenza Vaccine Given - only chart on this line when given: Influenza Vaccine Given (See MAR)    I understand that a diet low in cholesterol, fat, and sodium is recommended for good health. Unless I have been given specific instructions below for another diet, I accept this instruction as my diet prescription.   Other diet: Resume diet as tolerated    Special Instructions: None    · Is patient discharged on Warfarin / Coumadin?   No     Depression / Suicide Risk    As you are discharged from this [facility] facility, it is important to learn how to keep safe from harming yourself.    Recognize the warning signs:  · Abrupt changes in personality, positive or negative- including increase in energy   · Giving away possessions  · Change in eating patterns- significant weight changes-  positive or negative  · Change in sleeping patterns- unable to sleep or sleeping all the time   · Unwillingness or inability to communicate  · Depression  · Unusual sadness, discouragement and loneliness  · Talk of wanting to die  · Neglect of personal appearance   · Rebelliousness- reckless behavior  · Withdrawal from people/activities they love  · Confusion- inability to concentrate     If you or a loved one observes any of these behaviors or has concerns about self-harm, here's what you can do:  · Talk about it- your feelings and reasons for harming yourself  · Remove any means that you might use to hurt yourself (examples: pills, rope, extension cords, firearm)  · Get professional help from the community (Mental Health, Substance Abuse, psychological counseling)  · Do not  be alone:Call your Safe Contact- someone whom you trust who will be there for you.  · Call your local CRISIS HOTLINE 472-3631 or 591-080-7168  · Call your local Children's Mobile Crisis Response Team [place] (220) 062-0603 or www.Mippin  · Call the toll free National Suicide Prevention Hotlines   · National Suicide Prevention Lifeline 955-045-PKHN (7344)  · [employer] 800-SUICIDE (151-7234)      Cervical Spine Fracture, Stable    A cervical spine fracture is a break or crack in one of the bones of the neck. If there is a very low risk of problems happening during healing, the fracture is considered stable.  What are the causes?  This condition may be caused by:  · Motor vehicle accidents.  · Injuries from sports such as diving, football, biking, wrestling, or skiing.  · Severe osteoporosis or other bone diseases, such as cancers that spread to bone or metabolic abnormalities that cause bone weakness.  What are the signs or symptoms?  Symptoms of this condition include:  · Severe neck pain after an accident or fall. Pain may spread down the shoulders or arms.  · Bruising or swelling on the back of the neck.  · Numbness, tingling, sudden muscle tightening (spasms), or weakness in the arms, legs, or both.  How is this diagnosed?  This condition may be diagnosed based on:  · Your medical history.  · A physical exam of your neck, arms, and legs.  · Imaging studies of the neck, such as:  ? X-rays.  ? CT scan.  ? MRI.  How is this treated?  This condition is treated with a neck brace or cervical collar to keep your neck from moving during the healing process. A cervical collar is a device that supports your chin and the back of your head. You may also be given medicine to help relieve pain.  Follow these instructions at home:  If you have a neck brace:  · Wear the brace as told by your health care provider. Remove it only as told by your health care provider.  · If you experience numbness or  tingling, loosen the brace.  · Keep the brace clean.  · If the brace is not waterproof:  ? Do not let it get wet.  ? Cover it with a watertight covering when you take a bath or a shower.  If you have a cervical collar:  · Do not remove the collar unless your health care provider tells you to do this. If you are allowed to remove the collar for cleaning and bathing:  ? Follow your health care provider’s instructions about how to safely take off the collar.  ? Wash and thoroughly dry the skin on your neck. Check your skin for irritation or sores. If you see any, tell your health care provider.  · Ask your health care provider before making any adjustments to your collar. Small adjustments may be needed over time to improve comfort and reduce pressure on your chin or on the back of your head.  · Keep long hair outside of the collar.  · Keep your collar clean by wiping it with mild soap and water and letting it air-dry completely. The pads can be hand-washed with soap and water and air-dried completely.  Managing pain, stiffness, and swelling    · If directed, put ice on the injured area:  ? If you have a removable brace or cervical collar, remove it as told by your health care provider.  ? Put ice in a plastic bag.  ? Place a towel between your skin and the bag.  ? Leave the ice on for 20 minutes, 2-3 times a day.  Activity  · Do not drive a car until your health care provider approves.  · Do not drive or use heavy machinery while taking prescription pain medicine.  · Avoid physical activity for as long as directed. Ask your health care provider what activities are safe for you.  General instructions  · Take over-the-counter and prescription medicines only as told by your health care provider.  · Do not take baths, swim, or use a hot tub until your health care provider approves. Ask your health care provider if you can take showers. You may only be allowed to take sponge baths for bathing.  · Do not use any products  that contain nicotine or tobacco, such as cigarettes and e-cigarettes. These can delay bone healing. If you need help quitting, ask your health care provider.  · Keep all follow-up visits as told by your health care provider. This is important to help prevent long-term (chronic) or permanent injury, pain, and disability. You may need to have follow-up X-rays or MRI 1-3 weeks after your injury.  Contact a health care provider if:  · You have irritation or sores on your skin from your brace or cervical collar.  Get help right away if:  · You have neck pain that gets worse.  · You develop difficulties swallowing or breathing.  · You develop swelling in your neck.  · You have any of the following problems in your arms, legs, or both:  ? Numbness.  ? Weakness.  ? Burning pain.  ? Movement problems.  · You are unable to control when you urinate or have a bowel movement (incontinence).  · You have problems with coordination or difficulty walking.  This information is not intended to replace advice given to you by your health care provider. Make sure you discuss any questions you have with your health care provider.  Document Released: 11/04/2005 Document Revised: 11/30/2018 Document Reviewed: 09/21/2017  [employer] Patient Education © 2020 [employer]      Vertebral Artery Dissection    Vertebral artery dissection is a tear in a vertebral artery. The vertebral arteries are major blood vessels at the base of the neck. They carry blood from the heart to the brain. When an artery tears, blood collects inside the layers of the artery wall. This can cause a blood clot.  This condition increases the risk for stroke if it is not diagnosed and treated right away. It is a common cause of stroke in people who are 30-45 years old.  What are the causes?  This condition may be caused by:  · A neck injury due to sudden or too much neck movement.  · Having weak blood vessel walls. The walls may tear even when no injury occurs  (spontaneous dissection).  What increases the risk?  The following factors may make you more likely to develop this condition:  · High blood pressure (hypertension).  · Migraines.  · Inherited diseases that affect the strength or shape of the blood vessels.  What are the signs or symptoms?  Symptoms usually appear within days of an injury, but sometimes they may not appear for weeks or years.  Common symptoms of this condition include:  · Stabbing, sharp pain in the head, neck, eye, or face.  · Dizziness.  · Vertigo. This is a feeling that you or things around you are moving when they are not.  · Double vision.  Other symptoms include:  · Hoarse voice.  · Hearing loss.  · Hiccups.  · Loss of taste.  · Difficulty speaking.  · Loss of balance.  · Difficulty swallowing.  · Ear pain.  · Nausea and vomiting.  · Loss of feeling in the torso, legs, or arms.  How is this diagnosed?  This condition may be diagnosed based on tests, such as:  · CT angiogram. X-ray images of your vertebral arteries are taken. A dye makes the images clear.  · MRI angiogram. This is used to check the health of blood vessels.  · Cerebral angiogram. X-ray images of blood vessels in the brain and neck are taken. A dye makes the images clear.  · Doppler ultrasound. This test creates images using sound waves. It shows how well blood flows through your arteries.  How is this treated?  Treatment depends on the cause of your condition and on your overall health. The goal of treatment is to prevent a stroke. If you are having a stroke, it is important to get treatment quickly. Treatment may include:  · Blood thinners. This medicine helps to prevent blood clots. This may be given first through an IV, and then as pills for 3-6 months.  · Procedures to widen a narrow blood vessel (angioplasty) or to place a mesh tube (stent) inside the blood vessel to keep it open.  · Surgery to repair the area. This is rarely needed.  Follow these instructions at  "home:  Medicines  · Take over-the-counter and prescription medicines only as told by your health care provider.  · If you are taking blood thinners:  ? Talk with your health care provider before you take any medicines that contain aspirin or NSAIDs. These medicines increase your risk for dangerous bleeding.  ? Take your medicine exactly as told, at the same time every day.  ? Avoid activities that could cause injury or bruising.  ? Follow instructions about how to prevent falls.  ? Wear a medical alert bracelet or carry a card that lists what medicines you take.  Lifestyle    · Work with your health care provider to control hypertension. This may include:  ? Exercising regularly. Check with your health care provider before starting a new type of exercise.  ? Eating a heart-healthy diet of fruits, vegetables, whole grains, and lean meats. Limit unhealthy fats and eat more healthy fats such as avocados, eggs, and oily fish.  ? Reducing the amount of salt (sodium) that you eat to less than 1,500 mg a day.  ? Reducing stress by doing things that you enjoy and avoiding things that cause you stress.  · Do not use any products that contain nicotine or tobacco, such as cigarettes, e-cigarettes, and chewing tobacco. If you need help quitting, ask your health care provider.  General instructions  · If you drink alcohol:  ? Limit how much you use to:  § 0-1 drink a day for women.  § 0-2 drinks a day for men.  ? Be aware of how much alcohol is in your drink. In the [place] one drink equals one 12 oz bottle of beer (355 mL), one 5 oz glass of wine (148 mL), or one 1½ oz of hard liquor (44 mL).  · Keep all follow-up visits as told by your health care provider. This is important.  Contact a health care provider if you:  · Feel weak or dizzy.  · Have a fever.  Get help right away if:  · You have any symptoms of a stroke. \"BE FAST\" is an easy way to remember the main warning signs of a stroke:  ? B - Balance. Signs are dizziness, " sudden trouble walking, or loss of balance.  ? E - Eyes. Signs are trouble seeing or a sudden change in vision.  ? F - Face. Signs are sudden weakness or numbness of the face, or the face or eyelid drooping on one side.  ? A - Arms. Signs are weakness or numbness in an arm. This happens suddenly and usually on one side of the body.  ? S - Speech. Signs are sudden trouble speaking, slurred speech, or trouble understanding what people say.  ? T - Time. Time to call emergency services. Write down what time symptoms started.  · You have other signs of a stroke, such as:  ? A sudden, severe headache with no known cause.  ? Nausea or vomiting.  ? Seizure.  · You have other symptoms, such as:  ? Difficulty breathing.  ? Chest pain.  These symptoms may represent a serious problem that is an emergency. Do not wait to see if the symptoms will go away. Get medical help right away. Call your local emergency services (911 in the [place] Do not drive yourself to the hospital.  Summary  · Vertebral artery dissection is a tear in an artery that carries blood from the heart to the brain.  · Symptoms usually appear within days of an injury, but sometimes they may not appear for weeks or years.  · This condition increases the risk for stroke if it is not diagnosed and treated right away.  · Treatment depends on the cause of your condition and on your overall health. The goal of treatment is to prevent a stroke.  · Get help right away if you have any symptoms of a stroke.  This information is not intended to replace advice given to you by your health care provider. Make sure you discuss any questions you have with your health care provider.  Document Released: 12/04/2013 Document Revised: 09/12/2019 Document Reviewed: 09/12/2019  Elsevier Patient Education © 2020 [employer]

## 2022-01-21 NOTE — PROGRESS NOTES
[facility] FAMILY MEDICINE PROGRESS NOTE        Attending:   Dr. Matthew    Resident:   Milton Onofre M.D.    PATIENT:   Leena Bella; [id number]; 1950    ID:   71 y.o. femaleScolette Bella is a 71 y.o. female with a history of CAD s/p CABG in 1998, CVA w/ left-sided weakness, type 2 diabetes, hypertension, COPD (2 L O2 at baseline and), A. fib, depression who admitted for weakness, C4 fracture, vertebral artery dissection.    SUBJECTIVE:   No acute events overnight. Sent out additional referrals to facility's yesterday. Still waiting to hear back. No concerns at this time. Still with pain in her legs.  Episode of blood glucose 244 this morning notified nursing and received glass of orange juice which she drink which improved her sugar into the mid 70s.  Patient symptoms went away afterwards    OBJECTIVE:  Vitals:    01/20/22 0324 01/20/22 0742 01/1950 01/21/22 0347   BP: 137/74 136/73 103/66 102/63   Pulse: 80 63 75 71   Resp: 17 18 18 18   Temp: 36.3 °C (97.3 °F) 36.1 °C (96.9 °F) 36.5 °C (97.7 °F) 36.4 °C (97.6 °F)   TempSrc: Temporal Temporal Temporal Temporal   SpO2: 93% 90% 91% 96%   Weight:       Height:           Intake/Output Summary (Last 24 hours) at 1/20/2022 0629  Last data filed at 1/20/2022 0500  Gross per 24 hour   Intake 640 ml   Output 900 ml   Net -260 ml       PHYSICAL EXAM:  General: No acute distress, afebrile, resting comfortably  HEENT: NC/AT. EOMI.   Cardiovascular: RRR without murmurs. Normal capillary refill   Respiratory: CTAB, but poor inspiratory and expiratory effort  Abdomen: soft, nontender, nondistended, no masses  EXT:  DOOLEY, no edema  Skin: Venous stasis changes present in the bilateral lower extremities with dry patches of skin. L heel with bandage over pressure ulcer.   Neuro: Non-focal    LABS:  Recent Labs     01/19/22  0637 01/20/22  0725   WBC 8.8 10.4   RBC 4.55 4.69   HEMOGLOBIN 13.4 13.7   HEMATOCRIT 41.1 43.3   MCV 90.3 92.3   MCH 29.5 29.2   RDW 47.5 49.5    PLATELETCT 157* 169   MPV 11.5 11.0   NEUTSPOLYS 64.10 77.70*   LYMPHOCYTES 22.70 13.20*   MONOCYTES 9.70 7.50   EOSINOPHILS 2.90 0.90   BASOPHILS 0.30 0.30     Recent Labs     01/19/22  0637 01/20/22  0725   SODIUM 135 136   POTASSIUM 3.8 4.7   CHLORIDE 103 102   CO2 24 23   BUN 22 31*   CREATININE 1.18 1.19   CALCIUM 9.6 9.9   MAGNESIUM 2.0  --    PHOSPHORUS 3.2  --    ALBUMIN 3.0* 2.9*     Estimated GFR/CRCL = Estimated Creatinine Clearance: 32.2 mL/min (by C-G formula based on SCr of 1.19 mg/dL).  Recent Labs     01/19/22  0637 01/19/22  0837 01/20/22  0725 01/20/22  0838 01/20/22  1229 01/20/22  1753 01/20/22  2218   GLUCOSE 98  --  51*  --   --   --   --    POCGLUCOSE  --    < >  --    < > 154* 169* 126*    < > = values in this interval not displayed.     Recent Labs     01/19/22  0637 01/20/22  0725   ASTSGOT 10* 11*   ALTSGPT 8 <5   TBILIRUBIN <0.2 0.2   ALKPHOSPHAT 72 71   GLOBULIN 2.6 3.1             No results for input(s): INR, APTT, FIBRINOGEN in the last 72 hours.    Invalid input(s): DIMER      IMAGING:  DX-SHOULDER 2+ LEFT   Final Result      1.  No evidence of acute fracture or dislocation.   2.  Acromioclavicular and glenohumeral degenerative changes.   3.  Atherosclerotic plaque.         DX-SHOULDER 2+ RIGHT   Final Result      1.  No evidence of acute fracture or dislocation.   2.  Degenerative changes of the acromioclavicular joint.   3.  Mild glenohumeral degenerative changes.      DX-SHOULDER 1 VIEW RIGHT   Final Result      Notching at the inferior right humeral head is new from prior exam. This could represent a nondisplaced fracture.      CT-CTA NECK WITH & W/O-POST PROCESSING   Final Result      1.  Biffl grade 2 right vertebral artery injury at the level of the C4 transverse process fracture.   2.  Severe atherosclerotic narrowing of the proximal bilateral carotid arteries.   3.  Emphysematous changes with pulmonary nodules measuring up to 5 mm. Follow-up recommendations below.      Low  Risk: No routine follow-up      High Risk: Optional CT at 12 months      Comments: Use most suspicious nodule as guide to management. Follow-up intervals may vary according to size and risk.      Low Risk - Minimal or absent history of smoking and of other known risk factors.      High Risk - History of smoking or of other known risk factors.      Note: These recommendations do not apply to lung cancer screening, patients with immunosuppression, or patients with known primary cancer.      Fleischner Society 2017 Guidelines for Management of Incidentally Detected Pulmonary Nodules in Adults         Dr. Mullins discussed these findings with Dr. Moreno at 11:20 PM by telephone on 1/17/2022      CT-HEAD W/O   Final Result      1.  There is no acute intracranial hemorrhage or infarct.      2.  White matter lucencies most consistent with small vessel ischemic change versus demyelination or gliosis.      3.  There is cerebral atrophy.         CT-CSPINE WITHOUT PLUS RECONS   Final Result   Addendum 1 of 1   Dr. Perez Moreno aware of findings at 7:40 PM      Final      1.  Apparent subtle nondisplaced fractures involving the anterior and posterior lateral walls of the right C4 transverse foramen.      2.  Multilevel degenerative change.      DX-CHEST-PORTABLE (1 VIEW)   Final Result      No acute cardiac or pulmonary abnormalities are identified.          MEDS:  Current Facility-Administered Medications   Medication Last Admin   • influenza Vac High-Dose Quad (Fluzone) injection 0.7 mL     • insulin GLARGINE (Lantus,Semglee) injection 20 Units at 01/20/22 2220    And   • insulin LISPRO (AdmeLOG,HumaLOG) injection      And   • dextrose 50% (D50W) injection 50 mL     • aspirin (ASA) chewable tab 81 mg 81 mg at 01/21/22 0420   • ROPINIRole (REQUIP) tablet 0.5 mg 0.5 mg at 01/20/22 2215   • DULoxetine (CYMBALTA) capsule 40 mg 40 mg at 01/21/22 0420   • gabapentin (NEURONTIN) capsule 300 mg 300 mg at 01/21/22 0420   • diclofenac  sodium (Voltaren) 1 % gel 2 g     • hydroCHLOROthiazide (HYDRODIURIL) tablet 12.5 mg 12.5 mg at 01/21/22 0420   • labetalol (NORMODYNE/TRANDATE) injection 10-20 mg     • cefUROXime (CEFTIN) tablet 250 mg 250 mg at 01/20/22 2250   • metFORMIN (GLUCOPHAGE) tablet 1,000 mg 1,000 mg at 01/20/22 1804   • ibuprofen (MOTRIN) tablet 800 mg 800 mg at 01/21/22 0306   • acetaminophen (Tylenol) tablet 650 mg 650 mg at 01/21/22 0420   • amiodarone (Cordarone) tablet 200 mg 200 mg at 01/21/22 0421   • budesonide-formoterol (SYMBICORT) 160-4.5 MCG/ACT inhaler 2 Puff 2 Puff at 01/21/22 0516   • carvedilol (COREG) tablet 12.5 mg 12.5 mg at 01/20/22 1803   • clopidogrel (PLAVIX) tablet 75 mg 75 mg at 01/21/22 0421   • furosemide (LASIX) tablet 20 mg 20 mg at 01/21/22 0423   • rosuvastatin (CRESTOR) tablet 40 mg 40 mg at 01/20/22 1803   • tiotropium (Spiriva Respimat) 2.5 mcg/Act inhalation spray 5 mcg 5 mcg at 01/21/22 0420   • senna-docusate (PERICOLACE or SENOKOT S) 8.6-50 MG per tablet 2 Tablet 2 Tablet at 01/20/22 0514    And   • polyethylene glycol/lytes (MIRALAX) PACKET 1 Packet      And   • magnesium hydroxide (MILK OF MAGNESIA) suspension 30 mL      And   • bisacodyl (DULCOLAX) suppository 10 mg     • Respiratory Therapy Consult     • enoxaparin (LOVENOX) inj 40 mg 40 mg at 01/21/22 0421   • ketorolac (TORADOL) injection 15 mg 15 mg at 01/20/22 2229   • nicotine (NICODERM) 14 MG/24HR 14 mg 14 mg at 01/21/22 0420    And   • nicotine polacrilex (NICORETTE) 2 MG piece 2 mg         ASSESSMENT/PLAN:    Problem   Vertebral Artery Dissection (Hcc)   Debility   Closed Fracture Dislocation of Cervical Spine (Hcc)       Vertebral artery dissection (HCC)  #Vertebral artery dissection, BIFFL grade II  #Bilateral carotid artery stenosis  Patient states that she has had generalized weakness ongoing for the past 2 weeks has been worse.  Had a fall last week but does not remember the exact events.  CTA performed in the ED showed a BIFFL  grade 2 right vertebral artery injury at the level of the C4 transverse process fracture.  -Dr. Chow was consulted in the ED, where the patient did not have any new focal neurodeficits, and recommended dual antiplatelet therapy with Plavix and aspirin; recommendations appreciated    Closed fracture dislocation of cervical spine (HCC)  Patient at home and had a fall approximately 1 week ago but does not remember injuring her neck or head but notes she has had neck pain for approximately 1 week.  -In the ED apparent subtle nondisplaced fracture of the anterior and posterior lateral walls of the right C4 transverse foramen.  Neurosurgery consulted in the ED and recommended soft collar only at this time  -Soft collar ordered for comfort  -PT and OT continue to see patient    Debility  Patient with generalized weakness and several falls over the past week.  Lives at home and is likely not in the state where she can care for herself.  Discussed at length with patient and she understands that she likely should not be home alone at this present time and should probably rehab at another facility.  Patient unable to care for self including not taking meds at home, difficulty ambulating to the toilet, ulcers on her right foot and small ulcer on her buttocks as well  -Wound care consulted  -PT and OT to evaluate patient  -Social work to help assist with discharge planning  - This morning first round of referrals have been rejected.  Will continue to send out placement options       Uncontrolled type 2 diabetes mellitus with polyneuropathy (HCC)  - Continuing 15 units of glargine nightly  - Continue SSI  - Continue hypoglycemia protocol  -Patient had one episode of hypoglycemia to 44 overnight.  Will reduce basal insulin to 15 units daily    UTI  - Patient did present initially with dysuria  - Patient has had a Klebsiella UTI in the past  - Patient was started on cefuroxime ER on 1/18/2022, and the last dose will occur in  1/24/2022    Core Measures:   Fluids: None  Lines: PIV  Abx: Cefuroxime  DVT prophylaxis: Lovenox  Code Status: Full    Disposition: Inpatient; pending placement    Milton Onofre M.D.    PGY-1 Family Medicine Resident   [facility]

## 2022-01-21 NOTE — CARE PLAN
The patient is Stable - Low risk of patient condition declining or worsening    Shift Goals  Clinical Goals: improved activity tolerance, pain control, discharge planning  Patient Goals: rest and comfort  Family Goals: not present    Progress made toward(s) clinical / shift goals:  Patient out of bed with 1 person assist. Plan for discharge to SNF. Patient reports improving pain. Smoking cessation information provided as patient states she still smoke 3-4 cigarettes a month.      Problem: Pain - Standard  Goal: Alleviation of pain or a reduction in pain to the patient’s comfort goal  Outcome: Progressing     Problem: Skin Integrity  Goal: Skin integrity is maintained or improved  Outcome: Progressing     Problem: Fall Risk  Goal: Patient will remain free from falls  Outcome: Progressing     Problem: Risk for Aspiration  Goal: Patient's risk for aspiration will be absent or decrease  Outcome: Progressing       Patient is not progressing towards the following goals:

## 2022-01-21 NOTE — DISCHARGE SUMMARY
[facility] DISCHARGE SUMMARY     PATIENT ID:  Name:             Leena Bella   YOB: 1950  Age:                 71 y.o.  female   MRN:               [id number]  Address:         [address] [place] [address] [place] [address]  Phone:            [phone] (home)    ADMISSION DATE:   1/17/2022    DISCHARGE DATE:   1/21/2022    DISCHARGE DIAGNOSES:   Problem Noted   Vertebral Artery Dissection (Hcc) 1/18/2022   Debility 11/4/2021   Closed Fracture Dislocation of Cervical Spine (Hcc) 1/17/2022       ATTENDING PHYSICIAN:   Dr. Jada Matthew    RESIDENT:   Milton Onofre. MD    CONSULTANTS:    Neurosurgery, vascular surgery    PROCEDURES:    None    IMAGING:   DX-SHOULDER 2+ LEFT   Final Result      1.  No evidence of acute fracture or dislocation.   2.  Acromioclavicular and glenohumeral degenerative changes.   3.  Atherosclerotic plaque.         DX-SHOULDER 2+ RIGHT   Final Result      1.  No evidence of acute fracture or dislocation.   2.  Degenerative changes of the acromioclavicular joint.   3.  Mild glenohumeral degenerative changes.      DX-SHOULDER 1 VIEW RIGHT   Final Result      Notching at the inferior right humeral head is new from prior exam. This could represent a nondisplaced fracture.      CT-CTA NECK WITH & W/O-POST PROCESSING   Final Result      1.  Biffl grade 2 right vertebral artery injury at the level of the C4 transverse process fracture.   2.  Severe atherosclerotic narrowing of the proximal bilateral carotid arteries.   3.  Emphysematous changes with pulmonary nodules measuring up to 5 mm. Follow-up recommendations below.      Low Risk: No routine follow-up      High Risk: Optional CT at 12 months      Comments: Use most suspicious nodule as guide to management. Follow-up intervals may vary according to size and risk.      Low Risk - Minimal or absent history of smoking and of other known risk factors.      High Risk - History of smoking or of other known risk factors.       Note: These recommendations do not apply to lung cancer screening, patients with immunosuppression, or patients with known primary cancer.      Fleischner Society 2017 Guidelines for Management of Incidentally Detected Pulmonary Nodules in Adults         Dr. Mullins discussed these findings with Dr. Moreno at 11:20 PM by telephone on 1/17/2022      CT-HEAD W/O   Final Result      1.  There is no acute intracranial hemorrhage or infarct.      2.  White matter lucencies most consistent with small vessel ischemic change versus demyelination or gliosis.      3.  There is cerebral atrophy.         CT-CSPINE WITHOUT PLUS RECONS   Final Result   Addendum 1 of 1   Dr. Perez Moreno aware of findings at 7:40 PM      Final      1.  Apparent subtle nondisplaced fractures involving the anterior and posterior lateral walls of the right C4 transverse foramen.      2.  Multilevel degenerative change.      DX-CHEST-PORTABLE (1 VIEW)   Final Result      No acute cardiac or pulmonary abnormalities are identified.            PHYSICAL EXAM:   General: No acute distress, afebrile, resting comfortably  HEENT: NC/AT. EOMI.   Cardiovascular: RRR without murmurs. Normal capillary refill   Respiratory: CTAB, but poor inspiratory and expiratory effort  Abdomen: soft, nontender, nondistended, no masses  EXT:  DOOLEY, no edema  Skin: Venous stasis changes present in the bilateral lower extremities with dry patches of skin. L heel with bandage over pressure ulcer.   Neuro: Non-focal    LABS:  Recent Labs     01/19/22  0637 01/20/22  0725   WBC 8.8 10.4   RBC 4.55 4.69   HEMOGLOBIN 13.4 13.7   HEMATOCRIT 41.1 43.3   MCV 90.3 92.3   MCH 29.5 29.2   RDW 47.5 49.5   PLATELETCT 157* 169   MPV 11.5 11.0   NEUTSPOLYS 64.10 77.70*   LYMPHOCYTES 22.70 13.20*   MONOCYTES 9.70 7.50   EOSINOPHILS 2.90 0.90   BASOPHILS 0.30 0.30     Recent Labs     01/19/22  0637 01/20/22  0725   SODIUM 135 136   POTASSIUM 3.8 4.7   CHLORIDE 103 102   CO2 24 23   GLUCOSE 98  51*   BUN 22 31*     Lab Results   Component Value Date/Time    CHOLSTRLTOT 194 06/25/2018 10:03 AM     (H) 06/25/2018 10:03 AM    HDL 66 06/25/2018 10:03 AM    TRIGLYCERIDE 57 06/25/2018 10:03 AM       Lab Results   Component Value Date/Time    TROPONINI <0.01 04/30/2017 04:28 PM    CKMB 0.6 05/29/2006 05:00 AM       Lab Results   Component Value Date/Time    TROPONINI <0.01 04/30/2017 04:28 PM    CKMB 0.6 05/29/2006 05:00 AM            HOSPITAL COURSE:   Leena Bella is a 71 y.o. female with a history of CAD s/p CABG in 1998, CVA w/ left-sided weakness, type 2 diabetes, hypertension, COPD (2 L O2 at baseline and), A. fib, depression who presented to the ED for generalized weakness.    She presented to the emergency department after having multiple falls in the past few weeks as well as becoming progressively more weak.  She notes that a few weeks ago her legs buckled and she has had difficulty walking around the house since then.  There was a time where she had fallen on the toilet and was unable to get back up afterwards she does not recall hitting her neck or head does remember falling into the wall and hurting her right and left shoulder.    On arrival in the emergency department she had an elevated troponin of 25 elevated sugars of 250 CT of the head and neck positive for C4 transverse foramen all nondisplaced fracture no evidence of hemorrhage in the brain but a CTA of the neck was positive for Biffle grade 2 right vertebral artery injury.    Patient was placed in a spinal collar and consults were placed with neurosurgery and vascular surgery.  Neurosurgery recommended placement in a soft collar as it is an age indeterminant fracture of the C4 transverse process.  Vascular surgery recommended continual dual antiplatelet therapy and to continue to monitor for any signs of neurological dysfunction.    The patient's insulin regimen was changed to help lower her sugars repeat A1c was 15.1.  With poorly  controlled diabetes at home.  Patient states she lives at home that this time PT and OT as well as  did not think home is a safe place for her and that her home health aide had to call the police in order to get entry into the home to check on her as she was not answering the calls or answering the door.  Throughout the course of the stay her medical comorbidities were managed including her diabetes and a diabetic neuropathy.  On she was found to have mild UTI versus cystitis and was started on antibiotic therapy.  PT and OT recommend placement in a skilled nursing facility for postacute care.    On 1/21 patient was accepted to care facility and is medically stable for discharge.  She should finish her course of antibiotics as outpatient and is recommend that she continue to monitor her insulin regimen and increase her metformin to 1000 mg twice daily.  She should follow-up with her primary care physician in approximately 2 weeks    PROBLEM BASED PLAN:      Vertebral artery dissection (HCC)  #Vertebral artery dissection, BIFFL grade II  #Bilateral carotid artery stenosis  Patient states that she has had generalized weakness ongoing for the past 2 weeks has been worse.  Had a fall last week but does not remember the exact events.  CTA performed in the ED showed a BIFFL grade 2 right vertebral artery injury at the level of the C4 transverse process fracture.  -Dr. Chow was consulted in the ED, where the patient did not have any new focal neurodeficits, and recommended dual antiplatelet therapy with Plavix and aspirin; recommendations appreciated     Closed fracture dislocation of cervical spine (HCC)  Patient at home and had a fall approximately 1 week ago but does not remember injuring her neck or head but notes she has had neck pain for approximately 1 week.  -In the ED apparent subtle nondisplaced fracture of the anterior and posterior lateral walls of the right C4 transverse  foramen.  Neurosurgery consulted in the ED and recommended soft collar only at this time  -Soft collar ordered for comfort  -PT and OT continue to see patient     Debility  Patient with generalized weakness and several falls over the past week.  Lives at home and is likely not in the state where she can care for herself.  Discussed at length with patient and she understands that she likely should not be home alone at this present time and should probably rehab at another facility.  Patient unable to care for self including not taking meds at home, difficulty ambulating to the toilet, ulcers on her right foot and small ulcer on her buttocks as well  -Wound care consulted  -PT and OT to evaluate patient  -Social work to help assist with discharge planning  - This morning first round of referrals have been rejected.  Will continue to send out placement options        Uncontrolled type 2 diabetes mellitus with polyneuropathy (HCC)  - Continuing 15 units of glargine nightly  - Continue SSI  - Continue hypoglycemia protocol  -Patient had one episode of hypoglycemia to 44 overnight.  Will reduce basal insulin to 15 units daily     UTI  - Patient did present initially with dysuria  - Patient has had a Klebsiella UTI in the past  - Patient was started on cefuroxime ER on 1/18/2022, and the last dose will occur in 1/24/2022      DISCHARGE CONDITION:    Stable    DISPOSITION:   Discharge to acute care facility    DISCHARGE MEDICATIONS:      Medication List      START taking these medications      Instructions   aspirin 81 MG Chew chewable tablet  Start taking on: January 22, 2022  Commonly known as: ASA   Chew 1 Tablet every day for 60 days.  Dose: 81 mg     cefUROXime 250 MG Tabs  Commonly known as: CEFTIN   Take 1 Tablet by mouth every 12 hours for 3 days.  Dose: 250 mg        CHANGE how you take these medications      Instructions   DULoxetine HCl 40 MG Cpep  Start taking on: January 22, 2022  What changed:   · medication  strength  · how much to take   Take 40 mg by mouth every day for 60 days.  Dose: 40 mg     ROPINIRole 0.5 MG Tabs  What changed:   · medication strength  · how much to take  Commonly known as: REQUIP   Take 1 Tablet by mouth at bedtime for 60 days.  Dose: 0.5 mg        CONTINUE taking these medications      Instructions   acetaminophen 325 MG Tabs  Commonly known as: Tylenol   Take 2 Tablets by mouth every 6 hours as needed.  Dose: 650 mg     amiodarone 200 MG Tabs  Commonly known as: Cordarone   Take 1 Tablet by mouth every day.  Dose: 200 mg     budesonide-formoterol 160-4.5 MCG/ACT Aero  Commonly known as: SYMBICORT   Inhale 2 Puffs 2 times a day.  Dose: 2 Puff     carvedilol 12.5 MG Tabs  Commonly known as: COREG   Take 1 Tablet by mouth 2 times a day with meals.  Dose: 12.5 mg     clopidogrel 75 MG Tabs  Commonly known as: PLAVIX   Take 1 Tablet by mouth every day.  Dose: 75 mg     Easy Comfort Pen Needles  Generic drug: Insulin Pen Needle 32 G x 4 mm   NULL     ergocalciferol 39891 UNIT capsule  Commonly known as: DRISDOL   Doctor's comments: Please bubble pack with next cycle  Take 1 Capsule by mouth every 7 days. For 6 months then DC  Dose: 50,000 Units     furosemide 20 MG Tabs  Commonly known as: LASIX   Take 1 Tablet by mouth every day.  Dose: 20 mg     gabapentin 300 MG Caps  Commonly known as: NEURONTIN   Take 1 Capsule by mouth 3 times a day.  Dose: 300 mg     glucose blood strip   1 Strip by Other route 3 times a day.  Dose: 1 Each     metFORMIN 850 MG Tabs  Commonly known as: GLUCOPHAGE   Doctor's comments: Please bubble pack next cycle  Take 1 Tablet by mouth 2 times a day with meals.  Dose: 850 mg     omeprazole 20 MG delayed-release capsule  Commonly known as: PRILOSEC   Take 1 Capsule by mouth every day.  Dose: 20 mg     polyethylene glycol/lytes 17 g Pack  Commonly known as: MIRALAX   Take 1 Packet by mouth 1 time a day as needed (if sennosides and docusate ineffective after 24 hours).  Dose: 17  g     rosuvastatin 40 MG tablet  Commonly known as: CRESTOR   Take 1 Tablet by mouth every evening.  Dose: 40 mg     tiotropium 2.5 mcg/Act Aers  Commonly known as: Spiriva Respimat   Inhale 2 Inhalation every day.  Dose: 5 mcg              ACTIVITY:   Normal Activity as Tolerated.    DIET:   Healthy    DISCHARGE INSTRUCTIONS AND FOLLOW UP:  Patient is medically stable for discharge and will be discharged to skilled nursing facility.    Follow Up: Recommend follow-up with primary care physician in approximately 2 weeks    Discharge Instructions:   Patient was instructed to return the ER in the event of worsening symptoms including but not limited to worsening weakness in the legs, worsening control of blood sugars, fever or chills shortness of breath or any other major concerns. Patient understands that failure to do so may indicate worsening of her medical condition(s) and result in adverse clinical outcomes including fatality. We have counseled the patient on the importance of compliance and the patient has agreed to proceed with all medical recommendations and follow up plan indicated above. The patient understands that the failure to do so may result in result in adverse clinical outcomes including fatality.       CC:   LAURA Rosas

## 2022-01-21 NOTE — DISCHARGE PLANNING
Agency/Facility Name: [facility]  Spoke To: Ailyn  Outcome: Pt accepted. Bed available 1/21. Need neg COVID result and DC summary. We set up transport for 1630.      KISHOR walsh

## 2022-01-21 NOTE — NON-PROVIDER
[facility] Progress Note     PATIENT ID:     NAME:             Leena Bella  MRN:               [id number]  Date of birth:   1950     Date of Admission: 1/17/2022   Attending: Dr. Jada Matthew  Resident: Dr. Onofre and Dr. Goodson  Primary Care Physician:  LAURA Rosas  CONSULTS:   Vascular surgery, Dr. Neumann   Neurosurgery, Dr. Dallas Manzo     CC:  Generalized weakness, falls     ID: Leena Bella is a 71 y.o. female with a history of CAD s/p CABG in 1998, CVA w/ left-sided weakness, type 2 diabetes, hypertension, COPD (2 L O2 at baseline), A. fib, depression who presented to the ED for generalized weakness, found on admission to have C4 transverse foraminal nondisplaced fracture and Biffl grade 2 right vertebral artery dissection.      Subjective: POC glucose was 44 this a.m. and patient was experiencing symptoms and feeling unwell, glucose improved to 78 after sipping juice. Long-acting insulin was decreased to 20 units yesterday and patient required 0 units SSI overnight. Per SW, Top 3 choice facilities declined admission, SW discussed with pt and will be sending requests to all remaining facilities in the area. Continues to experience headaches and lower extremity shooting pains.      Objective:      Vitals:      01/20 0700   01/21 0659 01/21 0700   01/21 0713  Most Recent     Temperature (°C) 36.1-36.5    36.4 (97.6)    Pulse 63-75    71    Respiration 18    18    Blood Pressure  102//73    102/63    Pulse Oximetry (%) 90-96            Physical Exam:  General: AAOx4  HEENT: PERRL, EOMI, atraumatic, no bruising  Pulm: CTAB, lungs sound strained  CV: RRR, no m/r/g  Skin: pressure ulcer L heel, with scaling, erythema, and flaking of bilateral feet and legs below the knee  Neuro: significant lower extremity weakness bilaterally, 0/5 strength L arm, 3/5 strength R arm, CN 2-12 intact, sensation present bilaterally upper and lower extremity with decreased sensation on L side, no  new focal deficits     Labs:      *POC glucose 55,67,133,154, 169,126 yesterday  *44, 78 today     ASSESSMENT/PLAN:      71 y.o. female with a history of CAD s/p CABG in 1998, CVA w/ left-sided weakness, type 2 diabetes, hypertension, COPD (2 L O2 at baseline and), A. fib, depression who presented to the ED for generalized weakness found on admission to have C4 transverse foraminal nondisplaced fracture and Biffl grade 2 right vertebral artery dissection.     #DM2  #Diabetic neuropathy  Patient with history of type 2 diabetes. Only takes metformin at home.  Last A1c on 12/3/2021 was 13.5.  Patient admits she has not been compliant with her medicines recently. Patient has had low a.m. sugars for last two days (44 today, increased to 78 after juice). Decrease from 20 to 15 units glargine nightly, DC SSI. Continue metformin 1000 BID.  - A1C -- 15.4 during this hospitalization  - Hypoglycemic protocol on board  - Continue home gabapentin for neuropathy at 300mg TID because of increased Cr. Increased duloxetine to 40mg for improved pain management.   - Electrolytes 1/19/22 were within normal range; foot pain most likely d/t longstanding poorly controlled diabetes  - continue insulin regimen as above during hospital stay. Consider discharging without insulin.     #Vertebral artery dissection, BIFFL grade II  #Bilateral carotid artery stenosis  Patient reports falling about 4 weeks ago. She also was unable to stand up from the toilet for 2 days 1 week ago but denies recent falls. Patient has residual left-sided weakness from prior CVA (2016) without any new focal neurodeficits.   -Dr. Chow Vascular surgery recommends continuing dual antiplatelet therapy which she is prescribed at home. He says he would not initiate full anticoagulation.  No procedural intervention. Outpatient follow-up for surveillance imaging.  He gave patient his clinic information and instructions to call to set up a follow-up visit in 4-6 weeks to  discuss surveillance.  -Started Aspirin 81mg     #C-spine fracture  Patient at home and had a fall approximately 4 weeks ago but does not remember injuring her neck or head but notes she has had neck pain recently.  -In the ED apparent subtle nondisplaced fracture of the anterior and posterior lateral walls of the right C4 transverse foramen.    -PT and OT to see patient  -Neurosurgery: Fitted for [place] or likely downgraded to soft collar bc injury is nonstructural. F/u in clinic in 6 weeks. Patient is now in soft collar.       #Debility  #Generalized weakness   #Ground level falls  Patient with generalized weakness and several falls over recent months.  Lives at home and is likely not in the state where she can care for herself. Patient admits that she is not able to care for herself alone, including not taking meds at home, difficulty ambulating to toilet, self-bathing, and ulcers on L heel and buttocks. Patient agreeable to post-acute placement but would like long-term goal of HomeHealth.   -Wound Care consulted -- pressure wounds on L heel, ischium, and coccyx -- soft boot applied. Wound Care will continue f/u   -PT/OT saw patient yesterday and recommends continued PT/OT 4x/week with recommended post-acute placement  - Social Work had extended discussion with patient yesterday -- reached mutual agreement that post-acute placement to SNF was appropriate and chose 3 facilities to contact. Social work currently working on placement options.     #Dysuria  -Patient reports improved dysuria today  -Hx of Klebsiella UTI 12/2021  -UA shows evidence of bacterial and yeast infection.   -Continue Cefuroxime 250mg, last dose will be 1/24 (started 1/18)  -Fluconazole not started d/t adverse interaction with Plavix      #Acute on chronic hypoxic respiratory failure  #COPD  Patient with long history of smoking and history of COPD.  CT shows emphysematous changes as well as 5 mm pulmonary nodules..  Patient's baseline O2  "is 2 L; she is now requiring only 1.5L to maintain sats above 90%  -Titrate O2 as needed  -Continue home Symbicort  -RT consulted      #Afib  -Patient with history of atrial fibrillation followed by [facility] cardiology.  Rate currently controlled in the 70s.  Patient has not taken her amiodarone or Coreg for approximately 1 week.  -Continue home Coreg and amiodarone  -[id number] score of 8, but not anticoagulated. Score>2 indicates \"moderate-high\" risk for stroke indicating strong candidate for anticoagulation  -Given history of frequent falls, recommend discussing risk/benefits of anticoagulation     #Hx of CVA in 2016  -Patient with history of CVA in 2016.  She does have residual left-sided weakness which is mild; however, she is unable to ambulate over the last few weeks  -Continue outpatient Plavix and Crestor  -patient will likely benefit from anticoagulation given her high risk of repeat stroke       #Elevated troponin  #Hx of CABG 4v in 1998   Patient with history of coronary disease and CABG in 1998.  Notes that she has not had any chest pain or shortness of breath is worsening recently; ever, she has a mildly elevated troponin of 25.  EKG showed sinus rhythm, first-degree AV block, no ST elevation  -Troponin trending down 25 to 23, likely not ACS based on history  -Continue outpatient Plavix and Crestor     #HFpEF  Patient with history of congestive heart failure.  Takes 20 p.o. Lasix daily.  Has not been compliant with medicines over the past week.  Typically follows up with Dr. Quezada at [facility].   -Last echo in November 2021 showed an EF of 60%  -No significant leg swelling, mild increase in O2 demands  -Do not think patient is having an acute exacerbation  -Continue on Lasix at this time and spironolactone  -CTM     #Pulmonary lung nodules   #Tobacco use  Patient with long history of smoking and still smokes currently.  -CT in the ED showed an incidental 5 mm right upper lobe pulmonary " nodule  -Recommend outpatient follow-up  -Nicotine replacement as needed     #HLD  -Continue outpatient Plavix and Crestor     #Right shoulder pain  #L shoulder pain  -Right shoulder pain ongoing for approximately 1 week.  Unknown if she had any specific injuries  -Voltaren cream as needed  -repeat xray show no evidence of fracture bilaterally, although there is evidence of degenerative changes and joint stenosis  -PT/OT     #Restless leg syndrome   -Continue outpatient Requip     CODE STATUS: DNR/DNI. Discuss with patient goals of care.      Seen by: Yusra White, MS3

## 2022-01-21 NOTE — DISCHARGE PLANNING
DC Transport Scheduled    Received request at: 1230    Transport Company Scheduled:  [facility]      Scheduled Date: 01/21/22  Scheduled Time: [id number]    Destination: [facility] & [address]    Notified care team of scheduled transport via Voalte.     If there are any changes needed to the DC transportation scheduled, please contact [facility] at ext. [phone] between the hours of 3098-4496 Mon-Fri. If outside those hours, contact the ED Case Manager at ext. [phone].

## 2022-01-22 NOTE — DISCHARGE PLANNING
RN called to check on transport.   Nikkie from [employer] said that transported are on location and about to  pt.

## 2022-01-22 NOTE — DISCHARGE PLANNING
Anticipated Discharge Disposition: [facility] ride between [id number]    Action: RNCM setup transportation through [facility] to take patient to [facility], approved service for $99.14 faxed to [employer] [facility] for [facility]. Patient signature obtained on cobra. Vorb for cobra from resident Dr. Goodson. Transfer packet placed in locked cabinet outside of room. RNCM left VM for patient's Son Narendra requesting callback to inform him of transfer. No callback received.     Barriers to Discharge: None    Plan: Patient to dc to [facility] today between 1630 and 1700. HCM will continue to follow to assist with dc planning as needed.

## 2022-01-23 NOTE — DISCHARGE PLANNING
Received call from son, Narendra. He was returning call from Laura, she called him 1/21/22. Informed that pt has been transferred to [place]. Provided address and phone number.

## 2022-02-02 PROBLEM — L89.620 DECUBITUS ULCER OF LEFT HEEL, UNSTAGEABLE (HCC): Status: ACTIVE | Noted: 2021-01-01

## 2022-02-02 PROBLEM — L89.620 DECUBITUS ULCER OF LEFT HEEL, UNSTAGEABLE (HCC): Chronic | Status: ACTIVE | Noted: 2021-01-01

## 2022-03-13 NOTE — ASSESSMENT & PLAN NOTE
Pt had the CVA on NOV 30 and she went to [facility]. Pt had an increase in her aspirin since then. It started with tingling going up in her arm and hand. Pt states she still has some problem with speech.  
Pt states that she did not like the way the Botox made her feel. Pt states that she feels like the headaches have not gotten better with the botox.  
Alert-The patient is alert, awake and responds to voice. The patient is oriented to time, place, and person. The triage nurse is able to obtain subjective information.

## 2022-05-04 NOTE — TELEPHONE ENCOUNTER
COPD program follow up phone call:    Did you stop smoking because of the program? Quit prior  Not smoking at time of follow up? Yes  Did you refill your medications? Yes  Did you take them everyday as prescribed? Yes  Have you seen [facility] since discharging from the hospital? n/a  Have you seen Home Health since discharging from the hospital? Yes  Have you seen [facility] since discharging from the hospital? n/a  Have you seen [facility] since discharging from the hospital? n/a

## 2022-05-13 PROBLEM — D72.829 LEUKOCYTOSIS: Status: ACTIVE | Noted: 2022-01-01

## 2022-05-13 PROBLEM — I99.8 LIMB ISCHEMIA: Status: ACTIVE | Noted: 2022-01-01

## 2022-05-13 PROBLEM — I21.4 NSTEMI (NON-ST ELEVATED MYOCARDIAL INFARCTION) (HCC): Status: ACTIVE | Noted: 2022-01-01

## 2022-05-13 PROBLEM — E87.20 METABOLIC ACIDOSIS: Status: ACTIVE | Noted: 2022-01-01

## 2022-05-13 NOTE — ASSESSMENT & PLAN NOTE
MARGARITA likely due to poor oral intake, dehydration, suspect rhabdo as well  Cr 1.93  CPK pending   Continue IVF   Avoid nephrotoxic agents and renally dose adjust all medications

## 2022-05-13 NOTE — H&P
"Hospital Medicine History & Physical Note    Date of Service  5/13/2022    Primary Care Physician  LUISANA Rosas.    Consultants  vascular surgery    Specialist Names: Dr. Bennett     Code Status  DNAR/DNI    Chief Complaint  Chief Complaint   Patient presents with   • Failure to Thrive     Pt covered in feces- recent neck fracture 6 months ago in soft brace- home health RN states she needs more care-pt declining in condition- falls, not walking        History of Presenting Illness  Leena Bella is a 72 y.o. female with a complex past medical history of HFpEF, CAD s/p bypass and AICD, A.fib, hx of CVA, poorly controlled DM2, diabetic neuropathy, HTN, GERD, admission 6 months ago for fall resulting in cervical neck fracture and vertebral artery dissection who presented 5/13/2022 after being found by her home health nurse covered in feces and inability to ambulate.   Here in the ED the patient is very ill appearing, she denies specific complaints but overall feels \"terrible\" and hurts everywhere, she has SOB, denies overt chest pain but states she gets in occasional, admits to nausea, emesis and diarrhea x 4 days, denies dysuria. Has had poor oral intake.     In ER she is afebrile, HR , RR 18, /86, saturating well on RA. Labs remarkable for WBC 12.6, Plt 130, Na 128, Cl 91, bicarb 14 with AG 23, glucose 389, BUN/Cr 55/1.93, LA 3.4, Trop 339.     Pt has cold feet bilaterally with no LE pulses with multiple abrasions, pt was seen by vascular and patient will need bilateral LE amputations.     I discussed the plan of care with patient.    Review of Systems  Review of Systems   Constitutional: Positive for chills, malaise/fatigue and weight loss. Negative for fever.   HENT: Negative for congestion and sore throat.    Eyes: Negative for blurred vision and double vision.   Respiratory: Positive for shortness of breath. Negative for cough.    Cardiovascular: Positive for leg swelling. Negative " for chest pain, palpitations and orthopnea.   Gastrointestinal: Positive for diarrhea, nausea and vomiting. Negative for abdominal pain.   Genitourinary: Negative for dysuria and urgency.   Musculoskeletal: Positive for back pain, falls, joint pain, myalgias and neck pain.   Neurological: Positive for sensory change, weakness and headaches. Negative for dizziness and speech change.   Psychiatric/Behavioral: Positive for depression. Negative for memory loss and substance abuse. The patient is not nervous/anxious.        Past Medical History   has a past medical history of Abscess (6/4/2016), Allergic rhinitis (6/4/2016), Angina, Anxiety disorder (6/4/2016), Arrhythmia, Arthritis, ASTHMA, Automatic implantable cardiac defibrillator in situ, Automatic implantable cardioverter-defibrillator in situ (6/4/2016), Breath shortness, Bronchitis, Cardiomyopathy, ischemic (6/4/2016), CATARACT, Cold, Congestive heart failure ([facility]), COPD (chronic obstructive pulmonary disease) ([facility]) (6/4/2016), Coronary bypass, CVA (cerebral vascular accident) ([facility]) (6/4/2016), Diabetes, Diabetes ([facility]), Diabetic neuropathy ([facility]) (6/4/2016), Dyslipidemia (6/4/2016), GERD (gastroesophageal reflux disease) (6/4/2016), Heart burn, Hiatus hernia syndrome, HTN (hypertension) (6/4/2016), coronary artery bypass graft (6/4/2016), Hypertension, Indigestion, Infectious disease, Labial abscess (6/4/2016), Myocardial infarct ([facility]), Nicotine dependence (6/4/2016), On home oxygen therapy, PEYTON (obstructive sleep apnea) (6/4/2016), Osteoarthritis (6/4/2016), Other acute pain, Pacemaker, RLS (restless legs syndrome) (6/4/2016), and Ventricular tachycardia ([facility]) (6/4/2016).    Surgical History   has a past surgical history that includes other cardiac surgery; other orthopedic surgery; gyn surgery; recovery (10/21/2011); nam rectal abscess (10/8/2013); and nam rectal abscess incision and drainage (5/29/2014).     Family History  family history includes Arthritis  "in her mother and sister; Heart Disease in her father and mother; Other in her brother, mother, and sister.   Family history reviewed with patient. There is no family history that is pertinent to the chief complaint.     Social History   reports that she has been smoking cigarettes. She has a 6.25 pack-year smoking history. She has never used smokeless tobacco. She reports that she does not drink alcohol and does not use drugs.    Allergies  Allergies   Allergen Reactions   • Morphine Unspecified     Heart stopped?   • Cephalexin Rash     Rash - \"looks like I have AIDS\"    Tolerated cefuroxime 2022   • Other Environmental Rash     tape   • Potassium Unspecified     Headache    • Ciprofloxacin Unspecified     Headache     • Escitalopram Unspecified     Unknown reaction     • Lisinopril Unspecified     Unknown reaction   • Losartan Unspecified     Unknown reaction     • Sulfa Drugs Rash     .       Medications  Prior to Admission Medications   Prescriptions Last Dose Informant Patient Reported? Taking?   EASY COMFORT PEN NEEDLES  Patient No No   Sig: NULL   acetaminophen (TYLENOL) 325 MG Tab  Patient No No   Sig: Take 2 Tablets by mouth every 6 hours as needed.   amiodarone (CORDARONE) 200 MG Tab  Patient No No   Sig: Take 1 Tablet by mouth every day.   budesonide-formoterol (SYMBICORT) 160-4.5 MCG/ACT Aerosol  Patient No No   Sig: Inhale 2 Puffs 2 times a day.   carvedilol (COREG) 12.5 MG Tab  Patient No No   Sig: Take 1 Tablet by mouth 2 times a day with meals.   clopidogrel (PLAVIX) 75 MG Tab  Patient No No   Sig: Take 1 Tablet by mouth every day.   ergocalciferol (DRISDOL) 92641 UNIT capsule  Patient No No   Sig: Take 1 Capsule by mouth every 7 days. For 6 months then DC   furosemide (LASIX) 20 MG Tab  Patient No No   Sig: Take 1 Tablet by mouth every day.   gabapentin (NEURONTIN) 300 MG Cap  Patient No No   Sig: Take 1 Capsule by mouth 3 times a day.   glucose blood strip  Patient No No   Si Strip by " Other route 3 times a day.   metFORMIN (GLUCOPHAGE) 850 MG Tab  Patient No No   Sig: Take 1 Tablet by mouth 2 times a day with meals.   omeprazole (PRILOSEC) 20 MG delayed-release capsule  Patient No No   Sig: Take 1 Capsule by mouth every day.   polyethylene glycol/lytes (MIRALAX) 17 g Pack  Patient No No   Sig: Take 1 Packet by mouth 1 time a day as needed (if sennosides and docusate ineffective after 24 hours).   rosuvastatin (CRESTOR) 40 MG tablet  Patient No No   Sig: Take 1 Tablet by mouth every evening.   tiotropium (SPIRIVA RESPIMAT) 2.5 mcg/Act Aero Soln  Patient No No   Sig: Inhale 2 Inhalation every day.      Facility-Administered Medications: None       Physical Exam  Temp:  [36.6 °C (97.9 °F)] 36.6 °C (97.9 °F)  Pulse:  [] 108  Resp:  [18] 18  BP: (107-144)/(54-86) 142/73  SpO2:  [92 %-99 %] 92 %  Blood Pressure : (!) 144/65   Temperature: 36.6 °C (97.9 °F)   Pulse: (!) 108   Respiration: 18   Pulse Oximetry: 99 %       Physical Exam  Vitals and nursing note reviewed.   Constitutional:       General: She is not in acute distress.     Appearance: She is ill-appearing. She is not toxic-appearing.      Comments: Very ill appearing, cachectic elderly female   HENT:      Head: Normocephalic and atraumatic.      Mouth/Throat:      Mouth: Mucous membranes are dry.   Eyes:      General: No scleral icterus.     Extraocular Movements: Extraocular movements intact.      Conjunctiva/sclera: Conjunctivae normal.   Neck:      Comments: In soft neck brace, limited ROM  Cardiovascular:      Rate and Rhythm: Normal rate. Rhythm irregular.      Heart sounds: No murmur heard.    No gallop.   Pulmonary:      Effort: Pulmonary effort is normal. No respiratory distress.      Breath sounds: Normal breath sounds. No rales.   Abdominal:      General: Bowel sounds are normal. There is no distension.      Palpations: Abdomen is soft.      Tenderness: There is no abdominal tenderness. There is no guarding or rebound.    Musculoskeletal:         General: Swelling, tenderness, deformity and signs of injury present.      Right lower leg: Edema present.      Left lower leg: Edema present.      Comments: BLE edema with multiple wounds and blisters, feet are cold to touch without pulses    Skin:     General: Skin is warm.      Findings: Lesion present.   Neurological:      Mental Status: She is alert and oriented to person, place, and time.   Psychiatric:         Mood and Affect: Mood normal.         Behavior: Behavior normal.         Laboratory:  Recent Labs     05/13/22  1326   WBC 12.6*   RBC 4.61   HEMOGLOBIN 13.8   HEMATOCRIT 43.6   MCV 94.6   MCH 29.9   MCHC 31.7*   RDW 47.3   PLATELETCT 130*   MPV 11.8     Recent Labs     05/13/22  1326   SODIUM 128*   POTASSIUM 4.3   CHLORIDE 91*   CO2 14*   GLUCOSE 389*   BUN 55*   CREATININE 1.93*   CALCIUM 10.2     Recent Labs     05/13/22  1326   ALTSGPT 31   ASTSGOT 45   ALKPHOSPHAT 139*   TBILIRUBIN 0.3   GLUCOSE 389*     Recent Labs     05/13/22  1326   APTT 37.4*   INR 1.16*     No results for input(s): NTPROBNP in the last 72 hours.      Recent Labs     05/13/22  1326   TROPONINT 339*       Imaging:  CT-HEAD W/O   Final Result         1. No acute intracranial abnormality. No evidence of acute intracranial hemorrhage or mass lesion.                     US-EXTREMITY ARTERY LOWER BILAT   Final Result      US-EXTREMITY VENOUS LOWER UNILAT RIGHT   Final Result      DX-TIBIA AND FIBULA RIGHT   Final Result      No evidence of bony abnormality.      Apparent edema.      Postsurgical change.      Apparent old radiopaque foreign bodies in the lateral distal leg.                  INTERPRETING LOCATION:  [address] [place] [address], [facility]      DX-CHEST-PORTABLE (1 VIEW)   Final Result      Enlarged cardiac silhouette without evidence of acute disease.      CT-CSPINE WITHOUT PLUS RECONS    (Results Pending)   EC-ECHOCARDIOGRAM COMPLETE W/O CONT    (Results Pending)       X-Ray:  I have personally  reviewed the images and compared with prior images.  EKG:  I have personally reviewed the images and compared with prior images.    Assessment/Plan:  Justification for Admission Status  I anticipate this patient will require at least two midnights for appropriate medical management, necessitating inpatient admission because very ill elderly female with ischemic limb that will require surgery as well as NSTEMI and MARGARITA , multiple comorbdities that will take more than 2 midnights to stablize for safe discharge, pt will likely need placement     * Limb ischemia- (present on admission)  Assessment & Plan  BLE feet cold and without pulses  Arterial US with > 75% stenosis in bilateral proximal superficial femoral arteries.  Small segmental occlusion of the proximal right popliteal artery.   Vascular surgery consulted, pt will need bilateral lower extremity amputations once medically stabilized   Heparin ggt started   Supportive care with pain control   IVF     Leukocytosis- (present on admission)  Assessment & Plan  WBC 12.1 suspect this is reactive, no other signs of sepsis, no obvious source of infection   Blood cultures ordered   Pt given Unasyn in ER   Hold on further antibiotic therapy and monitor closely     Metabolic acidosis- (present on admission)  Assessment & Plan  Acidosis with Bicarb 14, LA 3.9, likely due to ischemia and dehydration   Continue IVF, monitor volume status given hx of CHF   Monitor electrolytes     NSTEMI (non-ST elevated myocardial infarction) (HCC)- (present on admission)  Assessment & Plan  Elevated troponin 389, EKG with ST wave changes, no STEMI   Hx of CAD with CABG, no active chest pain   Suspect elevated trop is due to acidosis and limb ischemia however cannot rule out ACS   Will trend troponin and monitor on tele   Continue home cardiac medications   Will get echocardiogram to evaluate wall motion   On heparin ggt     Atrial fibrillation (HCC)- (present on admission)  Assessment &  Plan  Chronic, currently SR  Continue home cardiac medications   Monitor on tele     MARGARITA (acute kidney injury) ([facility])  Assessment & Plan  MARGARITA likely due to poor oral intake, dehydration, suspect rhabdo as well  Cr 1.93  CPK pending   Continue IVF   Avoid nephrotoxic agents and renally dose adjust all medications     COPD (chronic obstructive pulmonary disease) ([facility])- (present on admission)  Assessment & Plan  Stable on in acute exacerbation   Continue home inhaler therapy   RT protocol     Uncontrolled type 2 diabetes mellitus with polyneuropathy ([facility])- (present on admission)  Assessment & Plan  Poorly controlled DM, most recent Ha1 >15%   Hyperglycemic on admit with 's   Start lantus 5 units  Insulin sliding scale and hypoglycemic protocol   Diabetic diet   Monitor and adjust insulin as needed       VTE prophylaxis: therapeutic anticoagulation with Heparin ggt

## 2022-05-13 NOTE — ASSESSMENT & PLAN NOTE
BLE feet cold and without pulses  Arterial US with > 75% stenosis in bilateral proximal superficial femoral arteries.  Small segmental occlusion of the proximal right popliteal artery.   Vascular surgery consulted, pt will need bilateral lower extremity amputations once medically stabilized   Heparin ggt started   Supportive care with pain control   IVF

## 2022-05-13 NOTE — ASSESSMENT & PLAN NOTE
Poorly controlled DM, most recent Ha1 >15%   Hyperglycemic on admit with 's   Start lantus 5 units  Insulin sliding scale and hypoglycemic protocol   Diabetic diet   Monitor and adjust insulin as needed

## 2022-05-13 NOTE — ED TRIAGE NOTES
Chief Complaint   Patient presents with   • Failure to Thrive     Pt covered in feces- recent neck fracture 6 months ago in soft brace- home health RN states she needs more care-pt declining in condition- falls, not walking

## 2022-05-13 NOTE — CONSULTS
VASCULAR SURGERY CONSULTATION        Seen and examined.  Please see dictated note, #[id number]    Irreversible bilateral lower extremity ischemia with gangrenous changes and insensate bilateral feet with essentially no motor function.     Will need bilateral leg amputation once medically more stable.    Discussed with patient and Dr. Ramirez.    Vascular service will follow.

## 2022-05-13 NOTE — PROGRESS NOTES
Patient arrived to floor. Assumed care at 1645. Assessment complete, A&Ox 3 with occasionally odd responses  Admission record complete, Patient on monitor, Monitor room notified. Pt oriented to room, educated on call light/extension/phone system, RN and CNA extension numbers provided to pt, white board updated. Fall assessment complete, patient educated to call for assistance, pt verbalizes understanding. Pt denies pain or any additional needs at this time. Questions and concerns addressed. Call light, phone, and personal belongings within reach.

## 2022-05-13 NOTE — ASSESSMENT & PLAN NOTE
WBC 12.1 suspect this is reactive, no other signs of sepsis, no obvious source of infection   Blood cultures ordered   Pt given Unasyn in ER   Hold on further antibiotic therapy and monitor closely

## 2022-05-13 NOTE — ASSESSMENT & PLAN NOTE
Elevated troponin 389, EKG with ST wave changes, no STEMI   Hx of CAD with CABG, no active chest pain   Suspect elevated trop is due to acidosis and limb ischemia however cannot rule out ACS   Will trend troponin and monitor on tele   Continue home cardiac medications   Will get echocardiogram to evaluate wall motion   On heparin ggt

## 2022-05-13 NOTE — ASSESSMENT & PLAN NOTE
Acidosis with Bicarb 14, LA 3.9, likely due to ischemia and dehydration   Continue IVF, monitor volume status given hx of CHF   Monitor electrolytes

## 2022-05-14 PROBLEM — R78.81 BACTEREMIA: Status: ACTIVE | Noted: 2022-01-01

## 2022-05-14 NOTE — DISCHARGE PLANNING
0535 - Contacted pts sister, Louise, upon request, to offer information regarding the on call mortuary. On call mortuary is [facility] [employer] [facility] [employer]. Phone # [phone]. Louise amenable and states she will call.

## 2022-05-14 NOTE — CODE DOCUMENTATION
Per patient's POLST, patient requests comfort based measures only. MD Reyna was at bedside and was made aware of this information. MD personally attempted contacting any family members listed in chart to determine plan of care and use of any interventions moving forward at this time. MD was unable to contact family.

## 2022-05-14 NOTE — DIETARY
Nutrition Services: Brief Update    Consult received for failure to thrive. Pt has now transitioned to comfort care. Please consult for additional needs.     RD available PRN.
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
## 2022-05-14 NOTE — CARE PLAN
The patient is Watcher - Medium risk of patient condition declining or worsening    Shift Goals manage heart rate and monitor for signs and symptoms of infection  Clinical Goals: manage heart rate  Patient Goals: GUANAKITO  Family Goals: n/a    Progress made toward(s) clinical / shift goals:    Problem: Pain - Standard  Goal: Alleviation of pain or a reduction in pain to the patient’s comfort goal  Outcome: Progressing   Pt educated on pain scale. RN aware of pt's goal pain. PRN medication orders followed.   Problem: Fall Risk  Goal: Patient will remain free from falls  Outcome: Progressing   Newsome alisha fall scale utilized. Bed alarm in place. Pt is bed-bound. Educated pt and family to call for appropriate assistance prior to ambulating.     Patient is not progressing towards the following goals:

## 2022-05-14 NOTE — PROGRESS NOTES
Rapid response called at 2324 for altered level of consciousness and hypotension. Labs ordered per RRT protocol. Patient's family was able to be contacted and after consulting patient's POLST, decision was made by MD Diaz to change patient to comfort care status per patient's documented wishes and not continue with any further interventions at this time. Rapid response end 0020.

## 2022-05-14 NOTE — PROGRESS NOTES
Spiritual Care Note        Patient's Name: Leena Bella   MRN: [id number]    YOB: 1950   Age and Gender: 72 y.o. female   Unit/Service Area: [facility]   Room (and Bed): [facility]   Ethnicity or Nationality: white   Primary Language: English   Samaritan/Spiritual Preference: Mosque   Place of Residence: [place], [place]   Medical Diagnoses: ischemia bilateral lower extremities  multiple areas of skin ulceration, no wounds and blistering  diabetic foot wound on left, subacute  eevated troponin  MARGARITA  diabetic ketoacidosis   Code Status: Comfort Care/DNR    Date of Admission: 5/13/2022   Length of Stay: 1 day        Nature of the Visit:   Call Back    Crisis Visit:   Death    Referral from/Origin of Request:   Verbal, by staff, on Voalte    Samaritan Needs:   Prayer & [place]    Interaction/Conversation:   Said prayer over patient and gave a blessing.  Spoke with patient's sisters by phone and offered condolences.      Spiritual Care Provider   Chaplain TRACE Jones  [phone]   [email or web address]

## 2022-05-14 NOTE — PROGRESS NOTES
"Brief note    Called by rapid response team for altered mental status. Per RN team, patient was alert and awake earlier today and since change of shift, they have noticed her mental status slowly decline. When they went in to check on her this time, they found her completely unresponsive to painful stimuli.   Patient received coreg and diltiazem this evening as well as insulin for BG in high 200s. Currently BG in low 200s. No narcotics or benzos.   Patient's BP dropped to 80s and she appears mildly tachypneic. She was admitted for failure to thrive and b/l limb ischemia after she was found at home covered in feces and unable to ambulate. She was also noted to have +blood cultures and is currently on unasyn.     Unclear etiology of her acute encephalopathy. Patient has a complex past medical history and is unable to care for herself at home. She is DNR/DNI and review of her POLST from 12/2021 shows that she chose DNR and comfort focused care only. Per Obi Mcmillan's documentation on 12/8/21 \".  Patient elected DNR, comfort focused treatment, no artificial nutrition, feeding tube or IV fluid to prolong her life if she unable to tolerate oral intake permanently as she has had multiple severe medical conditions that cannot be cure.\" I called patient's son several times but was unable to reach him.  Most appropriate management at this time given her history and wishes would be work-up her acute encephalopathy, continue abx/give IVFs but we will not pursue any aggressive or invasive measures such as vasopressors, dialysis or intubation if she continues to deteriorate.     - f/u CBC, CMP, lactate, trop  - f/u ABG  - f/u CT head stat  - f/u TTE already ordered previously  - 1L NS bolus now - re-assess and IVF boluses PRN since she does appear dehydrated  - continue unasyn  - team will attempt to get a hold of son but we have been unsuccessful so far  - team notified covering hospitalist    Ruba Reyna, " MD  Pulmonary and Critical Care Medicine  [facility]

## 2022-05-14 NOTE — PROGRESS NOTES
Received call from sister, Benita, at 0839 and updated her on patient condition and comfort care status. Patient was thankful and requested I try and reach out to son Chema, attempted to call with no answer and voicemail box was full.     1005: spoke with sister, Louise and updated her on patient condition. Will try and reach out to the son again. Per sister received sons room mates number, Abhinav at [phone]. Will attempt to reach out Abhinav as well to locate son to come to bedside.      1139: Confirmed with [facility] that she was recently discharged back to home per patient wishes at the time. Still unable to reach son at the number [phone], will retry as able.    1250: Patient pronounced by 2 RN, sister notified. Attempted to call son for 4th time, no answer and VM full.    1558: attempted to reach son again regarding patient belongings (socks, cell phone, allergy bracelet, ring and necklace), no answer.    1608: called billy La room mate and room mate was able to find son Chema outside. Informed Chema of his mothers passing who states he's always had bad reception on his phone and received no calls. Also relayed that his VM box was full and he stated he was unaware of that. Will lock up belongings on unit and provide them to son tomorrow, 5/15. Provided son with T8 unit # and informed him to ask for this RN for the return of items.    Patient belongings to be locked in belonging cabinet at [facility] RN station soiled utility clearly labeled.

## 2022-05-14 NOTE — PROGRESS NOTES
Rapid response note:    Patient seen and evaluated for hypotensive vital signs.  Rapid response called earlier in shift and change in mentation noted and intensivist recommended IV fluids and CT head.  Family was contacted and Sister Louise is in agreement with comfort care measures and agreement with patient's POLST form which is on file.  Patient would not want any prolonged suffering and is visibly in pain on physical examination.  She is here with septic shock and rhabdomyolysis with ischemic limb and has facial grimacing during evaluation and unable to verbalize her pains.  She is visibly suffering and would benefit from comfort care order set being implemented.  Subsequent order set deployed.    Physical examination:    General: Intermittently obtunded, nonverbal, facial grimacing eliciting pain, tachypneic, malnourished appearance/cachectic  HEENT: Normocephalic atraumatic, eyes closed and does not open spontaneously, pupils equal reactive to light and accommodation bilaterally, no oral lesions  Cardiac: Tachycardic, grade 3 murmur appreciated, no rubs clicks or gallops appreciated.  Respiratory: Tachypneic, diminished breath sounds bilateral bases  Abdomen: Facial grimacing with palpation appreciated  Extremities: Right swollen lower extremity, left lower extremity cold, dusky appearance with missing great left toe nail  Neurologic: Intermittently obtunded, does not follow commands, no tremulous activity appreciated.    Electronically signed:  Prashant Diaz DO    I spent 35 minutes of critical care time evaluating and treating patient excluding procedures.

## 2022-05-14 NOTE — PROGRESS NOTES
Received report from nightshift RN, patient transitioned to comfort care overnight. Family aware, patiently currently unresponsive and resting comfortably in bed, call light within reach.

## 2022-05-14 NOTE — CONSULTS
VASCULAR SURGERY CONSULTATION       DATE OF SERVICE:  05/13/2022     REFERRING PHYSICIAN:  Moises Ramirez MD, Emergency Room physician.     REASON FOR CONSULTATION:  Lower extremity ischemia.     HISTORY OF PRESENT ILLNESS:  This is a 72-year-old female with multiple   medical problems including coronary artery disease with a remote coronary   artery bypass, COPD on oxygen, cerebrovascular accident with left-sided   weakness, congestive heart failure, ischemic cardiomyopathy, dyslipidemia who   was discharged from [facility] in January of this year after she sustained a   cervical fracture and a left vertebral artery dissection, both of which were   treated conservatively with neck brace as well as aspirin and Plavix therapy.    Per report, the patient was found by home health nurse today lying in bed   with no one taking care of her and both her feet were found to be ice cold.    The patient was taken to the Emergency Room.  Arterial study showed essentially no flow to both feet.  I was consulted.     PAST MEDICAL HISTORY:  As above.     ALLERGIES:  MORPHINE, CEPHALEXIN, POTASSIUM, CIPRO, LISINOPRIL, LOSARTAN, AND   SULFA.     HOME MEDICATIONS:  The patient was supposed to be on Plavix, but has not been   taking it.  Unclear if she has been taking other medications as well.     SOCIAL HISTORY:  Significant for tobacco use.  The patient denies alcohol   abuse or illicit drug use.     FAMILY HISTORY:  Significant for hypertension.     REVIEW OF SYSTEMS:  Significant for generalized weakness.     PHYSICAL EXAMINATION:  GENERAL:  Thin, elderly female lying in bed, moaning.  HEENT:  Neck brace in place.  LUNGS:  Decreased breath sounds at bases.  CARDIOVASCULAR:  Mild tachycardia.  ABDOMEN:  Soft, nondistended, nontender.  LOWER EXTREMITIES:  Palpable bilateral femoral pulses, but weak.  The   popliteal pulses are not palpable on either side.  The right leg is discolored   and cold from proximal lower leg down to  foot with scattered blisters.  The   left foot is also ice cold and the toes are discolored.  There is scattered   blisters on the left lower extremity as well.  The pedal pulses are not   palpable on either side.  Both feet are insensate and have very little range of   motion.     LABORATORY DATA:  Reviewed.     RADIOLOGY STUDY:  Reviewed.     ASSESSMENT:  1.  Irreversible bilateral lower extremity ischemia with gangrenous changes   and insensate bilateral feet with essentially no motor function.  2.  Elevated troponin.  3.  Chronic kidney disease.  4.  Chronic obstructive pulmonary disease, on oxygen.  5.  History of cerebrovascular accident with left-sided weakness.  6.  History of cardiomyopathy.  7.  History of dyslipidemia.     PLAN:  I had a long discussion with the patient.  Unfortunately, her legs are   not salvageable.  She would need to undergo bilateral leg amputation once she   is medically more stable.  The patient indicated understanding.     Case was discussed with Dr. Moises Ramirez.     Vascular surgery will follow along.     Thank you for the consultation.        ______________________________  MD NANCY Flores/LIV    DD:  05/13/2022 15:49  DT:  05/13/2022 18:40    Job#:  [id number]

## 2022-05-14 NOTE — PROGRESS NOTES
Urinalysis showed 2-5 WBCs and moderate bacteria as well as budding yeast  Per RN, patient did have some dysuria prior to Watemran being placed  Patient did receive a dose of Unasyn in the ER    CPK slightly elevated at 2756.  Patient already on IV fluids.  Will recheck in early a.m.    Blood cultures then returned 1 out of 2 tubes positive for possible strep.  Patient appears to have a reaction to multiple antibiotics including cephalexin, ciprofloxacin, sulfa drugs.    Added Unasyn.  Await further blood culture results.

## 2022-05-14 NOTE — PROGRESS NOTES
VASCULAR SURGERY PROGRESS NOTE    Decision for comfort care noted.    Vascular service will sign off.

## 2022-05-14 NOTE — PROGRESS NOTES
Assumed care of patient at bedside report from day RN. Updated on POC. Patient currently A & O x 4 but forgetful and shows signs of hallucinations; on 4 L O2 nasal cannula; up bedbound; without complaints of acute pain. Assessment completed Call light within reach. Whiteboard updated. Fall precautions in place. Bed locked and in lowest position. All questions answered. No other needs indicated at this time.

## 2022-05-14 NOTE — PROGRESS NOTES
4 Eyes Skin Assessment Completed by Kathleen RN and Swathi RN.    Head WDL  Ears WDL  Nose WDL  Mouth WDL  Neck WDL  Breast/Chest WDL  Shoulder Blades WDL  Spine WDL  (R) Arm/Elbow/Hand Redness, Blanching and Swelling  (L) Arm/Elbow/Hand Redness, Non-Blanching, Bruising, Abrasion, Scab and Swelling  Abdomen WDL  Groin Redness and Excoriation  Scrotum/Coccyx/Buttocks Redness, Non-Blanching and Excoriation  (R) Leg Redness, Swelling, Shiny and Edema  (L) Leg Redness, Non-Blanching, Scab and Shiny  (R) Heel/Foot/Toe Discoloration, Boggy, Ulcer(s), Bruising, Swelling, Scab and Edema  (L) Heel/Foot/Toe Redness, Discoloration, Boggy, Ulcer(s) and Bruising          Devices In Places Tele Box, Blood Pressure Cuff, Nasal Cannula and Cervical Collar      Interventions In Place Sacral Mepilex, Heel Float Boots, Q2 Turns, Low Air Loss Mattress and Pressure Redistribution Mattress    Possible Skin Injury Yes    Pictures Uploaded Into Epic Yes  Wound Consult Placed Yes  RN Wound Prevention Protocol Ordered Yes

## 2022-05-15 LAB
BACTERIA BLD CULT: ABNORMAL
ETEST SENSITIVITY ETEST: NORMAL
SIGNIFICANT IND 70042: ABNORMAL
SIGNIFICANT IND 70042: ABNORMAL
SITE SITE: ABNORMAL
SITE SITE: ABNORMAL
SOURCE SOURCE: ABNORMAL
SOURCE SOURCE: ABNORMAL
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
## 2022-05-26 NOTE — DOCUMENTATION QUERY
"                                                                         Former[facility]                                                                       Query Response Note      PATIENT:               ANEESH KAUR  ACCT #:                  [id number]  MRN:                     [id number]  :                      1950  ADMIT DATE:       2022 12:17 PM  DISCH DATE:        2022 12:50 PM  RESPONDING  PROVIDER #:        [id number]           QUERY TEXT:    Can the term ?unresponsive? be further clarified.    NOTE:  If an appropriate response is not listed below, please respond with a new note.    If you have any questions please contact:  Marlys Dumas RN CDI [facility]  [email or web address]  Marlys Dumas [address]          The patient's Clinical Indicators include:  72 year old female admitted for failure to thrive and b/l limb ischemia and septic shock.     Maribell  \"Called by rapid response team for altered mental status. Per RN team, patient was alert and awake earlier today and since change of shift, they have noticed her mental status slowly decline. When they went in to check on her this time, they found her  completely unresponsive to painful stimuli.\"     Melger \"Intermittently obtunded, nonverbal, facial grimacing eliciting pain, tachypneic,\"     He \" Night shift and rapid response  were called for hypotension and altered mental status secondary to septic shock.\"    Risk factors: limb ischemia, septic shock, unable to care for herself at home, HFpEF, DM,   Treatment: ABX, labs, comfort care, CT,  Options provided:   -- Unresponsive only   -- Acute metabolic encephalopathy   -- Coma   -- Unable to determine      Query created by: Marlys Dumas on 2022 2:12 PM    RESPONSE TEXT:    Unresponsive only       QUERY TEXT:    The diagnosis of sepsis has been documented in Septic shock in the D/C summary .    Please provide additional clinical indicators/SIRS criteria " "supportive of the documented diagnosis of sepsis.     Note: If an appropriate response is not listed below, please respond with a new note.    Sepsis - real or suspected infection plus 2 or more SIRS criteria  Temp <96.8 or >101  HR >90  RR >20  WBC <4,000 or > 12,000  >10% bandemia     If you have any questions please contact:  Marlys Dumas RN CDI [facility]  [email or web address]  Marlys Dumas [address]      The patient's Clinical Indicators include:  72 year old female admitted for failure to thrive and b/l limb ischemia and septic shock.    5/14 D/C summary He  \"Septic shock due to bacteremia due to  due to ischemic limb/infection\"  \"MARGARITA, NSTEMI, acidosis\"    5/14 Diaz \"Patient seen and evaluated for hypotensive vital signs.  Rapid response called earlier in shift and change in mentation noted \"  \"Respiratory: Tachypneic, diminished breath sounds\"  \" She is here with septic shock and rhabdomyolysis with ischemic limb and has facial grimacing during  evaluation and unable to verbalize her pains.  She is visibly suffering and would benefit from comfort care order set being implemented\"    5/13 H&P [facility] \"WBC 12.1 suspect this is reactive, no other signs of sepsis, no obvious source of infection\"    5/13 ED note Page \"Temp 100.9, SpO2 64%  \"seems extremely weak\"  \"MARGARITA, DKA, bilateral leg ischemia\"    Labs 5/13 WBC 12.6, , Cr 1.93, ALK phos 139, creatine Kinase 3061, LAC 3.4, Trop 339,  + BC    RN flowsheets 5/13 Temp 100.9 at 1659, pulse 154 at 2001,  P 112,R 25, 74/44 at 23:29    Risk factors: FTT, limb ischemia, sacral decubitus ulceration stage 2, DKA, MARGARITA, DM, NSTEMI, Acidosis, HFpEF  Treatment: labs, CXR, BC, CT, US, EKG, Comfort care  Options provided:   -- Sepsis/Septic shock evolving on admission   -- Sepsis/septic shock present on admission   -- Sepsis not present on admission and evolved into sepsis/septic shock after admission   -- Septic/septic shock ruled out   -- Unable to " "determine      Query created by: Marlys Dumas on 5/25/2022 2:40 PM    RESPONSE TEXT:    Sepsis/Septic shock evolving on admission       QUERY TEXT:    SOB with O2 sat of 64% is documented in the Medical Record. Please specify a correlating diagnosis    NOTE:  If an appropriate response is not listed below, please respond with a new note.      If you have any questions please contact:  Marlys Dumas RN [facility]  [email or web address]  Marlys Dumas [address]      The patient's Clinical Indicators include:  72 year old female admitted for FTT and limb ischemia and then was placed on comfort care  due to septic shock due to bacteremia due to  due to ischemic limb/infection.    5/13 ED note Ashley \"The patient says that she is supposed to be on home oxygen  at 3 L/min by nasal cannula and has a history of COPD but she says that she has not had oxygen at home lately.\"  \"SpO2 64%\"    5/13 H&P [facility] \"Effort: Pulmonary effort is normal. No respiratory distress. Breath sounds: Normal breath sounds. \"  \"Respiratory: Positive for shortness of breath. Negative for cough. \"    5/14 Diaz \"Respiratory: Tachypneic, diminished breath sounds bilateral bases\"    5/14 D/c summary He \"ere in the ED the patient is very ill appearing,  she denies specific complaints but overall feels \"terrible\" and hurts everywhere, she has SOB\"    RN flowsheets: 5/13 pulse OX 78%, 64% placed on 4 L and  sats 92%    5/13 ABG pH 7.38, pCO2 33.9, pO2 64.2, O2 sat 90.7%, HCO3 19, Base Excess -5, O2 4.0L,(Patient temp: pH 7.36, pCO2 35.4, pO2 68.8)    Risk factors: FTT, limb ischemia, sacral decubitus ulceration stage 2, DKA, MARGARITA, DM, NSTEMI, Acidosis, HFpEF, + BC, COPD  Treatment: labs, CXR, BC, CT, US, EKG, Comfort care, O2  Options provided:   -- Acute on chronic respiratory failure with hypoxia   -- Acute on chronic respiratory failure with hypercapnia   -- Chronic respiratory failure Patient at their baseline O2 requirement   " -- Patient was not hypoxic, SOB  or in respiratory failure   -- Unable to determine      Query created by: Marlys Dumas on 5/25/2022 2:59 PM    RESPONSE TEXT:    Acute on chronic respiratory failure with hypoxia          Electronically signed by:  BASILIO WILDE MD 5/25/2022 10:41 PM
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
